# Patient Record
Sex: MALE | Race: WHITE | NOT HISPANIC OR LATINO | Employment: OTHER | URBAN - NONMETROPOLITAN AREA
[De-identification: names, ages, dates, MRNs, and addresses within clinical notes are randomized per-mention and may not be internally consistent; named-entity substitution may affect disease eponyms.]

---

## 2017-01-03 ENCOUNTER — OFFICE VISIT (OUTPATIENT)
Dept: CARDIOLOGY | Facility: CLINIC | Age: 67
End: 2017-01-03

## 2017-01-03 ENCOUNTER — OFFICE VISIT (OUTPATIENT)
Dept: FAMILY MEDICINE CLINIC | Facility: CLINIC | Age: 67
End: 2017-01-03

## 2017-01-03 VITALS
SYSTOLIC BLOOD PRESSURE: 120 MMHG | BODY MASS INDEX: 34.36 KG/M2 | WEIGHT: 232 LBS | DIASTOLIC BLOOD PRESSURE: 76 MMHG | HEIGHT: 69 IN | HEART RATE: 78 BPM

## 2017-01-03 VITALS
HEART RATE: 83 BPM | WEIGHT: 232.9 LBS | BODY MASS INDEX: 34.49 KG/M2 | SYSTOLIC BLOOD PRESSURE: 108 MMHG | OXYGEN SATURATION: 91 % | HEIGHT: 69 IN | DIASTOLIC BLOOD PRESSURE: 64 MMHG

## 2017-01-03 DIAGNOSIS — J44.9 CHRONIC OBSTRUCTIVE PULMONARY DISEASE, UNSPECIFIED COPD TYPE (HCC): Chronic | ICD-10-CM

## 2017-01-03 DIAGNOSIS — E78.5 HYPERLIPIDEMIA, UNSPECIFIED HYPERLIPIDEMIA TYPE: ICD-10-CM

## 2017-01-03 DIAGNOSIS — R07.9 CHEST PAIN, UNSPECIFIED TYPE: Primary | Chronic | ICD-10-CM

## 2017-01-03 DIAGNOSIS — R73.09 INCREASED GLUCOSE LEVEL: ICD-10-CM

## 2017-01-03 DIAGNOSIS — I10 ESSENTIAL HYPERTENSION: Primary | ICD-10-CM

## 2017-01-03 DIAGNOSIS — R53.83 FATIGUE, UNSPECIFIED TYPE: Chronic | ICD-10-CM

## 2017-01-03 DIAGNOSIS — I20.9 ISCHEMIC CHEST PAIN (HCC): ICD-10-CM

## 2017-01-03 DIAGNOSIS — I25.10 CORONARY ARTERY DISEASE INVOLVING NATIVE CORONARY ARTERY OF NATIVE HEART WITHOUT ANGINA PECTORIS: ICD-10-CM

## 2017-01-03 DIAGNOSIS — Z71.6 TOBACCO ABUSE COUNSELING: ICD-10-CM

## 2017-01-03 PROCEDURE — 99214 OFFICE O/P EST MOD 30 MIN: CPT | Performed by: INTERNAL MEDICINE

## 2017-01-03 PROCEDURE — 99203 OFFICE O/P NEW LOW 30 MIN: CPT | Performed by: INTERNAL MEDICINE

## 2017-01-03 PROCEDURE — 93010 ELECTROCARDIOGRAM REPORT: CPT | Performed by: INTERNAL MEDICINE

## 2017-01-03 RX ORDER — NITROGLYCERIN 0.4 MG/1
0.4 TABLET SUBLINGUAL
Qty: 100 TABLET | Refills: 11 | Status: SHIPPED | OUTPATIENT
Start: 2017-01-03

## 2017-01-03 RX ORDER — MIRTAZAPINE 15 MG/1
TABLET, ORALLY DISINTEGRATING ORAL
Qty: 90 TABLET | Refills: 1 | Status: SHIPPED | OUTPATIENT
Start: 2017-01-03 | End: 2017-06-19 | Stop reason: SDUPTHER

## 2017-01-03 NOTE — MR AVS SNAPSHOT
Aman Win Daniel   1/3/2017 11:15 AM   Office Visit    Dept Phone:  [phone]   Encounter #:  [id number]    Provider:  Chuy Camargo MD   Department:  [facility]                Your Full Care Plan              Today's Medication Changes          These changes are accurate as of: 1/3/17 12:12 PM.  If you have any questions, ask your nurse or doctor.               Medication(s)that have changed:     mirtazapine 15 MG disintegrating tablet   Commonly known as:  REMERON SOL-TAB   Dissolve 1 tablet on the tongue every night   What changed:  See the new instructions.   Changed by:  Chyna Kennedy MA            Where to Get Your Medications      These medications were sent to [facility] - [place], [place] - [address] [facility] - [phone]  - [phone] [address], [facility] [address]     Phone:  [phone]     mirtazapine 15 MG disintegrating tablet                  Your Updated Medication List          This list is accurate as of: 1/3/17 12:12 PM.  Always use your most recent med list.                aspirin 81 MG EC tablet       budesonide-formoterol 160-4.5 MCG/ACT inhaler   Commonly known as:  SYMBICORT       cetirizine 10 MG tablet   Commonly known as:  zyrTEC       finasteride 5 MG tablet   Commonly known as:  PROSCAR       FLUoxetine 40 MG capsule   Commonly known as:  PROzac   Take 1 CAPSULE BY MOUTH  DAILY FOR DEPRESSION       fluticasone 50 MCG/ACT nasal spray   Commonly known as:  FLONASE       lisinopril-hydrochlorothiazide 20-25 MG per tablet   Commonly known as:  PRINZIDE,ZESTORETIC       meloxicam 15 MG tablet   Commonly known as:  MOBIC       mirtazapine 15 MG disintegrating tablet   Commonly known as:  REMERON SOL-TAB   Dissolve 1 tablet on the tongue every night       naproxen sodium 220 MG tablet   Commonly known as:  ALEVE       pravastatin 80 MG tablet   Commonly known as:  PRAVACHOL        predniSONE 20 MG tablet   Commonly known as:  DELTASONE   Take 1 tablet by mouth Daily.       * PROAIR  (90 BASE) MCG/ACT inhaler   Generic drug:  albuterol       * albuterol (2.5 MG/3ML) 0.083% nebulizer solution   Commonly known as:  PROVENTIL   TAKE INSERT VIAL(2.5MG) BY NEBULIZATION EVERY 4 HOURS AS NEEDED FOR WHEEZING       tamsulosin 0.4 MG capsule 24 hr capsule   Commonly known as:  FLOMAX   TAKE 1 CAPSULE BY MOUTH EVERY NIGHT       tiotropium 18 MCG per inhalation capsule   Commonly known as:  SPIRIVA HANDIHALER   Place 2 puffs into inhaler and inhale Daily.       traMADol 50 MG tablet   Commonly known as:  ULTRAM       * Notice:  This list has 2 medication(s) that are the same as other medications prescribed for you. Read the directions carefully, and ask your doctor or other care provider to review them with you.            You Were Diagnosed With        Codes Comments    Chest pain, unspecified type    -  Primary ICD-10-CM: R07.9  ICD-9-CM: 786.50     Fatigue, unspecified type     ICD-10-CM: R53.83  ICD-9-CM: 780.79     Increased glucose level     ICD-10-CM: R73.09  ICD-9-CM: 790.29 new      Instructions     None    Patient Instructions History      Upcoming Appointments     Visit Type Date Time Department    NEW PATIENT 1/3/2017  3:00 PM [facility] CARDIOLOGY [facility]    OFFICE VISIT 1/3/2017 11:15 AM [facility] Signup     Our records indicate that you have declined [facility] sign[facility]. If you would like to sign up for [employer], please email H[employer] [email or web address] or call [phone] to obtain an activation code.             Other Info from Your Visit           Your Appointments     Jan 03, 2017  3:00 PM CST   New Patient with Mariposa Zamorano MD   [facility] MEDICAL [facility] CARDIOLOGY (--)    [address] [place] [address] Dr Deloris RAMOS [id number] [phone]           Bring all previous medical records and films, along with current medications and insurance information.        "       Allergies     Codeine      Contrast Dye        Reason for Visit     Follow-up 1 month, mirtazipine      Vital Signs     Blood Pressure Pulse Height Weight Body Mass Index Smoking Status    120/76 78 69\" (175.3 cm) 232 lb (105 kg) 34.26 kg/m2 Current Every Day Smoker      Problems and Diagnoses Noted     Chest pain    -  Primary    Tiredness        Increased glucose level            "

## 2017-01-03 NOTE — MR AVS SNAPSHOT
Aman Sanchez   1/3/2017 3:00 PM   Office Visit    Dept Phone:  [phone]   Encounter #:  [id number]    Provider:  Mariposa Zamorano MD   Department:  [facility] CARDIOLOGY                Your Full Care Plan              Today's Medication Changes          These changes are accurate as of: 1/3/17  4:31 PM.  If you have any questions, ask your nurse or doctor.               New Medication(s)Ordered:     nitroglycerin 0.4 MG SL tablet   Commonly known as:  NITROSTAT   Place 1 tablet under the tongue Every 5 (Five) Minutes As Needed for chest pain.   Started by:  Mariposa Zamorano MD         Medication(s)that have changed:     mirtazapine 15 MG disintegrating tablet   Commonly known as:  REMERON SOL-TAB   Dissolve 1 tablet on the tongue every night   What changed:  See the new instructions.   Changed by:  Chyna Kennedy MA            Where to Get Your Medications      These medications were sent to [facility] - [place], [place] - [address] [facility] [phone]  - [phone] [address], [facility] [address]     Phone:  [phone]     mirtazapine 15 MG disintegrating tablet    nitroglycerin 0.4 MG SL tablet                  Your Updated Medication List          This list is accurate as of: 1/3/17  4:31 PM.  Always use your most recent med list.                aspirin 81 MG EC tablet       budesonide-formoterol 160-4.5 MCG/ACT inhaler   Commonly known as:  SYMBICORT       cetirizine 10 MG tablet   Commonly known as:  zyrTEC       finasteride 5 MG tablet   Commonly known as:  PROSCAR       FLUoxetine 40 MG capsule   Commonly known as:  PROzac   Take 1 CAPSULE BY MOUTH  DAILY FOR DEPRESSION       fluticasone 50 MCG/ACT nasal spray   Commonly known as:  FLONASE       lisinopril-hydrochlorothiazide 20-25 MG per tablet   Commonly known as:  PRINZIDE,ZESTORETIC       meloxicam 15 MG tablet   Commonly known as:  MOBIC       mirtazapine 15 MG  disintegrating tablet   Commonly known as:  REMERON SOL-TAB   Dissolve 1 tablet on the tongue every night       naproxen sodium 220 MG tablet   Commonly known as:  ALEVE       nitroglycerin 0.4 MG SL tablet   Commonly known as:  NITROSTAT   Place 1 tablet under the tongue Every 5 (Five) Minutes As Needed for chest pain.       pravastatin 80 MG tablet   Commonly known as:  PRAVACHOL       predniSONE 20 MG tablet   Commonly known as:  DELTASONE   Take 1 tablet by mouth Daily.       * PROAIR  (90 BASE) MCG/ACT inhaler   Generic drug:  albuterol       * albuterol (2.5 MG/3ML) 0.083% nebulizer solution   Commonly known as:  PROVENTIL   TAKE INSERT VIAL(2.5MG) BY NEBULIZATION EVERY 4 HOURS AS NEEDED FOR WHEEZING       tamsulosin 0.4 MG capsule 24 hr capsule   Commonly known as:  FLOMAX   TAKE 1 CAPSULE BY MOUTH EVERY NIGHT       tiotropium 18 MCG per inhalation capsule   Commonly known as:  SPIRIVA HANDIHALER   Place 2 puffs into inhaler and inhale Daily.       traMADol 50 MG tablet   Commonly known as:  ULTRAM       * Notice:  This list has 2 medication(s) that are the same as other medications prescribed for you. Read the directions carefully, and ask your doctor or other care provider to review them with you.            You Were Diagnosed With        Codes Comments    Chest pain, unspecified type    -  Primary ICD-10-CM: R07.9  ICD-9-CM: 786.50     Essential hypertension     ICD-10-CM: I10  ICD-9-CM: 401.9       Instructions     None    Patient Instructions History      Upcoming Appointments     Visit Type Date Time Department    NEW PATIENT 1/3/2017  3:00 PM [facility] CARDIOLOGY POW    OFFICE VISIT 1/3/2017 11:15 AM BRANDAN KNUTSON      [facility] Signup     Our records indicate that you have declined [employer] signup. If you would like to sign up for [employer], please email [email or web address] or call [phone] to obtain an activation code.             Other Info from Your Visit          "  Allergies     Codeine      Contrast Dye        Reason for Visit     Chest Pain New patient for Cardiac Eval.      Vital Signs     Blood Pressure Pulse Height Weight Oxygen Saturation Body Mass Index    108/64 (BP Location: Right arm, Patient Position: Lying, Cuff Size: Large Adult) 83 69\" (175.3 cm) 232 lb 14.4 oz (106 kg) 91% 34.39 kg/m2    Smoking Status                   Current Every Day Smoker           Problems and Diagnoses Noted     Chest pain    -  Primary    High blood pressure            "

## 2017-01-03 NOTE — PROGRESS NOTES
Subjective   Aman Sanchez is a 66 y.o. male.     Chief Complaint   Patient presents with   • Follow-up     1 month, mirtazipine        History of Present Illness     Pt in f/u on chest pain and COPD.    Pt says he thinks the new medication is helping him sleep.  The Spiriva he says he doesn't think is helping any.  He says he has an appointment with the heart doctor this afternoon.  Pt says he is still hurting in his chest but just a little bit.     Pt says yesterday, he went out with his dog and says he got so dizzy he didn't know if he was going to make it back to his house.  He says it was all he could do to get in the house and get in the chair.  He says this isn't the first time this has happened.  He says he sometimes feels dizzy while he is sitting down too.       The following portions of the patient's history were reviewed and updated as appropriate: allergies, current medications, past family history, past medical history, past social history, past surgical history and problem list.    Review of Systems   Constitutional: Negative for activity change, appetite change, fatigue and unexpected weight change.   HENT: Negative for ear pain, facial swelling and hearing loss.    Respiratory: Negative for cough, chest tightness, shortness of breath and wheezing.    Cardiovascular: Positive for chest pain. Negative for palpitations and leg swelling.   Gastrointestinal: Negative for abdominal pain.   Genitourinary: Negative for difficulty urinating, dysuria, flank pain, frequency and urgency.   Musculoskeletal: Negative for arthralgias and back pain.   Skin: Negative for color change and rash.   Allergic/Immunologic: Negative for environmental allergies and food allergies.   Neurological: Positive for dizziness and light-headedness. Negative for syncope, weakness, numbness and headaches.   Hematological: Negative for adenopathy.   Psychiatric/Behavioral: Positive for sleep disturbance. Negative for confusion,  decreased concentration, dysphoric mood and suicidal ideas. The patient is not nervous/anxious.    All other systems reviewed and are negative.      Objective   Physical Exam   Constitutional: He is oriented to person, place, and time. Vital signs are normal. He appears well-developed and well-nourished.   HENT:   Head: Normocephalic and atraumatic.   Right Ear: External ear normal.   Left Ear: External ear normal.   Nose: Nose normal.   Eyes: Conjunctivae and EOM are normal. Pupils are equal, round, and reactive to light.   Neck: Normal range of motion. Neck supple.   Cardiovascular: Normal rate and regular rhythm.  Exam reveals no gallop and no friction rub.    No murmur heard.  Pulmonary/Chest: Effort normal and breath sounds normal. No respiratory distress. He has no wheezes. He has no rales.   Abdominal: Soft. He exhibits no distension. There is no tenderness. There is no rebound and no guarding.   Musculoskeletal: Normal range of motion. He exhibits no edema.   Neurological: He is alert and oriented to person, place, and time. No cranial nerve deficit.   Skin: Skin is warm and dry. No rash noted.   Psychiatric: He has a normal mood and affect. His behavior is normal. Judgment and thought content normal. His mood appears not anxious. He does not exhibit a depressed mood. He expresses no homicidal and no suicidal ideation. He expresses no suicidal plans and no homicidal plans.   Nursing note and vitals reviewed.      Assessment/Plan   Aman was seen today for follow-up.    Diagnoses and all orders for this visit:    Chest pain, unspecified type    Fatigue, unspecified type    Increased glucose level  Comments:  new    Chronic obstructive pulmonary disease, unspecified COPD type        Pt has chest pain - he is seeing cardio today.    Glucose is elevated to 120    Get BMP A1C in February    Refill Remeron with 6 refills    Scribed for Dr. Camargo by Tabatha Ackerman [facility] 01/03/17

## 2017-01-03 NOTE — PROGRESS NOTES
Chief complaint : Chest Pain    Chest Pain    This is a recurrent problem. The current episode started more than 1 year ago. The onset quality is gradual. The problem occurs daily. The problem has been gradually worsening. The pain is present in the substernal region. The pain is at a severity of 5/10. The pain is moderate. The quality of the pain is described as heavy and pressure. The pain does not radiate. Associated symptoms include dizziness, malaise/fatigue, orthopnea, shortness of breath and vomiting. Pertinent negatives include no abdominal pain, claudication, cough, diaphoresis, headaches, hemoptysis, irregular heartbeat, nausea, numbness, palpitations, PND, syncope or weakness. The pain is aggravated by exertion. Risk factors include obesity, male gender, lack of exercise and smoking/tobacco exposure.   His past medical history is significant for CAD, COPD, hyperlipidemia and hypertension.   Pertinent negatives for past medical history include no muscle weakness and no seizures. Prior diagnostic workup includes cardiac catherization.     Very pleasant 66-year-old gentleman who comes today for cardiac evaluation.  He's been having progressively worsening chest discomfort which is exacerbated with minimal exertion and relieved at rest.  The discomfort is associated with shortness of breath diaphoresis and dizziness.  He had myocardial infarction in 2007 and drug-eluting stent was placed to the mid right coronary artery.      3.5mm x 18mm Cypher mid RCA  2007 Dr [facility]       Review of Systems   Constitution: Positive for malaise/fatigue. Negative for decreased appetite, diaphoresis, weakness, night sweats, weight gain and weight loss.   HENT: Negative for headaches, hearing loss, nosebleeds and sore throat.    Eyes: Negative.  Negative for blurred vision and photophobia.   Cardiovascular: Positive for chest pain and orthopnea. Negative for claudication, dyspnea on exertion, irregular heartbeat, leg swelling,  palpitations, paroxysmal nocturnal dyspnea and syncope.   Respiratory: Positive for shortness of breath. Negative for cough, hemoptysis and wheezing.    Endocrine: Negative for cold intolerance, heat intolerance, polydipsia, polyphagia and polyuria.   Hematologic/Lymphatic: Negative.    Skin: Negative for color change, dry skin, flushing, itching and rash.   Musculoskeletal: Negative.  Negative for muscle cramps, muscle weakness and myalgias.   Gastrointestinal: Positive for vomiting. Negative for abdominal pain, change in bowel habit, diarrhea, hematemesis, melena and nausea.   Genitourinary: Negative for dysuria, frequency and hematuria.   Neurological: Positive for dizziness. Negative for focal weakness, light-headedness, loss of balance, numbness and seizures.   Psychiatric/Behavioral: Negative.  Negative for substance abuse, suicidal ideas and thoughts of violence.   Allergic/Immunologic: Negative.        Past Medical History   Diagnosis Date   • Acute bronchitis    • Anemia    • Breast lump    • Cervical radiculopathy    • Coronary atherosclerosis    • Depressive disorder    • Dysuria    • Essential hypertension    • Flank pain    • Hematochezia    • Hematuria syndrome    • Hyperlipidemia    • Hypoglycemia    • Mass of neck    • Neck pain      sprain   • Osteoarthritis    • Type 2 diabetes mellitus        Family History   Problem Relation Age of Onset   • Lung cancer Mother    • Rectal cancer Sister        Codeine and Contrast dye     reports that he has been smoking.  He has never used smokeless tobacco. He reports that he drinks alcohol.    Objective     Vital Signs  Heart Rate:  [78-83] 83  BP: (108-120)/(64-76) 108/64    Physical Exam   Constitutional: He is oriented to person, place, and time. He appears well-developed and well-nourished.   HENT:   Head: Normocephalic and atraumatic.   Eyes: Conjunctivae and EOM are normal. Pupils are equal, round, and reactive to light.   Neck: Neck supple. No JVD  present. Carotid bruit is not present. No tracheal deviation and no edema present.   Cardiovascular: Normal rate, regular rhythm, S1 normal, S2 normal, normal heart sounds and intact distal pulses.  Exam reveals no gallop, no S3, no S4 and no friction rub.    No murmur heard.  Pulmonary/Chest: Effort normal and breath sounds normal. He has no wheezes. He has no rales. He exhibits no tenderness.   Abdominal: Bowel sounds are normal. He exhibits no abdominal bruit and no pulsatile midline mass. There is no rebound and no guarding.   Musculoskeletal: Normal range of motion. He exhibits no edema.   Neurological: He is alert and oriented to person, place, and time.   Skin: Skin is warm and dry.   Psychiatric: He has a normal mood and affect.         ECG 12 Lead  Date/Time: 1/3/2017 5:47 PM  Performed by: PEDRO PABLO STEWART  Authorized by: PEDRO PABLO STEWART   Rhythm: sinus rhythm  Rate: normal  Conduction: incomplete RBBB  QRS axis: left  Q waves: II, III and aVF  Clinical impression: abnormal ECG and myocardial infarction            Assessment/Plan     Patient Active Problem List   Diagnosis   • Ischemic chest pain   • Essential hypertension   • Coronary artery disease involving native coronary artery of native heart without angina pectoris   • Hyperlipidemia   • Tobacco abuse counseling     1. Essential hypertension  Patient's blood pressure actually is well-controlled at this time.  We'll continue with current medications and risk factor modification.    2. Ischemic chest pain  The chest pain that he is describing is typical of angina pectoralis class III.  In view of history of coronary artery disease and stent placement in 2007 and multiple risk factors for coronary artery disease I have suggested that he undergoes cardiac catheterization.  I have discussed indication alternatives and all potential, occasions of cardiac catheterization and patient has given me permission to schedule the procedure for him.    3. Coronary artery  disease involving native coronary artery of native heart without angina pectoris  We will continue with guideline direct medical therapy including aspirin and statin.    4. Hyperlipidemia, unspecified hyperlipidemia type  Continue with current dose of pravastatin.  We will continue to monitor liver function test and lipid panel.    5. Tobacco abuse counseling  Patient has been counseled regarding tobacco smoking cessation.  He has tried Chantix in the past however he did not tolerate the side effects.  He is going to try and cut back as much as he can and try to quit on his own.    I discussed the patients findings and my recommendations with patient.    Mariposa Zamorano MD  01/03/17  5:55 PM     EMR Dragon/Transcription disclaimer:   Much of this encounter note is an electronic transcription/translation of spoken language to printed text. The electronic translation of spoken language may permit erroneous, or at times, nonsensical words or phrases to be inadvertently transcribed; Although I have reviewed the note for such errors, some may still exist.

## 2017-01-09 ENCOUNTER — TRANSCRIBE ORDERS (OUTPATIENT)
Dept: CARDIOLOGY | Facility: HOSPITAL | Age: 67
End: 2017-01-09

## 2017-01-09 ENCOUNTER — HOSPITAL ENCOUNTER (OUTPATIENT)
Dept: OTHER | Facility: HOSPITAL | Age: 67
Discharge: HOME | End: 2017-01-10
Attending: INTERNAL MEDICINE | Admitting: INTERNAL MEDICINE

## 2017-01-09 LAB
ANION GAP SERPL CALCULATED.3IONS-SCNC: 10 MMOL/L (ref 5–15)
APTT PPP: 27.1 SECONDS (ref 20–40.3)
BASOPHILS # BLD AUTO: 0.04 X1000/UL (ref 0–0.2)
BASOPHILS NFR BLD AUTO: 0.5 % (ref 0–2)
BUN SERPL-MCNC: 14 MG/DL (ref 7–21)
CALCIUM SERPL-MCNC: 9 MG/DL (ref 8.4–10.2)
CHLORIDE SERPL-SCNC: 104 MMOL/L (ref 95–110)
CO2 SERPL-SCNC: 27 MMOL/L (ref 22–31)
CONV AUTO IG#: 0.03 X1000/UL (ref 0.01–0.02)
CONV AUTO IG%: 0.3 % (ref 0–0.5)
CREAT SERPL-MCNC: 0.9 MG/DL (ref 0.7–1.3)
EOSINOPHIL # BLD AUTO: 0.56 X1000/UL (ref 0–0.7)
EOSINOPHIL NFR BLD AUTO: 6.4 % (ref 0–7)
ERYTHROCYTE [DISTWIDTH] IN BLOOD: 13.7 % (ref 11.5–14.5)
GLUCOSE SERPL-MCNC: 96 MG/DL (ref 60–100)
GRANULOCYTES # BLD AUTO: 3.34 X1000/UL (ref 2–8.6)
GRANULOCYTES NFR BLD AUTO: 38 % (ref 37–80)
HCT VFR BLD CALC: 45.4 % (ref 39–49)
HGB BLD-MCNC: 15.8 GM/DL (ref 13.7–17.3)
INR PPP: 1 (ref 0.8–1.2)
LYMPHOCYTES # BLD AUTO: 3.88 X1000/UL (ref 0.6–4.2)
LYMPHOCYTES NFR BLD AUTO: 44.2 % (ref 10–50)
MCH RBC QN: 31.1 PG (ref 26–34)
MCHC RBC-ENTMCNC: 34.8 GM/DL (ref 31.5–36.3)
MCV RBC: 89.4 FL (ref 80–98)
MONOCYTES # BLD AUTO: 0.93 X1000/UL (ref 0–0.9)
MONOCYTES NFR BLD AUTO: 10.6 % (ref 0–12)
PLATELET # BLD: 169 X1000/MM3 (ref 150–450)
PMV BLD: 9.6 FL (ref 8–12)
POTASSIUM SERPL-SCNC: 3.8 MMOL/L (ref 3.5–5.1)
PROTHROMBIN TIME: 13.1 SECONDS (ref 11.1–15.3)
RBC # BLD: 5.08 MEGA/MM3 (ref 4.37–5.74)
SODIUM SERPL-SCNC: 141 MMOL/L (ref 137–145)
WBC # BLD: 8.8 X1000/UL (ref 3.2–9.8)

## 2017-01-10 LAB
ANION GAP SERPL CALCULATED.3IONS-SCNC: 8 MMOL/L (ref 5–15)
BASOPHILS # BLD AUTO: 0.03 X1000/UL (ref 0–0.2)
BASOPHILS NFR BLD AUTO: 0.3 % (ref 0–2)
BUN SERPL-MCNC: 15 MG/DL (ref 7–21)
CALCIUM SERPL-MCNC: 9.1 MG/DL (ref 8.4–10.2)
CHLORIDE SERPL-SCNC: 103 MMOL/L (ref 95–110)
CO2 SERPL-SCNC: 29 MMOL/L (ref 22–31)
CONV AUTO IG#: 0.04 X1000/UL (ref 0.01–0.02)
CONV AUTO IG%: 0.3 % (ref 0–0.5)
CREAT SERPL-MCNC: 1 MG/DL (ref 0.7–1.3)
EOSINOPHIL # BLD AUTO: 0.36 X1000/UL (ref 0–0.7)
EOSINOPHIL NFR BLD AUTO: 3.1 % (ref 0–7)
ERYTHROCYTE [DISTWIDTH] IN BLOOD: 13.7 % (ref 11.5–14.5)
GLUCOSE SERPL-MCNC: 105 MG/DL (ref 60–100)
GRANULOCYTES # BLD AUTO: 5.38 X1000/UL (ref 2–8.6)
GRANULOCYTES NFR BLD AUTO: 45.7 % (ref 37–80)
HCT VFR BLD CALC: 43 % (ref 39–49)
HGB BLD-MCNC: 14.7 GM/DL (ref 13.7–17.3)
LYMPHOCYTES # BLD AUTO: 4.61 X1000/UL (ref 0.6–4.2)
LYMPHOCYTES NFR BLD AUTO: 39.2 % (ref 10–50)
MCH RBC QN: 30.5 PG (ref 26–34)
MCHC RBC-ENTMCNC: 34.2 GM/DL (ref 31.5–36.3)
MCV RBC: 89.2 FL (ref 80–98)
MONOCYTES # BLD AUTO: 1.34 X1000/UL (ref 0–0.9)
MONOCYTES NFR BLD AUTO: 11.4 % (ref 0–12)
PLATELET # BLD: 172 X1000/MM3 (ref 150–450)
PMV BLD: 9.8 FL (ref 8–12)
POTASSIUM SERPL-SCNC: 4.1 MMOL/L (ref 3.5–5.1)
RBC # BLD: 4.82 MEGA/MM3 (ref 4.37–5.74)
SODIUM SERPL-SCNC: 140 MMOL/L (ref 137–145)
WBC # BLD: 11.8 X1000/UL (ref 3.2–9.8)

## 2017-01-10 RX ORDER — METOPROLOL SUCCINATE 25 MG/1
25 TABLET, EXTENDED RELEASE ORAL DAILY
Qty: 90 TABLET | Refills: 3 | Status: SHIPPED | OUTPATIENT
Start: 2017-01-10 | End: 2017-03-06 | Stop reason: ALTCHOICE

## 2017-01-10 RX ORDER — PRASUGREL 10 MG/1
10 TABLET, FILM COATED ORAL DAILY
Qty: 30 TABLET | Refills: 11 | Status: SHIPPED | OUTPATIENT
Start: 2017-01-10 | End: 2017-01-10 | Stop reason: SDUPTHER

## 2017-01-10 RX ORDER — PRASUGREL 10 MG/1
10 TABLET, FILM COATED ORAL DAILY
Qty: 90 TABLET | Refills: 3 | Status: SHIPPED | OUTPATIENT
Start: 2017-01-10 | End: 2017-08-11 | Stop reason: ALTCHOICE

## 2017-01-10 RX ORDER — METOPROLOL SUCCINATE 25 MG/1
25 TABLET, EXTENDED RELEASE ORAL DAILY
Qty: 30 TABLET | Refills: 6 | Status: SHIPPED | OUTPATIENT
Start: 2017-01-10 | End: 2017-01-10 | Stop reason: SDUPTHER

## 2017-01-14 NOTE — OP NOTE
[facility]                             CARDIAC CATHETERIZATION     NAME:  JLUIS KILPATRICK    UNIT #:  [id number]  :  1950              PHYSICIAN:  PEDRO PABLO STEWART M.D.  ROOM:  [id number]                 [facility] #:  [id number]  DICTATED:  2017  1001   TRANSCRIBED:  2017 1035        DATE OF PROCEDURE:  2017     INDICATION:  This is a very pleasant 66-year-old gentleman who presented  to my office with a chief complaint of progressively worsening angina  pectoris, Garza Cardiovascular Class Society III. Patient did have  history of coronary artery disease and stent placement back in . At  that time, a 3.5 x 18 mm Cypher stent was deployed to the midright  coronary artery by Dr. Harvey. Given his history of coronary artery  disease and typical symptoms of angina, decision was made to proceed  with a diagnostic coronary angiogram and possible PCI. I discussed the  indication, alternatives, and all potential complications of the  procedure and the patient gave me permission to proceed by signing a  witnessed consent.     PROCEDURES PERFORMED:  1.   Selective coronary angiogram.  2.   Successful angioplasty and stent placement to the distal right       coronary artery using a 3 0 x 23 mm Xience Alpine stent which was       postdilated with a 3.5 x 20 mm noncompliant balloon inflated up to       16 romeo.     DESCRIPTION OF PROCEDURE:  The patient was brought to the cath lab in  stable condition. He was prepped and draped according to the cath lab  protocol under strict aseptic and sterile condition. Patient's right  radial artery site was prepped and draped accordingly. Using ultrasound  guidance, the right radial artery was punctured using a 21-gauge needle,  and a 6-Tamazight sheath was then introduced over a wire. Initially, we  advanced the FL3.5 diagnostic catheter over the wire. The ostium of the  LAD was selectively engaged. Selective angiogram of the LAD  was  performed in multiple projections. Then, we advanced the FR4 diagnostic  catheter over the wire. The ostium of the right coronary artery was  selectively engaged and selective right coronary angiogram of the right  coronary artery and the left circumflex that originates from the right  coronary sinus was obtained.     CORONARIES:  1.   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  2.   LAD: Left anterior descending coronary artery originates from the       left coronary sinus. It courses along the anterior interventricular       groove and it gives rise to a single major diagonal branch and       multiple septal perforators. The diagonal branch has 3 subbranches.       The LAD extends all the way down to the apex. LAD also is noted to       give rise to multiple septal perforators. There is approximately       less than 50% stenosis noted in the mid-LAD.  3.   RCA: The right coronary artery originates from the right coronary       sinus. It courses along the posterior AV groove. It is a relatively       large caliber vessel. It gives rise distally to a decent caliber       PDA and posterolateral branches. The RCA has less than 50% stenosis       at the midsegment. At the edge of the previously placed stent,       there is evidence of in-stent restenosis of approximately 80% to       90%. Shortly after that, the vessel has approximately 30% stenosis       as well.  4.   Left circumflex: The left circumflex originates from the right       coronary sinus and courses along the anterior AV groove. The left       circumflex gives rise to 2 marginal branches and is free of       obstructive disease.     FINAL IMPRESSION:  1.   Single vessel obstructive coronary artery disease noted at the       distal edge of the previously stented segment of the distal RCA.       This appears to be an in-segment and in-stent  restenosis.  2.   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     PLAN:  Plan is to proceed with PCI of the distal RCA.     PERCUTANEOUS CORONARY INTERVENTION OF THE DISTAL RIGHT CORONARY ARTERY:  We advanced the 3 DRC guiding catheter over the wire. The ostium of the  right coronary artery was selectively engaged. Run-through wire was then  advanced into the right coronary artery and crossed the lesion with no  difficulty. Therapeutic anticoagulation was achieved by administration  of unfractionated heparin. Initially, we advanced a 2.5 mm balloon and  performed angioplasty at the lesion site. Then, we advanced a 3.0 x 23  mm Xience Alpine stent. We positioned it across the lesion, overlapped  it with the previously placed stent, and deployed it at a nominal  pressure. We subsequently advanced a 3.5 x 20 mm noncompliant balloon  and performed high pressure inflation within the stented segment. The  final angiogram revealed excellent result with resolution of the  stenosis. The 80% to 90% stenosis was reduced to 0% with no residual  disease. Patient tolerated the procedure very well with no  complications. The radial sheath was removed in the cath lab. Adequate  hemostasis was achieved by placing a transit radial band. Patient was  escorted to the unit in stable condition. He was administered with 60 mg  of Effient in the cath lab.        PEDRO PABLO STEWART M.D.  Electronically Signed  01/14/2017 08:04:44  By MAUREEN BYRNES/[email or web address]  [id number]                          CARDIAC CATHETERIZATION  Page #page

## 2017-01-14 NOTE — DISCHARGE SUMMARY
[facility]                                DISCHARGE SUMMARY     NAME:  JLUIS KILPATRICK    UNIT #:  [id number]  :  1950              ACCT #:  [id number]                                PHYSICIAN:  PEDRO PABLO STEWART M.D.  ADMITTED:  2017         DISCHARGED:  01/10/2017  DICTATED:  01/10/2017  1857   TRANSCRIBED:  01/10/2017 2336        HOSPITAL COURSE:  This is a very pleasant 66-year-old gentleman who  presented with chest discomfort. Subsequent stress test was abnormal  suggesting ischemia. He underwent successful diagnostic coronary  angiogram, which revealed evidence of stenosis distal to his previously  placed stent. The patient had a stent placement back in , 3.5 x 18  mm Cypher stent. Distal to the stent, the patient had an obstructive  stenosis. After angioplasty, we advanced a 3.0 x 23 mm Xience Alpine  stent, which was subsequently post dilated with a 3.5 x 20 mm  noncompliant balloon. Patient was admitted to the hospital and monitored  overnight. He has no chest pain or chest discomfort. He is feeling much  better. His blood pressure is 118/68. Pulse is 57. Temperature is 97.1  degrees Fahrenheit.     MEDICATIONS:  1.   Aspirin 325 mg daily.  2.   Effient 10 mg daily.  3.   Lisinopril 20 mg daily.  4.   Hydrochlorothiazide 25 mg daily.  5.   Fluoxetine 40 mg daily.  6.   Mirtazapine 50 mg at bedtime.  7.   Toprol-XL 25 mg daily.  8.   Tamsulosin 0.4 mg daily.     CONDITION:  Stable.     FOLLOWUP:  He will be following up with me in the office in 4 weeks.     DISCHARGE DIAGNOSES:  1.   Coronary artery disease.  2.   Angina pectoris, Hitchcock Class 3 with abnormal stress test.  3.   Status post successful stent placement to the distal right coronary       artery.  4.   Hypertension.  5.   Hyperlipidemia.     PRIMARY CARE PHYSICIAN:  Dr. Chuy STEWART M.D.  Electronically Signed  2017 08:07:53  By PEDRO PABLO STEWART M.D.      /  [id number]  cc:   MD PEDRO PABLO PORTILLO MD                             DISCHARGE SUMMARY  Page #page

## 2017-01-23 ENCOUNTER — HOSPITAL ENCOUNTER (EMERGENCY)
Facility: HOSPITAL | Age: 67
Discharge: LEFT BEFORE DISCHARGE | End: 2017-01-23

## 2017-01-23 VITALS
BODY MASS INDEX: 34.07 KG/M2 | HEIGHT: 69 IN | RESPIRATION RATE: 20 BRPM | TEMPERATURE: 98 F | DIASTOLIC BLOOD PRESSURE: 91 MMHG | WEIGHT: 230 LBS | OXYGEN SATURATION: 95 % | HEART RATE: 68 BPM | SYSTOLIC BLOOD PRESSURE: 180 MMHG

## 2017-01-23 PROCEDURE — 99211 OFF/OP EST MAY X REQ PHY/QHP: CPT

## 2017-01-24 ENCOUNTER — APPOINTMENT (OUTPATIENT)
Dept: CT IMAGING | Facility: HOSPITAL | Age: 67
End: 2017-01-24

## 2017-01-24 ENCOUNTER — APPOINTMENT (OUTPATIENT)
Dept: GENERAL RADIOLOGY | Facility: HOSPITAL | Age: 67
End: 2017-01-24

## 2017-01-24 ENCOUNTER — HOSPITAL ENCOUNTER (OUTPATIENT)
Facility: HOSPITAL | Age: 67
Setting detail: OBSERVATION
Discharge: HOME | End: 2017-01-25
Attending: EMERGENCY MEDICINE | Admitting: INTERNAL MEDICINE

## 2017-01-24 DIAGNOSIS — R55 SYNCOPE AND COLLAPSE: ICD-10-CM

## 2017-01-24 DIAGNOSIS — R06.02 SHORTNESS OF BREATH: ICD-10-CM

## 2017-01-24 DIAGNOSIS — R07.2 PRECORDIAL CHEST PAIN: Primary | ICD-10-CM

## 2017-01-24 LAB
ALBUMIN SERPL-MCNC: 3.8 G/DL (ref 3.4–4.8)
ALBUMIN/GLOB SERPL: 1.3 G/DL (ref 1.1–1.8)
ALP SERPL-CCNC: 64 U/L (ref 38–126)
ALT SERPL W P-5'-P-CCNC: 71 U/L (ref 21–72)
ANION GAP SERPL CALCULATED.3IONS-SCNC: 12 MMOL/L (ref 5–15)
AST SERPL-CCNC: 38 U/L (ref 17–59)
BASOPHILS # BLD AUTO: 0.04 10*3/MM3 (ref 0–0.2)
BASOPHILS NFR BLD AUTO: 0.4 % (ref 0–2)
BILIRUB SERPL-MCNC: 0.5 MG/DL (ref 0.2–1.3)
BUN BLD-MCNC: 19 MG/DL (ref 7–21)
BUN/CREAT SERPL: 21.3 (ref 7–25)
CALCIUM SPEC-SCNC: 9.7 MG/DL (ref 8.4–10.2)
CHLORIDE SERPL-SCNC: 101 MMOL/L (ref 95–110)
CK MB SERPL-CCNC: 1.08 NG/ML (ref 0–5)
CK MB SERPL-CCNC: 1.11 NG/ML (ref 0–5)
CK SERPL-CCNC: 128 U/L (ref 55–170)
CK SERPL-CCNC: 129 U/L (ref 55–170)
CO2 SERPL-SCNC: 26 MMOL/L (ref 22–31)
CREAT BLD-MCNC: 0.89 MG/DL (ref 0.7–1.3)
D-DIMER, QUANTITATIVE (MAD,POW, STR): 498 NG/ML (FEU) (ref 0–470)
DEPRECATED RDW RBC AUTO: 44.9 FL (ref 35.1–43.9)
EOSINOPHIL # BLD AUTO: 0.62 10*3/MM3 (ref 0–0.7)
EOSINOPHIL NFR BLD AUTO: 6.4 % (ref 0–7)
ERYTHROCYTE [DISTWIDTH] IN BLOOD BY AUTOMATED COUNT: 13.8 % (ref 11.5–14.5)
GFR SERPL CREATININE-BSD FRML MDRD: 86 ML/MIN/1.73 (ref 49–113)
GLOBULIN UR ELPH-MCNC: 3 GM/DL (ref 2.3–3.5)
GLUCOSE BLD-MCNC: 97 MG/DL (ref 60–100)
HCT VFR BLD AUTO: 44 % (ref 39–49)
HGB BLD-MCNC: 15.3 G/DL (ref 13.7–17.3)
HOLD SPECIMEN: NORMAL
HOLD SPECIMEN: NORMAL
IMM GRANULOCYTES # BLD: 0.05 10*3/MM3 (ref 0–0.02)
IMM GRANULOCYTES NFR BLD: 0.5 % (ref 0–0.5)
LYMPHOCYTES # BLD AUTO: 3.71 10*3/MM3 (ref 0.6–4.2)
LYMPHOCYTES NFR BLD AUTO: 38.5 % (ref 10–50)
MCH RBC QN AUTO: 30.8 PG (ref 26.5–34)
MCHC RBC AUTO-ENTMCNC: 34.8 G/DL (ref 31.5–36.3)
MCV RBC AUTO: 88.5 FL (ref 80–98)
MONOCYTES # BLD AUTO: 1.07 10*3/MM3 (ref 0–0.9)
MONOCYTES NFR BLD AUTO: 11.1 % (ref 0–12)
NEUTROPHILS # BLD AUTO: 4.15 10*3/MM3 (ref 2–8.6)
NEUTROPHILS NFR BLD AUTO: 43.1 % (ref 37–80)
NT-PROBNP SERPL-MCNC: 239 PG/ML (ref 0–900)
PLATELET # BLD AUTO: 179 10*3/MM3 (ref 150–450)
PMV BLD AUTO: 10.1 FL (ref 8–12)
POTASSIUM BLD-SCNC: 4.3 MMOL/L (ref 3.5–5.1)
PROT SERPL-MCNC: 6.8 G/DL (ref 6.3–8.6)
RBC # BLD AUTO: 4.97 10*6/MM3 (ref 4.37–5.74)
SODIUM BLD-SCNC: 139 MMOL/L (ref 137–145)
TROPONIN I SERPL-MCNC: 0.01 NG/ML
TROPONIN I SERPL-MCNC: <0.012 NG/ML
WBC NRBC COR # BLD: 9.64 10*3/MM3 (ref 3.2–9.8)
WHOLE BLOOD HOLD SPECIMEN: NORMAL
WHOLE BLOOD HOLD SPECIMEN: NORMAL

## 2017-01-24 PROCEDURE — 93005 ELECTROCARDIOGRAM TRACING: CPT

## 2017-01-24 PROCEDURE — 82550 ASSAY OF CK (CPK): CPT | Performed by: EMERGENCY MEDICINE

## 2017-01-24 PROCEDURE — 85379 FIBRIN DEGRADATION QUANT: CPT | Performed by: EMERGENCY MEDICINE

## 2017-01-24 PROCEDURE — 82550 ASSAY OF CK (CPK): CPT | Performed by: PHYSICIAN ASSISTANT

## 2017-01-24 PROCEDURE — G0378 HOSPITAL OBSERVATION PER HR: HCPCS

## 2017-01-24 PROCEDURE — 99285 EMERGENCY DEPT VISIT HI MDM: CPT

## 2017-01-24 PROCEDURE — 85025 COMPLETE CBC W/AUTO DIFF WBC: CPT | Performed by: EMERGENCY MEDICINE

## 2017-01-24 PROCEDURE — 84484 ASSAY OF TROPONIN QUANT: CPT | Performed by: PHYSICIAN ASSISTANT

## 2017-01-24 PROCEDURE — 93010 ELECTROCARDIOGRAM REPORT: CPT | Performed by: INTERNAL MEDICINE

## 2017-01-24 PROCEDURE — 80053 COMPREHEN METABOLIC PANEL: CPT | Performed by: EMERGENCY MEDICINE

## 2017-01-24 PROCEDURE — 82553 CREATINE MB FRACTION: CPT | Performed by: PHYSICIAN ASSISTANT

## 2017-01-24 PROCEDURE — 84484 ASSAY OF TROPONIN QUANT: CPT | Performed by: EMERGENCY MEDICINE

## 2017-01-24 PROCEDURE — 0 IOPAMIDOL PER 1 ML: Performed by: EMERGENCY MEDICINE

## 2017-01-24 PROCEDURE — 71020 HC CHEST PA AND LATERAL: CPT

## 2017-01-24 PROCEDURE — 71275 CT ANGIOGRAPHY CHEST: CPT

## 2017-01-24 PROCEDURE — 82553 CREATINE MB FRACTION: CPT | Performed by: EMERGENCY MEDICINE

## 2017-01-24 PROCEDURE — 83880 ASSAY OF NATRIURETIC PEPTIDE: CPT | Performed by: EMERGENCY MEDICINE

## 2017-01-24 RX ORDER — PRASUGREL 10 MG/1
10 TABLET, FILM COATED ORAL DAILY
Status: DISCONTINUED | OUTPATIENT
Start: 2017-01-25 | End: 2017-01-25

## 2017-01-24 RX ORDER — BUDESONIDE AND FORMOTEROL FUMARATE DIHYDRATE 160; 4.5 UG/1; UG/1
2 AEROSOL RESPIRATORY (INHALATION)
Status: DISCONTINUED | OUTPATIENT
Start: 2017-01-24 | End: 2017-01-26 | Stop reason: HOSPADM

## 2017-01-24 RX ORDER — METOPROLOL SUCCINATE 25 MG/1
25 TABLET, EXTENDED RELEASE ORAL DAILY
Status: DISCONTINUED | OUTPATIENT
Start: 2017-01-25 | End: 2017-01-25

## 2017-01-24 RX ORDER — LISINOPRIL 5 MG/1
5 TABLET ORAL DAILY
Status: DISCONTINUED | OUTPATIENT
Start: 2017-01-25 | End: 2017-01-25

## 2017-01-24 RX ORDER — TAMSULOSIN HYDROCHLORIDE 0.4 MG/1
0.4 CAPSULE ORAL DAILY
Status: DISCONTINUED | OUTPATIENT
Start: 2017-01-25 | End: 2017-01-25

## 2017-01-24 RX ORDER — FLUOXETINE HYDROCHLORIDE 20 MG/1
40 CAPSULE ORAL DAILY
Status: DISCONTINUED | OUTPATIENT
Start: 2017-01-25 | End: 2017-01-25

## 2017-01-24 RX ORDER — SODIUM CHLORIDE 0.9 % (FLUSH) 0.9 %
10 SYRINGE (ML) INJECTION AS NEEDED
Status: DISCONTINUED | OUTPATIENT
Start: 2017-01-24 | End: 2017-01-26 | Stop reason: HOSPADM

## 2017-01-24 RX ORDER — FINASTERIDE 5 MG/1
5 TABLET, FILM COATED ORAL ONCE
Status: DISCONTINUED | OUTPATIENT
Start: 2017-01-24 | End: 2017-01-26 | Stop reason: HOSPADM

## 2017-01-24 RX ORDER — HYDRALAZINE HYDROCHLORIDE 20 MG/ML
20 INJECTION INTRAMUSCULAR; INTRAVENOUS EVERY 6 HOURS PRN
Status: DISCONTINUED | OUTPATIENT
Start: 2017-01-24 | End: 2017-01-26 | Stop reason: HOSPADM

## 2017-01-24 RX ORDER — ONDANSETRON 2 MG/ML
4 INJECTION INTRAMUSCULAR; INTRAVENOUS EVERY 6 HOURS PRN
Status: DISCONTINUED | OUTPATIENT
Start: 2017-01-24 | End: 2017-01-26 | Stop reason: HOSPADM

## 2017-01-24 RX ORDER — CETIRIZINE HYDROCHLORIDE 10 MG/1
10 TABLET ORAL DAILY
Status: DISCONTINUED | OUTPATIENT
Start: 2017-01-25 | End: 2017-01-25

## 2017-01-24 RX ORDER — SODIUM CHLORIDE 0.9 % (FLUSH) 0.9 %
1-10 SYRINGE (ML) INJECTION AS NEEDED
Status: DISCONTINUED | OUTPATIENT
Start: 2017-01-24 | End: 2017-01-26 | Stop reason: HOSPADM

## 2017-01-24 RX ORDER — PRAVASTATIN SODIUM 40 MG
80 TABLET ORAL NIGHTLY
Status: DISCONTINUED | OUTPATIENT
Start: 2017-01-24 | End: 2017-01-25

## 2017-01-24 RX ORDER — ALBUTEROL SULFATE 2.5 MG/3ML
2.5 SOLUTION RESPIRATORY (INHALATION)
Status: DISCONTINUED | OUTPATIENT
Start: 2017-01-25 | End: 2017-01-26 | Stop reason: HOSPADM

## 2017-01-24 RX ORDER — ASPIRIN 325 MG
325 TABLET ORAL DAILY
Status: DISCONTINUED | OUTPATIENT
Start: 2017-01-24 | End: 2017-01-26 | Stop reason: HOSPADM

## 2017-01-24 RX ADMIN — IOPAMIDOL 66 ML: 755 INJECTION, SOLUTION INTRAVENOUS at 17:16

## 2017-01-24 NOTE — Clinical Note
Level of Care: Telemetry [5]   Diagnosis: Precordial chest pain [551001]   Admitting Physician: KHADIJAH LEMOS [[id number]]   Attending Physician: KHADIJAH LEMOS [[id number]]

## 2017-01-24 NOTE — IP AVS SNAPSHOT
AFTER VISIT SUMMARY             Aman Sanchez           About your hospitalization     You were admitted on:  January 24, 2017 You last received care in the:  [address]       Procedures & Surgeries         Medications    If you or your caregiver advised us that you are currently taking a medication and that medication is marked below as “Resume”, this simply indicates that we have reviewed those medications to make sure our new therapy recommendations do not interfere.  If you have concerns about medications other than those new ones which we are prescribing today, please consult the physician who prescribed them (or your primary physician).  Our review of your home medications is not meant to indicate that we are directing their use.             Your Medications      START taking these medications     amLODIPine 5 MG tablet   Take 1 tablet by mouth Daily.   Last time this was given:  1/25/2017  2:16 PM   Commonly known as:  NORVASC   Next Dose Due:  1/26 9 AM   Notes to Patient:  For blood pressure             CHANGE how you take these medications     aspirin 81 MG tablet   Take 2 tablets by mouth Daily for 30 days.   According to our records, you may have been taking this medication differently.   What changed:  how much to take   Next Dose Due:  1/26 9 AM   Notes to Patient:  For heart prevention           tiotropium 18 MCG per inhalation capsule   Place 2 puffs into inhaler and inhale Daily.   According to our records, you may have been taking this medication differently.   Commonly known as:  SPIRIVA HANDIHALER   What changed:    - when to take this  - reasons to take this   Next Dose Due:  1/26 9 AM   Notes to Patient:  For breathing             CONTINUE taking these medications     budesonide-formoterol 160-4.5 MCG/ACT inhaler   Inhale 2 puffs 2 (Two) Times a Day As Needed.   Commonly known as:  SYMBICORT   Next Dose Due:  As needed   Notes to Patient:  For breathing           cetirizine 10 MG tablet   Take 10 mg by mouth Daily.   Last time this was given:  1/25/2017  8:32 AM   Commonly known as:  zyrTEC   Next Dose Due:  1/26 9 AM   Notes to Patient:  For allergies             finasteride 5 MG tablet   Take 5 mg by mouth 1 (One) Time.   Commonly known as:  PROSCAR   Next Dose Due:  1/26 9 AM   Notes to Patient:  For prostate           FLUoxetine 40 MG capsule   Take 1 CAPSULE BY MOUTH  DAILY FOR DEPRESSION   Last time this was given:  1/25/2017  8:34 AM   Commonly known as:  PROzac   Next Dose Due:  1/26 9 AM   Notes to Patient:  For depression           fluticasone 50 MCG/ACT nasal spray   2 sprays into each nostril Every Night.   Commonly known as:  FLONASE   Next Dose Due:  1/25 9 pm   Notes to Patient:  For allergies           lisinopril-hydrochlorothiazide 20-25 MG per tablet   Take 1 tablet by mouth Daily.   Commonly known as:  PRINZIDE,ZESTORETIC   Next Dose Due:  1/26 9 AM   Notes to Patient:  For blood pressure/fluid           meloxicam 15 MG tablet   Take 15 mg by mouth 1 (One) Time.   Commonly known as:  MOBIC   Next Dose Due:  1/26 9 AM   Notes to Patient:  For joint pain           metoprolol succinate XL 25 MG 24 hr tablet   Take 1 tablet by mouth Daily for 90 days.   Last time this was given:  1/25/2017  8:33 AM   Commonly known as:  TOPROL-XL   Next Dose Due:  1/26 9 AM   Notes to Patient:  For heart/blood pressure           mirtazapine 15 MG disintegrating tablet   Dissolve 1 tablet on the tongue every night   Commonly known as:  REMERON SOL-TAB   Next Dose Due:  1/25 9 pm           nitroglycerin 0.4 MG SL tablet   Place 1 tablet under the tongue Every 5 (Five) Minutes As Needed for chest pain.   Commonly known as:  NITROSTAT   Next Dose Due:  As needed           prasugrel 10 MG tablet   Take 1 tablet by mouth Daily for 90 days.   Last time this was given:  1/25/2017  8:31 AM   Commonly known as:  EFFIENT   Next Dose Due:  1/26 9 AM   Notes to Patient:  Blood thinner to  prevent clots             pravastatin 80 MG tablet   Take 80 mg by mouth Daily.   Last time this was given:  1/25/2017  9:02 PM   Commonly known as:  PRAVACHOL   Next Dose Due:  1/26 9 pm   Notes to Patient:  For cholesterol           predniSONE 20 MG tablet   Take 1 tablet by mouth Daily.   Commonly known as:  DELTASONE   Next Dose Due:  1/26 9 AM   Notes to Patient:  steroid           PROAIR  (90 BASE) MCG/ACT inhaler   Inhale 108 mcg 4 (Four) Times a Day As Needed.   Generic drug:  albuterol   Next Dose Due:  As needed   Notes to Patient:  For shortness of air/wheezing           albuterol (2.5 MG/3ML) 0.083% nebulizer solution   TAKE INSERT VIAL(2.5MG) BY NEBULIZATION EVERY 4 HOURS AS NEEDED FOR WHEEZING   Last time this was given:  1/25/2017  2:03 AM   Commonly known as:  PROVENTIL   Next Dose Due:  As needed   Notes to Patient:  For shortness of air/wheezing           tamsulosin 0.4 MG capsule 24 hr capsule   TAKE 1 CAPSULE BY MOUTH EVERY NIGHT   Last time this was given:  1/25/2017  8:32 AM   Commonly known as:  FLOMAX   Next Dose Due:  1/26 9 pm   Notes to Patient:  For prostate           traMADol 50 MG tablet   Take 50 mg by mouth Every 6 (Six) Hours As Needed for moderate pain (4-6).   Commonly known as:  ULTRAM   Next Dose Due:  As needed   Notes to Patient:  For pain             STOP taking these medications     naproxen sodium 220 MG tablet   Commonly known as:  ALEVE                Where to Get Your Medications      These medications were sent to [facility] - [place], [place] - [address] [place] [address] - [phone] [facility] [phone] [address], [facility] [address]     Phone:  [phone]     amLODIPine 5 MG tablet                  Your Medications      Your Medication List           Morning Noon Evening Bedtime As Needed    amLODIPine 5 MG tablet   Take 1 tablet by mouth Daily.   Commonly known as:  NORVASC   Notes to Patient:  For blood pressure                                    aspirin 81 MG tablet   Take 2 tablets by mouth Daily for 30 days.   Notes to Patient:  For heart prevention                                   budesonide-formoterol 160-4.5 MCG/ACT inhaler   Inhale 2 puffs 2 (Two) Times a Day As Needed.   Commonly known as:  SYMBICORT   Notes to Patient:  For breathing                                   cetirizine 10 MG tablet   Take 10 mg by mouth Daily.   Commonly known as:  zyrTEC   Notes to Patient:  For allergies                                     finasteride 5 MG tablet   Take 5 mg by mouth 1 (One) Time.   Commonly known as:  PROSCAR   Notes to Patient:  For prostate                                   FLUoxetine 40 MG capsule   Take 1 CAPSULE BY MOUTH  DAILY FOR DEPRESSION   Commonly known as:  PROzac   Notes to Patient:  For depression                                   fluticasone 50 MCG/ACT nasal spray   2 sprays into each nostril Every Night.   Commonly known as:  FLONASE   Notes to Patient:  For allergies                                   lisinopril-hydrochlorothiazide 20-25 MG per tablet   Take 1 tablet by mouth Daily.   Commonly known as:  PRINZIDE,ZESTORETIC   Notes to Patient:  For blood pressure/fluid                                   meloxicam 15 MG tablet   Take 15 mg by mouth 1 (One) Time.   Commonly known as:  MOBIC   Notes to Patient:  For joint pain                                   metoprolol succinate XL 25 MG 24 hr tablet   Take 1 tablet by mouth Daily for 90 days.   Commonly known as:  TOPROL-XL   Notes to Patient:  For heart/blood pressure                                   mirtazapine 15 MG disintegrating tablet   Dissolve 1 tablet on the tongue every night   Commonly known as:  REMERON SOL-TAB                                   nitroglycerin 0.4 MG SL tablet   Place 1 tablet under the tongue Every 5 (Five) Minutes As Needed for chest pain.   Commonly known as:  NITROSTAT                                   prasugrel 10 MG tablet   Take 1 tablet  by mouth Daily for 90 days.   Commonly known as:  EFFIENT   Notes to Patient:  Blood thinner to prevent clots                                     pravastatin 80 MG tablet   Take 80 mg by mouth Daily.   Commonly known as:  PRAVACHOL   Notes to Patient:  For cholesterol                                   predniSONE 20 MG tablet   Take 1 tablet by mouth Daily.   Commonly known as:  DELTASONE   Notes to Patient:  steroid                                   * PROAIR  (90 BASE) MCG/ACT inhaler   Inhale 108 mcg 4 (Four) Times a Day As Needed.   Generic drug:  albuterol   Notes to Patient:  For shortness of air/wheezing                                   * albuterol (2.5 MG/3ML) 0.083% nebulizer solution   TAKE INSERT VIAL(2.5MG) BY NEBULIZATION EVERY 4 HOURS AS NEEDED FOR WHEEZING   Commonly known as:  PROVENTIL   Notes to Patient:  For shortness of air/wheezing                                   tamsulosin 0.4 MG capsule 24 hr capsule   TAKE 1 CAPSULE BY MOUTH EVERY NIGHT   Commonly known as:  FLOMAX   Notes to Patient:  For prostate                                   tiotropium 18 MCG per inhalation capsule   Place 2 puffs into inhaler and inhale Daily.   Commonly known as:  SPIRIVA HANDIHALER   Notes to Patient:  For breathing                                   traMADol 50 MG tablet   Take 50 mg by mouth Every 6 (Six) Hours As Needed for moderate pain (4-6).   Commonly known as:  ULTRAM   Notes to Patient:  For pain                                   * Notice:  This list has 2 medication(s) that are the same as other medications prescribed for you. Read the directions carefully, and ask your doctor or other care provider to review them with you.             Instructions for After Discharge        Activity Instructions     Activity as tolerated             Diet Instructions     Heart healthy               Discharge Instructions       Go to the hospital or see the doctor for unrelieved chest pain, shortness of air, temp  >101.5, or any persistent worrisome symptoms.     Discharge References/Attachments     PURSED LIP BREATHING, EASY-TO-READ (ENGLISH)       Follow-ups for After Discharge        Follow-up Information     Follow up with Chuy Camargo MD. Schedule an appointment as soon as possible for a visit in 1 week(s).    Specialty:  Internal Medicine    Contact information:    [address] [place] [address] [address] [place] [place] [address] [phone]        Scheduled Appointments     Mar 06, 2017  2:15 PM CST   Follow Up with Mariposa Zamorano MD   [facility] CARDIOLOGY (--)    [address] [place] [address] [address]  [facility] [address] [place] [id number] [phone]           Arrive 15 minutes prior to appointment.              [employer] Signup     [employer] allows you to send messages to your doctor, view your test results, renew your prescriptions, schedule appointments, and more. To sign up, go to [email or web address] and click on the Sign Up Now link in the New User? box. Enter your [employer] Activation Code exactly as it appears below along with the last four digits of your Social Security Number and your Date of Birth () to complete the sign-up process. If you do not sign up before the expiration date, you must request a new code.    [employer] Activation Code: [email or web address]  Expires: 2017 10:44 PM    If you have questions, you can email [email or web address] or call [phone] to talk to our [employer] staff. Remember, [employer] is NOT to be used for urgent needs. For medical emergencies, dial 911.           Summary of Your Hospitalization        Reason for Hospitalization     Your primary diagnosis was:  Not on File    Your diagnoses also included:  Precordial Chest Pain      Care Providers     Provider Service Role Specialty    Musa Miller MD Medicine Attending Provider Hospitalist    Mariposa Zamorano MD Cardiology Consulting Physician  Cardiology      Your Allergies  Date Reviewed: 2017     Allergen Reactions    Codeine Not Noted         Contrast Dye Not Noted      Patient Belongings Returned     Document Return of Belongings Flowsheet     Were the patient bedside belongings sent home?   --   Belongings Retrieved from Security & Sent Home   --    Belongings Sent to Safe   --   Medications Retrieved from Pharmacy & Sent Home   --              More Information      Pursed Lip Breathing  Some long-term respiratory conditions like COPD and asthma can make it hard to release all the air in your lungs when you breathe out (exhale). This can result in oxygen-poor air building up in your lungs (air trapping) and make it hard to fill your lungs with fresh air during each breath. This can also cause you to feel short of breath.   Pursed lip breathing can help to relieve some of this shortness of breath. By keeping your airways open for a longer amount of time during your breath out, you can empty more air out of your lungs. This will make more space for fresh air during the next breath in (inhalation).    HOW TO PERFORM PURSED LIP BREATHING  1. Close your mouth.  2. Breathe in through your nose, taking a normal-sized breath. The rate of breathing in may be your normal rate, or you can try breathing in with a slow count to two or three if you feel you are not getting enough air.  3. Pucker your lips as if you were going to whistle.  4. Gently tighten your stomach muscles to help push the air out.  5. Breathe out slowly through your pursed lips. You should breathe out at least twice as long as you breathe in.  6.  Be sure you breathe out all of the air, but do not force the air out.  GET HELP IF:  Your shortness of breath gets worse.  GET HELP RIGHT AWAY IF:   You are very short of breath.     This information is not intended to replace advice given to you by your health care provider. Make sure you discuss any questions you have with your health care provider.     Document Released: 09/26/2009 Document Revised:  01/08/2016 Document Reviewed: 07/30/2014  [employer] Interactive Patient Education ©2016 [employer]            SYMPTOMS OF A STROKE    Call 911 or have someone take you to the Emergency Department if you have any of the following:    · Sudden numbness or weakness of your face, arm or leg especially on one side of the body  · Sudden confusion, diffiiculty speaking or trouble understanding   · Changes in your vision or loss of sight in one eye  · Sudden severe headache with no known cause  · sudden dizziness, trouble walking, loss of balance or coordination    It is important to seek emergency care right away if you have further stroke symptoms. If you get emergency help quickly, the powerful clot-dissolving medicines can reduce the disabilities caused by a stroke.     For more information:    American Stroke Association  7-694-7-STROKE  www.strokeassociation.org           IF YOU SMOKE OR USE TOBACCO PLEASE READ THE FOLLOWING:    Why is smoking bad for me?  Smoking increases the risk of heart disease, lung disease, vascular disease, stroke, and cancer.     If you smoke, STOP!    If you would like more information on quitting smoking, please visit the [employer] website: [email or web address]   This link will provide additional resources including the QUIT line and the Beat the Pack support groups.     For more information:    American Cancer Society  (830) 441-7783    American Heart Association  1-969.647.8798               YOU ARE THE MOST IMPORTANT FACTOR IN YOUR RECOVERY.     Follow all instructions carefully.     I have reviewed my discharge instructions with my nurse, including the following information, if applicable:     Information about my illness and diagnosis   Follow up appointments (including lab draws)   Wound Care   Equipment Needs   Medications (new and continuing) along with side effects   Preventative information such as vaccines and smoking  cessations   Diet   Pain   I know when to contact my Doctor's office or seek emergency care      I want my nurse to describe the side effects of my medications: YES NO   If the answer is no, I understand the side effects of my medications: YES NO   My nurse described the side effects of my medications in a way that I could understand: YES NO   I have taken my personal belongings and my own medications with me at discharge: YES NO            I have received this information and my questions have been answered. I have discussed any concerns I see with this plan with the nurse or physician. I understand these instructions.    Signature of Patient or Responsible Person: _____________________________________    Date: _________________  Time: __________________    Signature of Healthcare Provider: _______________________________________  Date: _________________  Time: __________________

## 2017-01-24 NOTE — ED PROVIDER NOTES
Subjective   HPI Comments: Patient sent from clinic with about two weeks of worsening elevated blood pressure, followed by CP today while at an outpatient urology appointment.  Patient became presyncopal, possible syncope, at the clinic radiology.  No f/c.  No n/v.  No diaphoresis.  Patient sent to the ED after phone consultation with Dr. Mario.  There was initial concern for NSTEMI.  Patient currently without symptoms.      Patient is a 66 y.o. male presenting with chest pain.   History provided by:  Patient   used: No    Chest Pain   Pain location:  Substernal area and epigastric  Pain quality: stabbing and throbbing    Pain radiates to:  Does not radiate  Pain severity:  Severe (Now without pain)  Onset quality:  Sudden  Duration:  2 hours  Timing:  Intermittent  Progression:  Resolved  Chronicity:  New  Context: breathing (could not catch his breath during the episode)    Relieved by:  Nothing  Worsened by:  Certain positions and deep breathing  Ineffective treatments:  None tried  Associated symptoms: dizziness, fatigue, near-syncope, shortness of breath and weakness    Associated symptoms: no abdominal pain, no altered mental status, no back pain, no cough, no diaphoresis, no fever, no heartburn, no nausea, no numbness, no orthopnea, no palpitations and no vomiting    Risk factors: coronary artery disease (recent stenting on January 9th 2017, prior stenting in 2007), hypertension, male sex and obesity        Review of Systems   Constitutional: Positive for fatigue. Negative for diaphoresis and fever.   HENT: Negative.    Eyes: Negative.    Respiratory: Positive for chest tightness and shortness of breath. Negative for cough.    Cardiovascular: Positive for chest pain and near-syncope. Negative for palpitations and orthopnea.   Gastrointestinal: Negative.  Negative for abdominal pain, heartburn, nausea and vomiting.   Genitourinary: Positive for dysuria (recent ureterolithiasis).    Musculoskeletal: Negative.  Negative for back pain.   Skin: Negative.    Neurological: Positive for dizziness and weakness. Negative for numbness.   Psychiatric/Behavioral: Negative.        Past Medical History   Diagnosis Date   • Acute bronchitis    • Anemia    • Breast lump    • Cervical radiculopathy    • COPD (chronic obstructive pulmonary disease)    • Coronary atherosclerosis    • Depressive disorder    • Dysuria    • Essential hypertension    • Flank pain    • Hematochezia    • Hematuria syndrome    • Hyperlipidemia    • Hypoglycemia    • Mass of neck    • Neck pain      sprain   • Osteoarthritis    • Type 2 diabetes mellitus        Allergies   Allergen Reactions   • Codeine    • Contrast Dye        Past Surgical History   Procedure Laterality Date   • Cystoscopy  08/29/2014     Left ESWL, cysto and stent removal   • Transesophageal echocardiogram (mary lou)  07/21/2014     with color flow-Normal LV systolic function with Ef of 60-65%/ Grad1 diastolic dysfunction of the left ventricular myocardium. No evidence of pericardial effusion   • Injection of medication  11/11/2013     Kenalog   • Cystoscopy w/ litholapaxy / ehl  08/15/2014     Left ESWL   • Injection of medication  01/16/2014     Toradol   • Back surgery     • Hip surgery     • Tonsillectomy     • Cholecystectomy         Family History   Problem Relation Age of Onset   • Lung cancer Mother    • Rectal cancer Sister        Social History     Social History   • Marital status:      Spouse name: N/A   • Number of children: N/A   • Years of education: N/A     Social History Main Topics   • Smoking status: Current Every Day Smoker     Packs/day: 0.50     Types: Cigarettes   • Smokeless tobacco: Never Used   • Alcohol use Yes      Comment: Rare   • Drug use: No   • Sexual activity: Not Asked     Other Topics Concern   • None     Social History Narrative   • None           Objective   Physical Exam   Constitutional: He is oriented to person, place, and  time. He appears well-developed and well-nourished.   HENT:   Head: Normocephalic and atraumatic.   Mouth/Throat: Oropharynx is clear and moist.   Eyes: Conjunctivae and EOM are normal.   Neck: Normal range of motion. Neck supple.   Cardiovascular: Normal rate, regular rhythm, normal heart sounds and normal pulses.  Exam reveals no gallop and no friction rub.    No murmur heard.  No current pain, though some pain on palpation to the left costosternal region   Pulmonary/Chest: Effort normal and breath sounds normal. He has no wheezes. He has no rales.   Abdominal: Soft. Bowel sounds are normal. He exhibits no mass. There is no tenderness. There is no rebound and no guarding.   Musculoskeletal: Normal range of motion. He exhibits no tenderness.   Neurological: He is alert and oriented to person, place, and time. No cranial nerve deficit.   Skin: Skin is warm and dry.   Psychiatric: He has a normal mood and affect. His behavior is normal. Judgment and thought content normal.   Nursing note and vitals reviewed.      ECG 12 Lead    Date/Time: 1/24/2017 1:23 PM  Performed by: BIENVENIDO CARRASCO  Authorized by: BIENVENIDO CARRASCO   Interpreted by physician  Rhythm: sinus bradycardia  Rate: normal  QRS axis: left  Conduction: incomplete RBBB  Clinical impression: non-specific ECG               ED Course  ED Course      Labs Reviewed   CBC WITH AUTO DIFFERENTIAL - Abnormal; Notable for the following:        Result Value    RDW-SD 44.9 (*)     Monocytes, Absolute 1.07 (*)     Immature Grans, Absolute 0.05 (*)     All other components within normal limits   D-DIMER, QUANTITATIVE - Abnormal; Notable for the following:     D-Dimer, Quantitative 498 (*)     All other components within normal limits    Narrative:     Dimer values <500 ng/ml FEU are FDA approved as aid in diagnosis of deep venous thrombosis and pulmonary embolism.  This test should not be used in an exclusion strategy with pretest probability alone.    A recent  guideline regarding diagnosis for pulmonary thomboembolism recommends an adjusted exclusion criterion of age x 10 ng/ml FEU for patients >50 years of age ([employer] Intern Med 2015; 163: 701-711).   TROPONIN (IN-HOUSE) - Normal   COMPREHENSIVE METABOLIC PANEL - Normal   BNP (IN-HOUSE) - Normal   CK - Normal   CK MB - Normal   TROPONIN (IN-HOUSE) - Normal   RAINBOW DRAW    Narrative:     The following orders were created for panel order [place] Draw.  Procedure                               Abnormality         Status                     ---------                               -----------         ------                     Light Blue Top[[id number]]                                    In process                 Green Top (Gel)[[id number]]                                   In process                 Lavender Top[[id number]]                                      In process                 Gold Top - SST[[id number]]                                    In process                   Please view results for these tests on the individual orders.   CBC AND DIFFERENTIAL    Narrative:     The following orders were created for panel order CBC & Differential.  Procedure                               Abnormality         Status                     ---------                               -----------         ------                     CBC Auto Differential[[id number]]         Abnormal            Final result                 Please view results for these tests on the individual orders.   LIGHT BLUE TOP   GREEN TOP   LAVENDER TOP   GOLD TOP - SST       CT Angiogram Chest With Contrast   Final Result   CONCLUSION:   1.  No evidence of pulmonary embolism.   2.  Scattered areas of glandular less attenuation and prominent interstitial markings could be due to CHF.   3.  Two tiny nodules in the right upper lobe, the largest measuring 5 mm. Recommend follow-up chest CT in 6 months to ensure stability.   4.  Enlarged left hilar and mediastinal lymph nodes, most  likely reactive.   5.  Fatty liver.      Electronically signed by:  Tyler Dewitt MD  1/24/2017 5:44 PM CST         XR Chest 2 View   Final Result   CONCLUSION:   Chronic obstructive pulmonary disease.   No acute infiltrate.   Old granulomatous disease is present.   Minimal cardiomegaly.      [id number]               Electronically signed by:  Ermias Paz MD  1/24/2017 2:31 PM CST                       MDM  Number of Diagnoses or Management Options  Precordial chest pain: established and worsening  Shortness of breath: new and requires workup  Syncope and collapse: new and requires workup  Diagnosis management comments: Patient with chest pain, syncope, shortness of breath, recent stenting, last stent in 2007 with known ASCAD.  Negative markers and negative EKG changes for AMI.  Will obs for repeat cardiac markers.  Also awaiting CTA with minimally elevated d-dimer, current picture does not suggest PE though cannot be ruled out as cause of patients syncope with cp, dyspnea.        Final diagnoses:   Precordial chest pain   Syncope and collapse   Shortness of breath            Keo George MD  01/24/17 6318       Keo George MD  01/24/17 2833

## 2017-01-25 VITALS
BODY MASS INDEX: 35.01 KG/M2 | RESPIRATION RATE: 20 BRPM | OXYGEN SATURATION: 93 % | TEMPERATURE: 96.2 F | HEART RATE: 60 BPM | WEIGHT: 236.4 LBS | SYSTOLIC BLOOD PRESSURE: 152 MMHG | DIASTOLIC BLOOD PRESSURE: 84 MMHG | HEIGHT: 69 IN

## 2017-01-25 PROBLEM — I20.9 ISCHEMIC CHEST PAIN (HCC): Status: RESOLVED | Noted: 2017-01-03 | Resolved: 2017-01-25

## 2017-01-25 PROBLEM — R07.2 PRECORDIAL CHEST PAIN: Status: RESOLVED | Noted: 2017-01-24 | Resolved: 2017-01-25

## 2017-01-25 LAB
TROPONIN I SERPL-MCNC: <0.012 NG/ML

## 2017-01-25 PROCEDURE — 84484 ASSAY OF TROPONIN QUANT: CPT | Performed by: PHYSICIAN ASSISTANT

## 2017-01-25 PROCEDURE — 99218 PR INITIAL OBSERVATION CARE/DAY 30 MINUTES: CPT | Performed by: INTERNAL MEDICINE

## 2017-01-25 PROCEDURE — 94640 AIRWAY INHALATION TREATMENT: CPT

## 2017-01-25 PROCEDURE — G0378 HOSPITAL OBSERVATION PER HR: HCPCS

## 2017-01-25 RX ORDER — TAMSULOSIN HYDROCHLORIDE 0.4 MG/1
0.4 CAPSULE ORAL DAILY
Status: DISCONTINUED | OUTPATIENT
Start: 2017-01-25 | End: 2017-01-26 | Stop reason: HOSPADM

## 2017-01-25 RX ORDER — PRAVASTATIN SODIUM 80 MG/1
80 TABLET ORAL NIGHTLY
Status: DISCONTINUED | OUTPATIENT
Start: 2017-01-25 | End: 2017-01-26 | Stop reason: HOSPADM

## 2017-01-25 RX ORDER — PRAVASTATIN SODIUM 40 MG
80 TABLET ORAL NIGHTLY
Status: DISCONTINUED | OUTPATIENT
Start: 2017-01-25 | End: 2017-01-25

## 2017-01-25 RX ORDER — CETIRIZINE HYDROCHLORIDE 10 MG/1
10 TABLET ORAL DAILY
Status: DISCONTINUED | OUTPATIENT
Start: 2017-01-25 | End: 2017-01-26 | Stop reason: HOSPADM

## 2017-01-25 RX ORDER — FLUOXETINE HYDROCHLORIDE 20 MG/1
40 CAPSULE ORAL DAILY
Status: DISCONTINUED | OUTPATIENT
Start: 2017-01-25 | End: 2017-01-25

## 2017-01-25 RX ORDER — AMLODIPINE BESYLATE 5 MG/1
5 TABLET ORAL
Qty: 30 TABLET | Refills: 1 | Status: SHIPPED | OUTPATIENT
Start: 2017-01-25 | End: 2017-02-02 | Stop reason: SDUPTHER

## 2017-01-25 RX ORDER — FLUOXETINE HYDROCHLORIDE 40 MG/1
40 CAPSULE ORAL DAILY
Status: DISCONTINUED | OUTPATIENT
Start: 2017-01-25 | End: 2017-01-26 | Stop reason: HOSPADM

## 2017-01-25 RX ORDER — AMLODIPINE BESYLATE 5 MG/1
5 TABLET ORAL
Status: DISCONTINUED | OUTPATIENT
Start: 2017-01-25 | End: 2017-01-26 | Stop reason: HOSPADM

## 2017-01-25 RX ORDER — LISINOPRIL 20 MG/1
20 TABLET ORAL
Status: DISCONTINUED | OUTPATIENT
Start: 2017-01-25 | End: 2017-01-26 | Stop reason: HOSPADM

## 2017-01-25 RX ORDER — LISINOPRIL 10 MG/1
10 TABLET ORAL
Status: DISCONTINUED | OUTPATIENT
Start: 2017-01-25 | End: 2017-01-25

## 2017-01-25 RX ORDER — METOPROLOL SUCCINATE 25 MG/1
25 TABLET, EXTENDED RELEASE ORAL DAILY
Status: DISCONTINUED | OUTPATIENT
Start: 2017-01-25 | End: 2017-01-26 | Stop reason: HOSPADM

## 2017-01-25 RX ORDER — AMLODIPINE BESYLATE 5 MG/1
5 TABLET ORAL
Qty: 30 TABLET | Refills: 1 | Status: SHIPPED | OUTPATIENT
Start: 2017-01-25 | End: 2017-01-25

## 2017-01-25 RX ORDER — HYDROCHLOROTHIAZIDE 25 MG/1
25 TABLET ORAL DAILY
Status: DISCONTINUED | OUTPATIENT
Start: 2017-01-25 | End: 2017-01-26 | Stop reason: HOSPADM

## 2017-01-25 RX ORDER — PRASUGREL 10 MG/1
10 TABLET, FILM COATED ORAL DAILY
Status: DISCONTINUED | OUTPATIENT
Start: 2017-01-25 | End: 2017-01-26 | Stop reason: HOSPADM

## 2017-01-25 RX ADMIN — LISINOPRIL 5 MG: 5 TABLET ORAL at 08:36

## 2017-01-25 RX ADMIN — HYDROCHLOROTHIAZIDE 25 MG: 25 TABLET ORAL at 14:16

## 2017-01-25 RX ADMIN — METOPROLOL SUCCINATE 25 MG: 25 TABLET, EXTENDED RELEASE ORAL at 08:33

## 2017-01-25 RX ADMIN — CETIRIZINE HYDROCHLORIDE 10 MG: 10 TABLET ORAL at 08:32

## 2017-01-25 RX ADMIN — PRAVASTATIN SODIUM 80 MG: 80 TABLET ORAL at 21:02

## 2017-01-25 RX ADMIN — LISINOPRIL 20 MG: 20 TABLET ORAL at 14:16

## 2017-01-25 RX ADMIN — ALBUTEROL SULFATE 2.5 MG: 2.5 SOLUTION RESPIRATORY (INHALATION) at 02:03

## 2017-01-25 RX ADMIN — AMLODIPINE BESYLATE 5 MG: 5 TABLET ORAL at 14:16

## 2017-01-25 RX ADMIN — FLUOXETINE HYDROCHLORIDE 40 MG: 40 CAPSULE ORAL at 08:34

## 2017-01-25 RX ADMIN — PRASUGREL 10 MG: 10 TABLET, FILM COATED ORAL at 08:31

## 2017-01-25 RX ADMIN — TAMSULOSIN HYDROCHLORIDE 0.4 MG: 0.4 CAPSULE ORAL at 08:32

## 2017-01-25 NOTE — H&P
"      [facility] Medicine Admission      Date of Admission: 1/24/2017      Primary Care Physician: Chuy Camargo MD      Chief Complaint/HPI:  Patient was at Dr. Horne' office for check up today when he had extremely elevated blood pressure.  Systolic was over 200, diastolic over 100.  Patient was directed to ED at that time.  Patient reports \"I run out of air.\"  Reports he has had increasing dyspnea and fatigue over the last few days.  Reports recent history of stent placement with his cardiologist, Dr. Zamorano.  Reports left sided chest pain that does not radiate.  Denies diaphoresis, reports this is different than when he had his MI.  Reports his MI was more like \"burning in my throat.\"  Patient states he has general headache, but no vision changes or worst headache of life.  No focal weakness.    Concurrent Medical History:  has a past medical history of Acute bronchitis; Anemia; Breast lump; Cervical radiculopathy; COPD (chronic obstructive pulmonary disease); Coronary atherosclerosis; Depressive disorder; Dysuria; Essential hypertension; Flank pain; Hematochezia; Hematuria syndrome; Hyperlipidemia; Hypoglycemia; Mass of neck; Neck pain; Osteoarthritis; and Type 2 diabetes mellitus.  CAD with recent PTCA, chronic pain, history of myocardial infarction    Past Surgical History:  has a past surgical history that includes Cystoscopy (08/29/2014); transesophageal echocardiogram (mary lou) (07/21/2014); Injection of Medication (11/11/2013); Cystoscopy w/ litholapaxy / EHL (08/15/2014); Injection of Medication (01/16/2014); Back surgery; Hip surgery; Tonsillectomy; and Cholecystectomy.    Family History: family history includes Lung cancer in his mother; Rectal cancer in his sister.    Social History:  reports that he has been smoking Cigarettes.  He has been smoking about 0.50 packs per day. He has never used smokeless tobacco. He reports that he drinks alcohol. He reports that " he does not use illicit drugs.    Allergies:   Allergies   Allergen Reactions   • Codeine    • Contrast Dye        Medications: Scheduled Meds:   Continuous Infusions:   PRN Meds:.•  hydrALAZINE  •  ondansetron  •  sodium chloride    Review of Systems:  Review of Systems   Constitutional: Positive for fatigue. Negative for appetite change, chills, diaphoresis and fever.   Respiratory: Positive for chest tightness and shortness of breath. Negative for cough, choking, wheezing and stridor.    Cardiovascular: Positive for chest pain. Negative for palpitations and leg swelling.   Gastrointestinal: Negative for abdominal distention, abdominal pain, constipation, diarrhea, nausea and vomiting.   Genitourinary: Negative for decreased urine volume, difficulty urinating, dysuria and flank pain.   Musculoskeletal: Negative for arthralgias and myalgias.   Skin: Negative for pallor and rash.   Neurological: Positive for headaches. Negative for dizziness, syncope, facial asymmetry, speech difficulty, weakness, light-headedness and numbness.   Psychiatric/Behavioral: Negative for agitation.      Otherwise complete ROS is negative except as mentioned above.    Physical Exam:   Temp:  [98.7 °F (37.1 °C)] 98.7 °F (37.1 °C)  Heart Rate:  [55-71] 70  Resp:  [18-20] 18  BP: (141-177)/(71-86) 169/82  Physical Exam   Constitutional: He is oriented to person, place, and time. He appears well-developed and well-nourished.  Non-toxic appearance. He does not have a sickly appearance. He does not appear ill. No distress.   HENT:   Head: Normocephalic and atraumatic.   Eyes: Conjunctivae and EOM are normal. Pupils are equal, round, and reactive to light. Right eye exhibits no discharge and no exudate. Left eye exhibits no discharge and no exudate. No scleral icterus.   Neck: Normal range of motion. Neck supple. No tracheal deviation present. No thyromegaly present.   Cardiovascular: Normal rate, regular rhythm, normal heart sounds and intact  distal pulses.  Exam reveals no gallop and no friction rub.    No murmur heard.  Pulmonary/Chest: Effort normal and breath sounds normal. No accessory muscle usage. No apnea, no tachypnea and no bradypnea. No respiratory distress. He has no wheezes. He has no rales. He exhibits no tenderness.   Abdominal: Soft. Bowel sounds are normal. He exhibits no distension and no mass. There is no tenderness. There is no rebound and no guarding. No hernia.   Musculoskeletal: He exhibits no edema, tenderness or deformity.   Neurological: He is alert and oriented to person, place, and time. No cranial nerve deficit. Coordination normal.   Skin: Skin is warm and dry. No abrasion, no bruising, no ecchymosis, no laceration, no lesion, no petechiae and no rash noted. He is not diaphoretic. No erythema. No pallor.   Psychiatric: He has a normal mood and affect. His behavior is normal. Judgment and thought content normal. His mood appears not anxious. His affect is not inappropriate.           Results Reviewed:  I have personally reviewed current lab, radiology, and data and agree with results.  Lab Results (last 24 hours)     Procedure Component Value Units Date/Time    CBC & Differential [[id number]] Collected:  01/24/17 1328    Specimen:  Blood Updated:  01/24/17 1342    Narrative:       The following orders were created for panel order CBC & Differential.  Procedure                               Abnormality         Status                     ---------                               -----------         ------                     CBC Auto Differential[[id number]]         Abnormal            Final result                 Please view results for these tests on the individual orders.    CBC Auto Differential [[id number]]  (Abnormal) Collected:  01/24/17 1328    Specimen:  Blood Updated:  01/24/17 1342     WBC 9.64 10*3/mm3      RBC 4.97 10*6/mm3      Hemoglobin 15.3 g/dL      Hematocrit 44.0 %      MCV 88.5 fL      MCH 30.8 pg      MCHC 34.8  g/dL      RDW 13.8 %      RDW-SD 44.9 (H) fl      MPV 10.1 fL      Platelets 179 10*3/mm3      Neutrophil % 43.1 %      Lymphocyte % 38.5 %      Monocyte % 11.1 %      Eosinophil % 6.4 %      Basophil % 0.4 %      Immature Grans % 0.5 %      Neutrophils, Absolute 4.15 10*3/mm3      Lymphocytes, Absolute 3.71 10*3/mm3      Monocytes, Absolute 1.07 (H) 10*3/mm3      Eosinophils, Absolute 0.62 10*3/mm3      Basophils, Absolute 0.04 10*3/mm3      Immature Grans, Absolute 0.05 (H) 10*3/mm3     Comprehensive Metabolic Panel [[id number]]  (Normal) Collected:  01/24/17 1328    Specimen:  Blood Updated:  01/24/17 1413     Glucose 97 mg/dL      BUN 19 mg/dL      Creatinine 0.89 mg/dL      Sodium 139 mmol/L      Potassium 4.3 mmol/L      Chloride 101 mmol/L      CO2 26.0 mmol/L      Calcium 9.7 mg/dL      Total Protein 6.8 g/dL      Albumin 3.80 g/dL      ALT (SGPT) 71 U/L      AST (SGOT) 38 U/L      Alkaline Phosphatase 64 U/L      Total Bilirubin 0.5 mg/dL      eGFR Non African Amer 86 mL/min/1.73      Globulin 3.0 gm/dL      A/G Ratio 1.3 g/dL      BUN/Creatinine Ratio 21.3      Anion Gap 12.0 mmol/L     BNP [[id number]]  (Normal) Collected:  01/24/17 1328    Specimen:  Blood Updated:  01/24/17 1421     proBNP 239.0 pg/mL     Troponin [[id number]]  (Normal) Collected:  01/24/17 1328    Specimen:  Blood Updated:  01/24/17 1426     Troponin I <0.012 ng/mL     CK [[id number]]  (Normal) Collected:  01/24/17 1328    Specimen:  Blood Updated:  01/24/17 1506     Creatine Kinase 129 U/L     D-dimer, Quantitative [[id number]]  (Abnormal) Collected:  01/24/17 1328    Specimen:  Blood Updated:  01/24/17 1508     D-Dimer, Quantitative 498 (H) ng/mL (FEU)     Narrative:       Dimer values <500 ng/ml FEU are FDA approved as aid in diagnosis of deep venous thrombosis and pulmonary embolism.  This test should not be used in an exclusion strategy with pretest probability alone.    A recent guideline regarding diagnosis for pulmonary thomboembolism  recommends an adjusted exclusion criterion of age x 10 ng/ml FEU for patients >50 years of age ([employer] Intern Med 2015; 163: 701-711).    CK-MB [[id number]]  (Normal) Collected:  01/24/17 1328    Specimen:  Blood Updated:  01/24/17 1551     CKMB 1.08 ng/mL     Troponin [[id number]]  (Normal) Collected:  01/24/17 1630    Specimen:  Blood Updated:  01/24/17 1727     Troponin I <0.012 ng/mL     [place] Draw [[id number]] Collected:  01/24/17 1328    Specimen:  Blood Updated:  01/24/17 1801    Narrative:       The following orders were created for panel order [place] Draw.  Procedure                               Abnormality         Status                     ---------                               -----------         ------                     Light Blue Top[[id number]]                                    Final result               Green Top (Gel)[[id number]]                                   Final result               Lavender Top[[id number]]                                      Final result               Gold Top - SST[[id number]]                                    Final result                 Please view results for these tests on the individual orders.    Light Blue Top [[id number]] Collected:  01/24/17 1328    Specimen:  Blood Updated:  01/24/17 1801     Extra Tube hold for add-on       Auto resulted       Green Top (Gel) [[id number]] Collected:  01/24/17 1328    Specimen:  Blood Updated:  01/24/17 1801     Extra Tube Hold for add-ons.       Auto resulted.       Lavender Top [[id number]] Collected:  01/24/17 1328    Specimen:  Blood Updated:  01/24/17 1801     Extra Tube hold for add-on       Auto resulted       Gold Top - SST [[id number]] Collected:  01/24/17 1328    Specimen:  Blood Updated:  01/24/17 1801     Extra Tube Hold for add-ons.       Auto resulted.           Imaging Results (last 24 hours)     Procedure Component Value Units Date/Time    XR Chest 2 View [[id number]] Collected:  01/24/17 1429     Updated:  01/24/17 1432     Narrative:         [facility] [place]    Patient Name: JLUIS KILPATRICK    ORDERING: BIENVENIDO CARRASCO    ATTENDING: BIENVENIDO CARRASCO     REFERRING: BIENVENIDO CARRASCO      TWO VIEW CHEST    HISTORY: Chest pain    Frontal and lateral films of the chest were obtained.  COMPARISON: 6/19/2016    EKG leads in place.  Chronic obstructive pulmonary disease.  No acute infiltrate.  Old granulomatous disease is present.  Minimal cardiomegaly.  The pulmonary vasculature is not increased.  No pleural effusion.  No pneumothorax.  No acute osseous abnormality.      Impression:       CONCLUSION:  Chronic obstructive pulmonary disease.  No acute infiltrate.  Old granulomatous disease is present.  Minimal cardiomegaly.    [id number]          Electronically signed by:  Ermias Paz MD  1/24/2017 2:31 PM CST      CT Angiogram Chest With Contrast [[id number]] Collected:  01/24/17 1731     Updated:  01/24/17 1745    Narrative:       [facility] [place]    Patient Name: JLUIS KILPATRICK    ORDERING: BIENVENIDO CARRASCO    ATTENDING: BIENVENIDO CARRASCO     REFERRING: BIENVENIDO CARRASCO    -----------------------    PROCEDURE: CT/CTA THORAX/PULMONARY WITH CONTRAST    CLINICAL INFORMATION:  PE, R07.2 Precordial pain R55 Syncope and collapse R06.02 Shortness of breath       TECHNIQUE: Axial images were obtained and multiplanar reconstructions were made.    Dose length product 535.3  This exam was performed using radiation doses that are as low as reasonably achievable (ALARA).  CONTRAST:   66 cc intravenous Isovue 370  COMPARISON: CT abdomen dated 7/22/2014    Note: Reconstructed MIP images were obtained in multiple obliquities. No 3-D surface rendered images were obtained for this exam.    FINDINGS:  PULMONARY CTA: The main pulmonary arteries and their major branches are opacified with contrast without evidence of abnormal filling defects.  LUNGS/PLEURA: There are emphysematous changes bilaterally. Scattered areas of groundglass attenuation and  prominent interstitial markings are nonspecific but could be due to CHF. There are two tiny nodules in the right upper lobe best seen on axial image   46, the largest measuring 5 mm.  TRACHEA AND BRONCHI:  The trachea and bronchi are patent.  MEDIASTINUM, RICKIE AND LYMPH NODES: Enlarged, calcified, right paratracheal lymph node likely due to old granulomatous disease. Enlarged left hilar lymph nodes measuring up to 1.3 cm in short axis. Slightly enlarged aorticopulmonary window lymph node   measuring 1.2 cm short axis.  HEART AND PERICARDIUM: The heart and pericardium are normal.  VASCULAR: Unremarkable  UPPER ABDOMEN: Fatty liver.  OSSEOUS STRUCTURES: Unremarkable.      Impression:       CONCLUSION:  1.  No evidence of pulmonary embolism.  2.  Scattered areas of glandular less attenuation and prominent interstitial markings could be due to CHF.  3.  Two tiny nodules in the right upper lobe, the largest measuring 5 mm. Recommend follow-up chest CT in 6 months to ensure stability.  4.  Enlarged left hilar and mediastinal lymph nodes, most likely reactive.  5.  Fatty liver.    Electronically signed by:  Tyler Dewitt MD  1/24/2017 5:44 PM CST              Assessment:    Hospital Problem List     Precordial chest pain                Plan:  Have informed patient cardiologist, Dr. Zamorano, of patient's admission.  Will continue to trend cardiac enzymes.  Echocardiogram.  May also be suffering COPD exacerbation.  Will treat with breathing treatments and steroids if necessary.      DANIELLE Pino  01/24/17  6:41 PM

## 2017-01-25 NOTE — ED NOTES
Attempted to call report; nurse unavailable for report at this time and will call back     Tete Jones RN  01/24/17 1575

## 2017-01-25 NOTE — PROGRESS NOTES
"    [facility] Medicine Services  INPATIENT PROGRESS NOTE    Length of Stay: 0  Date of Admission: 1/24/2017  Primary Care Physician: Chuy Camargo MD    Subjective   Chief Complaint:   Chief Complaint   Patient presents with   • Chest Pain       HPI  Patient was at Dr. Horne' office for check up today when he had extremely elevated blood pressure. Systolic was over 200, diastolic over 100. Patient was directed to ED at that time. Patient reports \"I run out of air.\" Reports he has had increasing dyspnea and fatigue over the last few days. Reports recent history of stent placement with his cardiologist, Dr. Zamorano. Reports left sided chest pain that does not radiate. Denies diaphoresis, reports this is different than when he had his MI. Reports his MI was more like \"burning in my throat.\" Patient states he has general headache, but no vision changes or worst headache of life. No focal weakness    Review of Systems     All pertinent negatives and positives are as above. All other systems have been reviewed and are negative unless otherwise stated.     Objective    Temp:  [97 °F (36.1 °C)-98.7 °F (37.1 °C)] 97 °F (36.1 °C)  Heart Rate:  [50-71] 55  Resp:  [18-20] 20  BP: (139-177)/(71-93) 167/93  Physical Exam        Results Review:  I have reviewed the labs, radiology results, and diagnostic studies.    Laboratory Data:   Lab Results (last 24 hours)     Procedure Component Value Units Date/Time    CBC & Differential [[id number]] Collected:  01/24/17 1328    Specimen:  Blood Updated:  01/24/17 1342    Narrative:       The following orders were created for panel order CBC & Differential.  Procedure                               Abnormality         Status                     ---------                               -----------         ------                     CBC Auto Differential[[id number]]         Abnormal            Final result                 Please view results for these " tests on the individual orders.    CBC Auto Differential [[id number]]  (Abnormal) Collected:  01/24/17 1328    Specimen:  Blood Updated:  01/24/17 1342     WBC 9.64 10*3/mm3      RBC 4.97 10*6/mm3      Hemoglobin 15.3 g/dL      Hematocrit 44.0 %      MCV 88.5 fL      MCH 30.8 pg      MCHC 34.8 g/dL      RDW 13.8 %      RDW-SD 44.9 (H) fl      MPV 10.1 fL      Platelets 179 10*3/mm3      Neutrophil % 43.1 %      Lymphocyte % 38.5 %      Monocyte % 11.1 %      Eosinophil % 6.4 %      Basophil % 0.4 %      Immature Grans % 0.5 %      Neutrophils, Absolute 4.15 10*3/mm3      Lymphocytes, Absolute 3.71 10*3/mm3      Monocytes, Absolute 1.07 (H) 10*3/mm3      Eosinophils, Absolute 0.62 10*3/mm3      Basophils, Absolute 0.04 10*3/mm3      Immature Grans, Absolute 0.05 (H) 10*3/mm3     Comprehensive Metabolic Panel [[id number]]  (Normal) Collected:  01/24/17 1328    Specimen:  Blood Updated:  01/24/17 1413     Glucose 97 mg/dL      BUN 19 mg/dL      Creatinine 0.89 mg/dL      Sodium 139 mmol/L      Potassium 4.3 mmol/L      Chloride 101 mmol/L      CO2 26.0 mmol/L      Calcium 9.7 mg/dL      Total Protein 6.8 g/dL      Albumin 3.80 g/dL      ALT (SGPT) 71 U/L      AST (SGOT) 38 U/L      Alkaline Phosphatase 64 U/L      Total Bilirubin 0.5 mg/dL      eGFR Non African Amer 86 mL/min/1.73      Globulin 3.0 gm/dL      A/G Ratio 1.3 g/dL      BUN/Creatinine Ratio 21.3      Anion Gap 12.0 mmol/L     BNP [[id number]]  (Normal) Collected:  01/24/17 1328    Specimen:  Blood Updated:  01/24/17 1421     proBNP 239.0 pg/mL     Troponin [[id number]]  (Normal) Collected:  01/24/17 1328    Specimen:  Blood Updated:  01/24/17 1426     Troponin I <0.012 ng/mL     CK [[id number]]  (Normal) Collected:  01/24/17 1328    Specimen:  Blood Updated:  01/24/17 1506     Creatine Kinase 129 U/L     D-dimer, Quantitative [[id number]]  (Abnormal) Collected:  01/24/17 1328    Specimen:  Blood Updated:  01/24/17 1508     D-Dimer, Quantitative 498 (H) ng/mL (FEU)      Narrative:       Dimer values <500 ng/ml FEU are FDA approved as aid in diagnosis of deep venous thrombosis and pulmonary embolism.  This test should not be used in an exclusion strategy with pretest probability alone.    A recent guideline regarding diagnosis for pulmonary thomboembolism recommends an adjusted exclusion criterion of age x 10 ng/ml FEU for patients >50 years of age ([employer] Intern Med 2015; 163: 701-711).    CK-MB [[id number]]  (Normal) Collected:  01/24/17 1328    Specimen:  Blood Updated:  01/24/17 1551     CKMB 1.08 ng/mL     Troponin [[id number]]  (Normal) Collected:  01/24/17 1630    Specimen:  Blood Updated:  01/24/17 1727     Troponin I <0.012 ng/mL     [place] [facility] [[id number]] Collected:  01/24/17 1328    Specimen:  Blood Updated:  01/24/17 1801    Narrative:       The following orders were created for panel order [place] [facility].  Procedure                               Abnormality         Status                     ---------                               -----------         ------                     Light Blue Top[[id number]]                                    Final result               Green Top (Gel)[[id number]]                                   Final result               Lavender Top[[id number]]                                      Final result               Gold Top - SST[[id number]]                                    Final result                 Please view results for these tests on the individual orders.    Light Blue Top [[id number]] Collected:  01/24/17 1328    Specimen:  Blood Updated:  01/24/17 1801     Extra Tube hold for add-on       Auto resulted       Green Top (Gel) [[id number]] Collected:  01/24/17 1328    Specimen:  Blood Updated:  01/24/17 1801     Extra Tube Hold for add-ons.       Auto resulted.       Lavender Top [[id number]] Collected:  01/24/17 1328    Specimen:  Blood Updated:  01/24/17 1801     Extra Tube hold for add-on       Auto resulted       Gold Top - SST [[id number]] Collected:   01/24/17 1328    Specimen:  Blood Updated:  01/24/17 1801     Extra Tube Hold for add-ons.       Auto resulted.       CK [[id number]]  (Normal) Collected:  01/24/17 1630    Specimen:  Blood Updated:  01/24/17 1915     Creatine Kinase 128 U/L     CK-MB [[id number]]  (Normal) Collected:  01/24/17 1630    Specimen:  Blood Updated:  01/24/17 1925     CKMB 1.11 ng/mL     Troponin [[id number]]  (Normal) Collected:  01/24/17 1926    Specimen:  Blood Updated:  01/24/17 2017     Troponin I 0.014 ng/mL     Troponin [[id number]]  (Normal) Collected:  01/24/17 2205    Specimen:  Blood Updated:  01/24/17 2306     Troponin I <0.012 ng/mL     Troponin [[id number]]  (Normal) Collected:  01/25/17 0021    Specimen:  Blood Updated:  01/25/17 0109     Troponin I <0.012 ng/mL     Troponin [[id number]]  (Normal) Collected:  01/25/17 0338    Specimen:  Blood Updated:  01/25/17 0442     Troponin I <0.012 ng/mL     Troponin [[id number]]  (Normal) Collected:  01/25/17 0719    Specimen:  Blood Updated:  01/25/17 0748     Troponin I <0.012 ng/mL           Culture Data:   No results found for: BLOODCX  No results found for: URINECX  No results found for: RESPCX  No results found for: WOUNDCX  No results found for: STOOLCX  No components found for: BODYFLD    Radiology Data:   Imaging Results (last 24 hours)     Procedure Component Value Units Date/Time    XR Chest 2 View [[id number]] Collected:  01/24/17 1429     Updated:  01/24/17 1432    Narrative:         [facility] [place]    Patient Name: JLUIS KILPATRICK    ORDERING: BIENVENIDO CARRASCO    ATTENDING: BIENVENIDO CARRASCO     REFERRING: BIENVENIDO CARRASCO      TWO VIEW CHEST    HISTORY: Chest pain    Frontal and lateral films of the chest were obtained.  COMPARISON: 6/19/2016    EKG leads in place.  Chronic obstructive pulmonary disease.  No acute infiltrate.  Old granulomatous disease is present.  Minimal cardiomegaly.  The pulmonary vasculature is not increased.  No pleural effusion.  No pneumothorax.  No  acute osseous abnormality.      Impression:       CONCLUSION:  Chronic obstructive pulmonary disease.  No acute infiltrate.  Old granulomatous disease is present.  Minimal cardiomegaly.    [id number]          Electronically signed by:  Ermias Paz MD  1/24/2017 2:31 PM CST      CT Angiogram Chest With Contrast [[id number]] Collected:  01/24/17 1731     Updated:  01/24/17 1745    Narrative:       [facility] [place]    Patient Name: JLUIS KILPATRICK    ORDERING: BIENVENIDO CARRASCO    ATTENDING: BIENVENIDO CARRASCO     REFERRING: BIENVENIDO CARRASCO    -----------------------    PROCEDURE: CT/CTA THORAX/PULMONARY WITH CONTRAST    CLINICAL INFORMATION:  PE, R07.2 Precordial pain R55 Syncope and collapse R06.02 Shortness of breath       TECHNIQUE: Axial images were obtained and multiplanar reconstructions were made.    Dose length product 535.3  This exam was performed using radiation doses that are as low as reasonably achievable (ALARA).  CONTRAST:   66 cc intravenous Isovue 370  COMPARISON: CT abdomen dated 7/22/2014    Note: Reconstructed MIP images were obtained in multiple obliquities. No 3-D surface rendered images were obtained for this exam.    FINDINGS:  PULMONARY CTA: The main pulmonary arteries and their major branches are opacified with contrast without evidence of abnormal filling defects.  LUNGS/PLEURA: There are emphysematous changes bilaterally. Scattered areas of groundglass attenuation and prominent interstitial markings are nonspecific but could be due to CHF. There are two tiny nodules in the right upper lobe best seen on axial image   46, the largest measuring 5 mm.  TRACHEA AND BRONCHI:  The trachea and bronchi are patent.  MEDIASTINUM, RICKIE AND LYMPH NODES: Enlarged, calcified, right paratracheal lymph node likely due to old granulomatous disease. Enlarged left hilar lymph nodes measuring up to 1.3 cm in short axis. Slightly enlarged aorticopulmonary window lymph node   measuring 1.2 cm short axis.  HEART  AND PERICARDIUM: The heart and pericardium are normal.  VASCULAR: Unremarkable  UPPER ABDOMEN: Fatty liver.  OSSEOUS STRUCTURES: Unremarkable.      Impression:       CONCLUSION:  1.  No evidence of pulmonary embolism.  2.  Scattered areas of glandular less attenuation and prominent interstitial markings could be due to CHF.  3.  Two tiny nodules in the right upper lobe, the largest measuring 5 mm. Recommend follow-up chest CT in 6 months to ensure stability.  4.  Enlarged left hilar and mediastinal lymph nodes, most likely reactive.  5.  Fatty liver.    Electronically signed by:  Tyler Dewitt MD  1/24/2017 5:44 PM CST            I have reviewed the patient current medications.     Assessment/Plan     Hospital Problem List     Precordial chest pain        A/P  1/Chest pain  Rule out ACS  Cardiology consult pending   2/shortness of breath  Multifactorial  :COPD ,WEight  ,ALLEN   Will order an echo to evaluate the EF   Treat  COPD   Patient not in any exacerbation   3/Uncontrolled BP  :increased dose of lisinopril           Discharge Planning: I expect patient to be discharged to home  in 1 days.    Musa Miller MD   01/25/17   12:53 PM

## 2017-01-25 NOTE — PLAN OF CARE
Problem: Respiratory Insufficiency (Adult)  Goal: Identify Related Risk Factors and Signs and Symptoms  Outcome: Outcome(s) achieved Date Met:  01/24/17

## 2017-01-25 NOTE — PLAN OF CARE
Problem: Patient Care Overview (Adult)  Goal: Plan of Care Review  Outcome: Ongoing (interventions implemented as appropriate)    01/25/17 1611   Coping/Psychosocial Response Interventions   Plan Of Care Reviewed With patient;spouse   Patient Care Overview   Progress no change   Outcome Evaluation   Outcome Summary/Follow up Plan continuing to monitor blood pressure       Goal: Adult Individualization and Mutuality  Outcome: Ongoing (interventions implemented as appropriate)    Problem: Acute Coronary Syndrome (ACS) (Adult)  Goal: Signs and Symptoms of Listed Potential Problems Will be Absent or Manageable (Acute Coronary Syndrome)  Outcome: Ongoing (interventions implemented as appropriate)

## 2017-01-25 NOTE — PLAN OF CARE
Problem: Patient Care Overview (Adult)  Goal: Plan of Care Review  Outcome: Ongoing (interventions implemented as appropriate)  Goal: Adult Individualization and Mutuality  Outcome: Ongoing (interventions implemented as appropriate)  Goal: Discharge Needs Assessment  Outcome: Ongoing (interventions implemented as appropriate)    Problem: Respiratory Insufficiency (Adult)  Goal: Identify Related Risk Factors and Signs and Symptoms  Outcome: Ongoing (interventions implemented as appropriate)  Goal: Acid/Base Balance  Outcome: Ongoing (interventions implemented as appropriate)  Goal: Effective Ventilation  Outcome: Ongoing (interventions implemented as appropriate)

## 2017-01-26 NOTE — DISCHARGE INSTRUCTIONS
Go to the hospital or see the doctor for unrelieved chest pain, shortness of air, temp >101.5, or any persistent worrisome symptoms.

## 2017-01-26 NOTE — CONSULTS
Patient Name: Aman Sanchez  Age/Sex: 66 y.o. male  : 1950  MRN: [id number]    Date of Hospital Visit: 2017  Encounter Provider: Mariposa Zamorano MD  Referring Provider: Keo George MD         Subjective:       Chief Complaint: Chest Pain, dyspnea, accelerated hypertension    History of Present Illness:  Aman Sanchez is a 66 y.o. male presented to the emergency room with complaint of progressively worsening shortness of breath.  Patient apparently was at the [facility] and on his way to see Dr. Horne he became extremely short of breath and gave out.  He did not have syncope and he did not lose consciousness.  However his blood pressure was markedly elevated.  At this point decision was made to send her to the emergency room for further evaluation.  I saw him this morning in his room he has no chest pain or chest discomfort but he continues to have dyspnea which has been going on for at least a year.  His blood pressure is markedly elevated.    Last Echo: 2014 with left ventricular ejection fraction of 60-65% and grade 1 diastolic dysfunction of the left ventricular myocardium.    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  3.5 x 23 mm xience alpine stent was then deployed to the distal RCA.  Excellent result was achieved with no residual disease.      Past Medical History:  Past Medical History   Diagnosis Date   • Acute bronchitis    • Anemia    • Breast lump    • Cervical radiculopathy    • COPD (chronic obstructive pulmonary disease)    • Coronary atherosclerosis    • Depressive disorder    • Dysuria    • Essential hypertension    • Flank pain    • Hematochezia    • Hematuria syndrome    • Hyperlipidemia    • Hypoglycemia    • Kidney stone    • Mass of neck    • MI (myocardial infarction)    • Neck pain      sprain   • Osteoarthritis    • Type 2 diabetes mellitus        Past Surgical History   Procedure Laterality Date   •  Cystoscopy  08/29/2014     Left ESWL, cysto and stent removal   • Transesophageal echocardiogram (mary lou)  07/21/2014     with color flow-Normal LV systolic function with Ef of 60-65%/ Grad1 diastolic dysfunction of the left ventricular myocardium. No evidence of pericardial effusion   • Injection of medication  11/11/2013     Kenalog   • Cystoscopy w/ litholapaxy / ehl  08/15/2014     Left ESWL   • Injection of medication  01/16/2014     Toradol   • Back surgery     • Hip surgery     • Tonsillectomy     • Cholecystectomy     • Skin graft       on his foot; removed skin from hip       Home Medications:    Prescriptions Prior to Admission   Medication Sig Dispense Refill Last Dose   • albuterol (PROVENTIL) (2.5 MG/3ML) 0.083% nebulizer solution TAKE INSERT VIAL(2.5MG) BY NEBULIZATION EVERY 4 HOURS AS NEEDED FOR WHEEZING 900 mL 12 1/23/2017 at Unknown time   • aspirin 81 MG tablet Take 2 tablets by mouth Daily for 30 days. (Patient taking differently: Take 81 mg by mouth Daily.) 60 tablet 6 1/24/2017 at 1500   • budesonide-formoterol (SYMBICORT) 160-4.5 MCG/ACT inhaler Inhale 2 puffs 2 (Two) Times a Day As Needed.   1/23/2017   • cetirizine (zyrTEC) 10 MG tablet Take 10 mg by mouth Daily.   1/24/2017 at 0900   • finasteride (PROSCAR) 5 MG tablet Take 5 mg by mouth 1 (One) Time.   1/24/2017 at 0900   • FLUoxetine (PROzac) 40 MG capsule Take 1 CAPSULE BY MOUTH  DAILY FOR DEPRESSION 90 capsule 3 1/24/2017 at 0900   • fluticasone (FLONASE) 50 MCG/ACT nasal spray 2 sprays into each nostril Every Night.   1/23/2017 at 2100   • lisinopril-hydrochlorothiazide (PRINZIDE,ZESTORETIC) 20-25 MG per tablet Take 1 tablet by mouth Daily.   1/24/2017 at 0900   • meloxicam (MOBIC) 15 MG tablet Take 15 mg by mouth 1 (One) Time.   1/24/2017 at 0900   • metoprolol succinate XL (TOPROL-XL) 25 MG 24 hr tablet Take 1 tablet by mouth Daily for 90 days. 90 tablet 3 1/24/2017 at 0900   • mirtazapine (REMERON SOL-TAB) 15 MG disintegrating tablet  Dissolve 1 tablet on the tongue every night 90 tablet 1 1/23/2017 at 2100   • naproxen sodium (ALEVE) 220 MG tablet Take 220 mg by mouth As Needed for mild pain (1-3).   Past Week at Unknown time   • nitroglycerin (NITROSTAT) 0.4 MG SL tablet Place 1 tablet under the tongue Every 5 (Five) Minutes As Needed for chest pain. 100 tablet 11    • prasugrel (EFFIENT) 10 MG tablet Take 1 tablet by mouth Daily for 90 days. 90 tablet 3 1/24/2017 at 0900   • pravastatin (PRAVACHOL) 80 MG tablet Take 80 mg by mouth Daily.   1/24/2017 at 0900   • predniSONE (DELTASONE) 20 MG tablet Take 1 tablet by mouth Daily. 14 tablet 0 1/24/2017 at 0900   • PROAIR  (90 BASE) MCG/ACT inhaler Inhale 108 mcg 4 (Four) Times a Day As Needed.  2 1/23/2017 at Unknown time   • tamsulosin (FLOMAX) 0.4 MG capsule 24 hr capsule TAKE 1 CAPSULE BY MOUTH EVERY NIGHT 90 capsule 5 1/24/2017 at 0900   • tiotropium (SPIRIVA HANDIHALER) 18 MCG per inhalation capsule Place 2 puffs into inhaler and inhale Daily. (Patient taking differently: Place 1 capsule into inhaler and inhale Daily As Needed.) 30 each 5 Past Week at Unknown time   • traMADol (ULTRAM) 50 MG tablet Take 50 mg by mouth Every 6 (Six) Hours As Needed for moderate pain (4-6).   Past Month at Unknown time       Allergies:  Allergies   Allergen Reactions   • Codeine    • Contrast Dye        Past Social History:  Social History     Social History   • Marital status:      Spouse name: N/A   • Number of children: N/A   • Years of education: N/A     Social History Main Topics   • Smoking status: Current Every Day Smoker     Packs/day: 0.25     Types: Cigarettes   • Smokeless tobacco: Never Used      Comment: 2 cigarettes per day.  Declines cessation    • Alcohol use Yes      Comment: Rare   • Drug use: No   • Sexual activity: Defer     Other Topics Concern   • None     Social History Narrative       Past Family History:      Review of Systems:   Constitution: No chills, no rigors, no  unexplained weight loss or weight gain  Eyes:  No diplopia, no blurred vision, no loss of vision, conjunctiva is pink and sclera is anicteric  ENT:  No tinnitus, no otorrhea, no epistaxis, no sore throat   Respiratory: No cough, no hemoptysis  Cardiovascular: Dyspnea on exertion  Gastrointestinal: No nausea, no vomiting, no hematemesis, no diarrhea or constipation, no melena  Genitourinary: No frequency of dysuria no hematuria  Integument: positive for  No pruritis and  no skin rash  Hematologic / Lymphatic: No excessive bleeding, easy bruising, fatigue, lymphadenopathy and petechiae  Musculoskeletal: No joint pain, joint stiffness, joint swelling, muscle pain, muscle weakness and neck pain  Neurological: No dizziness, headaches, light headedness, seizures and vertigo  Behavioral/Psych: No depression, homicidal ideations and suicidal ideations  Endocrine: No frequent urination and nocturia, temperature intolerance, weight gain, unintended and weight loss, unintended      Objective:     Objective:  Vital Signs (last 24 hours)       01/24 0700  -  01/25 0659 01/25 0700  -  01/25 1919   Most Recent    Temp (°F)   98.7    97 -  97.2     97.2 (36.2)    Heart Rate 50 -  71    55 -  65     65    Resp 18 -  20    16 -  20     16    /75 -  177/81    148/82 -  174/84     174/84    SpO2 (%) 92 -  98    93 -  96     93          Body mass index is 34.91 kg/(m^2).  Weight change:           Physical Exam:   General Appearance:    Alert, oriented, cooperative, in no acute distress   Head:    Normocephalic, atraumatic, without obvious abnormality   Eyes:            Lids and lashes normal, conjunctivae and sclerae normal, no   icterus, no pallor   Ears:    Ears appear intact with no abnormalities noted   Throat:   Mucous membranes pink and moist   Neck:   Supple, trachea midline, no carotid bruit, no JVD   Lungs:     Clear to auscultation, respirations regular, even and                   Unlabored. No wheezes, rales, rhonchi     Heart:    Regular rhythm and normal rate, normal S1 and S2, no            murmur, no gallop, no rub, no click   Abdomen:     Normal bowel sounds, no masses, liver and spleen nonpalpable, soft, non-tender, non-distended, no guarding, no rebound tenderness   Genitalia:    Deferred   Extremities:   Moves all extremities well, no edema, no cyanosis, no              Redness, no clubbing   Pulses:   Pulses palpable and equal bilaterally   Skin:   No bleeding, bruising or rash   Neurologic:   Alert and oriented to person, place, and time. No focal neurological deficits       Lab Review:       Results from last 7 days  Lab Units 01/24/17  1328   SODIUM mmol/L 139   POTASSIUM mmol/L 4.3   CHLORIDE mmol/L 101   TOTAL CO2 mmol/L 26.0   BUN mg/dL 19   CREATININE mg/dL 0.89   CALCIUM mg/dL 9.7   BILIRUBIN mg/dL 0.5   ALK PHOS U/L 64   ALT (SGPT) U/L 71   AST (SGOT) U/L 38   GLUCOSE mg/dL 97       Results from last 7 days  Lab Units 01/25/17  0719 01/25/17  0338 01/25/17  0021  01/24/17  1630 01/24/17  1328   CK TOTAL U/L  --   --   --   --  128 129   TROPONIN I ng/mL <0.012 <0.012 <0.012  < > <0.012 <0.012   < > = values in this interval not displayed.        Results from last 7 days  Lab Units 01/24/17  1328   WBC 10*3/mm3 9.64   HEMOGLOBIN g/dL 15.3   HEMATOCRIT % 44.0   PLATELETS 10*3/mm3 179                       Invalid input(s):  T4,  FREET4    EKG:   ECG/EMG Results (last 24 hours)     Procedure Component Value Units Date/Time    ECG 12 Lead [[id number]] Collected:  01/24/17 1323     Updated:  01/25/17 0636    Narrative:       Test Reason : Chest painBlood Pressure : **/** mmHGVent. Rate : 055 BPM     Atrial Rate : 055 BPM   P-R Int : 156 ms          QRS Dur : 104 ms    QT Int : 446 ms       P-R-T Axes : 062 -24 094 degrees   QTc Int : 426 msSinus bradycardiaIncomplete right   bundle branch blockInferior infarct (cited on or before 11-JUN-2014)Abnormal ECGWhen compared with ECG of 18-JUN-2016 23:50,Vent. rate has decreased  BY  38 BPMNonspecific T wave abnormality now evident in Lateral leadsQT has shortenedConfirmed by   WENDY CHRISTENSEN, B. N. ([id number]),  FABIANO TOLBERT ([id number])on 1/25/2017 6:35:52 AMReferred By:             Confirmed By:YAZ NGUYEN MD          Imaging:  Imaging Results (last 24 hours)     ** No results found for the last 24 hours. **          I personally viewed and interpreted the patient's EKG/Telemetry data.    Assessment:     Active Problems:   1.  Precordial chest pain   2.  Accelerated hypertension   3.  Coronary artery disease status post recent stent placement   4.  Tobacco dependence   5.  Obstructive sleep apnea   6 .  Right bundle branch block          Plan:     Myocardial infarction was ruled out with serial cardiac enzymes.  I have added amlodipine and increased his lisinopril.  Patient can be discharged to home from cardiology standpoint.  Continue with dual antiplatelet treatment including aspirin and Effient, patient has been counseled regarding tobacco smoking cessation.  Recommend consultation with the pulmonary specialist as an outpatient.  And I'll see him back in my office in about 4 weeks.      Mariposa Zamorano MD  01/25/17  7:19 PM    EMR Dragon/Transcription disclaimer:   Much of this encounter note is an electronic transcription/translation of spoken language to printed text. The electronic translation of spoken language may permit erroneous, or at times, nonsensical words or phrases to be inadvertently transcribed; Although I have reviewed the note for such errors, some may still exist.

## 2017-01-27 NOTE — PROGRESS NOTES
"    [facility]  DISCHARGE SUMMARY       Date of Admission: 1/24/2017  Date of Discharge:  1/27/2017  Primary Care Physician: Chuy Camarog MD    Presenting Problem/History of Present Illness:  Syncope and collapse [R55]  Shortness of breath [R06.02]  Precordial chest pain [R07.2]       Final Discharge Diagnoses:  chest pain rule out ACS  shortness of breath due to copd   Obesity   Fatty liver   small lung nodule   ALLEN        Consults:   Dr Zamorano     Procedures Performed: none                 Pertinent Test Results:     CTA lungs  1. No evidence of pulmonary embolism.  2. Scattered areas of glandular less attenuation and prominent interstitial markings could be due to CHF.  3. Two tiny nodules in the right upper lobe, the largest measuring 5 mm. Recommend follow-up chest CT in 6 months to ensure stability.  4. Enlarged left hilar and mediastinal lymph nodes, most likely reactive.  5. Fatty liver    Chief Complaint on Day of Discharge: None     Hospital Course:  The patient is a 66 y.o. male who presented to [facility] with chest pain and shortness of breath he rule out for acute coronary syndrome . He had a CTA of the lungs that did not show  Any pulmonary embolism .   Dr Zamorano his cardiologist saw him he adjusted his medications . The shortness of breath reported by the patient  Was  thought to be from pulmonary origin as he is a smoker .    He needs to followup with an lung specialist . He does have a small nodule that warrants a followup by a CT  In 6 months .  Condition on Discharge:  Stable     Physical Exam on Discharge:  Visit Vitals   • /84 (BP Location: Right arm, Patient Position: Sitting)   • Pulse 60   • Temp 96.2 °F (35.7 °C) (Oral)   • Resp 20   • Ht 69\" (175.3 cm)   • Wt 236 lb 6.4 oz (107 kg)   • SpO2 93%   • BMI 34.91 kg/m2     Physical Exam   Constitutional: He is oriented to person, place, and time. He appears " well-developed and well-nourished.  Non-toxic appearance. He does not have a sickly appearance. He does not appear ill. No distress. He is not intubated.   HENT:   Head: Normocephalic and atraumatic.   Right Ear: External ear normal.   Left Ear: External ear normal.   Nose: Nose normal.   Mouth/Throat: Oropharynx is clear and moist. No oropharyngeal exudate.   Eyes: Conjunctivae and EOM are normal. Pupils are equal, round, and reactive to light. Right eye exhibits no discharge and no exudate. Left eye exhibits no discharge and no exudate. Right conjunctiva is not injected. Right conjunctiva has no hemorrhage. Left conjunctiva is not injected. Left conjunctiva has no hemorrhage. No scleral icterus.   Neck: Normal range of motion. Neck supple. No hepatojugular reflux and no JVD present. No tracheal tenderness, no spinous process tenderness and no muscular tenderness present. Carotid bruit is not present. No rigidity. No tracheal deviation, no edema, no erythema and normal range of motion present. No thyroid mass and no thyromegaly present.   Cardiovascular: Normal rate, regular rhythm, normal heart sounds and intact distal pulses.   No extrasystoles are present. Exam reveals no gallop and no friction rub.    No murmur heard.  Pulmonary/Chest: Effort normal and breath sounds normal. No stridor. He is not intubated. No respiratory distress. He has no decreased breath sounds. He has no wheezes. He has no rhonchi. He has no rales. He exhibits no tenderness.   Abdominal: Soft. Normal appearance and bowel sounds are normal. He exhibits no shifting dullness, no distension, no pulsatile liver, no fluid wave, no abdominal bruit, no ascites, no pulsatile midline mass and no mass. There is no hepatosplenomegaly, splenomegaly or hepatomegaly. There is no tenderness. There is no rigidity, no rebound, no guarding, no CVA tenderness, no tenderness at McBurney's point and negative Brower's sign. No hernia.   Genitourinary: Rectal  exam shows guaiac negative stool.   Musculoskeletal: Normal range of motion. He exhibits no edema, tenderness or deformity.        Right shoulder: He exhibits no swelling.      Skin Integrity  -   He hasno right foot ulcer, non-callous right foot, no right foot warmth and no right foot blister. He has no left foot ulcer, non-callous left foot, no left foot warmth and no left foot blister..  Lymphadenopathy:     He has no cervical adenopathy.     He has no axillary adenopathy.   Neurological: He is alert and oriented to person, place, and time. He has normal strength and normal reflexes. He is not disoriented. He displays no atrophy, no tremor and normal reflexes. No cranial nerve deficit or sensory deficit. He exhibits normal muscle tone. He displays no seizure activity. Coordination and gait normal. He displays no Babinski's sign on the right side. He displays no Babinski's sign on the left side.   Skin: Skin is warm and dry. No abrasion, no bruising, no burn, no ecchymosis, no laceration, no lesion, no purpura and no rash noted. Rash is not macular, not papular, not maculopapular, not nodular, not pustular, not vesicular and not urticarial. He is not diaphoretic. No cyanosis or erythema. No pallor. Nails show no clubbing.   Psychiatric: He has a normal mood and affect. His behavior is normal. Judgment and thought content normal. His mood appears not anxious. His affect is not angry, not blunt, not labile and not inappropriate. His speech is not slurred. He is not agitated, not aggressive, not hyperactive, not slowed, not withdrawn and not combative. Thought content is not paranoid and not delusional. Cognition and memory are not impaired. He does not express impulsivity or inappropriate judgment. He does not exhibit a depressed mood. He expresses no homicidal and no suicidal ideation. He expresses no suicidal plans and no homicidal plans. He is communicative. He exhibits normal recent memory and normal remote  memory.       Discharge Disposition:  Home or Self Care    Discharge Medications:   Aman Sanchez   Home Medication Instructions KELLI:[id number]    Printed on:01/27/17 3656   Medication Information                      albuterol (PROVENTIL) (2.5 MG/3ML) 0.083% nebulizer solution  TAKE INSERT VIAL(2.5MG) BY NEBULIZATION EVERY 4 HOURS AS NEEDED FOR WHEEZING             amLODIPine (NORVASC) 5 MG tablet  Take 1 tablet by mouth Daily.             aspirin 81 MG tablet  Take 2 tablets by mouth Daily for 30 days.             budesonide-formoterol (SYMBICORT) 160-4.5 MCG/ACT inhaler  Inhale 2 puffs 2 (Two) Times a Day As Needed.             cetirizine (zyrTEC) 10 MG tablet  Take 10 mg by mouth Daily.             finasteride (PROSCAR) 5 MG tablet  Take 5 mg by mouth 1 (One) Time.             FLUoxetine (PROzac) 40 MG capsule  Take 1 CAPSULE BY MOUTH  DAILY FOR DEPRESSION             fluticasone (FLONASE) 50 MCG/ACT nasal spray  2 sprays into each nostril Every Night.             lisinopril-hydrochlorothiazide (PRINZIDE,ZESTORETIC) 20-25 MG per tablet  Take 1 tablet by mouth Daily.             meloxicam (MOBIC) 15 MG tablet  Take 15 mg by mouth 1 (One) Time.             metoprolol succinate XL (TOPROL-XL) 25 MG 24 hr tablet  Take 1 tablet by mouth Daily for 90 days.             mirtazapine (REMERON SOL-TAB) 15 MG disintegrating tablet  Dissolve 1 tablet on the tongue every night             nitroglycerin (NITROSTAT) 0.4 MG SL tablet  Place 1 tablet under the tongue Every 5 (Five) Minutes As Needed for chest pain.             prasugrel (EFFIENT) 10 MG tablet  Take 1 tablet by mouth Daily for 90 days.             pravastatin (PRAVACHOL) 80 MG tablet  Take 80 mg by mouth Daily.             predniSONE (DELTASONE) 20 MG tablet  Take 1 tablet by mouth Daily.             PROAIR  (90 BASE) MCG/ACT inhaler  Inhale 108 mcg 4 (Four) Times a Day As Needed.             tamsulosin (FLOMAX) 0.4 MG capsule 24 hr capsule  TAKE 1  CAPSULE BY MOUTH EVERY NIGHT             tiotropium (SPIRIVA HANDIHALER) 18 MCG per inhalation capsule  Place 2 puffs into inhaler and inhale Daily.             traMADol (ULTRAM) 50 MG tablet  Take 50 mg by mouth Every 6 (Six) Hours As Needed for moderate pain (4-6).                 Discharge Diet:   Diet Instructions     Heart healthy                 Activity at Discharge:   Activity Instructions     Activity as tolerated                 Discharge Care Plan/Instructions:  Smoking cessation   fup on lung nodule in 6 months with a CT of chest     Follow-up Appointments:   Dr Zamorano in 4 weeks   pulmonogist as scheduled   pcp in 1week     Future Appointments  Date Time Provider Department Center   3/6/2017 2:15 PM Mariposa Zamorano MD [facility] None       Test Results Pending at Discharge: [unfilled]    Musa Miller MD  01/27/17  3:06 PM    Time: 35 min

## 2017-01-27 NOTE — DISCHARGE SUMMARY
"-     [facility]  DISCHARGE SUMMARY         Date of Admission: 1/24/2017  Date of Discharge: 1/27/2017  Primary Care Physician: Chuy Camargo MD     Presenting Problem/History of Present Illness:  Syncope and collapse [R55]  Shortness of breath [R06.02]  Precordial chest pain [R07.2]         Final Discharge Diagnoses:  chest pain rule out ACS  shortness of breath due to copd   Obesity   Fatty liver   small lung nodule   ALLEN           Consults:   Dr Zamorano      Procedures Performed: none         Pertinent Test Results:      CTA lungs  1. No evidence of pulmonary embolism.  2. Scattered areas of glandular less attenuation and prominent interstitial markings could be due to CHF.  3. Two tiny nodules in the right upper lobe, the largest measuring 5 mm. Recommend follow-up chest CT in 6 months to ensure stability.  4. Enlarged left hilar and mediastinal lymph nodes, most likely reactive.  5. Fatty liver     Chief Complaint on Day of Discharge: None      Hospital Course:  The patient is a 66 y.o. male who presented to [facility] with chest pain and shortness of breath he rule out for acute coronary syndrome . He had a CTA of the lungs that did not show Any pulmonary embolism .   Dr Zamorano his cardiologist saw him he adjusted his medications . The shortness of breath reported by the patient Was thought to be from pulmonary origin as he is a smoker .   He needs to followup with an lung specialist . He does have a small nodule that warrants a followup by a CT In 6 months .  Condition on Discharge: Stable      Physical Exam on Discharge:       Visit Vitals   • /84 (BP Location: Right arm, Patient Position: Sitting)   • Pulse 60   • Temp 96.2 °F (35.7 °C) (Oral)   • Resp 20   • Ht 69\" (175.3 cm)   • Wt 236 lb 6.4 oz (107 kg)   • SpO2 93%   • BMI 34.91 kg/m2      Physical Exam   Constitutional: He is oriented to person, place, and time. He " appears well-developed and well-nourished. Non-toxic appearance. He does not have a sickly appearance. He does not appear ill. No distress. He is not intubated.   HENT:   Head: Normocephalic and atraumatic.   Right Ear: External ear normal.   Left Ear: External ear normal.   Nose: Nose normal.   Mouth/Throat: Oropharynx is clear and moist. No oropharyngeal exudate.   Eyes: Conjunctivae and EOM are normal. Pupils are equal, round, and reactive to light. Right eye exhibits no discharge and no exudate. Left eye exhibits no discharge and no exudate. Right conjunctiva is not injected. Right conjunctiva has no hemorrhage. Left conjunctiva is not injected. Left conjunctiva has no hemorrhage. No scleral icterus.   Neck: Normal range of motion. Neck supple. No hepatojugular reflux and no JVD present. No tracheal tenderness, no spinous process tenderness and no muscular tenderness present. Carotid bruit is not present. No rigidity. No tracheal deviation, no edema, no erythema and normal range of motion present. No thyroid mass and no thyromegaly present.   Cardiovascular: Normal rate, regular rhythm, normal heart sounds and intact distal pulses. No extrasystoles are present. Exam reveals no gallop and no friction rub.   No murmur heard.  Pulmonary/Chest: Effort normal and breath sounds normal. No stridor. He is not intubated. No respiratory distress. He has no decreased breath sounds. He has no wheezes. He has no rhonchi. He has no rales. He exhibits no tenderness.   Abdominal: Soft. Normal appearance and bowel sounds are normal. He exhibits no shifting dullness, no distension, no pulsatile liver, no fluid wave, no abdominal bruit, no ascites, no pulsatile midline mass and no mass. There is no hepatosplenomegaly, splenomegaly or hepatomegaly. There is no tenderness. There is no rigidity, no rebound, no guarding, no CVA tenderness, no tenderness at McBurney's point and negative Brower's sign. No hernia.   Genitourinary: Rectal  exam shows guaiac negative stool.   Musculoskeletal: Normal range of motion. He exhibits no edema, tenderness or deformity.   Right shoulder: He exhibits no swelling.     Skin Integrity - He hasno right foot ulcer, non-callous right foot, no right foot warmth and no right foot blister. He has no left foot ulcer, non-callous left foot, no left foot warmth and no left foot blister..  Lymphadenopathy:   He has no cervical adenopathy.   He has no axillary adenopathy.   Neurological: He is alert and oriented to person, place, and time. He has normal strength and normal reflexes. He is not disoriented. He displays no atrophy, no tremor and normal reflexes. No cranial nerve deficit or sensory deficit. He exhibits normal muscle tone. He displays no seizure activity. Coordination and gait normal. He displays no Babinski's sign on the right side. He displays no Babinski's sign on the left side.   Skin: Skin is warm and dry. No abrasion, no bruising, no burn, no ecchymosis, no laceration, no lesion, no purpura and no rash noted. Rash is not macular, not papular, not maculopapular, not nodular, not pustular, not vesicular and not urticarial. He is not diaphoretic. No cyanosis or erythema. No pallor. Nails show no clubbing.   Psychiatric: He has a normal mood and affect. His behavior is normal. Judgment and thought content normal. His mood appears not anxious. His affect is not angry, not blunt, not labile and not inappropriate. His speech is not slurred. He is not agitated, not aggressive, not hyperactive, not slowed, not withdrawn and not combative. Thought content is not paranoid and not delusional. Cognition and memory are not impaired. He does not express impulsivity or inappropriate judgment. He does not exhibit a depressed mood. He expresses no homicidal and no suicidal ideation. He expresses no suicidal plans and no homicidal plans. He is communicative. He exhibits normal recent memory and normal remote memory.          Discharge Disposition:  Home or Self Care     Discharge Medications:                Aman Sanchez   Home Medication Instructions KELLI:[id number]     Printed on:01/27/17 [id number]   Medication Information                                       albuterol (PROVENTIL) (2.5 MG/3ML) 0.083% nebulizer solution  TAKE INSERT VIAL(2.5MG) BY NEBULIZATION EVERY 4 HOURS AS NEEDED FOR WHEEZING                   amLODIPine (NORVASC) 5 MG tablet  Take 1 tablet by mouth Daily.                   aspirin 81 MG tablet  Take 2 tablets by mouth Daily for 30 days.                   budesonide-formoterol (SYMBICORT) 160-4.5 MCG/ACT inhaler  Inhale 2 puffs 2 (Two) Times a Day As Needed.                   cetirizine (zyrTEC) 10 MG tablet  Take 10 mg by mouth Daily.                   finasteride (PROSCAR) 5 MG tablet  Take 5 mg by mouth 1 (One) Time.                   FLUoxetine (PROzac) 40 MG capsule  Take 1 CAPSULE BY MOUTH DAILY FOR DEPRESSION                   fluticasone (FLONASE) 50 MCG/ACT nasal spray  2 sprays into each nostril Every Night.                   lisinopril-hydrochlorothiazide (PRINZIDE,ZESTORETIC) 20-25 MG per tablet  Take 1 tablet by mouth Daily.                   meloxicam (MOBIC) 15 MG tablet  Take 15 mg by mouth 1 (One) Time.                   metoprolol succinate XL (TOPROL-XL) 25 MG 24 hr tablet  Take 1 tablet by mouth Daily for 90 days.                   mirtazapine (REMERON SOL-TAB) 15 MG disintegrating tablet  Dissolve 1 tablet on the tongue every night                   nitroglycerin (NITROSTAT) 0.4 MG SL tablet  Place 1 tablet under the tongue Every 5 (Five) Minutes As Needed for chest pain.                   prasugrel (EFFIENT) 10 MG tablet  Take 1 tablet by mouth Daily for 90 days.                   pravastatin (PRAVACHOL) 80 MG tablet  Take 80 mg by mouth Daily.                   predniSONE (DELTASONE) 20 MG tablet  Take 1 tablet by mouth Daily.                   PROAIR  (90 BASE) MCG/ACT  inhaler  Inhale 108 mcg 4 (Four) Times a Day As Needed.                   tamsulosin (FLOMAX) 0.4 MG capsule 24 hr capsule  TAKE 1 CAPSULE BY MOUTH EVERY NIGHT                   tiotropium (SPIRIVA HANDIHALER) 18 MCG per inhalation capsule  Place 2 puffs into inhaler and inhale Daily.                   traMADol (ULTRAM) 50 MG tablet  Take 50 mg by mouth Every 6 (Six) Hours As Needed for moderate pain (4-6).                         Discharge Diet:       Diet Instructions      Heart healthy                    Activity at Discharge:       Activity Instructions      Activity as tolerated                    Discharge Care Plan/Instructions:  Smoking cessation   fup on lung nodule in 6 months with a CT of chest      Follow-up Appointments:   Dr Zamorano in 4 weeks   pulmonogist as scheduled   pcp in 1week      Future Appointments  Date Time Provider Department Center   3/6/2017 2:15 PM Maripsoa Zamorano MD [facility] None         Test Results Pending at Discharge:   none     Musa Miller MD  01/27/17  3:06 PM     Time: 35 min  -

## 2017-02-02 ENCOUNTER — OFFICE VISIT (OUTPATIENT)
Dept: FAMILY MEDICINE CLINIC | Facility: CLINIC | Age: 67
End: 2017-02-02

## 2017-02-02 VITALS
BODY MASS INDEX: 35.43 KG/M2 | DIASTOLIC BLOOD PRESSURE: 74 MMHG | HEART RATE: 55 BPM | HEIGHT: 69 IN | SYSTOLIC BLOOD PRESSURE: 122 MMHG | OXYGEN SATURATION: 93 % | WEIGHT: 239.2 LBS

## 2017-02-02 DIAGNOSIS — F32.A DEPRESSION, UNSPECIFIED DEPRESSION TYPE: Chronic | ICD-10-CM

## 2017-02-02 DIAGNOSIS — F41.9 ANXIETY: Chronic | ICD-10-CM

## 2017-02-02 DIAGNOSIS — I10 ESSENTIAL HYPERTENSION: Primary | ICD-10-CM

## 2017-02-02 PROCEDURE — 99214 OFFICE O/P EST MOD 30 MIN: CPT | Performed by: INTERNAL MEDICINE

## 2017-02-02 RX ORDER — AMLODIPINE BESYLATE 5 MG/1
5 TABLET ORAL 2 TIMES DAILY
Qty: 180 TABLET | Refills: 1 | Status: SHIPPED | OUTPATIENT
Start: 2017-02-02 | End: 2017-03-06 | Stop reason: SDUPTHER

## 2017-02-02 RX ORDER — AMLODIPINE BESYLATE 5 MG/1
5 TABLET ORAL
Qty: 180 TABLET | Refills: 1 | Status: SHIPPED | OUTPATIENT
Start: 2017-02-02 | End: 2017-02-02

## 2017-02-02 NOTE — PROGRESS NOTES
Subjective   Aman Sanchez is a 66 y.o. male.     Chief Complaint   Patient presents with   • Follow-up     [facility] bp elevated        History of Present Illness     Pt is in f/u for HTN.    Pt says he has recently been evaluated for high BP.  He admits he is trying to stop smoking and has been having some withdrawal symptoms with some anxiety and he thinks this is what is causing his BP to go up.  He says otherwise, he is doing okay.     The following portions of the patient's history were reviewed and updated as appropriate: allergies, current medications, past family history, past medical history, past social history, past surgical history and problem list.    Review of Systems   Constitutional: Negative for activity change, appetite change, fatigue and unexpected weight change.   HENT: Negative for ear pain, facial swelling and hearing loss.    Respiratory: Negative for cough, chest tightness, shortness of breath and wheezing.    Cardiovascular: Negative for chest pain, palpitations and leg swelling.   Gastrointestinal: Negative for abdominal pain.   Genitourinary: Negative for difficulty urinating, dysuria, flank pain, frequency and urgency.   Musculoskeletal: Negative for arthralgias and back pain.   Skin: Negative for color change and rash.   Allergic/Immunologic: Negative for environmental allergies and food allergies.   Neurological: Negative for dizziness, syncope, weakness, numbness and headaches.   Hematological: Negative for adenopathy.   Psychiatric/Behavioral: Negative for confusion, decreased concentration, dysphoric mood, sleep disturbance and suicidal ideas. The patient is nervous/anxious.    All other systems reviewed and are negative.      Objective   Physical Exam   Constitutional: He is oriented to person, place, and time. Vital signs are normal. He appears well-developed and well-nourished.   HENT:   Head: Normocephalic and atraumatic.   Right Ear: External ear normal.   Left Ear:  External ear normal.   Nose: Nose normal.   Eyes: Conjunctivae and EOM are normal. Pupils are equal, round, and reactive to light.   Neck: Normal range of motion. Neck supple.   Cardiovascular: Normal rate and regular rhythm.  Exam reveals no gallop and no friction rub.    No murmur heard.  Pulmonary/Chest: Effort normal and breath sounds normal. No respiratory distress. He has no wheezes. He has no rales.   Abdominal: Soft. He exhibits no distension. There is no tenderness. There is no rebound and no guarding.   Musculoskeletal: Normal range of motion. He exhibits no edema.   Neurological: He is alert and oriented to person, place, and time. No cranial nerve deficit.   Skin: Skin is warm and dry. No rash noted.   Psychiatric: His behavior is normal. Judgment and thought content normal. His mood appears anxious. He does not exhibit a depressed mood. He expresses no homicidal and no suicidal ideation. He expresses no suicidal plans and no homicidal plans.   Nursing note and vitals reviewed.      Assessment/Plan   Aman was seen today for follow-up.    Diagnoses and all orders for this visit:    Essential hypertension  -     CBC & Differential  -     TSH; Future  -     Comprehensive Metabolic Panel; Future  -     Lipid Panel; Future  -     T4; Future    Anxiety    Depression, unspecified depression type    Other orders  -     Discontinue: amLODIPine (NORVASC) 5 MG tablet; Take 1 tablet by mouth Daily.  -     amLODIPine (NORVASC) 5 MG tablet; Take 1 tablet by mouth 2 (Two) Times a Day.        Labs due: CBC CMP Lipids Thyroids in a couple weeks    Pt was started on Amlodipine 5mg a week ago, this isn't enough.    Double the Amlodipine to BID now.  Log BP    Scribed for Dr. Camarog by Madiha Rubi 02/03/17

## 2017-02-03 ENCOUNTER — TRANSCRIBE ORDERS (OUTPATIENT)
Dept: NUCLEAR MEDICINE | Facility: CLINIC | Age: 67
End: 2017-02-03

## 2017-02-03 DIAGNOSIS — J44.9 CHRONIC AIRWAY OBSTRUCTION, NOT ELSEWHERE CLASSIFIED: Primary | ICD-10-CM

## 2017-02-14 RX ORDER — TAMSULOSIN HYDROCHLORIDE 0.4 MG/1
CAPSULE ORAL
Qty: 90 CAPSULE | Refills: 1 | Status: SHIPPED | OUTPATIENT
Start: 2017-02-14 | End: 2017-06-27 | Stop reason: DRUGHIGH

## 2017-02-24 ENCOUNTER — LAB (OUTPATIENT)
Dept: LAB | Facility: HOSPITAL | Age: 67
End: 2017-02-24

## 2017-02-24 DIAGNOSIS — R07.9 CHEST PAIN, UNSPECIFIED TYPE: ICD-10-CM

## 2017-02-24 DIAGNOSIS — I10 ESSENTIAL HYPERTENSION: Chronic | ICD-10-CM

## 2017-02-24 DIAGNOSIS — I10 HYPERTENSION, ESSENTIAL: ICD-10-CM

## 2017-02-24 DIAGNOSIS — Z12.5 SPECIAL SCREENING EXAMINATION FOR NEOPLASM OF PROSTATE: ICD-10-CM

## 2017-02-24 DIAGNOSIS — Z00.00 ROUTINE MEDICAL EXAM: Primary | ICD-10-CM

## 2017-02-24 LAB
ALBUMIN SERPL-MCNC: 4.5 G/DL (ref 3.4–4.8)
ALBUMIN/GLOB SERPL: 1.5 G/DL (ref 1.1–1.8)
ALP SERPL-CCNC: 61 U/L (ref 38–126)
ALT SERPL W P-5'-P-CCNC: 96 U/L (ref 21–72)
ANION GAP SERPL CALCULATED.3IONS-SCNC: 12 MMOL/L (ref 5–15)
ARTICHOKE IGE QN: 72 MG/DL (ref 1–129)
AST SERPL-CCNC: 99 U/L (ref 17–59)
BASOPHILS # BLD AUTO: 0.04 10*3/MM3 (ref 0–0.2)
BASOPHILS NFR BLD AUTO: 0.5 % (ref 0–2)
BILIRUB SERPL-MCNC: 0.7 MG/DL (ref 0.2–1.3)
BUN BLD-MCNC: 20 MG/DL (ref 7–21)
BUN/CREAT SERPL: 20.2 (ref 7–25)
CALCIUM SPEC-SCNC: 9.6 MG/DL (ref 8.4–10.2)
CHLORIDE SERPL-SCNC: 101 MMOL/L (ref 95–110)
CHOLEST SERPL-MCNC: 146 MG/DL (ref 0–199)
CO2 SERPL-SCNC: 29 MMOL/L (ref 22–31)
CREAT BLD-MCNC: 0.99 MG/DL (ref 0.7–1.3)
DEPRECATED RDW RBC AUTO: 44.4 FL (ref 35.1–43.9)
EOSINOPHIL # BLD AUTO: 0.46 10*3/MM3 (ref 0–0.7)
EOSINOPHIL NFR BLD AUTO: 5.6 % (ref 0–7)
ERYTHROCYTE [DISTWIDTH] IN BLOOD BY AUTOMATED COUNT: 13.7 % (ref 11.5–14.5)
GFR SERPL CREATININE-BSD FRML MDRD: 76 ML/MIN/1.73 (ref 49–113)
GLOBULIN UR ELPH-MCNC: 3 GM/DL (ref 2.3–3.5)
GLUCOSE BLD-MCNC: 135 MG/DL (ref 60–100)
HBA1C MFR BLD: 8.13 % (ref 4–5.6)
HCT VFR BLD AUTO: 46.6 % (ref 39–49)
HDLC SERPL-MCNC: 41 MG/DL (ref 60–200)
HGB BLD-MCNC: 15.9 G/DL (ref 13.7–17.3)
IMM GRANULOCYTES # BLD: 0.03 10*3/MM3 (ref 0–0.02)
IMM GRANULOCYTES NFR BLD: 0.4 % (ref 0–0.5)
LDLC/HDLC SERPL: 1.77 {RATIO} (ref 0–3.55)
LYMPHOCYTES # BLD AUTO: 3.6 10*3/MM3 (ref 0.6–4.2)
LYMPHOCYTES NFR BLD AUTO: 43.9 % (ref 10–50)
MCH RBC QN AUTO: 30.2 PG (ref 26.5–34)
MCHC RBC AUTO-ENTMCNC: 34.1 G/DL (ref 31.5–36.3)
MCV RBC AUTO: 88.6 FL (ref 80–98)
MONOCYTES # BLD AUTO: 0.93 10*3/MM3 (ref 0–0.9)
MONOCYTES NFR BLD AUTO: 11.3 % (ref 0–12)
NEUTROPHILS # BLD AUTO: 3.14 10*3/MM3 (ref 2–8.6)
NEUTROPHILS NFR BLD AUTO: 38.3 % (ref 37–80)
PLATELET # BLD AUTO: 200 10*3/MM3 (ref 150–450)
PMV BLD AUTO: 10.1 FL (ref 8–12)
POTASSIUM BLD-SCNC: 4.2 MMOL/L (ref 3.5–5.1)
PROT SERPL-MCNC: 7.5 G/DL (ref 6.3–8.6)
PSA SERPL-MCNC: 1.01 NG/ML (ref 0–4)
RBC # BLD AUTO: 5.26 10*6/MM3 (ref 4.37–5.74)
SODIUM BLD-SCNC: 142 MMOL/L (ref 137–145)
T4 FREE SERPL-MCNC: 1.06 NG/DL (ref 0.78–2.19)
T4 SERPL-MCNC: 8.85 MCG/DL (ref 5.5–11)
TRIGL SERPL-MCNC: 163 MG/DL (ref 20–199)
TSH SERPL DL<=0.05 MIU/L-ACNC: 4.76 MIU/ML (ref 0.46–4.68)
WBC NRBC COR # BLD: 8.2 10*3/MM3 (ref 3.2–9.8)

## 2017-02-24 PROCEDURE — 84443 ASSAY THYROID STIM HORMONE: CPT | Performed by: INTERNAL MEDICINE

## 2017-02-24 PROCEDURE — 85025 COMPLETE CBC W/AUTO DIFF WBC: CPT | Performed by: INTERNAL MEDICINE

## 2017-02-24 PROCEDURE — 84439 ASSAY OF FREE THYROXINE: CPT | Performed by: INTERNAL MEDICINE

## 2017-02-24 PROCEDURE — 83036 HEMOGLOBIN GLYCOSYLATED A1C: CPT | Performed by: INTERNAL MEDICINE

## 2017-02-24 PROCEDURE — 36415 COLL VENOUS BLD VENIPUNCTURE: CPT

## 2017-02-24 PROCEDURE — 84153 ASSAY OF PSA TOTAL: CPT | Performed by: INTERNAL MEDICINE

## 2017-02-24 PROCEDURE — 80061 LIPID PANEL: CPT | Performed by: INTERNAL MEDICINE

## 2017-02-24 PROCEDURE — 80053 COMPREHEN METABOLIC PANEL: CPT | Performed by: INTERNAL MEDICINE

## 2017-02-28 ENCOUNTER — TRANSCRIBE ORDERS (OUTPATIENT)
Dept: LAB | Facility: OTHER | Age: 67
End: 2017-02-28

## 2017-02-28 ENCOUNTER — LAB (OUTPATIENT)
Dept: LAB | Facility: OTHER | Age: 67
End: 2017-02-28

## 2017-02-28 DIAGNOSIS — J84.10 POSTINFLAMMATORY PULMONARY FIBROSIS (HCC): Primary | ICD-10-CM

## 2017-02-28 DIAGNOSIS — J84.10 POSTINFLAMMATORY PULMONARY FIBROSIS (HCC): ICD-10-CM

## 2017-02-28 LAB — ERYTHROCYTE [SEDIMENTATION RATE] IN BLOOD: 1 MM/HR (ref 0–15)

## 2017-02-28 PROCEDURE — 85651 RBC SED RATE NONAUTOMATED: CPT | Performed by: INTERNAL MEDICINE

## 2017-02-28 PROCEDURE — 36415 COLL VENOUS BLD VENIPUNCTURE: CPT | Performed by: INTERNAL MEDICINE

## 2017-03-06 ENCOUNTER — OFFICE VISIT (OUTPATIENT)
Dept: CARDIOLOGY | Facility: CLINIC | Age: 67
End: 2017-03-06

## 2017-03-06 VITALS
SYSTOLIC BLOOD PRESSURE: 136 MMHG | DIASTOLIC BLOOD PRESSURE: 70 MMHG | WEIGHT: 243 LBS | OXYGEN SATURATION: 95 % | HEIGHT: 69 IN | BODY MASS INDEX: 35.99 KG/M2 | HEART RATE: 60 BPM

## 2017-03-06 DIAGNOSIS — R06.09 DYSPNEA ON EXERTION: ICD-10-CM

## 2017-03-06 DIAGNOSIS — I10 ESSENTIAL HYPERTENSION: Primary | ICD-10-CM

## 2017-03-06 DIAGNOSIS — I25.10 CORONARY ARTERY DISEASE INVOLVING NATIVE CORONARY ARTERY OF NATIVE HEART WITHOUT ANGINA PECTORIS: ICD-10-CM

## 2017-03-06 DIAGNOSIS — E78.5 HYPERLIPIDEMIA, UNSPECIFIED HYPERLIPIDEMIA TYPE: ICD-10-CM

## 2017-03-06 PROCEDURE — 99213 OFFICE O/P EST LOW 20 MIN: CPT | Performed by: INTERNAL MEDICINE

## 2017-03-06 RX ORDER — AMLODIPINE BESYLATE 10 MG/1
10 TABLET ORAL DAILY
Qty: 90 TABLET | Refills: 3 | Status: SHIPPED | OUTPATIENT
Start: 2017-03-06 | End: 2017-03-10

## 2017-03-07 ENCOUNTER — DOCUMENTATION (OUTPATIENT)
Dept: CARDIOLOGY | Facility: CLINIC | Age: 67
End: 2017-03-07

## 2017-03-10 ENCOUNTER — TRANSCRIBE ORDERS (OUTPATIENT)
Dept: LAB | Facility: OTHER | Age: 67
End: 2017-03-10

## 2017-03-10 ENCOUNTER — OFFICE VISIT (OUTPATIENT)
Dept: FAMILY MEDICINE CLINIC | Facility: CLINIC | Age: 67
End: 2017-03-10

## 2017-03-10 ENCOUNTER — LAB (OUTPATIENT)
Dept: LAB | Facility: OTHER | Age: 67
End: 2017-03-10

## 2017-03-10 VITALS
HEIGHT: 69 IN | OXYGEN SATURATION: 96 % | BODY MASS INDEX: 36.08 KG/M2 | WEIGHT: 243.6 LBS | DIASTOLIC BLOOD PRESSURE: 74 MMHG | HEART RATE: 112 BPM | SYSTOLIC BLOOD PRESSURE: 118 MMHG

## 2017-03-10 DIAGNOSIS — I25.10 CAD IN NATIVE ARTERY: Chronic | ICD-10-CM

## 2017-03-10 DIAGNOSIS — J84.10 POSTINFLAMMATORY PULMONARY FIBROSIS (HCC): ICD-10-CM

## 2017-03-10 DIAGNOSIS — M15.9 PRIMARY OSTEOARTHRITIS INVOLVING MULTIPLE JOINTS: Chronic | ICD-10-CM

## 2017-03-10 DIAGNOSIS — E03.9 HYPOTHYROIDISM, UNSPECIFIED TYPE: Chronic | ICD-10-CM

## 2017-03-10 DIAGNOSIS — R60.9 EDEMA, UNSPECIFIED TYPE: ICD-10-CM

## 2017-03-10 DIAGNOSIS — R06.02 SOB (SHORTNESS OF BREATH): Primary | ICD-10-CM

## 2017-03-10 DIAGNOSIS — J84.10 POSTINFLAMMATORY PULMONARY FIBROSIS (HCC): Primary | ICD-10-CM

## 2017-03-10 DIAGNOSIS — J44.9 CHRONIC OBSTRUCTIVE PULMONARY DISEASE, UNSPECIFIED COPD TYPE (HCC): Chronic | ICD-10-CM

## 2017-03-10 LAB
BASOPHILS # BLD AUTO: 0.05 10*3/MM3 (ref 0–0.2)
BASOPHILS NFR BLD AUTO: 0.5 % (ref 0–2)
DEPRECATED RDW RBC AUTO: 44 FL (ref 35.1–43.9)
EOSINOPHIL # BLD AUTO: 0.43 10*3/MM3 (ref 0–0.7)
EOSINOPHIL NFR BLD AUTO: 4.7 % (ref 0–7)
ERYTHROCYTE [DISTWIDTH] IN BLOOD BY AUTOMATED COUNT: 13.6 % (ref 11.5–14.5)
HCT VFR BLD AUTO: 47 % (ref 39–49)
HGB BLD-MCNC: 16 G/DL (ref 13.7–17.3)
LYMPHOCYTES # BLD AUTO: 3.4 10*3/MM3 (ref 0.6–4.2)
LYMPHOCYTES NFR BLD AUTO: 36.9 % (ref 10–50)
MCH RBC QN AUTO: 30.5 PG (ref 26.5–34)
MCHC RBC AUTO-ENTMCNC: 34 G/DL (ref 31.5–36.3)
MCV RBC AUTO: 89.5 FL (ref 80–98)
MONOCYTES # BLD AUTO: 0.86 10*3/MM3 (ref 0–0.9)
MONOCYTES NFR BLD AUTO: 9.3 % (ref 0–12)
NEUTROPHILS # BLD AUTO: 4.47 10*3/MM3 (ref 2–8.6)
NEUTROPHILS NFR BLD AUTO: 48.6 % (ref 37–80)
PLATELET # BLD AUTO: 195 10*3/MM3 (ref 150–450)
PMV BLD AUTO: 9.9 FL (ref 8–12)
RBC # BLD AUTO: 5.25 10*6/MM3 (ref 4.37–5.74)
RHEUMATOID FACT SERPL-ACNC: NEGATIVE [IU]/ML
WBC NRBC COR # BLD: 9.21 10*3/MM3 (ref 3.2–9.8)

## 2017-03-10 PROCEDURE — 86256 FLUORESCENT ANTIBODY TITER: CPT | Performed by: INTERNAL MEDICINE

## 2017-03-10 PROCEDURE — 86235 NUCLEAR ANTIGEN ANTIBODY: CPT | Performed by: INTERNAL MEDICINE

## 2017-03-10 PROCEDURE — 86431 RHEUMATOID FACTOR QUANT: CPT | Performed by: INTERNAL MEDICINE

## 2017-03-10 PROCEDURE — 85025 COMPLETE CBC W/AUTO DIFF WBC: CPT | Performed by: INTERNAL MEDICINE

## 2017-03-10 PROCEDURE — 83520 IMMUNOASSAY QUANT NOS NONAB: CPT | Performed by: INTERNAL MEDICINE

## 2017-03-10 PROCEDURE — 36415 COLL VENOUS BLD VENIPUNCTURE: CPT | Performed by: INTERNAL MEDICINE

## 2017-03-10 PROCEDURE — 86225 DNA ANTIBODY NATIVE: CPT | Performed by: INTERNAL MEDICINE

## 2017-03-10 PROCEDURE — 99214 OFFICE O/P EST MOD 30 MIN: CPT | Performed by: INTERNAL MEDICINE

## 2017-03-10 RX ORDER — POTASSIUM CHLORIDE 20 MEQ/1
20 TABLET, EXTENDED RELEASE ORAL DAILY
Qty: 30 TABLET | Refills: 5 | Status: SHIPPED | OUTPATIENT
Start: 2017-03-10 | End: 2017-06-27

## 2017-03-10 RX ORDER — FUROSEMIDE 20 MG/1
20 TABLET ORAL DAILY
Qty: 30 TABLET | Refills: 5 | Status: SHIPPED | OUTPATIENT
Start: 2017-03-10 | End: 2017-03-20

## 2017-03-10 RX ORDER — AMLODIPINE BESYLATE 5 MG/1
5 TABLET ORAL DAILY
Qty: 30 TABLET | Refills: 5 | Status: SHIPPED | OUTPATIENT
Start: 2017-03-10 | End: 2017-03-20 | Stop reason: DRUGHIGH

## 2017-03-10 NOTE — PROGRESS NOTES
Subjective   Aman Sanchez is a 66 y.o. male.   Chief Complaint   Patient presents with   • Follow-up     labs       History of Present Illness pt co severe sob the past year. Progressive. He sees pulmonary and cardiology. He has severe beach and orthopnea.     The following portions of the patient's history were reviewed and updated as appropriate: allergies, current medications, past family history, past medical history, past social history, past surgical history and problem list.    Review of Systems   Constitutional: Negative for activity change, appetite change, fatigue and unexpected weight change.   HENT: Negative for ear pain, facial swelling and hearing loss.    Respiratory: Positive for shortness of breath. Negative for cough, chest tightness and wheezing.    Cardiovascular: Positive for leg swelling. Negative for chest pain and palpitations.   Gastrointestinal: Negative for abdominal pain.   Genitourinary: Negative for difficulty urinating, dysuria, flank pain, frequency and urgency.   Musculoskeletal: Negative for arthralgias and back pain.   Skin: Negative for color change and rash.   Allergic/Immunologic: Negative for environmental allergies and food allergies.   Neurological: Negative for dizziness, syncope, weakness, numbness and headaches.   Hematological: Negative for adenopathy.   Psychiatric/Behavioral: Negative for confusion, decreased concentration, dysphoric mood, sleep disturbance and suicidal ideas. The patient is not nervous/anxious.    All other systems reviewed and are negative.      Objective   Physical Exam   Constitutional: He is oriented to person, place, and time. Vital signs are normal. He appears well-developed and well-nourished.   HENT:   Head: Normocephalic and atraumatic.   Right Ear: External ear normal.   Left Ear: External ear normal.   Nose: Nose normal.   Eyes: Conjunctivae and EOM are normal. Pupils are equal, round, and reactive to light.   Neck: Normal range of  motion. Neck supple.   Cardiovascular: Normal rate and regular rhythm.  Exam reveals no gallop and no friction rub.    No murmur heard.  Pulmonary/Chest: Effort normal. No respiratory distress. He has decreased breath sounds. He has no wheezes. He has no rales.   Abdominal: Soft. He exhibits no distension. There is no tenderness. There is no rebound and no guarding.   Musculoskeletal: Normal range of motion. He exhibits no edema.   Neurological: He is alert and oriented to person, place, and time. No cranial nerve deficit.   Skin: Skin is warm and dry. No rash noted.   Psychiatric: He has a normal mood and affect. His behavior is normal. His mood appears not anxious. He does not exhibit a depressed mood. He expresses no homicidal and no suicidal ideation.   Nursing note and vitals reviewed.      Assessment/Plan   Aman was seen today for follow-up.    Diagnoses and all orders for this visit:    Chronic obstructive pulmonary disease, unspecified COPD type    CAD in native artery    Primary osteoarthritis involving multiple joints    Edema, unspecified type      Decrease amolodipine to 5mg q day. Dc mobic. Overnight oximetry. Ambulatory oximetry again.     Stat chest xray.     Labs; glucose 135. a1c 8.1. Sgot 96, sgpt 99. tsh 4.7.     Metformin 500mg bid. Protoocol.

## 2017-03-13 ENCOUNTER — LAB (OUTPATIENT)
Dept: LAB | Facility: HOSPITAL | Age: 67
End: 2017-03-13

## 2017-03-13 DIAGNOSIS — Z00.00 ROUTINE MEDICAL EXAM: ICD-10-CM

## 2017-03-13 DIAGNOSIS — R60.9 EDEMA, UNSPECIFIED TYPE: ICD-10-CM

## 2017-03-13 LAB
ALBUMIN SERPL-MCNC: 4.4 G/DL (ref 3.4–4.8)
ALBUMIN/GLOB SERPL: 1.6 G/DL (ref 1.1–1.8)
ALP SERPL-CCNC: 110 U/L (ref 38–126)
ALT SERPL W P-5'-P-CCNC: 89 U/L (ref 21–72)
ANION GAP SERPL CALCULATED.3IONS-SCNC: 16 MMOL/L (ref 5–15)
ARTICHOKE IGE QN: 67 MG/DL (ref 1–129)
AST SERPL-CCNC: 47 U/L (ref 17–59)
BILIRUB SERPL-MCNC: 0.5 MG/DL (ref 0.2–1.3)
BUN BLD-MCNC: 26 MG/DL (ref 7–21)
BUN/CREAT SERPL: 24.8 (ref 7–25)
C-ANCA TITR SER IF: NORMAL TITER
CALCIUM SPEC-SCNC: 10.3 MG/DL (ref 8.4–10.2)
CENTROMERE B AB SER-ACNC: <0.2 AI (ref 0–0.9)
CHLORIDE SERPL-SCNC: 97 MMOL/L (ref 95–110)
CHOLEST SERPL-MCNC: 130 MG/DL (ref 0–199)
CHROMATIN AB SERPL-ACNC: <0.2 AI (ref 0–0.9)
CO2 SERPL-SCNC: 26 MMOL/L (ref 22–31)
CREAT BLD-MCNC: 1.05 MG/DL (ref 0.7–1.3)
DSDNA AB SER-ACNC: 1 IU/ML (ref 0–9)
ENA JO1 AB SER-ACNC: <0.2 AI (ref 0–0.9)
ENA RNP AB SER-ACNC: <0.2 AI (ref 0–0.9)
ENA SCL70 AB SER-ACNC: <0.2 AI (ref 0–0.9)
ENA SM AB SER-ACNC: <0.2 AI (ref 0–0.9)
ENA SS-A AB SER-ACNC: <0.2 AI (ref 0–0.9)
ENA SS-B AB SER-ACNC: <0.2 AI (ref 0–0.9)
GFR SERPL CREATININE-BSD FRML MDRD: 71 ML/MIN/1.73 (ref 60–113)
GLOBULIN UR ELPH-MCNC: 2.8 GM/DL (ref 2.3–3.5)
GLUCOSE BLD-MCNC: 289 MG/DL (ref 60–100)
HDLC SERPL-MCNC: 39 MG/DL (ref 60–200)
LDLC/HDLC SERPL: 0.9 {RATIO} (ref 0–3.55)
Lab: NORMAL
MAGNESIUM SERPL-MCNC: 1.6 MG/DL (ref 1.6–2.3)
MYELOPEROXIDASE AB SER-ACNC: <9 U/ML (ref 0–9)
P-ANCA ATYPICAL TITR SER IF: NORMAL TITER
P-ANCA TITR SER IF: NORMAL TITER
POTASSIUM BLD-SCNC: 4.5 MMOL/L (ref 3.5–5.1)
PROT SERPL-MCNC: 7.2 G/DL (ref 6.3–8.6)
PROTEINASE3 AB SER IA-ACNC: <3.5 U/ML (ref 0–3.5)
PSA SERPL-MCNC: 0.77 NG/ML (ref 0–4)
SODIUM BLD-SCNC: 139 MMOL/L (ref 137–145)
T4 SERPL-MCNC: 7.6 MCG/DL (ref 5.5–11)
TRIGL SERPL-MCNC: 280 MG/DL (ref 20–199)
TSH SERPL DL<=0.05 MIU/L-ACNC: 5.26 MIU/ML (ref 0.46–4.68)

## 2017-03-13 PROCEDURE — 80053 COMPREHEN METABOLIC PANEL: CPT | Performed by: INTERNAL MEDICINE

## 2017-03-13 PROCEDURE — 84436 ASSAY OF TOTAL THYROXINE: CPT | Performed by: INTERNAL MEDICINE

## 2017-03-13 PROCEDURE — 80061 LIPID PANEL: CPT | Performed by: INTERNAL MEDICINE

## 2017-03-13 PROCEDURE — 84153 ASSAY OF PSA TOTAL: CPT | Performed by: INTERNAL MEDICINE

## 2017-03-13 PROCEDURE — 83735 ASSAY OF MAGNESIUM: CPT | Performed by: INTERNAL MEDICINE

## 2017-03-13 PROCEDURE — 84443 ASSAY THYROID STIM HORMONE: CPT | Performed by: INTERNAL MEDICINE

## 2017-03-13 PROCEDURE — 36415 COLL VENOUS BLD VENIPUNCTURE: CPT

## 2017-03-14 ENCOUNTER — OFFICE VISIT (OUTPATIENT)
Dept: FAMILY MEDICINE CLINIC | Facility: CLINIC | Age: 67
End: 2017-03-14

## 2017-03-14 VITALS
HEART RATE: 100 BPM | OXYGEN SATURATION: 94 % | WEIGHT: 245 LBS | SYSTOLIC BLOOD PRESSURE: 134 MMHG | BODY MASS INDEX: 36.29 KG/M2 | HEIGHT: 69 IN | DIASTOLIC BLOOD PRESSURE: 68 MMHG

## 2017-03-14 DIAGNOSIS — Z13.9 SCREENING: ICD-10-CM

## 2017-03-14 DIAGNOSIS — E11.9 TYPE 2 DIABETES MELLITUS WITHOUT COMPLICATION, WITHOUT LONG-TERM CURRENT USE OF INSULIN (HCC): Chronic | ICD-10-CM

## 2017-03-14 DIAGNOSIS — I10 ESSENTIAL HYPERTENSION: Chronic | ICD-10-CM

## 2017-03-14 DIAGNOSIS — R06.02 SHORTNESS OF BREATH: Primary | ICD-10-CM

## 2017-03-14 PROCEDURE — 99214 OFFICE O/P EST MOD 30 MIN: CPT | Performed by: INTERNAL MEDICINE

## 2017-03-14 RX ORDER — BUDESONIDE AND FORMOTEROL FUMARATE DIHYDRATE 160; 4.5 UG/1; UG/1
2 AEROSOL RESPIRATORY (INHALATION) 2 TIMES DAILY PRN
Qty: 3 INHALER | Refills: 1 | Status: SHIPPED | OUTPATIENT
Start: 2017-03-14 | End: 2017-04-11

## 2017-03-14 RX ORDER — PRAVASTATIN SODIUM 80 MG/1
80 TABLET ORAL DAILY
Qty: 90 TABLET | Refills: 1 | Status: SHIPPED | OUTPATIENT
Start: 2017-03-14 | End: 2017-08-31 | Stop reason: SDUPTHER

## 2017-03-14 RX ORDER — LISINOPRIL AND HYDROCHLOROTHIAZIDE 25; 20 MG/1; MG/1
1 TABLET ORAL DAILY
Qty: 90 TABLET | Refills: 1 | Status: SHIPPED | OUTPATIENT
Start: 2017-03-14 | End: 2017-08-31 | Stop reason: SDUPTHER

## 2017-03-14 RX ORDER — ALBUTEROL SULFATE 2.5 MG/3ML
2.5 SOLUTION RESPIRATORY (INHALATION) EVERY 4 HOURS PRN
Qty: 75 VIAL | Refills: 1 | Status: SHIPPED | OUTPATIENT
Start: 2017-03-14 | End: 2017-05-05

## 2017-03-14 NOTE — PROGRESS NOTES
Subjective   Aman Sanchez is a 66 y.o. male.   Chief Complaint   Patient presents with   • Follow-up     MRI lab follow up       History of Present Illness pt fu dyspnea. He has been evaluated by cardiology, pulmonary, and cta chest neg for pe. Cant walk 100 feet without severe sob. Overnight oxygen pending. In for chest xray and labs.     The following portions of the patient's history were reviewed and updated as appropriate: allergies, current medications, past family history, past medical history, past social history, past surgical history and problem list.    Review of Systems   Constitutional: Negative for activity change, appetite change, fatigue and unexpected weight change.   HENT: Negative for ear pain, facial swelling and hearing loss.    Respiratory: Positive for shortness of breath. Negative for cough, chest tightness and wheezing.    Cardiovascular: Negative for chest pain, palpitations and leg swelling.   Gastrointestinal: Negative for abdominal pain.   Genitourinary: Negative for difficulty urinating, dysuria, flank pain, frequency and urgency.   Musculoskeletal: Negative for arthralgias and back pain.   Skin: Negative for color change and rash.   Allergic/Immunologic: Negative for environmental allergies and food allergies.   Neurological: Negative for dizziness, syncope, weakness, numbness and headaches.   Hematological: Negative for adenopathy.   Psychiatric/Behavioral: Negative for confusion, decreased concentration, dysphoric mood, sleep disturbance and suicidal ideas. The patient is not nervous/anxious.    All other systems reviewed and are negative.      Objective   Physical Exam   Constitutional: He is oriented to person, place, and time. Vital signs are normal. He appears well-developed and well-nourished.   HENT:   Head: Normocephalic and atraumatic.   Right Ear: External ear normal.   Left Ear: External ear normal.   Nose: Nose normal.   Eyes: Conjunctivae and EOM are normal. Pupils  are equal, round, and reactive to light.   Neck: Normal range of motion. Neck supple.   Cardiovascular: Normal rate and regular rhythm.  Exam reveals no gallop and no friction rub.    No murmur heard.  Pulmonary/Chest: No respiratory distress. He has decreased breath sounds. He has no wheezes. He has no rales.   Abdominal: Soft. He exhibits no distension. There is no tenderness. There is no rebound and no guarding.   Musculoskeletal: Normal range of motion. He exhibits no edema.   Neurological: He is alert and oriented to person, place, and time. No cranial nerve deficit.   Skin: Skin is warm and dry. No rash noted.   Psychiatric: He has a normal mood and affect. His behavior is normal. His mood appears not anxious. He does not exhibit a depressed mood. He expresses no homicidal and no suicidal ideation.   Nursing note and vitals reviewed.      Assessment/Plan   Aman was seen today for follow-up.    Diagnoses and all orders for this visit:    Shortness of breath    Essential hypertension    Type 2 diabetes mellitus without complication, without long-term current use of insulin    Other orders  -     PROAIR  (90 BASE) MCG/ACT inhaler; Inhale 2 puffs 4 (Four) Times a Day As Needed for shortness of air.  -     tiotropium (SPIRIVA HANDIHALER) 18 MCG per inhalation capsule; Place 1 capsule into inhaler and inhale Daily.  -     lisinopril-hydrochlorothiazide (PRINZIDE,ZESTORETIC) 20-25 MG per tablet; Take 1 tablet by mouth Daily.  -     pravastatin (PRAVACHOL) 80 MG tablet; Take 1 tablet by mouth Daily.  -     budesonide-formoterol (SYMBICORT) 160-4.5 MCG/ACT inhaler; Inhale 2 puffs 2 (Two) Times a Day As Needed (COPD).  -     albuterol (PROVENTIL) (2.5 MG/3ML) 0.083% nebulizer solution; Take 2.5 mg by nebulization Every 4 (Four) Hours As Needed for Wheezing.    dc lasix and k. It did not help.     overnite o2 pending.     Chest xray neg.     Glucose 228 and metformin increased. tfts slightly up. Lipids ok.      Bmp and fu with me 2 weeks from Friday. Bmp, a1c 3 mths. tfts 3 mths.

## 2017-03-16 LAB
BH CV ECHO MEAS - AO ISTHMUS: 2.3 CM
BH CV ECHO MEAS - AO MAX PG (FULL): 1 MMHG
BH CV ECHO MEAS - AO MAX PG: 4.2 MMHG
BH CV ECHO MEAS - AO MEAN PG (FULL): 0 MMHG
BH CV ECHO MEAS - AO MEAN PG: 2 MMHG
BH CV ECHO MEAS - AO ROOT AREA (BSA CORRECTED): 1.5
BH CV ECHO MEAS - AO ROOT AREA: 9.1 CM^2
BH CV ECHO MEAS - AO ROOT DIAM: 3.4 CM
BH CV ECHO MEAS - AO V2 MAX: 103 CM/SEC
BH CV ECHO MEAS - AO V2 MEAN: 68 CM/SEC
BH CV ECHO MEAS - AO V2 VTI: 10.7 CM
BH CV ECHO MEAS - ASC AORTA: 2.8 CM
BH CV ECHO MEAS - AVA(I,A): 3.7 CM^2
BH CV ECHO MEAS - AVA(I,D): 3.7 CM^2
BH CV ECHO MEAS - AVA(V,A): 2.7 CM^2
BH CV ECHO MEAS - AVA(V,D): 2.7 CM^2
BH CV ECHO MEAS - BSA(HAYCOCK): 2.4 M^2
BH CV ECHO MEAS - BSA: 2.3 M^2
BH CV ECHO MEAS - BZI_BMI: 36.2 KILOGRAMS/M^2
BH CV ECHO MEAS - BZI_METRIC_HEIGHT: 175.3 CM
BH CV ECHO MEAS - BZI_METRIC_WEIGHT: 111.1 KG
BH CV ECHO MEAS - EDV(CUBED): 97.3 ML
BH CV ECHO MEAS - EDV(TEICH): 97.3 ML
BH CV ECHO MEAS - EF(CUBED): 74.9 %
BH CV ECHO MEAS - EF(TEICH): 66.9 %
BH CV ECHO MEAS - ESV(CUBED): 24.4 ML
BH CV ECHO MEAS - ESV(TEICH): 32.2 ML
BH CV ECHO MEAS - FS: 37 %
BH CV ECHO MEAS - IVS/LVPW: 1.3
BH CV ECHO MEAS - IVSD: 1.4 CM
BH CV ECHO MEAS - LA DIMENSION: 3.6 CM
BH CV ECHO MEAS - LA/AO: 1.1
BH CV ECHO MEAS - LV MASS(C)D: 217.4 GRAMS
BH CV ECHO MEAS - LV MASS(C)DI: 96.5 GRAMS/M^2
BH CV ECHO MEAS - LV MAX PG: 3.2 MMHG
BH CV ECHO MEAS - LV MEAN PG: 2 MMHG
BH CV ECHO MEAS - LV V1 MAX: 89.4 CM/SEC
BH CV ECHO MEAS - LV V1 MEAN: 69.2 CM/SEC
BH CV ECHO MEAS - LV V1 VTI: 12.6 CM
BH CV ECHO MEAS - LVIDD: 4.6 CM
BH CV ECHO MEAS - LVIDS: 2.9 CM
BH CV ECHO MEAS - LVOT AREA (M): 3.1 CM^2
BH CV ECHO MEAS - LVOT AREA: 3.1 CM^2
BH CV ECHO MEAS - LVOT DIAM: 2 CM
BH CV ECHO MEAS - LVPWD: 1.1 CM
BH CV ECHO MEAS - MV A MAX VEL: 91.3 CM/SEC
BH CV ECHO MEAS - MV DEC SLOPE: 632 CM/SEC^2
BH CV ECHO MEAS - MV E MAX VEL: 60.7 CM/SEC
BH CV ECHO MEAS - MV E/A: 0.66
BH CV ECHO MEAS - MV MAX PG: 6.7 MMHG
BH CV ECHO MEAS - MV MEAN PG: 2 MMHG
BH CV ECHO MEAS - MV P1/2T MAX VEL: 70.6 CM/SEC
BH CV ECHO MEAS - MV P1/2T: 32.7 MSEC
BH CV ECHO MEAS - MV V2 MAX: 129 CM/SEC
BH CV ECHO MEAS - MV V2 MEAN: 66.5 CM/SEC
BH CV ECHO MEAS - MV V2 VTI: 20.1 CM
BH CV ECHO MEAS - MVA P1/2T LCG: 3.1 CM^2
BH CV ECHO MEAS - MVA(P1/2T): 6.7 CM^2
BH CV ECHO MEAS - MVA(VTI): 2 CM^2
BH CV ECHO MEAS - PA MAX PG: 6.1 MMHG
BH CV ECHO MEAS - PA V2 MAX: 123 CM/SEC
BH CV ECHO MEAS - PI END-D VEL: 68.8 CM/SEC
BH CV ECHO MEAS - RVDD: 2.2 CM
BH CV ECHO MEAS - SI(AO): 43.1 ML/M^2
BH CV ECHO MEAS - SI(CUBED): 32.4 ML/M^2
BH CV ECHO MEAS - SI(LVOT): 17.6 ML/M^2
BH CV ECHO MEAS - SI(TEICH): 28.9 ML/M^2
BH CV ECHO MEAS - SV(AO): 97.1 ML
BH CV ECHO MEAS - SV(CUBED): 72.9 ML
BH CV ECHO MEAS - SV(LVOT): 39.6 ML
BH CV ECHO MEAS - SV(TEICH): 65.1 ML
BH CV ECHO MEAS - TR MAX VEL: 142 CM/SEC

## 2017-03-20 ENCOUNTER — OFFICE VISIT (OUTPATIENT)
Dept: CARDIOLOGY | Facility: CLINIC | Age: 67
End: 2017-03-20

## 2017-03-20 VITALS
HEART RATE: 84 BPM | BODY MASS INDEX: 35.84 KG/M2 | HEIGHT: 69 IN | SYSTOLIC BLOOD PRESSURE: 130 MMHG | DIASTOLIC BLOOD PRESSURE: 77 MMHG | WEIGHT: 242 LBS | OXYGEN SATURATION: 95 %

## 2017-03-20 DIAGNOSIS — E78.5 HYPERLIPIDEMIA, UNSPECIFIED HYPERLIPIDEMIA TYPE: ICD-10-CM

## 2017-03-20 DIAGNOSIS — I25.10 CORONARY ARTERY DISEASE INVOLVING NATIVE CORONARY ARTERY OF NATIVE HEART WITHOUT ANGINA PECTORIS: Primary | ICD-10-CM

## 2017-03-20 DIAGNOSIS — R06.09 DYSPNEA ON EXERTION: ICD-10-CM

## 2017-03-20 DIAGNOSIS — I10 ESSENTIAL HYPERTENSION: ICD-10-CM

## 2017-03-20 PROCEDURE — 99214 OFFICE O/P EST MOD 30 MIN: CPT | Performed by: INTERNAL MEDICINE

## 2017-03-20 RX ORDER — AMLODIPINE BESYLATE 10 MG/1
5 TABLET ORAL 2 TIMES DAILY WITH MEALS
COMMUNITY
End: 2017-06-27 | Stop reason: ALTCHOICE

## 2017-03-20 NOTE — PROGRESS NOTES
Chief complaint : Shortness of breath, coronary artery disease    History of Present Illness this is a very pleasant 66-year-old gentleman who comes today for a follow-up visit.  He has no chest pain or chest discomfort.  However he continues to have dyspnea on minimal exertion NYHA functional class 3-4.  He has no orthopnea.    Subjective      Review of Systems   Cardiovascular: Positive for dyspnea on exertion. Negative for chest pain.   Respiratory: Positive for shortness of breath.        Past Medical History   Diagnosis Date   • Acute bronchitis    • Anemia    • Breast lump    • Cervical radiculopathy    • COPD (chronic obstructive pulmonary disease)    • Coronary atherosclerosis    • Depressive disorder    • Dysuria    • Essential hypertension    • Flank pain    • Hematochezia    • Hematuria syndrome    • Hyperlipidemia    • Hypoglycemia    • Kidney stone    • Mass of neck    • MI (myocardial infarction)    • Neck pain      sprain   • Osteoarthritis    • Type 2 diabetes mellitus        Family History   Problem Relation Age of Onset   • Lung cancer Mother    • Rectal cancer Sister        Codeine and Contrast dye     reports that he has quit smoking. His smoking use included Cigarettes. He smoked 0.25 packs per day. He has never used smokeless tobacco. He reports that he drinks alcohol. He reports that he does not use illicit drugs.    Objective     Vital Signs  Heart Rate:  [84] 84  BP: (130)/(77) 130/77    Physical Exam   Constitutional: He is oriented to person, place, and time. He appears well-developed and well-nourished.   HENT:   Head: Normocephalic and atraumatic.   Eyes: Conjunctivae and EOM are normal. Pupils are equal, round, and reactive to light.   Neck: Neck supple. No JVD present. Carotid bruit is not present. No tracheal deviation and no edema present.   Cardiovascular: Normal rate, regular rhythm, S1 normal, S2 normal, normal heart sounds and intact distal pulses.  Exam reveals no gallop, no S3,  no S4 and no friction rub.    No murmur heard.  Pulmonary/Chest: Effort normal and breath sounds normal. He has no wheezes. He has no rales. He exhibits no tenderness.   Abdominal: Bowel sounds are normal. He exhibits no abdominal bruit and no pulsatile midline mass. There is no rebound and no guarding.   Musculoskeletal: Normal range of motion. He exhibits no edema.   Neurological: He is alert and oriented to person, place, and time.   Skin: Skin is warm and dry.   Psychiatric: He has a normal mood and affect.       Procedures    Assessment/Plan     Patient Active Problem List   Diagnosis   • Essential hypertension   • Coronary artery disease involving native coronary artery of native heart without angina pectoris   • Hyperlipidemia   • Tobacco abuse counseling   • Dyspnea on exertion     1. 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  Obstructive disease disease in the RCA was noted which was successfully treated.  2007 3.5 x 18 mm Cypher stent was placed to the mid RCA.  January 2017 3.5 x 23 mm xience alpine stent was deployed to the distal RCA.  Plan:  Continue with dual antiplatelet treatment.  Continue with risk factor modification.  Patient has quit smoking cigarettes.    2. Essential hypertension  Patient's blood pressure readings from his home appears to be within optimal range I have advised him to continue with current medications.    His current echocardiogram reveals normal LV systolic function.      3. Hyperlipidemia, unspecified hyperlipidemia type  Component      Latest Ref Rng 11/14/2016 2/24/2017 3/13/2017   Total Cholesterol      0 - 199 mg/dL 124 (L) 146 130   Triglycerides      20 - 199 mg/dL 129 163 280 (H)   HDL Cholesterol      60 - 200 mg/dL 29.8 (L) 41 (L) 39 (L)   LDL Cholesterol      1  - 129 mg/dL 68 72 67   LDL/HDL Ratio      0.00 - 3.55  1.77 0.90     Continue with current dosage of statin.  We will monitor liver function and fasting lipid panel in 6 months    4. Dyspnea on exertion  Patient's current dyspnea is most likely secondary to his advanced stage of chronic obstructive lung disease.  He will continue to follow-up with his pulmonologist.  His CPAP need some adjustment.    I discussed the patients findings and my recommendations with patient.    Mariposa Zamorano MD  03/20/17  5:26 PM    EMR Dragon/Transcription disclaimer:   Much of this encounter note is an electronic transcription/translation of spoken language to printed text. The electronic translation of spoken language may permit erroneous, or at times, nonsensical words or phrases to be inadvertently transcribed; Although I have reviewed the note for such errors, some may still exist.

## 2017-03-28 ENCOUNTER — LAB (OUTPATIENT)
Dept: LAB | Facility: HOSPITAL | Age: 67
End: 2017-03-28

## 2017-03-28 DIAGNOSIS — Z13.9 SCREENING: ICD-10-CM

## 2017-03-28 LAB
ANION GAP SERPL CALCULATED.3IONS-SCNC: 13 MMOL/L (ref 5–15)
BUN BLD-MCNC: 25 MG/DL (ref 7–21)
BUN/CREAT SERPL: 22.7 (ref 7–25)
CALCIUM SPEC-SCNC: 9.8 MG/DL (ref 8.4–10.2)
CHLORIDE SERPL-SCNC: 100 MMOL/L (ref 95–110)
CO2 SERPL-SCNC: 24 MMOL/L (ref 22–31)
CREAT BLD-MCNC: 1.1 MG/DL (ref 0.7–1.3)
GFR SERPL CREATININE-BSD FRML MDRD: 67 ML/MIN/1.73 (ref 49–113)
GLUCOSE BLD-MCNC: 124 MG/DL (ref 60–100)
HBA1C MFR BLD: 8.05 % (ref 4–5.6)
POTASSIUM BLD-SCNC: 4.6 MMOL/L (ref 3.5–5.1)
SODIUM BLD-SCNC: 137 MMOL/L (ref 137–145)
T4 SERPL-MCNC: 7.76 MCG/DL (ref 5.5–11)
TSH SERPL DL<=0.05 MIU/L-ACNC: 3.48 MIU/ML (ref 0.46–4.68)

## 2017-03-28 PROCEDURE — 84436 ASSAY OF TOTAL THYROXINE: CPT | Performed by: INTERNAL MEDICINE

## 2017-03-28 PROCEDURE — 83036 HEMOGLOBIN GLYCOSYLATED A1C: CPT | Performed by: INTERNAL MEDICINE

## 2017-03-28 PROCEDURE — 36415 COLL VENOUS BLD VENIPUNCTURE: CPT

## 2017-03-28 PROCEDURE — 84443 ASSAY THYROID STIM HORMONE: CPT | Performed by: INTERNAL MEDICINE

## 2017-03-28 PROCEDURE — 80048 BASIC METABOLIC PNL TOTAL CA: CPT | Performed by: INTERNAL MEDICINE

## 2017-03-30 ENCOUNTER — OFFICE VISIT (OUTPATIENT)
Dept: FAMILY MEDICINE CLINIC | Facility: CLINIC | Age: 67
End: 2017-03-30

## 2017-03-30 VITALS
HEART RATE: 70 BPM | BODY MASS INDEX: 35.84 KG/M2 | SYSTOLIC BLOOD PRESSURE: 112 MMHG | WEIGHT: 242 LBS | OXYGEN SATURATION: 99 % | DIASTOLIC BLOOD PRESSURE: 64 MMHG | HEIGHT: 69 IN

## 2017-03-30 DIAGNOSIS — J44.9 CHRONIC OBSTRUCTIVE PULMONARY DISEASE, UNSPECIFIED COPD TYPE (HCC): Primary | Chronic | ICD-10-CM

## 2017-03-30 DIAGNOSIS — R09.02 HYPOXIA: Chronic | ICD-10-CM

## 2017-03-30 PROCEDURE — 99213 OFFICE O/P EST LOW 20 MIN: CPT | Performed by: INTERNAL MEDICINE

## 2017-03-31 ENCOUNTER — TELEPHONE (OUTPATIENT)
Dept: FAMILY MEDICINE CLINIC | Facility: CLINIC | Age: 67
End: 2017-03-31

## 2017-03-31 NOTE — TELEPHONE ENCOUNTER
----- Message from Chuy Camargo MD sent at 3/27/2017 10:06 AM CDT -----  Synthroid .05mg. Protocool.

## 2017-03-31 NOTE — TELEPHONE ENCOUNTER
Call placed to patient concerning recent MD new orders and unable to reach patient at this time, left VM

## 2017-04-03 ENCOUNTER — TELEPHONE (OUTPATIENT)
Dept: FAMILY MEDICINE CLINIC | Facility: CLINIC | Age: 67
End: 2017-04-03

## 2017-04-04 ENCOUNTER — TELEPHONE (OUTPATIENT)
Dept: FAMILY MEDICINE CLINIC | Facility: CLINIC | Age: 67
End: 2017-04-04

## 2017-04-04 DIAGNOSIS — Z13.9 SCREENING: Primary | ICD-10-CM

## 2017-04-04 RX ORDER — LEVOTHYROXINE SODIUM 0.05 MG/1
50 TABLET ORAL DAILY
Qty: 30 TABLET | Refills: 5 | Status: SHIPPED | OUTPATIENT
Start: 2017-04-04 | End: 2017-04-11 | Stop reason: SDUPTHER

## 2017-04-04 NOTE — TELEPHONE ENCOUNTER
Call placed to pt and spoke with wife Jesusita, explained the increase of metformin from 500 to 1000 in the am and cont. Same dose in PM of 500 and blood work in 2 weeks, 6 weeks and 3 months.  She voiced back understanding at this time and orders placed

## 2017-04-04 NOTE — TELEPHONE ENCOUNTER
----- Message from Chuy Camargo MD sent at 4/3/2017 12:02 PM CDT -----  Increase metformin to 1 gm am, cont 500mg pm. protoocol.

## 2017-04-04 NOTE — TELEPHONE ENCOUNTER
Call placed to pt and wife answered, spoke with her, her name is niall and explained synthroid new med and the lab work protocol for being put on synthroid, she voiced back understanding and stated they are in [place] at the moment and just to call in to mail in order so they will have it when they get home next week.

## 2017-04-11 ENCOUNTER — OFFICE VISIT (OUTPATIENT)
Dept: PULMONOLOGY | Facility: CLINIC | Age: 67
End: 2017-04-11

## 2017-04-11 VITALS
WEIGHT: 249.9 LBS | DIASTOLIC BLOOD PRESSURE: 82 MMHG | OXYGEN SATURATION: 97 % | HEART RATE: 55 BPM | HEIGHT: 69 IN | SYSTOLIC BLOOD PRESSURE: 129 MMHG | BODY MASS INDEX: 37.01 KG/M2

## 2017-04-11 DIAGNOSIS — R91.8 MULTIPLE LUNG NODULES: ICD-10-CM

## 2017-04-11 DIAGNOSIS — R06.09 DYSPNEA ON EXERTION: Primary | ICD-10-CM

## 2017-04-11 DIAGNOSIS — G47.33 OBSTRUCTIVE SLEEP APNEA SYNDROME: ICD-10-CM

## 2017-04-11 DIAGNOSIS — E66.01 MORBID OBESITY DUE TO EXCESS CALORIES (HCC): ICD-10-CM

## 2017-04-11 DIAGNOSIS — I25.10 CHRONIC CORONARY ARTERY DISEASE: ICD-10-CM

## 2017-04-11 DIAGNOSIS — F17.200 TOBACCO USE DISORDER: ICD-10-CM

## 2017-04-11 DIAGNOSIS — J44.9 MIXED TYPE COPD (CHRONIC OBSTRUCTIVE PULMONARY DISEASE) (HCC): ICD-10-CM

## 2017-04-11 DIAGNOSIS — I51.89 DIASTOLIC DYSFUNCTION: ICD-10-CM

## 2017-04-11 PROCEDURE — 99406 BEHAV CHNG SMOKING 3-10 MIN: CPT | Performed by: INTERNAL MEDICINE

## 2017-04-11 PROCEDURE — 99204 OFFICE O/P NEW MOD 45 MIN: CPT | Performed by: INTERNAL MEDICINE

## 2017-04-11 RX ORDER — HYDROCODONE BITARTRATE AND ACETAMINOPHEN 5; 325 MG/1; MG/1
1 TABLET ORAL EVERY 6 HOURS PRN
COMMUNITY
Start: 2017-04-05 | End: 2017-09-01 | Stop reason: ALTCHOICE

## 2017-04-11 RX ORDER — LEVOTHYROXINE SODIUM 0.05 MG/1
50 TABLET ORAL DAILY
Qty: 30 TABLET | Refills: 0 | Status: SHIPPED | OUTPATIENT
Start: 2017-04-11 | End: 2017-08-31 | Stop reason: SDUPTHER

## 2017-04-11 NOTE — PROGRESS NOTES
"Pulmonary Consultation    Subjective     Chief Complaint   Patient presents with   • COPD     REF from Mary Jo        History of Present Illness  Aman Sanchez is a 66 y.o. male with a PMH significant for COPD, tobacco use, CAD s/p PCI, ALLEN on CPAP, and DM who presents for evaluation of dyspnea. Pt states \"I don't have any air\". He reports his sats drop into the low 90's and have been in the 80's at night. He is disabled from back problems. Pt states he has had progressive SCHWAB for years and was diagnosed with COPD 3-4yrs ago. He has been tried on several different bronchodilators without improvement. Pt is currently on Symbicort BID, Spiriva BID, and albuterol MDI/ nebs prn. He does not feel the albuterol does not help so he does not use it much. He has smoked up to 1ppd, starting at age 8. He has cut down but recently quit, only to restart smoking 0.25ppd. Pt does have occasional nonproductive cough and 20lbs over the past 2-3months, but he denies wheeze, chest pain, or edema. He does wear a CPAP nightly and feels it helps him sleep at night.  His wife states that he was prescribed supplemental oxygen as he had an overnight pulse oximetry while off his CPAP and noted desaturation to 84% on room air.  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 His mother had lung cancer and COPD.     Review of Systems: History obtained from chart review and the patient.  Review of Systems   Constitutional: Positive for unexpected weight change.        Snoring, stops breathing at night, falls asleep early during the day   Respiratory: Positive for cough and shortness of breath. Negative for wheezing.    Cardiovascular: Negative for chest pain and leg swelling.   Musculoskeletal: Positive for arthralgias and back pain.   Psychiatric/Behavioral: Positive for dysphoric mood.     As described in the HPI. Otherwise, remainder of ROS (14 systems) were negative.    Patient Active Problem List   Diagnosis "   • Essential hypertension   • Chronic coronary artery disease   • Hyperlipidemia   • Tobacco abuse counseling   • Dyspnea on exertion   • Acute retention of urine   • Anemia   • Mixed type COPD (chronic obstructive pulmonary disease)   • Osteoarthritis of hip   • Hypersomnia   • Hypotension   • Hypoxia   • Obstructive sleep apnea syndrome   • Diastolic dysfunction   • Multiple lung nodules   • Morbid obesity due to excess calories   • Tobacco use disorder         Current Outpatient Prescriptions:   •  albuterol (PROVENTIL) (2.5 MG/3ML) 0.083% nebulizer solution, Take 2.5 mg by nebulization Every 4 (Four) Hours As Needed for Wheezing., Disp: 75 vial, Rfl: 1  •  amLODIPine (NORVASC) 10 MG tablet, Take 5 mg by mouth 2 (Two) Times a Day With Meals., Disp: , Rfl:   •  cetirizine (zyrTEC) 10 MG tablet, Take 10 mg by mouth Daily., Disp: , Rfl:   •  finasteride (PROSCAR) 5 MG tablet, Take 5 mg by mouth 1 (One) Time., Disp: , Rfl:   •  FLUoxetine (PROzac) 40 MG capsule, Take 1 CAPSULE BY MOUTH  DAILY FOR DEPRESSION, Disp: 90 capsule, Rfl: 3  •  fluticasone (FLONASE) 50 MCG/ACT nasal spray, 2 sprays into each nostril Every Night., Disp: , Rfl:   •  glucose blood test strip, Use as instructed, Disp: 100 each, Rfl: 0  •  HYDROcodone-acetaminophen (NORCO) 5-325 MG per tablet, , Disp: , Rfl:   •  levothyroxine (SYNTHROID) 50 MCG tablet, Take 1 tablet by mouth Daily., Disp: 30 tablet, Rfl: 0  •  lisinopril-hydrochlorothiazide (PRINZIDE,ZESTORETIC) 20-25 MG per tablet, Take 1 tablet by mouth Daily., Disp: 90 tablet, Rfl: 1  •  Magnesium 400 MG capsule, Take 400 mg by mouth Daily., Disp: 30 capsule, Rfl: 5  •  meloxicam (MOBIC) 15 MG tablet, Take 15 mg by mouth 1 (One) Time., Disp: , Rfl:   •  metFORMIN (GLUCOPHAGE) 1000 MG tablet, Take 1 tablet by mouth Daily Before Supper., Disp: 30 tablet, Rfl: 5  •  metFORMIN (GLUCOPHAGE) 500 MG tablet, Take 1 tablet by mouth Daily With Breakfast., Disp: 30 tablet, Rfl: 5  •  mirtazapine  (REMERON SOL-TAB) 15 MG disintegrating tablet, Dissolve 1 tablet on the tongue every night, Disp: 90 tablet, Rfl: 1  •  nitroglycerin (NITROSTAT) 0.4 MG SL tablet, Place 1 tablet under the tongue Every 5 (Five) Minutes As Needed for chest pain., Disp: 100 tablet, Rfl: 11  •  ONE TOUCH LANCETS misc, Use as directed test 6 times daily, Disp: 200 each, Rfl: 0  •  potassium chloride (K-DUR,KLOR-CON) 20 MEQ CR tablet, Take 1 tablet by mouth Daily., Disp: 30 tablet, Rfl: 5  •  pravastatin (PRAVACHOL) 80 MG tablet, Take 1 tablet by mouth Daily., Disp: 90 tablet, Rfl: 1  •  PROAIR  (90 BASE) MCG/ACT inhaler, Inhale 2 puffs 4 (Four) Times a Day As Needed for shortness of air., Disp: 3 inhaler, Rfl: 1  •  tamsulosin (FLOMAX) 0.4 MG capsule 24 hr capsule, Take 1 capsule by mouth  every night at bedtime for  BPH, Disp: 90 capsule, Rfl: 1  •  tiotropium (SPIRIVA HANDIHALER) 18 MCG per inhalation capsule, Place 1 capsule into inhaler and inhale Daily., Disp: 90 capsule, Rfl: 1  •  traMADol (ULTRAM) 50 MG tablet, Take 50 mg by mouth Every 6 (Six) Hours As Needed for moderate pain (4-6)., Disp: , Rfl:   •  prasugrel (EFFIENT) 10 MG tablet, Take 1 tablet by mouth Daily for 90 days., Disp: 90 tablet, Rfl: 3    Past Medical History:   Diagnosis Date   • Acute bronchitis    • Anemia    • Breast lump    • Cervical radiculopathy    • COPD (chronic obstructive pulmonary disease)    • Coronary atherosclerosis    • Depressive disorder    • Dysuria    • Essential hypertension    • Flank pain    • Hematochezia    • Hematuria syndrome    • Hyperlipidemia    • Hypoglycemia    • Kidney stone    • Mass of neck    • MI (myocardial infarction)    • Neck pain     sprain   • Osteoarthritis    • Type 2 diabetes mellitus      Past Surgical History:   Procedure Laterality Date   • BACK SURGERY     • CHOLECYSTECTOMY     • CYSTOSCOPY  08/29/2014    Left ESWL, cysto and stent removal   • CYSTOSCOPY W/ LITHOLAPAXY / EHL  08/15/2014    Left ESWL   •  "HIP SURGERY     • INJECTION OF MEDICATION  11/11/2013    Kenalog   • INJECTION OF MEDICATION  01/16/2014    Toradol   • SKIN GRAFT      on his foot; removed skin from hip   • TONSILLECTOMY     • TRANSESOPHAGEAL ECHOCARDIOGRAM (ОЛЬГА)  07/21/2014    with color flow-Normal LV systolic function with Ef of 60-65%/ Grad1 diastolic dysfunction of the left ventricular myocardium. No evidence of pericardial effusion     Social History     Social History   • Marital status:      Spouse name: N/A   • Number of children: N/A   • Years of education: N/A     Social History Main Topics   • Smoking status: Former Smoker     Packs/day: 0.25     Types: Cigarettes   • Smokeless tobacco: Never Used      Comment: 2 cigarettes per day.  Declines cessation    • Alcohol use Yes      Comment: Rare   • Drug use: No   • Sexual activity: Defer     Other Topics Concern   • None     Social History Narrative     Family History   Problem Relation Age of Onset   • Lung cancer Mother    • Rectal cancer Sister           Objective     Blood pressure 129/82, pulse 55, height 69\" (175.3 cm), weight 249 lb 14.4 oz (113 kg), SpO2 97 %.  Physical Exam   Constitutional: He is oriented to person, place, and time. Vital signs are normal. He appears well-developed and well-nourished.   Obese   HENT:   Head: Normocephalic and atraumatic.   Nose: Nose normal.   Mouth/Throat: Uvula is midline, oropharynx is clear and moist and mucous membranes are normal.   Mallampati 3   Eyes: Conjunctivae, EOM and lids are normal. Pupils are equal, round, and reactive to light.   Neck: Trachea normal and normal range of motion. No tracheal tenderness present. No thyroid mass present.   Cardiovascular: Normal rate, regular rhythm and normal heart sounds.  Exam reveals no gallop.    No murmur heard.  Pulmonary/Chest: Effort normal and breath sounds normal. No respiratory distress. He has no wheezes. He has no rhonchi. Chest wall is not dull to percussion. He exhibits no " tenderness.   Abdominal: Soft. Normal appearance and bowel sounds are normal. There is no tenderness.   Obese       Vascular Status -  His exam exhibits no right foot edema. His exam exhibits no left foot edema.  Lymphadenopathy:        Head (right side): No submandibular adenopathy present.        Head (left side): No submandibular adenopathy present.     He has no cervical adenopathy.        Right: No supraclavicular adenopathy present.        Left: No supraclavicular adenopathy present.   Neurological: He is alert and oriented to person, place, and time.   Skin: Skin is warm and dry. No cyanosis. Nails show no clubbing.   Psychiatric: He has a normal mood and affect. His speech is normal and behavior is normal. Judgment normal.   Nursing note and vitals reviewed.      PFTs: 2/6/17  Ratio 77  FVC 3.37/ 75%  FEV1 2.6/ 78%  Probable mixed obstruction and restriction.  Significant bronchial dilator response for midexpiratory flows.  No comparative data available.     Radiology (independently reviewed and interpreted by me): CXR 3/13/17 showed prior granulomatous disease, mild hyperinflation.    CTPA 1/24/17: No PE, emphysematous changes bilaterally, prominent interstitial markings and scattered GG oh likely representative of vascular congestion and early pulmonary edema, 25 mm nodules in the right upper lobe, calcified right paratracheal node, enlarged left hilar node and AP window node       Assessment/Plan     Aman was seen today for copd.    Diagnoses and all orders for this visit:    Dyspnea on exertion  -     Ambulatory Referral to Cardiology    Mixed type COPD (chronic obstructive pulmonary disease)    Morbid obesity due to excess calories    Tobacco use disorder    Obstructive sleep apnea syndrome    Chronic coronary artery disease  -     Ambulatory Referral to Cardiology    Diastolic dysfunction    Multiple lung nodules         Discussion/ Recommendations:   Spirometry consistent with mixed obstruction and  restriction, but primarily restriction likely due to obesity.  I think his dyspnea is multifactorial from mild COPD, morbid obesity, and deconditioning.  His CT from January is more indicative of vascular congestion and mild pulmonary edema.  He does have risk factors for heart failure and diastolic dysfunction was noted on his last echocardiogram.  I think he is currently on more bronchodilators that are necessary, especially in light as no perceived benefit.  Unfortunate, he continues to smoke intermittently, but is actively trying to quit.    -Stop Symbicort and use Spiriva once daily only.  If he notices worsening of his breathing after stopping Symbicort, recommend he restart it.  -Use albuterol nebs or MDI as needed only.  -Counseled the patient on the importance of continued complete smoking cessation.  As he is only smoking a few cigarettes a day, is likely the habit more so than anything.  Offered support.  Spent 3 minutes.  -Referral to Dr. Suggs for second opinion regarding diastolic dysfunction and dyspnea.  I anticipate he'll need another echocardiogram.  -Continue with CPAP at current pressure given good compliance and effectiveness.  Will refer to the new sleep physician when they arrive later this year for annual follow-up.  -No indication for supplemental oxygen therapy.  To assess nocturnal need, and overnight pulse oximetry would need to be performed while the patient is wearing his CPAP.  -Recommend entrance and pulmonary rehabilitation as I feel his deconditioning is a major issue.  Patient wishes to consider this and will let me know if he requests referral.  I did stress that physical activity is necessary for him to regain quality of life.  -Plan on repeat CT chest without contrast in January 2018.         Return in about 6 weeks (around 5/23/2017).      Thank you for allowing me to participate in the care of Aman Sanchez. Please do not hesitate to contact me with any questions.          This document has been electronically signed by Dorota Paz MD on April 11, 2017 2:22 PM      EMR Dragon/Transcription disclaimer:     Much of this encounter note is an electronic transcription/translation of spoken language to printed text. The electronic translation of spoken language may permit erroneous, or at times, nonsensical words or phrases to be inadvertently transcribed; Although I have reviewed the note for such errors, some may still exist.

## 2017-05-03 ENCOUNTER — LAB (OUTPATIENT)
Dept: LAB | Facility: HOSPITAL | Age: 67
End: 2017-05-03

## 2017-05-03 DIAGNOSIS — Z13.9 SCREENING: ICD-10-CM

## 2017-05-03 LAB
ANION GAP SERPL CALCULATED.3IONS-SCNC: 14 MMOL/L (ref 5–15)
ARTICHOKE IGE QN: 64 MG/DL (ref 1–129)
BUN BLD-MCNC: 15 MG/DL (ref 7–21)
BUN/CREAT SERPL: 16.7 (ref 7–25)
CALCIUM SPEC-SCNC: 9.7 MG/DL (ref 8.4–10.2)
CHLORIDE SERPL-SCNC: 99 MMOL/L (ref 95–110)
CHOLEST SERPL-MCNC: 125 MG/DL (ref 0–199)
CO2 SERPL-SCNC: 27 MMOL/L (ref 22–31)
CREAT BLD-MCNC: 0.9 MG/DL (ref 0.7–1.3)
GFR SERPL CREATININE-BSD FRML MDRD: 85 ML/MIN/1.73 (ref 49–113)
GLUCOSE BLD-MCNC: 138 MG/DL (ref 60–100)
HBA1C MFR BLD: 6.97 % (ref 4–5.6)
HDLC SERPL-MCNC: 34 MG/DL (ref 60–200)
LDLC/HDLC SERPL: 1.72 {RATIO} (ref 0–3.55)
POTASSIUM BLD-SCNC: 4.3 MMOL/L (ref 3.5–5.1)
SODIUM BLD-SCNC: 140 MMOL/L (ref 137–145)
TRIGL SERPL-MCNC: 162 MG/DL (ref 20–199)

## 2017-05-03 PROCEDURE — 80061 LIPID PANEL: CPT | Performed by: INTERNAL MEDICINE

## 2017-05-03 PROCEDURE — 80048 BASIC METABOLIC PNL TOTAL CA: CPT | Performed by: INTERNAL MEDICINE

## 2017-05-03 PROCEDURE — 83036 HEMOGLOBIN GLYCOSYLATED A1C: CPT | Performed by: INTERNAL MEDICINE

## 2017-05-05 ENCOUNTER — OFFICE VISIT (OUTPATIENT)
Dept: FAMILY MEDICINE CLINIC | Facility: CLINIC | Age: 67
End: 2017-05-05

## 2017-05-05 VITALS
HEART RATE: 102 BPM | SYSTOLIC BLOOD PRESSURE: 132 MMHG | BODY MASS INDEX: 35.78 KG/M2 | DIASTOLIC BLOOD PRESSURE: 68 MMHG | WEIGHT: 241.6 LBS | HEIGHT: 69 IN | OXYGEN SATURATION: 94 %

## 2017-05-05 DIAGNOSIS — J44.9 CHRONIC OBSTRUCTIVE PULMONARY DISEASE, UNSPECIFIED COPD TYPE (HCC): Chronic | ICD-10-CM

## 2017-05-05 DIAGNOSIS — I25.10 CAD IN NATIVE ARTERY: Chronic | ICD-10-CM

## 2017-05-05 DIAGNOSIS — E11.8 TYPE 2 DIABETES MELLITUS WITH COMPLICATION, WITHOUT LONG-TERM CURRENT USE OF INSULIN (HCC): Primary | Chronic | ICD-10-CM

## 2017-05-05 DIAGNOSIS — R06.09 DOE (DYSPNEA ON EXERTION): ICD-10-CM

## 2017-05-05 PROBLEM — E78.5 HYPERLIPIDEMIA: Status: RESOLVED | Noted: 2017-01-03 | Resolved: 2017-05-05

## 2017-05-05 PROCEDURE — 99214 OFFICE O/P EST MOD 30 MIN: CPT | Performed by: INTERNAL MEDICINE

## 2017-05-18 ENCOUNTER — OFFICE VISIT (OUTPATIENT)
Dept: RETAIL CLINIC | Facility: CLINIC | Age: 67
End: 2017-05-18

## 2017-05-18 VITALS
RESPIRATION RATE: 22 BRPM | OXYGEN SATURATION: 94 % | HEART RATE: 85 BPM | TEMPERATURE: 102.1 F | SYSTOLIC BLOOD PRESSURE: 142 MMHG | BODY MASS INDEX: 36.14 KG/M2 | HEIGHT: 69 IN | DIASTOLIC BLOOD PRESSURE: 74 MMHG | WEIGHT: 244 LBS

## 2017-05-18 DIAGNOSIS — R50.9 FEVER, UNSPECIFIED FEVER CAUSE: Primary | ICD-10-CM

## 2017-05-18 LAB
EXPIRATION DATE: NORMAL
EXPIRATION DATE: NORMAL
FLUAV AG NPH QL: NORMAL
FLUBV AG NPH QL: NORMAL
INTERNAL CONTROL: NORMAL
INTERNAL CONTROL: NORMAL
Lab: NORMAL
Lab: NORMAL
S PYO AG THROAT QL: NEGATIVE

## 2017-05-18 PROCEDURE — 87804 INFLUENZA ASSAY W/OPTIC: CPT | Performed by: NURSE PRACTITIONER

## 2017-05-18 PROCEDURE — 87880 STREP A ASSAY W/OPTIC: CPT | Performed by: NURSE PRACTITIONER

## 2017-05-18 PROCEDURE — 87086 URINE CULTURE/COLONY COUNT: CPT | Performed by: FAMILY MEDICINE

## 2017-05-18 PROCEDURE — 99213 OFFICE O/P EST LOW 20 MIN: CPT | Performed by: NURSE PRACTITIONER

## 2017-05-25 ENCOUNTER — OFFICE VISIT (OUTPATIENT)
Dept: FAMILY MEDICINE CLINIC | Facility: CLINIC | Age: 67
End: 2017-05-25

## 2017-05-25 VITALS
DIASTOLIC BLOOD PRESSURE: 70 MMHG | HEART RATE: 71 BPM | TEMPERATURE: 97 F | BODY MASS INDEX: 33.49 KG/M2 | WEIGHT: 239.2 LBS | HEIGHT: 71 IN | SYSTOLIC BLOOD PRESSURE: 142 MMHG | OXYGEN SATURATION: 97 %

## 2017-05-25 DIAGNOSIS — E11.9 TYPE 2 DIABETES, HBA1C GOAL < 7% (HCC): Primary | ICD-10-CM

## 2017-05-25 DIAGNOSIS — N12 PYELONEPHRITIS: ICD-10-CM

## 2017-05-25 PROCEDURE — 99213 OFFICE O/P EST LOW 20 MIN: CPT | Performed by: INTERNAL MEDICINE

## 2017-05-25 RX ORDER — CIPROFLOXACIN 500 MG/1
500 TABLET, FILM COATED ORAL 2 TIMES DAILY
Qty: 10 TABLET | Refills: 0 | Status: SHIPPED | OUTPATIENT
Start: 2017-05-25 | End: 2017-05-26 | Stop reason: SDUPTHER

## 2017-05-26 RX ORDER — CIPROFLOXACIN 500 MG/1
500 TABLET, FILM COATED ORAL 2 TIMES DAILY
Qty: 10 TABLET | Refills: 0 | Status: SHIPPED | OUTPATIENT
Start: 2017-05-26 | End: 2017-05-30

## 2017-05-30 RX ORDER — SULFAMETHOXAZOLE AND TRIMETHOPRIM 800; 160 MG/1; MG/1
1 TABLET ORAL 2 TIMES DAILY
Qty: 14 TABLET | Refills: 0 | Status: SHIPPED | OUTPATIENT
Start: 2017-05-30 | End: 2017-06-27

## 2017-06-02 NOTE — PROGRESS NOTES
Subjective   Aman Sanchez is a 66 y.o. male.     Chief Complaint   Patient presents with   • Follow-up     talk to MD        History of Present Illness     Pt in f/u on SOB.  F/U on COPD, CAD.    Pt is doing well now.  Pt is tolerating their conditions and medications well.       The following portions of the patient's history were reviewed and updated as appropriate: allergies, current medications, past family history, past medical history, past social history, past surgical history and problem list.    Review of Systems   Constitutional: Positive for fatigue. Negative for activity change, appetite change and unexpected weight change.   HENT: Negative for ear pain, facial swelling and hearing loss.    Respiratory: Positive for shortness of breath. Negative for cough, chest tightness and wheezing.    Cardiovascular: Negative for chest pain, palpitations and leg swelling.   Gastrointestinal: Negative for abdominal pain.   Genitourinary: Negative for difficulty urinating, dysuria, flank pain, frequency and urgency.   Musculoskeletal: Negative for arthralgias and back pain.   Skin: Negative for color change and rash.   Allergic/Immunologic: Negative for environmental allergies and food allergies.   Neurological: Negative for dizziness, syncope, weakness, numbness and headaches.   Hematological: Negative for adenopathy.   Psychiatric/Behavioral: Negative for confusion, decreased concentration, dysphoric mood, sleep disturbance and suicidal ideas. The patient is not nervous/anxious.    All other systems reviewed and are negative.  In past two weeks the patient has not felt down, depressed, hopeless, or lost interest in doing things.      Objective   Physical Exam   Constitutional: He is oriented to person, place, and time. Vital signs are normal. He appears well-developed and well-nourished.   HENT:   Head: Normocephalic and atraumatic.   Right Ear: External ear normal.   Left Ear: External ear normal.   Nose: Nose  normal.   Eyes: Conjunctivae and EOM are normal. Pupils are equal, round, and reactive to light.   Neck: Normal range of motion. Neck supple.   Cardiovascular: Normal rate and regular rhythm.  Exam reveals no gallop and no friction rub.    No murmur heard.  Pulmonary/Chest: Effort normal. No respiratory distress. He has decreased breath sounds. He has no wheezes. He has no rales.   Abdominal: Soft. He exhibits no distension. There is no tenderness. There is no rebound and no guarding.   Musculoskeletal: Normal range of motion. He exhibits tenderness. He exhibits no edema.   Neurological: He is alert and oriented to person, place, and time. No cranial nerve deficit.   Skin: Skin is warm and dry. No rash noted.   Psychiatric: He has a normal mood and affect. His behavior is normal. Judgment and thought content normal. His mood appears not anxious. He does not exhibit a depressed mood. He expresses no homicidal and no suicidal ideation. He expresses no suicidal plans and no homicidal plans.   Nursing note and vitals reviewed.      Assessment/Plan   Aman was seen today for follow-up.    Diagnoses and all orders for this visit:    Type 2 diabetes mellitus with complication, without long-term current use of insulin  -     CBC w AUTO Differential; Future  -     Basic metabolic panel; Future  -     Hemoglobin A1c; Future    CAD in native artery    Chronic obstructive pulmonary disease, unspecified COPD type    SCHWAB (dyspnea on exertion)  Comments:  Resolving    Other orders  -     Discontinue: glucose blood test strip; Use as instructed        F/U in 3 months RTC    CBC BMP A1C when returned    Scribed for Dr. Camargo by [facility] 06[facility]02/17      Dr. Camargo voiced to scribe what he wanted to be typed in this note.

## 2017-06-23 ENCOUNTER — PREP FOR SURGERY (OUTPATIENT)
Dept: OTHER | Facility: HOSPITAL | Age: 67
End: 2017-06-23

## 2017-06-23 DIAGNOSIS — N20.2 CALCULUS OF KIDNEY AND URETER: Primary | ICD-10-CM

## 2017-06-23 RX ORDER — LEVOFLOXACIN 5 MG/ML
500 INJECTION, SOLUTION INTRAVENOUS ONCE
Status: CANCELLED | OUTPATIENT
Start: 2017-06-30

## 2017-06-27 ENCOUNTER — APPOINTMENT (OUTPATIENT)
Dept: PREADMISSION TESTING | Facility: HOSPITAL | Age: 67
End: 2017-06-27

## 2017-06-27 VITALS
HEIGHT: 69 IN | SYSTOLIC BLOOD PRESSURE: 130 MMHG | RESPIRATION RATE: 20 BRPM | OXYGEN SATURATION: 94 % | BODY MASS INDEX: 34.51 KG/M2 | DIASTOLIC BLOOD PRESSURE: 72 MMHG | HEART RATE: 65 BPM | WEIGHT: 233 LBS

## 2017-06-27 LAB
ANION GAP SERPL CALCULATED.3IONS-SCNC: 13 MMOL/L (ref 5–15)
BUN BLD-MCNC: 14 MG/DL (ref 7–21)
BUN/CREAT SERPL: 15.9 (ref 7–25)
CALCIUM SPEC-SCNC: 10 MG/DL (ref 8.4–10.2)
CHLORIDE SERPL-SCNC: 99 MMOL/L (ref 95–110)
CO2 SERPL-SCNC: 25 MMOL/L (ref 22–31)
CREAT BLD-MCNC: 0.88 MG/DL (ref 0.7–1.3)
GFR SERPL CREATININE-BSD FRML MDRD: 87 ML/MIN/1.73 (ref 60–113)
GLUCOSE BLD-MCNC: 119 MG/DL (ref 60–100)
POTASSIUM BLD-SCNC: 4.2 MMOL/L (ref 3.5–5.1)
SODIUM BLD-SCNC: 137 MMOL/L (ref 137–145)

## 2017-06-27 PROCEDURE — 93010 ELECTROCARDIOGRAM REPORT: CPT | Performed by: INTERNAL MEDICINE

## 2017-06-27 PROCEDURE — 36415 COLL VENOUS BLD VENIPUNCTURE: CPT

## 2017-06-27 PROCEDURE — 93005 ELECTROCARDIOGRAM TRACING: CPT

## 2017-06-27 PROCEDURE — 80048 BASIC METABOLIC PNL TOTAL CA: CPT | Performed by: ANESTHESIOLOGY

## 2017-06-27 RX ORDER — TAMSULOSIN HYDROCHLORIDE 0.4 MG/1
2 CAPSULE ORAL NIGHTLY
COMMUNITY
End: 2017-12-07 | Stop reason: SDUPTHER

## 2017-06-27 RX ORDER — ASPIRIN 81 MG/1
81 TABLET ORAL DAILY
COMMUNITY

## 2017-06-27 RX ORDER — SODIUM CHLORIDE 9 MG/ML
1000 INJECTION, SOLUTION INTRAVENOUS CONTINUOUS PRN
Status: CANCELLED | OUTPATIENT
Start: 2017-06-30

## 2017-06-27 RX ORDER — MIRTAZAPINE 15 MG/1
TABLET, ORALLY DISINTEGRATING ORAL
Qty: 90 TABLET | Refills: 1 | Status: ON HOLD | OUTPATIENT
Start: 2017-06-27 | End: 2017-08-23

## 2017-06-27 NOTE — PAT
Unable to get Dr. Zamorano at office, left message on telephone r/t patient on [employer] and if OK for patient to hold prior to surgery. Patient voiced understanding that if he doesn't hear back from me today r/t [employer], he is to call Dr. Zamorano's office in am to find out if and when he can hold [employer].

## 2017-06-27 NOTE — DISCHARGE INSTRUCTIONS
[facility]  Pre-op Information and Guidelines    You will be called after 2 p.m. the day before your surgery (Friday for Monday surgery) and notified of your time for arrival and approximate surgery time.  If you have not received a call by 4P.M., please contact Same Day Surgery at [phone] of if outside [facility] call [phone].    Please Follow these Important Safety Guidelines:    • The morning of your procedure, take only the medications listed below with   A sip of water:_____________________________________________       ______________________________________________    • DO NOT eat or drink anything after 12:00 midnight the night before surgery  Specific instructions concerning drinking clear liquids will be discussed during  the pre-surgery instruction call the day before your surgery.    • If you take a blood thinner (ex. Plavix, Coumadin, aspirin), ask your doctor when to stop it before surgery  STOP DATE: _________________    • Only 2 visitors are allowed in patient rooms at a time  Your visitors will be asked to wait in the lobby until the admission process is complete with the exception of a parent with a child and patients in need of special assistance.    • YOU CANNOT DRIVE YOURSELF HOME  You must be accompanied by someone who will be responsible for driving you home after surgery and for your care at home.    • DO NOT chew gum, use breath mints, hard candy, or smoke the day of surgery  • DO NOT drink alcohol for at least 24 hours before your surgery  • DO NOT wear any jewelry and remove all body piercing before coming to the hospital  • DO NOT wear make-up to the hospital  • If you are having surgery on an extremity (arm/leg/foot) remove nail polish/artificial nails on the surgical side  • Clothing, glasses, contacts, dentures, and hairpieces must be removed before surgery  • Bathe the night before or the morning of your surgery and do not use powders/lotions on  skin.

## 2017-06-29 ENCOUNTER — ANESTHESIA EVENT (OUTPATIENT)
Dept: PERIOP | Facility: HOSPITAL | Age: 67
End: 2017-06-29

## 2017-06-30 ENCOUNTER — HOSPITAL ENCOUNTER (OUTPATIENT)
Dept: GENERAL RADIOLOGY | Facility: HOSPITAL | Age: 67
Discharge: HOME | End: 2017-06-30

## 2017-06-30 ENCOUNTER — ANESTHESIA (OUTPATIENT)
Dept: PERIOP | Facility: HOSPITAL | Age: 67
End: 2017-06-30

## 2017-06-30 ENCOUNTER — HOSPITAL ENCOUNTER (OUTPATIENT)
Facility: HOSPITAL | Age: 67
Setting detail: HOSPITAL OUTPATIENT SURGERY
Discharge: HOME | End: 2017-06-30
Attending: UROLOGY | Admitting: UROLOGY

## 2017-06-30 VITALS
HEART RATE: 70 BPM | TEMPERATURE: 97.2 F | SYSTOLIC BLOOD PRESSURE: 148 MMHG | WEIGHT: 226.85 LBS | OXYGEN SATURATION: 94 % | RESPIRATION RATE: 18 BRPM | HEIGHT: 69 IN | DIASTOLIC BLOOD PRESSURE: 75 MMHG | BODY MASS INDEX: 33.6 KG/M2

## 2017-06-30 LAB
BACTERIA UR QL AUTO: ABNORMAL /HPF
BILIRUB UR QL STRIP: ABNORMAL
CLARITY UR: ABNORMAL
COLOR UR: ABNORMAL
GLUCOSE BLDC GLUCOMTR-MCNC: 103 MG/DL (ref 70–130)
GLUCOSE BLDC GLUCOMTR-MCNC: 97 MG/DL (ref 70–130)
GLUCOSE UR STRIP-MCNC: NEGATIVE MG/DL
HGB UR QL STRIP.AUTO: ABNORMAL
HYALINE CASTS UR QL AUTO: ABNORMAL /LPF
KETONES UR QL STRIP: NEGATIVE
LEUKOCYTE ESTERASE UR QL STRIP.AUTO: NEGATIVE
NITRITE UR QL STRIP: NEGATIVE
PH UR STRIP.AUTO: 6 [PH] (ref 5–9)
PROT UR QL STRIP: NEGATIVE
RBC # UR: ABNORMAL /HPF
REF LAB TEST METHOD: ABNORMAL
SP GR UR STRIP: 1.02 (ref 1–1.03)
SQUAMOUS #/AREA URNS HPF: ABNORMAL /HPF
UROBILINOGEN UR QL STRIP: ABNORMAL
WBC UR QL AUTO: ABNORMAL /HPF

## 2017-06-30 PROCEDURE — 82962 GLUCOSE BLOOD TEST: CPT

## 2017-06-30 PROCEDURE — 25010000002 FENTANYL CITRATE (PF) 100 MCG/2ML SOLUTION: Performed by: NURSE ANESTHETIST, CERTIFIED REGISTERED

## 2017-06-30 PROCEDURE — 25010000002 MIDAZOLAM PER 1 MG: Performed by: NURSE ANESTHETIST, CERTIFIED REGISTERED

## 2017-06-30 PROCEDURE — 0 IOPAMIDOL 61 % SOLUTION: Performed by: UROLOGY

## 2017-06-30 PROCEDURE — 25010000002 LEVOFLOXACIN PER 250 MG: Performed by: UROLOGY

## 2017-06-30 PROCEDURE — C1758 CATHETER, URETERAL: HCPCS | Performed by: UROLOGY

## 2017-06-30 PROCEDURE — C2617 STENT, NON-COR, TEM W/O DEL: HCPCS | Performed by: UROLOGY

## 2017-06-30 PROCEDURE — C1769 GUIDE WIRE: HCPCS | Performed by: UROLOGY

## 2017-06-30 PROCEDURE — 74420 UROGRAPHY RTRGR +-KUB: CPT

## 2017-06-30 PROCEDURE — 25010000002 PROPOFOL 10 MG/ML EMULSION: Performed by: NURSE ANESTHETIST, CERTIFIED REGISTERED

## 2017-06-30 PROCEDURE — 25010000002 ONDANSETRON PER 1 MG: Performed by: NURSE ANESTHETIST, CERTIFIED REGISTERED

## 2017-06-30 PROCEDURE — 81001 URINALYSIS AUTO W/SCOPE: CPT | Performed by: UROLOGY

## 2017-06-30 DEVICE — URETERAL STENT
Type: IMPLANTABLE DEVICE | Status: FUNCTIONAL
Brand: POLARIS™ ULTRA

## 2017-06-30 RX ORDER — SCOLOPAMINE TRANSDERMAL SYSTEM 1 MG/1
1 PATCH, EXTENDED RELEASE TRANSDERMAL
Status: DISCONTINUED | OUTPATIENT
Start: 2017-06-30 | End: 2017-06-30 | Stop reason: HOSPADM

## 2017-06-30 RX ORDER — PROPOFOL 10 MG/ML
VIAL (ML) INTRAVENOUS AS NEEDED
Status: DISCONTINUED | OUTPATIENT
Start: 2017-06-30 | End: 2017-06-30 | Stop reason: SURG

## 2017-06-30 RX ORDER — FLUMAZENIL 0.1 MG/ML
0.2 INJECTION INTRAVENOUS AS NEEDED
Status: DISCONTINUED | OUTPATIENT
Start: 2017-06-30 | End: 2017-06-30 | Stop reason: HOSPADM

## 2017-06-30 RX ORDER — NALOXONE HCL 0.4 MG/ML
0.2 VIAL (ML) INJECTION AS NEEDED
Status: DISCONTINUED | OUTPATIENT
Start: 2017-06-30 | End: 2017-06-30 | Stop reason: HOSPADM

## 2017-06-30 RX ORDER — SODIUM CHLORIDE 9 MG/ML
1000 INJECTION, SOLUTION INTRAVENOUS CONTINUOUS PRN
Status: DISCONTINUED | OUTPATIENT
Start: 2017-06-30 | End: 2017-06-30 | Stop reason: HOSPADM

## 2017-06-30 RX ORDER — LABETALOL HYDROCHLORIDE 5 MG/ML
5 INJECTION, SOLUTION INTRAVENOUS
Status: DISCONTINUED | OUTPATIENT
Start: 2017-06-30 | End: 2017-06-30 | Stop reason: HOSPADM

## 2017-06-30 RX ORDER — TAMSULOSIN HYDROCHLORIDE 0.4 MG/1
0.4 CAPSULE ORAL DAILY
Status: DISCONTINUED | OUTPATIENT
Start: 2017-06-30 | End: 2017-06-30 | Stop reason: HOSPADM

## 2017-06-30 RX ORDER — ONDANSETRON 2 MG/ML
4 INJECTION INTRAMUSCULAR; INTRAVENOUS ONCE AS NEEDED
Status: DISCONTINUED | OUTPATIENT
Start: 2017-06-30 | End: 2017-06-30 | Stop reason: HOSPADM

## 2017-06-30 RX ORDER — DIPHENHYDRAMINE HYDROCHLORIDE 50 MG/ML
12.5 INJECTION INTRAMUSCULAR; INTRAVENOUS
Status: DISCONTINUED | OUTPATIENT
Start: 2017-06-30 | End: 2017-06-30 | Stop reason: HOSPADM

## 2017-06-30 RX ORDER — ONDANSETRON 2 MG/ML
INJECTION INTRAMUSCULAR; INTRAVENOUS AS NEEDED
Status: DISCONTINUED | OUTPATIENT
Start: 2017-06-30 | End: 2017-06-30 | Stop reason: SURG

## 2017-06-30 RX ORDER — ACETAMINOPHEN 325 MG/1
650 TABLET ORAL ONCE AS NEEDED
Status: DISCONTINUED | OUTPATIENT
Start: 2017-06-30 | End: 2017-06-30 | Stop reason: HOSPADM

## 2017-06-30 RX ORDER — ACETAMINOPHEN 650 MG/1
650 SUPPOSITORY RECTAL ONCE AS NEEDED
Status: DISCONTINUED | OUTPATIENT
Start: 2017-06-30 | End: 2017-06-30 | Stop reason: HOSPADM

## 2017-06-30 RX ORDER — LIDOCAINE HYDROCHLORIDE 20 MG/ML
INJECTION, SOLUTION INFILTRATION; PERINEURAL AS NEEDED
Status: DISCONTINUED | OUTPATIENT
Start: 2017-06-30 | End: 2017-06-30 | Stop reason: SURG

## 2017-06-30 RX ORDER — LEVOFLOXACIN 5 MG/ML
500 INJECTION, SOLUTION INTRAVENOUS ONCE
Status: COMPLETED | OUTPATIENT
Start: 2017-06-30 | End: 2017-06-30

## 2017-06-30 RX ORDER — EPHEDRINE SULFATE 50 MG/ML
5 INJECTION, SOLUTION INTRAVENOUS ONCE AS NEEDED
Status: DISCONTINUED | OUTPATIENT
Start: 2017-06-30 | End: 2017-06-30 | Stop reason: HOSPADM

## 2017-06-30 RX ORDER — MIDAZOLAM HYDROCHLORIDE 1 MG/ML
INJECTION INTRAMUSCULAR; INTRAVENOUS AS NEEDED
Status: DISCONTINUED | OUTPATIENT
Start: 2017-06-30 | End: 2017-06-30 | Stop reason: SURG

## 2017-06-30 RX ORDER — FENTANYL CITRATE 50 UG/ML
INJECTION, SOLUTION INTRAMUSCULAR; INTRAVENOUS AS NEEDED
Status: DISCONTINUED | OUTPATIENT
Start: 2017-06-30 | End: 2017-06-30 | Stop reason: SURG

## 2017-06-30 RX ADMIN — ONDANSETRON 4 MG: 2 INJECTION INTRAMUSCULAR; INTRAVENOUS at 18:35

## 2017-06-30 RX ADMIN — SCOPALAMINE 1 PATCH: 1 PATCH, EXTENDED RELEASE TRANSDERMAL at 13:33

## 2017-06-30 RX ADMIN — LEVOFLOXACIN 500 MG: 5 INJECTION, SOLUTION INTRAVENOUS at 18:24

## 2017-06-30 RX ADMIN — SODIUM CHLORIDE 1000 ML: 9 INJECTION, SOLUTION INTRAVENOUS at 14:06

## 2017-06-30 RX ADMIN — TAMSULOSIN HYDROCHLORIDE 0.4 MG: 0.4 CAPSULE ORAL at 21:11

## 2017-06-30 RX ADMIN — MIDAZOLAM 2 MG: 1 INJECTION INTRAMUSCULAR; INTRAVENOUS at 18:16

## 2017-06-30 RX ADMIN — FENTANYL CITRATE 50 MCG: 50 INJECTION, SOLUTION INTRAMUSCULAR; INTRAVENOUS at 18:28

## 2017-06-30 RX ADMIN — FENTANYL CITRATE 50 MCG: 50 INJECTION, SOLUTION INTRAMUSCULAR; INTRAVENOUS at 18:23

## 2017-06-30 RX ADMIN — LIDOCAINE HYDROCHLORIDE 80 MG: 20 INJECTION, SOLUTION INFILTRATION; PERINEURAL at 18:23

## 2017-06-30 RX ADMIN — PROPOFOL 160 MG: 10 INJECTION, EMULSION INTRAVENOUS at 18:23

## 2017-06-30 NOTE — ANESTHESIA PREPROCEDURE EVALUATION
" Anesthesia Evaluation     Patient summary reviewed and Nursing notes reviewed   history of anesthetic complications (Scopalmine patch applied pre-op.): PONV  NPO Solid Status: > 8 hours  NPO Liquid Status: > 4 hours     Airway   Mallampati: II  TM distance: >3 FB  Neck ROM: full  possible difficult intubation  Dental    (+) poor dentation    Pulmonary - normal exam    breath sounds clear to auscultation  (+) a smoker Former, COPD moderate, shortness of breath, sleep apnea,   Cardiovascular - normal exam    ECG reviewed  PT is on anticoagulation therapy  Rhythm: regular  Rate: normal    (+) hypertension, past MI  >12 months, CAD, cardiac stents (First stent place January 2007;Second stent for \"complete blockage\" January 2017.) more than 12 months ago hyperlipidemia    ROS comment: EKG:Sinus bradycardia with old IMI.    Neuro/Psych  (+) numbness (Cervical radiculopathy.), psychiatric history Depression,    GI/Hepatic/Renal/Endo    (+) morbid obesity, diabetes mellitus type 2 poorly controlled, hypothyroidism,     Musculoskeletal     (+) back pain, neck pain,       ROS comment: Lumbar surgery 2015.  Abdominal   (+) obese,    Substance History - negative use     OB/GYN negative ob/gyn ROS         Other   (+) arthritis                                     Anesthesia Plan    ASA 3     general     intravenous induction   Anesthetic plan and risks discussed with patient and spouse/significant other.      "

## 2017-06-30 NOTE — ANESTHESIA POSTPROCEDURE EVALUATION
Patient: Aman Sanchez    Procedure Summary     Date Anesthesia Start Anesthesia Stop Room / Location    06/30/17 [id number] [id number]  [facility] OR 02 / [facility]       Procedure Diagnosis Surgeon Provider    CYSTOSCOPY, LEFT URETEROSCOPY, RETROGRADE PYELOGRAM, HOLMIUM LASER, STENT INSERTION (Left ) Calculus of kidney and ureter  (Calculus of kidney and ureter [N20.2]) MD Zia Doll MD          Anesthesia Type: general  Last vitals  BP      Temp      Pulse     Resp      SpO2        Post Anesthesia Care and Evaluation    Patient location during evaluation: PACU  Patient participation: complete - patient participated  Level of consciousness: awake and awake and alert  Pain score: 3  Pain management: satisfactory to patient  Airway patency: patent  Anesthetic complications: No anesthetic complications  PONV Status: none  Cardiovascular status: acceptable and stable  Respiratory status: acceptable, room air, unassisted and spontaneous ventilation  Hydration status: acceptable

## 2017-06-30 NOTE — ANESTHESIA PROCEDURE NOTES
Airway  Urgency: elective    Airway not difficult    General Information and Staff    Patient location during procedure: OR  CRNA: FILI [id number]    Indications and Patient Condition  Indications for airway management: airway protection    Preoxygenated: yes  MILS not maintained throughout  Mask difficulty assessment: 1 - vent by mask    Final Airway Details  Final airway type: supraglottic airway      Successful airway: classic  Size 4    Number of attempts at approach: 1

## 2017-07-01 NOTE — PLAN OF CARE
Problem: Patient Care Overview (Adult)  Goal: Plan of Care Review  Outcome: Outcome(s) achieved Date Met:  06/30/17  Goal: Adult Individualization and Mutuality  Outcome: Outcome(s) achieved Date Met:  06/30/17  Goal: Discharge Needs Assessment  Outcome: Outcome(s) achieved Date Met:  06/30/17    Problem: Perioperative Period (Adult)  Goal: Signs and Symptoms of Listed Potential Problems Will be Absent or Manageable (Perioperative Period)  Outcome: Outcome(s) achieved Date Met:  06/30/17

## 2017-07-01 NOTE — PLAN OF CARE
Problem: Patient Care Overview (Adult)  Goal: Plan of Care Review  Outcome: Ongoing (interventions implemented as appropriate)    06/30/17 3946   Coping/Psychosocial Response Interventions   Plan Of Care Reviewed With patient   Patient Care Overview   Progress improving   Outcome Evaluation   Outcome Summary/Follow up Plan VSS on room air oxygen. Denies having nausea and tolerating ice chips well. Denies pain but complaining of need to void, unsuccessful at this time. Ready for phase II care.         Problem: Perioperative Period (Adult)  Goal: Signs and Symptoms of Listed Potential Problems Will be Absent or Manageable (Perioperative Period)  Outcome: Ongoing (interventions implemented as appropriate)

## 2017-08-11 ENCOUNTER — OFFICE VISIT (OUTPATIENT)
Dept: CARDIOLOGY | Facility: CLINIC | Age: 67
End: 2017-08-11

## 2017-08-11 ENCOUNTER — PREP FOR SURGERY (OUTPATIENT)
Dept: OTHER | Facility: HOSPITAL | Age: 67
End: 2017-08-11

## 2017-08-11 VITALS
HEIGHT: 69 IN | SYSTOLIC BLOOD PRESSURE: 140 MMHG | OXYGEN SATURATION: 97 % | HEART RATE: 68 BPM | WEIGHT: 231 LBS | BODY MASS INDEX: 34.21 KG/M2 | DIASTOLIC BLOOD PRESSURE: 82 MMHG

## 2017-08-11 DIAGNOSIS — F17.200 TOBACCO USE DISORDER: ICD-10-CM

## 2017-08-11 DIAGNOSIS — R06.09 DYSPNEA ON EXERTION: Primary | ICD-10-CM

## 2017-08-11 DIAGNOSIS — I27.20 PULMONARY HYPERTENSION (HCC): Primary | ICD-10-CM

## 2017-08-11 DIAGNOSIS — I25.10 CORONARY ARTERY DISEASE INVOLVING NATIVE CORONARY ARTERY OF NATIVE HEART WITHOUT ANGINA PECTORIS: ICD-10-CM

## 2017-08-11 PROCEDURE — 99214 OFFICE O/P EST MOD 30 MIN: CPT | Performed by: INTERNAL MEDICINE

## 2017-08-11 RX ORDER — ISOSORBIDE MONONITRATE 30 MG/1
30 TABLET, EXTENDED RELEASE ORAL DAILY
Qty: 90 TABLET | Refills: 3 | Status: SHIPPED | OUTPATIENT
Start: 2017-08-11 | End: 2017-12-07 | Stop reason: SDUPTHER

## 2017-08-11 RX ORDER — ISOSORBIDE MONONITRATE 30 MG/1
30 TABLET, EXTENDED RELEASE ORAL DAILY
Qty: 30 TABLET | Refills: 11 | Status: SHIPPED | OUTPATIENT
Start: 2017-08-11 | End: 2017-08-11 | Stop reason: SDUPTHER

## 2017-08-11 RX ORDER — SODIUM CHLORIDE 0.9 % (FLUSH) 0.9 %
1-10 SYRINGE (ML) INJECTION AS NEEDED
Status: CANCELLED | OUTPATIENT
Start: 2017-08-23

## 2017-08-11 RX ORDER — SODIUM CHLORIDE 9 MG/ML
50 INJECTION, SOLUTION INTRAVENOUS CONTINUOUS
Status: CANCELLED | OUTPATIENT
Start: 2017-08-23

## 2017-08-11 RX ORDER — CLOPIDOGREL BISULFATE 75 MG/1
75 TABLET ORAL DAILY
Qty: 90 TABLET | Refills: 6 | Status: SHIPPED | OUTPATIENT
Start: 2017-08-11 | End: 2017-12-07 | Stop reason: SDUPTHER

## 2017-08-11 NOTE — PROGRESS NOTES
Chief complaint : Coronary artery disease    History of Present Illness very pleasant 67-year-old gentleman who comes today for a follow-up visit.  Patient continues to experience shortness of breath.  He has seen a pulmonologist for further evaluation.  The recommendations were noted.  We will repeat his 2-D echocardiogram suggested by his pulmonologist and I will review the echocardiogram with Dr. Suggs for second opinion.        · Patient's last 2-D echocardiogram is from March 6, 2017 which revealed normal LV systolic function with estimated ejection fraction of 56-60%.  There was evidence of mild concentric left ventricular hypertrophy.  And grade 1 diastolic dysfunction of the left ventricular myocardium with normal left atrial dimensions.    MV E/A 0.7  Lateral e' 6 , Septal e' 11    Review of Systems   Constitution: Negative for fever.   HENT: Positive for headaches. Negative for ear pain and sore throat.    Cardiovascular: Positive for chest pain, claudication and orthopnea. Negative for leg swelling, paroxysmal nocturnal dyspnea and syncope.   Respiratory: Positive for shortness of breath and sputum production. Negative for hemoptysis and wheezing.    Skin: Negative for rash.   Musculoskeletal: Positive for neck pain.   Gastrointestinal: Negative for abdominal pain and vomiting.       Past Medical History:   Diagnosis Date   • Acute bronchitis    • Anemia    • Breast lump    • Cervical radiculopathy    • COPD (chronic obstructive pulmonary disease)    • Coronary atherosclerosis    • Depressive disorder    • Disease of thyroid gland    • Dysuria    • Essential hypertension    • Flank pain    • Hematochezia    • Hematuria syndrome    • Hyperlipidemia    • Hypoglycemia    • Kidney stone    • Mass of neck    • MI (myocardial infarction)    • Neck pain     sprain   • Osteoarthritis    • Sleep apnea     using c-pap   • Type 2 diabetes mellitus        Family History   Problem Relation Age of Onset   • Lung  cancer Mother    • Rectal cancer Sister         reports that he has quit smoking. His smoking use included Cigarettes. He has a 12.50 pack-year smoking history. He has never used smokeless tobacco. He reports that he drinks alcohol. He reports that he does not use illicit drugs.    Past Surgical History:   Procedure Laterality Date   • BACK SURGERY     • CHOLECYSTECTOMY     • CYSTOSCOPY  08/29/2014    Left ESWL, cysto and stent removal   • CYSTOSCOPY W/ LITHOLAPAXY / EHL  08/15/2014    Left ESWL   • CYSTOSCOPY, URETEROSCOPY, RETROGRADE PYELOGRAM, STENT INSERTION Left 6/30/2017    Procedure: CYSTOSCOPY, LEFT URETEROSCOPY, RETROGRADE PYELOGRAM, HOLMIUM LASER, STENT INSERTION;  Surgeon: Uday Horne MD;  Location: Health system;  Service:    • HIP SURGERY     • INJECTION OF MEDICATION  11/11/2013    Kenalog   • INJECTION OF MEDICATION  01/16/2014    Toradol   • JOINT REPLACEMENT Right 2012    total hip replacement   • SKIN GRAFT      on his foot; removed skin from hip   • TONSILLECTOMY     • TRANSESOPHAGEAL ECHOCARDIOGRAM (ОЛЬГА)  07/21/2014    with color flow-Normal LV systolic function with Ef of 60-65%/ Grad1 diastolic dysfunction of the left ventricular myocardium. No evidence of pericardial effusion       Allergies   Allergen Reactions   • Codeine Other (See Comments)     unknown       Current Outpatient Prescriptions   Medication Sig Dispense Refill   • aspirin 81 MG EC tablet Take 81 mg by mouth Daily.     • cetirizine (zyrTEC) 10 MG tablet Take 10 mg by mouth Daily.     • finasteride (PROSCAR) 5 MG tablet Take 5 mg by mouth 1 (One) Time.     • FLUoxetine (PROzac) 40 MG capsule Take 1 CAPSULE BY MOUTH  DAILY FOR DEPRESSION 90 capsule 3   • fluticasone (FLONASE) 50 MCG/ACT nasal spray 2 sprays into each nostril At Night As Needed.     • glucose blood test strip One touch reli-on glucometer 100 each 6   • HYDROcodone-acetaminophen (NORCO) 5-325 MG per tablet Take 1 tablet by mouth Every 6 (Six) Hours As Needed.      • levothyroxine (SYNTHROID) 50 MCG tablet Take 1 tablet by mouth Daily. 30 tablet 0   • lisinopril-hydrochlorothiazide (PRINZIDE,ZESTORETIC) 20-25 MG per tablet Take 1 tablet by mouth Daily. 90 tablet 1   • meloxicam (MOBIC) 15 MG tablet Take 15 mg by mouth 1 (One) Time.     • metFORMIN (GLUCOPHAGE) 1000 MG tablet Take 1 tablet by mouth Daily Before Supper. (Patient taking differently: Take 1,000 mg by mouth Daily With Breakfast.) 30 tablet 5   • metFORMIN (GLUCOPHAGE) 500 MG tablet Take 1 tablet by mouth Daily With Breakfast. (Patient taking differently: Take 500 mg by mouth Every Night.) 30 tablet 5   • mirtazapine (REMERON SOL-TAB) 15 MG disintegrating tablet Dissolve 1 tablet on the  tongue every night 90 tablet 1   • nitroglycerin (NITROSTAT) 0.4 MG SL tablet Place 1 tablet under the tongue Every 5 (Five) Minutes As Needed for chest pain. 100 tablet 11   • ONE TOUCH LANCETS misc Use as directed test 6 times daily 200 each 6   • prasugrel (EFFIENT) 10 MG tablet Take 1 tablet by mouth Daily for 90 days. 90 tablet 3   • pravastatin (PRAVACHOL) 80 MG tablet Take 1 tablet by mouth Daily. 90 tablet 1   • tamsulosin (FLOMAX) 0.4 MG capsule 24 hr capsule Take 2 capsules by mouth Every Night.     • traMADol (ULTRAM) 50 MG tablet Take 50 mg by mouth Every 6 (Six) Hours As Needed for moderate pain (4-6).       No current facility-administered medications for this visit.        Objective     Vital Signs  Heart Rate:  [68] 68  BP: (140)/(82) 140/82    Physical Exam   Constitutional: He is oriented to person, place, and time. He appears well-developed and well-nourished.   HENT:   Head: Normocephalic and atraumatic.   Eyes: Conjunctivae and EOM are normal. Pupils are equal, round, and reactive to light.   Neck: Neck supple. No JVD present. Carotid bruit is not present. No tracheal deviation and no edema present.   Cardiovascular: Normal rate, regular rhythm, S1 normal, S2 normal, normal heart sounds and intact distal  pulses.  Exam reveals no gallop, no S3, no S4 and no friction rub.    No murmur heard.  Pulmonary/Chest: Effort normal and breath sounds normal. He has no wheezes. He has no rales. He exhibits no tenderness.   Abdominal: Bowel sounds are normal. He exhibits no abdominal bruit and no pulsatile midline mass. There is no rebound and no guarding.   Musculoskeletal: Normal range of motion. He exhibits no edema.   Neurological: He is alert and oriented to person, place, and time.   Skin: Skin is warm and dry.   Psychiatric: He has a normal mood and affect.       Procedures    Assessment/Plan     Patient Active Problem List   Diagnosis   • Essential hypertension   • Chronic coronary artery disease   • Tobacco abuse counseling   • Dyspnea on exertion   • Acute retention of urine   • Anemia   • Mixed type COPD (chronic obstructive pulmonary disease)   • Osteoarthritis of hip   • Hypersomnia   • Hypotension   • Hypoxia   • Obstructive sleep apnea syndrome   • Diastolic dysfunction   • Multiple lung nodules   • Morbid obesity due to excess calories   • Tobacco use disorder     1.  Coronary artery disease    January 9, 2017 patient had cardiac catheterization and stent placement to the right coronary artery.  3.5 x 23 mm Xience Alpine stent was placed to the distal RCA.  2007 patient had a 3.5 x 18 mm Cypher stent to the mid RCA by Dr. Mart.    Plan:  · Patient is unable to afford his Effient due to cost reasons.  We will switch his Effient to Plavix 75 mg by mouth daily until the end of the year.  · I have counseled patient regarding tobacco smoking cessation.  I have offered the patient Chantix and Wellbutrin.  · Continue with risk factor modification and guideline direct medical therapy.    2.  Hyperlipidemia    Component      Latest Ref Rng & Units 11/14/2016 2/24/2017 3/13/2017 5/3/2017   Total Cholesterol      0 - 199 mg/dL 124 (L) 146 130 125   Triglycerides      20 - 199 mg/dL 129 163 280 (H) 162   HDL Cholesterol       60 - 200 mg/dL 29.8 (L) 41 (L) 39 (L) 34 (L)   LDL Cholesterol      1 - 129 mg/dL 68 72 67 64   LDL/HDL Ratio      0.00 - 3.55  1.77 0.90 1.72     We will continue with current dose of pravastatin.  Continue with risk factor modification.    3.  Hypertension  Patient's blood pressure is fairly controlled at this time.  We'll continue with current medication  Continue with low sodium diet    4.  Tobacco abuse  I have counseled patient regarding tobacco smoking cessation.  I have offered him Chantix.  I have also offered infection.      In view of his unexplained dyspnea I have suggested that we proceed with right side heart catheterization to evaluate his right-sided pressures and evaluate for pulmonary hypertension.  I suggested by his pulmonologist I have also ordered CT scan of the chest without contrast.  According to his previous CAT scan report repeat CAT scan was requested 6 months after the one that was done in January.    I have discussed indication alternatives and all potential complications of right heart catheterization.  Patient has verbalized understanding.  All her questions were answered to his satisfaction.    I discussed the patients findings and my recommendations with patient.          This document has been electronically signed by Mariposa Zamorano MD on August 11, 2017 9:31 AM      Mariposa Zamorano MD  08/11/17  8:45 AM

## 2017-08-11 NOTE — PROGRESS NOTES
Answers for HPI/ROS submitted by the patient on 8/8/2017   Shortness of breath  Chronicity: chronic  Onset: more than 1 year ago  Frequency: constantly  Progression since onset: rapidly worsening  Episode duration: 5 minutes  abdominal pain: No  chest pain: Yes  claudication: Yes  coryza: No  ear pain: No  fever: No  headaches: Yes  hemoptysis: No  leg pain: No  leg swelling: No  neck pain: Yes  orthopnea: Yes  PND: No  rash: No  rhinorrhea: No  sore throat: No  sputum production: Yes  swollen glands: No  syncope: No  vomiting: No  wheezing: No  Aggravating factors: any activity

## 2017-08-14 PROBLEM — I27.20 PULMONARY HYPERTENSION: Status: ACTIVE | Noted: 2017-08-14

## 2017-08-17 LAB
BH CV ECHO MEAS - ACS: 1.9 CM
BH CV ECHO MEAS - AO MAX PG (FULL): 0.63 MMHG
BH CV ECHO MEAS - AO MAX PG: 10.1 MMHG
BH CV ECHO MEAS - AO MEAN PG (FULL): 0.94 MMHG
BH CV ECHO MEAS - AO MEAN PG: 4.9 MMHG
BH CV ECHO MEAS - AO ROOT AREA: 6.4 CM^2
BH CV ECHO MEAS - AO ROOT DIAM: 2.9 CM
BH CV ECHO MEAS - AO V2 MAX: 159 CM/SEC
BH CV ECHO MEAS - AO V2 MEAN: 105 CM/SEC
BH CV ECHO MEAS - AO V2 VTI: 29.3 CM
BH CV ECHO MEAS - AVA(I,A): 4.4 CM^2
BH CV ECHO MEAS - AVA(I,D): 4.4 CM^2
BH CV ECHO MEAS - AVA(V,A): 4.2 CM^2
BH CV ECHO MEAS - AVA(V,D): 4.2 CM^2
BH CV ECHO MEAS - EDV(CUBED): 34.8 ML
BH CV ECHO MEAS - EDV(TEICH): 43 ML
BH CV ECHO MEAS - EF(CUBED): 62.9 %
BH CV ECHO MEAS - EF(TEICH): 55.7 %
BH CV ECHO MEAS - ESV(CUBED): 12.9 ML
BH CV ECHO MEAS - ESV(TEICH): 19.1 ML
BH CV ECHO MEAS - FS: 28.1 %
BH CV ECHO MEAS - IVS/LVPW: 1
BH CV ECHO MEAS - IVSD: 1.2 CM
BH CV ECHO MEAS - LV MASS(C)D: 123.2 GRAMS
BH CV ECHO MEAS - LV MAX PG: 9.5 MMHG
BH CV ECHO MEAS - LV MEAN PG: 4 MMHG
BH CV ECHO MEAS - LV V1 MAX: 154 CM/SEC
BH CV ECHO MEAS - LV V1 MEAN: 89.3 CM/SEC
BH CV ECHO MEAS - LV V1 VTI: 29.7 CM
BH CV ECHO MEAS - LVIDD: 3.3 CM
BH CV ECHO MEAS - LVIDS: 2.3 CM
BH CV ECHO MEAS - LVOT AREA: 4.3 CM^2
BH CV ECHO MEAS - LVOT DIAM: 2.3 CM
BH CV ECHO MEAS - LVPWD: 1.2 CM
BH CV ECHO MEAS - MR MAX PG: 57.4 MMHG
BH CV ECHO MEAS - MR MAX VEL: 378.9 CM/SEC
BH CV ECHO MEAS - MV A MAX VEL: 93.7 CM/SEC
BH CV ECHO MEAS - MV E MAX VEL: 78.3 CM/SEC
BH CV ECHO MEAS - MV E/A: 0.84
BH CV ECHO MEAS - MV P1/2T MAX VEL: 74.4 CM/SEC
BH CV ECHO MEAS - MVA P1/2T LCG: 3 CM^2
BH CV ECHO MEAS - PA MAX PG: 1.2 MMHG
BH CV ECHO MEAS - PA V2 MAX: 54 CM/SEC
BH CV ECHO MEAS - RAP SYSTOLE: 5 MMHG
BH CV ECHO MEAS - SV(AO): 187.2 ML
BH CV ECHO MEAS - SV(CUBED): 21.9 ML
BH CV ECHO MEAS - SV(LVOT): 129 ML
BH CV ECHO MEAS - SV(TEICH): 24 ML

## 2017-08-22 ENCOUNTER — PREP FOR SURGERY (OUTPATIENT)
Dept: OTHER | Facility: HOSPITAL | Age: 67
End: 2017-08-22

## 2017-08-22 DIAGNOSIS — N20.1 CALCULUS OF URETER: Primary | ICD-10-CM

## 2017-08-22 RX ORDER — LEVOFLOXACIN 5 MG/ML
500 INJECTION, SOLUTION INTRAVENOUS ONCE
Status: CANCELLED | OUTPATIENT
Start: 2017-09-06

## 2017-08-23 ENCOUNTER — HOSPITAL ENCOUNTER (OUTPATIENT)
Facility: HOSPITAL | Age: 67
Setting detail: HOSPITAL OUTPATIENT SURGERY
Discharge: HOME | End: 2017-08-23
Attending: INTERNAL MEDICINE | Admitting: INTERNAL MEDICINE

## 2017-08-23 VITALS
HEART RATE: 57 BPM | RESPIRATION RATE: 22 BRPM | WEIGHT: 215.17 LBS | DIASTOLIC BLOOD PRESSURE: 75 MMHG | TEMPERATURE: 97 F | BODY MASS INDEX: 31.87 KG/M2 | OXYGEN SATURATION: 95 % | SYSTOLIC BLOOD PRESSURE: 135 MMHG | HEIGHT: 69 IN

## 2017-08-23 DIAGNOSIS — I27.20 PULMONARY HYPERTENSION (HCC): ICD-10-CM

## 2017-08-23 LAB
ALBUMIN SERPL-MCNC: 3.8 G/DL (ref 3.4–4.8)
ALBUMIN/GLOB SERPL: 1.4 G/DL (ref 1.1–1.8)
ALP SERPL-CCNC: 70 U/L (ref 38–126)
ALT SERPL W P-5'-P-CCNC: 48 U/L (ref 21–72)
ANION GAP SERPL CALCULATED.3IONS-SCNC: 9 MMOL/L (ref 5–15)
AST SERPL-CCNC: 23 U/L (ref 17–59)
BILIRUB SERPL-MCNC: 0.4 MG/DL (ref 0.2–1.3)
BUN BLD-MCNC: 22 MG/DL (ref 7–21)
BUN/CREAT SERPL: 24.4 (ref 7–25)
CALCIUM SPEC-SCNC: 9.4 MG/DL (ref 8.4–10.2)
CHLORIDE SERPL-SCNC: 104 MMOL/L (ref 95–110)
CO2 SERPL-SCNC: 25 MMOL/L (ref 22–31)
CREAT BLD-MCNC: 0.9 MG/DL (ref 0.7–1.3)
DEPRECATED RDW RBC AUTO: 45.6 FL (ref 35.1–43.9)
ERYTHROCYTE [DISTWIDTH] IN BLOOD BY AUTOMATED COUNT: 14.2 % (ref 11.5–14.5)
GFR SERPL CREATININE-BSD FRML MDRD: 85 ML/MIN/1.73 (ref 49–113)
GLOBULIN UR ELPH-MCNC: 2.7 GM/DL (ref 2.3–3.5)
GLUCOSE BLD-MCNC: 134 MG/DL (ref 60–100)
HCT VFR BLD AUTO: 41.1 % (ref 39–49)
HGB BLD-MCNC: 13.9 G/DL (ref 13.7–17.3)
INR PPP: 0.97 (ref 0.8–1.2)
MCH RBC QN AUTO: 29.8 PG (ref 26.5–34)
MCHC RBC AUTO-ENTMCNC: 33.8 G/DL (ref 31.5–36.3)
MCV RBC AUTO: 88 FL (ref 80–98)
OXYGEN SATURATION, PULMONARY ARTERY: 59.4 % (ref 94–100)
OXYGEN SATURATION, RIGHT ATRIUM: 59.6 % (ref 94–100)
OXYGEN SATURATION, RIGHT VENTRICLE: 59 % (ref 94–100)
OXYGEN SATURATION, WEDGE: 95.2 % (ref 94–100)
PLATELET # BLD AUTO: 200 10*3/MM3 (ref 150–450)
PMV BLD AUTO: 9.6 FL (ref 8–12)
POTASSIUM BLD-SCNC: 3.9 MMOL/L (ref 3.5–5.1)
PROT SERPL-MCNC: 6.5 G/DL (ref 6.3–8.6)
PROTHROMBIN TIME: 12.8 SECONDS (ref 11.1–15.3)
RBC # BLD AUTO: 4.67 10*6/MM3 (ref 4.37–5.74)
SODIUM BLD-SCNC: 138 MMOL/L (ref 137–145)
WBC NRBC COR # BLD: 8.17 10*3/MM3 (ref 3.2–9.8)

## 2017-08-23 PROCEDURE — 85027 COMPLETE CBC AUTOMATED: CPT | Performed by: INTERNAL MEDICINE

## 2017-08-23 PROCEDURE — 82810 BLOOD GASES O2 SAT ONLY: CPT | Performed by: INTERNAL MEDICINE

## 2017-08-23 PROCEDURE — 85610 PROTHROMBIN TIME: CPT | Performed by: INTERNAL MEDICINE

## 2017-08-23 PROCEDURE — 93451 RIGHT HEART CATH: CPT | Performed by: INTERNAL MEDICINE

## 2017-08-23 PROCEDURE — 80053 COMPREHEN METABOLIC PANEL: CPT | Performed by: INTERNAL MEDICINE

## 2017-08-23 PROCEDURE — C1894 INTRO/SHEATH, NON-LASER: HCPCS | Performed by: INTERNAL MEDICINE

## 2017-08-23 RX ORDER — SODIUM CHLORIDE 9 MG/ML
50 INJECTION, SOLUTION INTRAVENOUS CONTINUOUS
Status: DISCONTINUED | OUTPATIENT
Start: 2017-08-23 | End: 2017-08-23 | Stop reason: HOSPADM

## 2017-08-23 RX ORDER — SODIUM CHLORIDE 0.9 % (FLUSH) 0.9 %
1-10 SYRINGE (ML) INJECTION AS NEEDED
Status: DISCONTINUED | OUTPATIENT
Start: 2017-08-23 | End: 2017-08-23 | Stop reason: HOSPADM

## 2017-08-23 RX ORDER — PRASUGREL 5 MG/1
5 TABLET, FILM COATED ORAL DAILY
COMMUNITY
End: 2018-01-11 | Stop reason: ALTCHOICE

## 2017-08-23 RX ORDER — LIDOCAINE HYDROCHLORIDE 20 MG/ML
INJECTION, SOLUTION INFILTRATION; PERINEURAL AS NEEDED
Status: DISCONTINUED | OUTPATIENT
Start: 2017-08-23 | End: 2017-08-23 | Stop reason: HOSPADM

## 2017-08-23 RX ADMIN — SODIUM CHLORIDE 50 ML/HR: 9 INJECTION, SOLUTION INTRAVENOUS at 08:46

## 2017-08-25 ENCOUNTER — HOSPITAL ENCOUNTER (OUTPATIENT)
Dept: CT IMAGING | Facility: HOSPITAL | Age: 67
Discharge: HOME | End: 2017-08-25
Attending: INTERNAL MEDICINE | Admitting: INTERNAL MEDICINE

## 2017-08-25 DIAGNOSIS — R06.09 DYSPNEA ON EXERTION: ICD-10-CM

## 2017-08-25 PROCEDURE — 71250 CT THORAX DX C-: CPT

## 2017-08-27 RX ORDER — LISINOPRIL AND HYDROCHLOROTHIAZIDE 25; 20 MG/1; MG/1
TABLET ORAL
Qty: 90 TABLET | Status: CANCELLED | OUTPATIENT
Start: 2017-08-27

## 2017-08-27 RX ORDER — PRAVASTATIN SODIUM 80 MG/1
TABLET ORAL
Qty: 90 TABLET | Status: CANCELLED | OUTPATIENT
Start: 2017-08-27

## 2017-08-31 RX ORDER — LEVOTHYROXINE SODIUM 0.05 MG/1
50 TABLET ORAL DAILY
Qty: 90 TABLET | Refills: 1 | Status: SHIPPED | OUTPATIENT
Start: 2017-08-31 | End: 2017-12-07 | Stop reason: SDUPTHER

## 2017-08-31 RX ORDER — PRAVASTATIN SODIUM 80 MG/1
80 TABLET ORAL DAILY
Qty: 90 TABLET | Refills: 1 | Status: SHIPPED | OUTPATIENT
Start: 2017-08-31 | End: 2017-12-07 | Stop reason: SDUPTHER

## 2017-08-31 RX ORDER — LISINOPRIL AND HYDROCHLOROTHIAZIDE 25; 20 MG/1; MG/1
1 TABLET ORAL DAILY
Qty: 90 TABLET | Refills: 1 | Status: SHIPPED | OUTPATIENT
Start: 2017-08-31 | End: 2017-12-07 | Stop reason: SDUPTHER

## 2017-09-01 ENCOUNTER — APPOINTMENT (OUTPATIENT)
Dept: PREADMISSION TESTING | Facility: HOSPITAL | Age: 67
End: 2017-09-01

## 2017-09-01 ENCOUNTER — OFFICE VISIT (OUTPATIENT)
Dept: FAMILY MEDICINE CLINIC | Facility: CLINIC | Age: 67
End: 2017-09-01

## 2017-09-01 VITALS
BODY MASS INDEX: 33.21 KG/M2 | WEIGHT: 232 LBS | DIASTOLIC BLOOD PRESSURE: 88 MMHG | HEART RATE: 66 BPM | OXYGEN SATURATION: 97 % | HEIGHT: 70 IN | RESPIRATION RATE: 24 BRPM | SYSTOLIC BLOOD PRESSURE: 130 MMHG

## 2017-09-01 VITALS
SYSTOLIC BLOOD PRESSURE: 132 MMHG | OXYGEN SATURATION: 94 % | DIASTOLIC BLOOD PRESSURE: 84 MMHG | WEIGHT: 233.8 LBS | HEART RATE: 68 BPM | HEIGHT: 70 IN | BODY MASS INDEX: 33.47 KG/M2

## 2017-09-01 DIAGNOSIS — R06.09 DYSPNEA ON EXERTION: ICD-10-CM

## 2017-09-01 DIAGNOSIS — N20.1 CALCULUS OF URETER: ICD-10-CM

## 2017-09-01 DIAGNOSIS — M19.90 OSTEOARTHRITIS, UNSPECIFIED OSTEOARTHRITIS TYPE, UNSPECIFIED SITE: Chronic | ICD-10-CM

## 2017-09-01 DIAGNOSIS — I25.10 CAD IN NATIVE ARTERY: Chronic | ICD-10-CM

## 2017-09-01 DIAGNOSIS — N20.0 KIDNEY STONES: Primary | ICD-10-CM

## 2017-09-01 DIAGNOSIS — E11.8 TYPE 2 DIABETES MELLITUS WITH COMPLICATION, WITHOUT LONG-TERM CURRENT USE OF INSULIN (HCC): Chronic | ICD-10-CM

## 2017-09-01 DIAGNOSIS — J44.9 CHRONIC OBSTRUCTIVE PULMONARY DISEASE, UNSPECIFIED COPD TYPE (HCC): Chronic | ICD-10-CM

## 2017-09-01 DIAGNOSIS — I10 ESSENTIAL HYPERTENSION: Chronic | ICD-10-CM

## 2017-09-01 LAB
BILIRUB UR QL STRIP: NEGATIVE
CLARITY UR: CLEAR
COLOR UR: YELLOW
GLUCOSE UR STRIP-MCNC: NEGATIVE MG/DL
HGB UR QL STRIP.AUTO: ABNORMAL
KETONES UR QL STRIP: NEGATIVE
LEUKOCYTE ESTERASE UR QL STRIP.AUTO: ABNORMAL
NITRITE UR QL STRIP: NEGATIVE
PH UR STRIP.AUTO: 7 [PH] (ref 5–9)
PROT UR QL STRIP: ABNORMAL
SP GR UR STRIP: 1.01 (ref 1–1.03)
UROBILINOGEN UR QL STRIP: ABNORMAL

## 2017-09-01 PROCEDURE — 81003 URINALYSIS AUTO W/O SCOPE: CPT | Performed by: UROLOGY

## 2017-09-01 PROCEDURE — 99214 OFFICE O/P EST MOD 30 MIN: CPT | Performed by: INTERNAL MEDICINE

## 2017-09-01 RX ORDER — ALBUTEROL SULFATE 90 UG/1
2 AEROSOL, METERED RESPIRATORY (INHALATION) EVERY 4 HOURS PRN
COMMUNITY
End: 2017-12-07 | Stop reason: SDUPTHER

## 2017-09-01 RX ORDER — OXYCODONE AND ACETAMINOPHEN 7.5; 325 MG/1; MG/1
1 TABLET ORAL EVERY 4 HOURS PRN
COMMUNITY
End: 2021-04-15

## 2017-09-01 RX ORDER — FLUOXETINE HYDROCHLORIDE 40 MG/1
40 CAPSULE ORAL DAILY
COMMUNITY
End: 2017-11-29 | Stop reason: SDUPTHER

## 2017-09-01 RX ORDER — BUDESONIDE AND FORMOTEROL FUMARATE DIHYDRATE 160; 4.5 UG/1; UG/1
2 AEROSOL RESPIRATORY (INHALATION) 2 TIMES DAILY PRN
COMMUNITY
End: 2017-12-04

## 2017-09-01 RX ORDER — SODIUM CHLORIDE 9 MG/ML
1000 INJECTION, SOLUTION INTRAVENOUS CONTINUOUS PRN
Status: CANCELLED | OUTPATIENT
Start: 2017-09-06

## 2017-09-01 RX ORDER — ALBUTEROL SULFATE 2.5 MG/3ML
2.5 SOLUTION RESPIRATORY (INHALATION) EVERY 4 HOURS PRN
COMMUNITY
End: 2017-12-04 | Stop reason: SDUPTHER

## 2017-09-01 NOTE — DISCHARGE INSTRUCTIONS
[facility]  Pre-op Information and Guidelines    You will be called after 2 p.m. the day before your surgery (Friday for Monday surgery) and notified of your time for arrival and approximate surgery time.  If you have not received a call by 4P.M., please contact Same Day Surgery at [phone] of if outside [facility] call [phone].    Please Follow these Important Safety Guidelines:    • The morning of your procedure, take only the medications listed below with   A sip of water:_PERCOCET, ULTRAM, LEVOTHYROXINE, INHALERS______       ISOSORBIDE, FLONASE, ZYRTEC______________________    • DO NOT eat or drink anything after 12:00 midnight the night before surgery  Specific instructions concerning drinking clear liquids will be discussed during  the pre-surgery instruction call the day before your surgery.    • If you take a blood thinner (ex. Plavix, Coumadin, aspirin), ask your doctor when to stop it before surgery  STOP DATE: _________________    • Only 2 visitors are allowed in patient rooms at a time  Your visitors will be asked to wait in the lobby until the admission process is complete with the exception of a parent with a child and patients in need of special assistance.    • YOU CANNOT DRIVE YOURSELF HOME  You must be accompanied by someone who will be responsible for driving you home after surgery and for your care at home.    • DO NOT chew gum, use breath mints, hard candy, or smoke the day of surgery  • DO NOT drink alcohol for at least 24 hours before your surgery  • DO NOT wear any jewelry and remove all body piercing before coming to the hospital  • DO NOT wear make-up to the hospital  • If you are having surgery on an extremity (arm/leg/foot) remove nail polish/artificial nails on the surgical side  • Clothing, glasses, contacts, dentures, and hairpieces must be removed before surgery  • Bathe the night before or the morning of your surgery and do not use powders/lotions  on skin.

## 2017-09-01 NOTE — PROGRESS NOTES
Subjective   Aman Sanchez is a 67 y.o. male.   Chief Complaint   Patient presents with   • surgery clearance     having surgery to break up kidney stones, stent placement,       History of Present Illness pt has multiple kidney stones. He is scheduled for lithotripsy and stint. He needs medical clearance. He has dm, htn, hyperlipidemia, cad, copd. He is stable with these. He does still have a lot of beach but not on oxygen.     The following portions of the patient's history were reviewed and updated as appropriate: allergies, current medications, past family history, past medical history, past social history, past surgical history and problem list.    Review of Systems   Constitutional: Negative for activity change, appetite change, fatigue and unexpected weight change.   HENT: Negative for ear pain, facial swelling and hearing loss.    Respiratory: Positive for shortness of breath. Negative for cough, chest tightness and wheezing.    Cardiovascular: Negative for chest pain, palpitations and leg swelling.   Gastrointestinal: Negative for abdominal pain.   Genitourinary: Negative for difficulty urinating, dysuria, flank pain, frequency and urgency.   Musculoskeletal: Negative for arthralgias and back pain.   Skin: Negative for color change and rash.   Allergic/Immunologic: Negative for environmental allergies and food allergies.   Neurological: Negative for dizziness, syncope, weakness, numbness and headaches.   Hematological: Negative for adenopathy.   Psychiatric/Behavioral: Negative for confusion, decreased concentration, dysphoric mood, sleep disturbance and suicidal ideas. The patient is not nervous/anxious.    All other systems reviewed and are negative.      Objective   Physical Exam   Constitutional: He is oriented to person, place, and time. Vital signs are normal. He appears well-developed and well-nourished.   HENT:   Head: Normocephalic and atraumatic.   Right Ear: External ear normal.   Left Ear:  External ear normal.   Nose: Nose normal.   Eyes: Conjunctivae and EOM are normal. Pupils are equal, round, and reactive to light.   Neck: Normal range of motion. Neck supple.   Cardiovascular: Normal rate and regular rhythm.  Exam reveals no gallop and no friction rub.    No murmur heard.  Pulmonary/Chest: Effort normal. No respiratory distress. He has decreased breath sounds. He has no wheezes. He has no rales.   Abdominal: Soft. He exhibits no distension. There is no tenderness. There is no rebound and no guarding.   Musculoskeletal: Normal range of motion. He exhibits no edema.   Neurological: He is alert and oriented to person, place, and time. No cranial nerve deficit.   Skin: Skin is warm and dry. No rash noted.   Psychiatric: He has a normal mood and affect. His behavior is normal. His mood appears not anxious. He does not exhibit a depressed mood. He expresses no homicidal and no suicidal ideation.   Nursing note and vitals reviewed.      Assessment/Plan   Aman was seen today for surgery clearance.    Diagnoses and all orders for this visit:    Kidney stones    CAD in native artery    Chronic obstructive pulmonary disease, unspecified COPD type    Type 2 diabetes mellitus with complication, without long-term current use of insulin    Osteoarthritis, unspecified osteoarthritis type, unspecified site    Dyspnea on exertion    Essential hypertension      Ambulatory oximetry. Overnight oxiimetry. Labs ok. Medically stable for surgery. Pt unable to sit for jury duty due to all these medical problems.

## 2017-09-01 NOTE — PAT
Dr. Potter here to examine patient r/t SOA. Cardiac hx reviewed.  Requested patient be seen by PCP prior to surgery. Appointment made with Dr. Camargo for today at 2:00 pm. Patient and spouse voiced understanding.

## 2017-09-06 ENCOUNTER — ANESTHESIA EVENT (OUTPATIENT)
Dept: PERIOP | Facility: HOSPITAL | Age: 67
End: 2017-09-06

## 2017-09-06 ENCOUNTER — ANESTHESIA (OUTPATIENT)
Dept: PERIOP | Facility: HOSPITAL | Age: 67
End: 2017-09-06

## 2017-09-06 ENCOUNTER — HOSPITAL ENCOUNTER (OUTPATIENT)
Facility: HOSPITAL | Age: 67
Setting detail: HOSPITAL OUTPATIENT SURGERY
Discharge: HOME | End: 2017-09-06
Attending: UROLOGY | Admitting: UROLOGY

## 2017-09-06 ENCOUNTER — APPOINTMENT (OUTPATIENT)
Dept: GENERAL RADIOLOGY | Facility: HOSPITAL | Age: 67
End: 2017-09-06

## 2017-09-06 VITALS
HEIGHT: 69 IN | RESPIRATION RATE: 18 BRPM | BODY MASS INDEX: 33.73 KG/M2 | WEIGHT: 227.74 LBS | DIASTOLIC BLOOD PRESSURE: 72 MMHG | OXYGEN SATURATION: 96 % | HEART RATE: 69 BPM | SYSTOLIC BLOOD PRESSURE: 148 MMHG | TEMPERATURE: 96.8 F

## 2017-09-06 DIAGNOSIS — N20.1 CALCULUS OF URETER: ICD-10-CM

## 2017-09-06 LAB
GLUCOSE BLDC GLUCOMTR-MCNC: 101 MG/DL (ref 70–130)
GLUCOSE BLDC GLUCOMTR-MCNC: 96 MG/DL (ref 70–130)

## 2017-09-06 PROCEDURE — 74420 UROGRAPHY RTRGR +-KUB: CPT

## 2017-09-06 PROCEDURE — 25010000002 ONDANSETRON PER 1 MG: Performed by: NURSE ANESTHETIST, CERTIFIED REGISTERED

## 2017-09-06 PROCEDURE — 25010000002 MIDAZOLAM PER 1 MG: Performed by: NURSE ANESTHETIST, CERTIFIED REGISTERED

## 2017-09-06 PROCEDURE — 25010000002 HYDROMORPHONE PER 4 MG: Performed by: ANESTHESIOLOGY

## 2017-09-06 PROCEDURE — 25010000002 DEXAMETHASONE PER 1 MG: Performed by: NURSE ANESTHETIST, CERTIFIED REGISTERED

## 2017-09-06 PROCEDURE — C1894 INTRO/SHEATH, NON-LASER: HCPCS | Performed by: UROLOGY

## 2017-09-06 PROCEDURE — 82962 GLUCOSE BLOOD TEST: CPT

## 2017-09-06 PROCEDURE — C1758 CATHETER, URETERAL: HCPCS | Performed by: UROLOGY

## 2017-09-06 PROCEDURE — C1769 GUIDE WIRE: HCPCS | Performed by: UROLOGY

## 2017-09-06 PROCEDURE — 0 IOPAMIDOL 61 % SOLUTION: Performed by: UROLOGY

## 2017-09-06 PROCEDURE — 25010000002 PROPOFOL 10 MG/ML EMULSION: Performed by: NURSE ANESTHETIST, CERTIFIED REGISTERED

## 2017-09-06 PROCEDURE — 25010000002 FENTANYL CITRATE (PF) 100 MCG/2ML SOLUTION: Performed by: NURSE ANESTHETIST, CERTIFIED REGISTERED

## 2017-09-06 PROCEDURE — C2617 STENT, NON-COR, TEM W/O DEL: HCPCS | Performed by: UROLOGY

## 2017-09-06 PROCEDURE — 25010000002 LEVOFLOXACIN PER 250 MG: Performed by: UROLOGY

## 2017-09-06 DEVICE — URETERAL STENT
Type: IMPLANTABLE DEVICE | Status: FUNCTIONAL
Brand: PERCUFLEX™ PLUS

## 2017-09-06 RX ORDER — DIPHENHYDRAMINE HYDROCHLORIDE 50 MG/ML
12.5 INJECTION INTRAMUSCULAR; INTRAVENOUS
Status: DISCONTINUED | OUTPATIENT
Start: 2017-09-06 | End: 2017-09-06 | Stop reason: HOSPADM

## 2017-09-06 RX ORDER — FENTANYL CITRATE 50 UG/ML
INJECTION, SOLUTION INTRAMUSCULAR; INTRAVENOUS AS NEEDED
Status: DISCONTINUED | OUTPATIENT
Start: 2017-09-06 | End: 2017-09-06 | Stop reason: SURG

## 2017-09-06 RX ORDER — SODIUM CHLORIDE 9 MG/ML
1000 INJECTION, SOLUTION INTRAVENOUS CONTINUOUS PRN
Status: DISCONTINUED | OUTPATIENT
Start: 2017-09-06 | End: 2017-09-06 | Stop reason: HOSPADM

## 2017-09-06 RX ORDER — MIDAZOLAM HYDROCHLORIDE 1 MG/ML
INJECTION INTRAMUSCULAR; INTRAVENOUS AS NEEDED
Status: DISCONTINUED | OUTPATIENT
Start: 2017-09-06 | End: 2017-09-06 | Stop reason: SURG

## 2017-09-06 RX ORDER — EPHEDRINE SULFATE 50 MG/ML
5 INJECTION, SOLUTION INTRAVENOUS ONCE AS NEEDED
Status: DISCONTINUED | OUTPATIENT
Start: 2017-09-06 | End: 2017-09-06 | Stop reason: HOSPADM

## 2017-09-06 RX ORDER — CEPHALEXIN 250 MG/1
250 CAPSULE ORAL 4 TIMES DAILY
Qty: 10 CAPSULE | Refills: 0 | Status: SHIPPED | OUTPATIENT
Start: 2017-09-06 | End: 2017-09-16

## 2017-09-06 RX ORDER — LABETALOL HYDROCHLORIDE 5 MG/ML
5 INJECTION, SOLUTION INTRAVENOUS
Status: DISCONTINUED | OUTPATIENT
Start: 2017-09-06 | End: 2017-09-06 | Stop reason: HOSPADM

## 2017-09-06 RX ORDER — ONDANSETRON 2 MG/ML
4 INJECTION INTRAMUSCULAR; INTRAVENOUS ONCE AS NEEDED
Status: DISCONTINUED | OUTPATIENT
Start: 2017-09-06 | End: 2017-09-06 | Stop reason: HOSPADM

## 2017-09-06 RX ORDER — ONDANSETRON 2 MG/ML
INJECTION INTRAMUSCULAR; INTRAVENOUS AS NEEDED
Status: DISCONTINUED | OUTPATIENT
Start: 2017-09-06 | End: 2017-09-06 | Stop reason: SURG

## 2017-09-06 RX ORDER — PROPOFOL 10 MG/ML
VIAL (ML) INTRAVENOUS AS NEEDED
Status: DISCONTINUED | OUTPATIENT
Start: 2017-09-06 | End: 2017-09-06 | Stop reason: SURG

## 2017-09-06 RX ORDER — ACETAMINOPHEN 650 MG/1
650 SUPPOSITORY RECTAL ONCE AS NEEDED
Status: DISCONTINUED | OUTPATIENT
Start: 2017-09-06 | End: 2017-09-06 | Stop reason: HOSPADM

## 2017-09-06 RX ORDER — NALOXONE HCL 0.4 MG/ML
0.2 VIAL (ML) INJECTION AS NEEDED
Status: DISCONTINUED | OUTPATIENT
Start: 2017-09-06 | End: 2017-09-06 | Stop reason: HOSPADM

## 2017-09-06 RX ORDER — SCOLOPAMINE TRANSDERMAL SYSTEM 1 MG/1
1 PATCH, EXTENDED RELEASE TRANSDERMAL ONCE
Status: DISCONTINUED | OUTPATIENT
Start: 2017-09-06 | End: 2017-09-06 | Stop reason: HOSPADM

## 2017-09-06 RX ORDER — DEXAMETHASONE SODIUM PHOSPHATE 4 MG/ML
INJECTION, SOLUTION INTRA-ARTICULAR; INTRALESIONAL; INTRAMUSCULAR; INTRAVENOUS; SOFT TISSUE AS NEEDED
Status: DISCONTINUED | OUTPATIENT
Start: 2017-09-06 | End: 2017-09-06 | Stop reason: SURG

## 2017-09-06 RX ORDER — ACETAMINOPHEN 325 MG/1
650 TABLET ORAL ONCE AS NEEDED
Status: DISCONTINUED | OUTPATIENT
Start: 2017-09-06 | End: 2017-09-06 | Stop reason: HOSPADM

## 2017-09-06 RX ORDER — FLUMAZENIL 0.1 MG/ML
0.2 INJECTION INTRAVENOUS AS NEEDED
Status: DISCONTINUED | OUTPATIENT
Start: 2017-09-06 | End: 2017-09-06 | Stop reason: HOSPADM

## 2017-09-06 RX ORDER — LEVOFLOXACIN 5 MG/ML
500 INJECTION, SOLUTION INTRAVENOUS ONCE
Status: COMPLETED | OUTPATIENT
Start: 2017-09-06 | End: 2017-09-06

## 2017-09-06 RX ADMIN — FENTANYL CITRATE 25 MCG: 50 INJECTION, SOLUTION INTRAMUSCULAR; INTRAVENOUS at 18:23

## 2017-09-06 RX ADMIN — LEVOFLOXACIN 500 MG: 5 INJECTION, SOLUTION INTRAVENOUS at 18:25

## 2017-09-06 RX ADMIN — FENTANYL CITRATE 25 MCG: 50 INJECTION, SOLUTION INTRAMUSCULAR; INTRAVENOUS at 18:39

## 2017-09-06 RX ADMIN — DEXAMETHASONE SODIUM PHOSPHATE 4 MG: 4 INJECTION, SOLUTION INTRAMUSCULAR; INTRAVENOUS at 18:22

## 2017-09-06 RX ADMIN — HYDROMORPHONE HYDROCHLORIDE 0.5 MG: 1 INJECTION, SOLUTION INTRAMUSCULAR; INTRAVENOUS; SUBCUTANEOUS at 16:21

## 2017-09-06 RX ADMIN — PROPOFOL 150 MG: 10 INJECTION, EMULSION INTRAVENOUS at 18:17

## 2017-09-06 RX ADMIN — SODIUM CHLORIDE 1000 ML: 9 INJECTION, SOLUTION INTRAVENOUS at 14:37

## 2017-09-06 RX ADMIN — ONDANSETRON 4 MG: 2 INJECTION INTRAMUSCULAR; INTRAVENOUS at 18:23

## 2017-09-06 RX ADMIN — FENTANYL CITRATE 25 MCG: 50 INJECTION, SOLUTION INTRAMUSCULAR; INTRAVENOUS at 18:17

## 2017-09-06 RX ADMIN — MIDAZOLAM 2 MG: 1 INJECTION INTRAMUSCULAR; INTRAVENOUS at 18:06

## 2017-09-06 NOTE — ANESTHESIA PREPROCEDURE EVALUATION
" Anesthesia Evaluation     Patient summary reviewed and Nursing notes reviewed   history of anesthetic complications (Scopalmine patch applied pre-op.): PONV  NPO Solid Status: > 8 hours  NPO Liquid Status: > 8 hours     Airway   Mallampati: II  TM distance: >3 FB  Neck ROM: full  possible difficult intubation  Dental    (+) poor dentation        Pulmonary - normal exam    breath sounds clear to auscultation  (+) a smoker Former, COPD moderate, shortness of breath, sleep apnea on CPAP,   Cardiovascular - normal exam  Exercise tolerance: poor (<4 METS)    ECG reviewed  PT is on anticoagulation therapy  Rhythm: regular  Rate: normal    (+) hypertension, past MI  >12 months, CAD, cardiac stents (First stent place January 2007;Second stent for \"complete blockage\" January 2017.) more than 12 months ago hyperlipidemia  (-) angina, murmur    ROS comment: EKG:Sinus bradycardia with old IMI.    Neuro/Psych  (+) numbness (Cervical radiculopathy.), psychiatric history Depression,    GI/Hepatic/Renal/Endo    (+) obesity,  diabetes mellitus (glu 101) type 2 well controlled, hypothyroidism,     Musculoskeletal     (+) back pain, neck pain,       ROS comment: Lumbar surgery 2015.  Abdominal   (+) obese,    Substance History - negative use     OB/GYN negative ob/gyn ROS         Other   (+) arthritis                                       Anesthesia Plan    ASA 3     general     intravenous induction   Anesthetic plan and risks discussed with patient and spouse/significant other.      "

## 2017-09-06 NOTE — ANESTHESIA PROCEDURE NOTES
Airway    General Information and Staff    Patient location during procedure: OR  CRNA: JOSE OBRIEN    Indications and Patient Condition  Indications for airway management: airway protection    Preoxygenated: yes  Mask difficulty assessment: 0 - not attempted    Final Airway Details  Final airway type: supraglottic airway      Successful airway: ProSeal  Size 4    Number of attempts at approach: 1

## 2017-09-06 NOTE — OP NOTE
CYSTOSCOPY, URETEROSCOPY, RETROGRADE PYELOGRAM, HOLMIUM LASER, STENT INSERTION  Procedure Note    Aman Sanchez  9/6/2017    Pre-op Diagnosis:   Calculus of ureter [N20.1]    Post-op Diagnosis:     Post-Op Diagnosis Codes:     * Calculus of ureter [N20.1]      Procedure(s):  CYSTOSCOPY, LEFT URETEROSCOPY, RETROGRADE PYELOGRAM, HOLMIUM LASER, STENT INSERTION    Surgeon(s):  Uday Horne MD    Anesthesia: General    Staff:   Circulator: Wendi Cast RN  Scrub Person: Jer Drummond RN  Assistant: Deanna Duval CSA    Estimated Blood Loss: *No blood loss documented*    Specimens:                * No specimens in log *      Drains:           Findings: Left proximal ureteral stones with 2 proximal and distal ureteral strictures    Complications: None    Indications: Flank pain hematuria hydronephrosis    Description of Procedure: Patient brought to cystoscopy place in the dorsolithotomy position.  I placed us 22 cystoscope open into the bladder the left ureter was catheterized with a 6 left 6 Tristanian retrograde catheter we put 6 cc contrast in the ureter showing a mid to distal left ureteral stone as well as a proximal ureteral stone we dilated this with the navigator system all the way to the UPJ and then pushing the stones in the proximal ureter back in the renal pelvis we went up through the working sheath of the navigator dilator into the renal pelvis of the flexible scope after we were unable to get up the ureter to the proximal stone with the rigid scope.  We placed the flexor scope into the renal pelvis and with a prior setting range between 3.5 continuous pulses we fragmented the stone in pieces then we put the 038 guidewire back in the renal pelvis removed the working sheath and over the top guidewire through the cystoscope placed 8 x 26 double-J stent push assistant pusher bladder strain scope was removed stent string was brought to the tip of the penis and shortened in routine fashion and  the patient was taken recovery and tall procedure well    Uday Horne MD     Date: 9/6/2017  Time: 6:55 PM

## 2017-09-07 NOTE — PLAN OF CARE
Problem: Patient Care Overview (Adult)  Goal: Plan of Care Review  Patient meets discharge criteria

## 2017-09-07 NOTE — PLAN OF CARE
Problem: Patient Care Overview (Adult)  Goal: Plan of Care Review  Outcome: Ongoing (interventions implemented as appropriate)    09/06/17 1926   Coping/Psychosocial Response Interventions   Plan Of Care Reviewed With patient   Patient Care Overview   Progress improving   Outcome Evaluation   Outcome Summary/Follow up Plan VSS on room air. Denies having nausea or pain at this time. Ready for transfer to [facility].         Problem: Perioperative Period (Adult)  Goal: Signs and Symptoms of Listed Potential Problems Will be Absent or Manageable (Perioperative Period)  Outcome: Ongoing (interventions implemented as appropriate)

## 2017-09-07 NOTE — ANESTHESIA POSTPROCEDURE EVALUATION
Patient: Aman Sanchez    Procedure Summary     Date Anesthesia Start Anesthesia Stop Room / Location    09/06/17 1811 1900  [facility] 02 / [facility]       Procedure Diagnosis Surgeon Provider    CYSTOSCOPY, LEFT URETEROSCOPY, RETROGRADE PYELOGRAM, HOLMIUM LASER, STENT INSERTION (Left ) Calculus of ureter  (Calculus of ureter [N20.1]) MD Darren Doll MD          Anesthesia Type: general  Last vitals  BP        Temp        Pulse       Resp        SpO2          Post Anesthesia Care and Evaluation    Patient location during evaluation: PACU  Patient participation: complete - patient participated  Level of consciousness: awake and alert  Pain management: adequate  Airway patency: patent  Anesthetic complications: No anesthetic complications    Cardiovascular status: acceptable  Respiratory status: acceptable  Hydration status: acceptable

## 2017-09-08 RX ORDER — LEVOTHYROXINE SODIUM 0.05 MG/1
TABLET ORAL
Qty: 90 TABLET | Refills: 0 | Status: SHIPPED | OUTPATIENT
Start: 2017-09-08 | End: 2017-09-29 | Stop reason: SDUPTHER

## 2017-09-11 ENCOUNTER — TELEPHONE (OUTPATIENT)
Dept: CARDIOLOGY | Facility: CLINIC | Age: 67
End: 2017-09-11

## 2017-09-29 ENCOUNTER — OFFICE VISIT (OUTPATIENT)
Dept: CARDIOLOGY | Facility: CLINIC | Age: 67
End: 2017-09-29

## 2017-09-29 VITALS
BODY MASS INDEX: 34.51 KG/M2 | WEIGHT: 233 LBS | SYSTOLIC BLOOD PRESSURE: 140 MMHG | OXYGEN SATURATION: 98 % | HEIGHT: 69 IN | HEART RATE: 65 BPM | DIASTOLIC BLOOD PRESSURE: 74 MMHG

## 2017-09-29 DIAGNOSIS — I25.10 CORONARY ARTERY DISEASE INVOLVING NATIVE CORONARY ARTERY OF NATIVE HEART WITHOUT ANGINA PECTORIS: ICD-10-CM

## 2017-09-29 DIAGNOSIS — G47.33 OBSTRUCTIVE SLEEP APNEA SYNDROME: ICD-10-CM

## 2017-09-29 DIAGNOSIS — R06.09 DYSPNEA ON EXERTION: ICD-10-CM

## 2017-09-29 DIAGNOSIS — I10 ESSENTIAL HYPERTENSION: Primary | ICD-10-CM

## 2017-09-29 PROCEDURE — 99213 OFFICE O/P EST LOW 20 MIN: CPT | Performed by: INTERNAL MEDICINE

## 2017-09-29 RX ORDER — HYDROMORPHONE HYDROCHLORIDE 2 MG/1
TABLET ORAL
COMMUNITY
Start: 2017-09-07 | End: 2018-01-11

## 2017-11-21 DIAGNOSIS — E11.9 DIABETES MELLITUS TYPE 2, NONINSULIN DEPENDENT (HCC): Primary | ICD-10-CM

## 2017-11-21 DIAGNOSIS — I10 ESSENTIAL HYPERTENSION: ICD-10-CM

## 2017-11-30 ENCOUNTER — LAB (OUTPATIENT)
Dept: LAB | Facility: HOSPITAL | Age: 67
End: 2017-11-30

## 2017-11-30 DIAGNOSIS — E11.9 TYPE 2 DIABETES, HBA1C GOAL < 7% (HCC): ICD-10-CM

## 2017-11-30 DIAGNOSIS — N12 PYELONEPHRITIS: ICD-10-CM

## 2017-11-30 DIAGNOSIS — E11.8 TYPE 2 DIABETES MELLITUS WITH COMPLICATION, WITHOUT LONG-TERM CURRENT USE OF INSULIN (HCC): Chronic | ICD-10-CM

## 2017-11-30 DIAGNOSIS — E11.9 DIABETES MELLITUS TYPE 2, NONINSULIN DEPENDENT (HCC): ICD-10-CM

## 2017-11-30 DIAGNOSIS — I10 ESSENTIAL HYPERTENSION: ICD-10-CM

## 2017-11-30 LAB
ALBUMIN SERPL-MCNC: 4.6 G/DL (ref 3.4–4.8)
ALBUMIN UR-MCNC: 2.8 MG/L
ALBUMIN/GLOB SERPL: 1.4 G/DL (ref 1.1–1.8)
ALP SERPL-CCNC: 64 U/L (ref 38–126)
ALT SERPL W P-5'-P-CCNC: 55 U/L (ref 21–72)
ANION GAP SERPL CALCULATED.3IONS-SCNC: 14 MMOL/L (ref 5–15)
ARTICHOKE IGE QN: 83 MG/DL (ref 1–129)
AST SERPL-CCNC: 99 U/L (ref 17–59)
BACTERIA UR QL AUTO: ABNORMAL /HPF
BASOPHILS # BLD AUTO: 0.04 10*3/MM3 (ref 0–0.2)
BASOPHILS NFR BLD AUTO: 0.5 % (ref 0–2)
BILIRUB SERPL-MCNC: 0.4 MG/DL (ref 0.2–1.3)
BILIRUB UR QL STRIP: ABNORMAL
BUN BLD-MCNC: 14 MG/DL (ref 7–21)
BUN/CREAT SERPL: 14.7 (ref 7–25)
CALCIUM SPEC-SCNC: 9.8 MG/DL (ref 8.4–10.2)
CHLORIDE SERPL-SCNC: 101 MMOL/L (ref 95–110)
CHOLEST SERPL-MCNC: 137 MG/DL (ref 0–199)
CLARITY UR: CLEAR
CO2 SERPL-SCNC: 26 MMOL/L (ref 22–31)
COD CRY URNS QL: ABNORMAL /HPF
COLOR UR: ABNORMAL
CREAT BLD-MCNC: 0.95 MG/DL (ref 0.7–1.3)
DEPRECATED RDW RBC AUTO: 46.8 FL (ref 35.1–43.9)
EOSINOPHIL # BLD AUTO: 0.39 10*3/MM3 (ref 0–0.7)
EOSINOPHIL NFR BLD AUTO: 4.4 % (ref 0–7)
ERYTHROCYTE [DISTWIDTH] IN BLOOD BY AUTOMATED COUNT: 14.4 % (ref 11.5–14.5)
GFR SERPL CREATININE-BSD FRML MDRD: 80 ML/MIN/1.73 (ref 49–113)
GLOBULIN UR ELPH-MCNC: 3.4 GM/DL (ref 2.3–3.5)
GLUCOSE BLD-MCNC: 128 MG/DL (ref 60–100)
GLUCOSE UR STRIP-MCNC: NEGATIVE MG/DL
HBA1C MFR BLD: 6.8 % (ref 4–5.6)
HCT VFR BLD AUTO: 49.1 % (ref 39–49)
HDLC SERPL-MCNC: 32 MG/DL (ref 60–200)
HGB BLD-MCNC: 16.6 G/DL (ref 13.7–17.3)
HGB UR QL STRIP.AUTO: NEGATIVE
HYALINE CASTS UR QL AUTO: ABNORMAL /LPF
IMM GRANULOCYTES # BLD: 0.03 10*3/MM3 (ref 0–0.02)
IMM GRANULOCYTES NFR BLD: 0.3 % (ref 0–0.5)
KETONES UR QL STRIP: NEGATIVE
LDLC/HDLC SERPL: 2.28 {RATIO} (ref 0–3.55)
LEUKOCYTE ESTERASE UR QL STRIP.AUTO: ABNORMAL
LYMPHOCYTES # BLD AUTO: 3.63 10*3/MM3 (ref 0.6–4.2)
LYMPHOCYTES NFR BLD AUTO: 41.2 % (ref 10–50)
MCH RBC QN AUTO: 29.7 PG (ref 26.5–34)
MCHC RBC AUTO-ENTMCNC: 33.8 G/DL (ref 31.5–36.3)
MCV RBC AUTO: 88 FL (ref 80–98)
MONOCYTES # BLD AUTO: 0.74 10*3/MM3 (ref 0–0.9)
MONOCYTES NFR BLD AUTO: 8.4 % (ref 0–12)
MUCOUS THREADS URNS QL MICRO: ABNORMAL /HPF
NEUTROPHILS # BLD AUTO: 3.99 10*3/MM3 (ref 2–8.6)
NEUTROPHILS NFR BLD AUTO: 45.2 % (ref 37–80)
NITRITE UR QL STRIP: NEGATIVE
PH UR STRIP.AUTO: 5.5 [PH] (ref 5–9)
PLATELET # BLD AUTO: 234 10*3/MM3 (ref 150–450)
PMV BLD AUTO: 10.2 FL (ref 8–12)
POTASSIUM BLD-SCNC: 4.1 MMOL/L (ref 3.5–5.1)
PROT SERPL-MCNC: 8 G/DL (ref 6.3–8.6)
PROT UR QL STRIP: NEGATIVE
PROT UR-MCNC: 12.1 MG/DL
RBC # BLD AUTO: 5.58 10*6/MM3 (ref 4.37–5.74)
RBC # UR: ABNORMAL /HPF
REF LAB TEST METHOD: ABNORMAL
SODIUM BLD-SCNC: 141 MMOL/L (ref 137–145)
SP GR UR STRIP: 1.02 (ref 1–1.03)
SQUAMOUS #/AREA URNS HPF: ABNORMAL /HPF
T4 SERPL-MCNC: 9.88 MCG/DL (ref 5.5–11)
TRIGL SERPL-MCNC: 161 MG/DL (ref 20–199)
TSH SERPL DL<=0.05 MIU/L-ACNC: 1.64 MIU/ML (ref 0.46–4.68)
UROBILINOGEN UR QL STRIP: ABNORMAL
WBC NRBC COR # BLD: 8.82 10*3/MM3 (ref 3.2–9.8)
WBC UR QL AUTO: ABNORMAL /HPF

## 2017-11-30 PROCEDURE — 81001 URINALYSIS AUTO W/SCOPE: CPT

## 2017-11-30 PROCEDURE — 84443 ASSAY THYROID STIM HORMONE: CPT

## 2017-11-30 PROCEDURE — 80061 LIPID PANEL: CPT

## 2017-11-30 PROCEDURE — 82043 UR ALBUMIN QUANTITATIVE: CPT

## 2017-11-30 PROCEDURE — 80053 COMPREHEN METABOLIC PANEL: CPT

## 2017-11-30 PROCEDURE — 84156 ASSAY OF PROTEIN URINE: CPT

## 2017-11-30 PROCEDURE — 87086 URINE CULTURE/COLONY COUNT: CPT

## 2017-11-30 PROCEDURE — 84436 ASSAY OF TOTAL THYROXINE: CPT

## 2017-11-30 PROCEDURE — 85025 COMPLETE CBC W/AUTO DIFF WBC: CPT

## 2017-11-30 PROCEDURE — 36415 COLL VENOUS BLD VENIPUNCTURE: CPT

## 2017-11-30 PROCEDURE — 83036 HEMOGLOBIN GLYCOSYLATED A1C: CPT

## 2017-11-30 RX ORDER — FLUOXETINE HYDROCHLORIDE 40 MG/1
CAPSULE ORAL
Qty: 90 CAPSULE | Refills: 0 | Status: SHIPPED | OUTPATIENT
Start: 2017-11-30 | End: 2017-12-07 | Stop reason: SDUPTHER

## 2017-12-02 LAB — BACTERIA SPEC AEROBE CULT: NORMAL

## 2017-12-04 ENCOUNTER — OFFICE VISIT (OUTPATIENT)
Dept: FAMILY MEDICINE CLINIC | Facility: CLINIC | Age: 67
End: 2017-12-04

## 2017-12-04 VITALS
OXYGEN SATURATION: 97 % | HEIGHT: 69 IN | WEIGHT: 230 LBS | TEMPERATURE: 97 F | BODY MASS INDEX: 34.07 KG/M2 | HEART RATE: 78 BPM | SYSTOLIC BLOOD PRESSURE: 142 MMHG | DIASTOLIC BLOOD PRESSURE: 78 MMHG

## 2017-12-04 DIAGNOSIS — R35.1 BPH ASSOCIATED WITH NOCTURIA: Chronic | ICD-10-CM

## 2017-12-04 DIAGNOSIS — J44.9 CHRONIC OBSTRUCTIVE PULMONARY DISEASE, UNSPECIFIED COPD TYPE (HCC): Chronic | ICD-10-CM

## 2017-12-04 DIAGNOSIS — M15.9 PRIMARY OSTEOARTHRITIS INVOLVING MULTIPLE JOINTS: Chronic | ICD-10-CM

## 2017-12-04 DIAGNOSIS — E11.8 TYPE 2 DIABETES MELLITUS WITH COMPLICATION, WITHOUT LONG-TERM CURRENT USE OF INSULIN (HCC): Chronic | ICD-10-CM

## 2017-12-04 DIAGNOSIS — F33.1 DEPRESSION, MAJOR, RECURRENT, MODERATE (HCC): Chronic | ICD-10-CM

## 2017-12-04 DIAGNOSIS — N40.1 BPH ASSOCIATED WITH NOCTURIA: Chronic | ICD-10-CM

## 2017-12-04 DIAGNOSIS — E78.01 FAMILIAL HYPERCHOLESTEROLEMIA: Chronic | ICD-10-CM

## 2017-12-04 DIAGNOSIS — R06.02 SOB (SHORTNESS OF BREATH): ICD-10-CM

## 2017-12-04 DIAGNOSIS — I10 ESSENTIAL HYPERTENSION: Primary | Chronic | ICD-10-CM

## 2017-12-04 PROCEDURE — 99214 OFFICE O/P EST MOD 30 MIN: CPT | Performed by: INTERNAL MEDICINE

## 2017-12-04 RX ORDER — ALBUTEROL SULFATE 2.5 MG/3ML
2.5 SOLUTION RESPIRATORY (INHALATION) EVERY 4 HOURS PRN
Qty: 180 VIAL | Refills: 1 | Status: SHIPPED | OUTPATIENT
Start: 2017-12-04 | End: 2017-12-07 | Stop reason: SDUPTHER

## 2017-12-07 RX ORDER — PRAVASTATIN SODIUM 80 MG/1
80 TABLET ORAL DAILY
Qty: 90 TABLET | Refills: 1 | Status: SHIPPED | OUTPATIENT
Start: 2017-12-07 | End: 2018-01-29 | Stop reason: SDUPTHER

## 2017-12-07 RX ORDER — CETIRIZINE HYDROCHLORIDE 10 MG/1
10 TABLET ORAL DAILY
Qty: 30 TABLET | Refills: 5 | Status: SHIPPED | OUTPATIENT
Start: 2017-12-07 | End: 2018-01-11

## 2017-12-07 RX ORDER — ALBUTEROL SULFATE 2.5 MG/3ML
2.5 SOLUTION RESPIRATORY (INHALATION) EVERY 4 HOURS PRN
Qty: 180 VIAL | Refills: 1 | Status: SHIPPED | OUTPATIENT
Start: 2017-12-07 | End: 2018-02-26 | Stop reason: SDUPTHER

## 2017-12-07 RX ORDER — LEVOTHYROXINE SODIUM 0.05 MG/1
50 TABLET ORAL DAILY
Qty: 90 TABLET | Refills: 1 | Status: SHIPPED | OUTPATIENT
Start: 2017-12-07 | End: 2018-01-29 | Stop reason: SDUPTHER

## 2017-12-07 RX ORDER — FLUOXETINE HYDROCHLORIDE 40 MG/1
40 CAPSULE ORAL DAILY
Qty: 90 CAPSULE | Refills: 1 | Status: SHIPPED | OUTPATIENT
Start: 2017-12-07 | End: 2018-01-29 | Stop reason: SDUPTHER

## 2017-12-07 RX ORDER — TAMSULOSIN HYDROCHLORIDE 0.4 MG/1
2 CAPSULE ORAL NIGHTLY
Qty: 90 CAPSULE | Refills: 1 | Status: SHIPPED | OUTPATIENT
Start: 2017-12-07 | End: 2018-01-25 | Stop reason: SDUPTHER

## 2017-12-07 RX ORDER — ALBUTEROL SULFATE 90 UG/1
2 AEROSOL, METERED RESPIRATORY (INHALATION) EVERY 4 HOURS PRN
Qty: 8 G | Refills: 5 | Status: SHIPPED | OUTPATIENT
Start: 2017-12-07

## 2017-12-07 RX ORDER — FLUTICASONE PROPIONATE 50 MCG
2 SPRAY, SUSPENSION (ML) NASAL NIGHTLY PRN
Qty: 15.8 ML | Refills: 5 | Status: SHIPPED | OUTPATIENT
Start: 2017-12-07 | End: 2018-02-26 | Stop reason: SDUPTHER

## 2017-12-07 RX ORDER — ISOSORBIDE MONONITRATE 30 MG/1
30 TABLET, EXTENDED RELEASE ORAL DAILY
Qty: 90 TABLET | Refills: 3 | Status: SHIPPED | OUTPATIENT
Start: 2017-12-07 | End: 2018-01-29 | Stop reason: SDUPTHER

## 2017-12-07 RX ORDER — LISINOPRIL AND HYDROCHLOROTHIAZIDE 25; 20 MG/1; MG/1
1 TABLET ORAL DAILY
Qty: 90 TABLET | Refills: 1 | Status: SHIPPED | OUTPATIENT
Start: 2017-12-07 | End: 2018-01-29 | Stop reason: SDUPTHER

## 2017-12-07 RX ORDER — CLOPIDOGREL BISULFATE 75 MG/1
75 TABLET ORAL DAILY
Qty: 90 TABLET | Refills: 6 | Status: SHIPPED | OUTPATIENT
Start: 2017-12-07 | End: 2018-01-29 | Stop reason: SDUPTHER

## 2018-01-02 RX ORDER — MIRTAZAPINE 15 MG/1
TABLET, ORALLY DISINTEGRATING ORAL
Qty: 90 TABLET | OUTPATIENT
Start: 2018-01-02

## 2018-01-08 ENCOUNTER — HOSPITAL ENCOUNTER (EMERGENCY)
Facility: HOSPITAL | Age: 68
Discharge: HOME | End: 2018-01-08
Attending: FAMILY MEDICINE | Admitting: FAMILY MEDICINE

## 2018-01-08 ENCOUNTER — APPOINTMENT (OUTPATIENT)
Dept: CT IMAGING | Facility: HOSPITAL | Age: 68
End: 2018-01-08

## 2018-01-08 VITALS
SYSTOLIC BLOOD PRESSURE: 151 MMHG | OXYGEN SATURATION: 93 % | HEIGHT: 69 IN | TEMPERATURE: 97.6 F | HEART RATE: 74 BPM | DIASTOLIC BLOOD PRESSURE: 82 MMHG | BODY MASS INDEX: 34.51 KG/M2 | RESPIRATION RATE: 18 BRPM | WEIGHT: 233 LBS

## 2018-01-08 DIAGNOSIS — R11.2 NAUSEA AND VOMITING, INTRACTABILITY OF VOMITING NOT SPECIFIED, UNSPECIFIED VOMITING TYPE: ICD-10-CM

## 2018-01-08 DIAGNOSIS — N20.1 URETERAL STONE: Primary | ICD-10-CM

## 2018-01-08 LAB
ALBUMIN SERPL-MCNC: 4.8 G/DL (ref 3.4–4.8)
ALBUMIN/GLOB SERPL: 1.4 G/DL (ref 1.1–1.8)
ALP SERPL-CCNC: 64 U/L (ref 38–126)
ALT SERPL W P-5'-P-CCNC: 49 U/L (ref 21–72)
ANION GAP SERPL CALCULATED.3IONS-SCNC: 13 MMOL/L (ref 5–15)
AST SERPL-CCNC: 28 U/L (ref 17–59)
BACTERIA UR QL AUTO: ABNORMAL /HPF
BASOPHILS # BLD AUTO: 0.01 10*3/MM3 (ref 0–0.2)
BASOPHILS NFR BLD AUTO: 0.1 % (ref 0–2)
BILIRUB SERPL-MCNC: 0.3 MG/DL (ref 0.2–1.3)
BILIRUB UR QL STRIP: NEGATIVE
BUN BLD-MCNC: 20 MG/DL (ref 7–21)
BUN/CREAT SERPL: 18.5 (ref 7–25)
CALCIUM SPEC-SCNC: 9.7 MG/DL (ref 8.4–10.2)
CHLORIDE SERPL-SCNC: 99 MMOL/L (ref 95–110)
CLARITY UR: CLEAR
CO2 SERPL-SCNC: 25 MMOL/L (ref 22–31)
COLOR UR: YELLOW
CREAT BLD-MCNC: 1.08 MG/DL (ref 0.7–1.3)
DEPRECATED RDW RBC AUTO: 43.7 FL (ref 35.1–43.9)
EOSINOPHIL # BLD AUTO: 0 10*3/MM3 (ref 0–0.7)
EOSINOPHIL NFR BLD AUTO: 0 % (ref 0–7)
ERYTHROCYTE [DISTWIDTH] IN BLOOD BY AUTOMATED COUNT: 13.8 % (ref 11.5–14.5)
FLUAV AG NPH QL: NEGATIVE
FLUBV AG NPH QL IA: NEGATIVE
GFR SERPL CREATININE-BSD FRML MDRD: 69 ML/MIN/1.73 (ref 49–113)
GLOBULIN UR ELPH-MCNC: 3.5 GM/DL (ref 2.3–3.5)
GLUCOSE BLD-MCNC: 191 MG/DL (ref 60–100)
GLUCOSE UR STRIP-MCNC: ABNORMAL MG/DL
HCT VFR BLD AUTO: 47.4 % (ref 39–49)
HGB BLD-MCNC: 16 G/DL (ref 13.7–17.3)
HGB UR QL STRIP.AUTO: ABNORMAL
HOLD SPECIMEN: NORMAL
HOLD SPECIMEN: NORMAL
HYALINE CASTS UR QL AUTO: ABNORMAL /LPF
IMM GRANULOCYTES # BLD: 0.04 10*3/MM3 (ref 0–0.02)
IMM GRANULOCYTES NFR BLD: 0.3 % (ref 0–0.5)
KETONES UR QL STRIP: ABNORMAL
LEUKOCYTE ESTERASE UR QL STRIP.AUTO: NEGATIVE
LYMPHOCYTES # BLD AUTO: 2.43 10*3/MM3 (ref 0.6–4.2)
LYMPHOCYTES NFR BLD AUTO: 20.3 % (ref 10–50)
MCH RBC QN AUTO: 29.5 PG (ref 26.5–34)
MCHC RBC AUTO-ENTMCNC: 33.8 G/DL (ref 31.5–36.3)
MCV RBC AUTO: 87.3 FL (ref 80–98)
MONOCYTES # BLD AUTO: 0.74 10*3/MM3 (ref 0–0.9)
MONOCYTES NFR BLD AUTO: 6.2 % (ref 0–12)
NEUTROPHILS # BLD AUTO: 8.75 10*3/MM3 (ref 2–8.6)
NEUTROPHILS NFR BLD AUTO: 73.1 % (ref 37–80)
NITRITE UR QL STRIP: NEGATIVE
PH UR STRIP.AUTO: 6.5 [PH] (ref 5–9)
PLATELET # BLD AUTO: 248 10*3/MM3 (ref 150–450)
PMV BLD AUTO: 9.7 FL (ref 8–12)
POTASSIUM BLD-SCNC: 4.1 MMOL/L (ref 3.5–5.1)
PROT SERPL-MCNC: 8.3 G/DL (ref 6.3–8.6)
PROT UR QL STRIP: ABNORMAL
RBC # BLD AUTO: 5.43 10*6/MM3 (ref 4.37–5.74)
RBC # UR: ABNORMAL /HPF
REF LAB TEST METHOD: ABNORMAL
SODIUM BLD-SCNC: 137 MMOL/L (ref 137–145)
SP GR UR STRIP: 1.02 (ref 1–1.03)
SQUAMOUS #/AREA URNS HPF: ABNORMAL /HPF
UROBILINOGEN UR QL STRIP: ABNORMAL
WBC NRBC COR # BLD: 11.97 10*3/MM3 (ref 3.2–9.8)
WBC UR QL AUTO: ABNORMAL /HPF
WHOLE BLOOD HOLD SPECIMEN: NORMAL
WHOLE BLOOD HOLD SPECIMEN: NORMAL

## 2018-01-08 PROCEDURE — 99284 EMERGENCY DEPT VISIT MOD MDM: CPT

## 2018-01-08 PROCEDURE — 99157 MOD SED OTHER PHYS/QHP EA: CPT

## 2018-01-08 PROCEDURE — 96374 THER/PROPH/DIAG INJ IV PUSH: CPT

## 2018-01-08 PROCEDURE — 25010000002 KETOROLAC TROMETHAMINE PER 15 MG: Performed by: PHYSICIAN ASSISTANT

## 2018-01-08 PROCEDURE — 96361 HYDRATE IV INFUSION ADD-ON: CPT

## 2018-01-08 PROCEDURE — 87804 INFLUENZA ASSAY W/OPTIC: CPT | Performed by: FAMILY MEDICINE

## 2018-01-08 PROCEDURE — 85025 COMPLETE CBC W/AUTO DIFF WBC: CPT | Performed by: FAMILY MEDICINE

## 2018-01-08 PROCEDURE — 81001 URINALYSIS AUTO W/SCOPE: CPT | Performed by: FAMILY MEDICINE

## 2018-01-08 PROCEDURE — 74176 CT ABD & PELVIS W/O CONTRAST: CPT

## 2018-01-08 PROCEDURE — 87086 URINE CULTURE/COLONY COUNT: CPT | Performed by: FAMILY MEDICINE

## 2018-01-08 PROCEDURE — 80053 COMPREHEN METABOLIC PANEL: CPT | Performed by: FAMILY MEDICINE

## 2018-01-08 RX ORDER — CIPROFLOXACIN 500 MG/1
500 TABLET, FILM COATED ORAL EVERY 12 HOURS SCHEDULED
Qty: 10 TABLET | Refills: 0 | Status: SHIPPED | OUTPATIENT
Start: 2018-01-08 | End: 2018-01-13

## 2018-01-08 RX ORDER — KETOROLAC TROMETHAMINE 30 MG/ML
30 INJECTION, SOLUTION INTRAMUSCULAR; INTRAVENOUS ONCE
Status: COMPLETED | OUTPATIENT
Start: 2018-01-08 | End: 2018-01-08

## 2018-01-08 RX ORDER — SODIUM CHLORIDE 9 MG/ML
125 INJECTION, SOLUTION INTRAVENOUS CONTINUOUS
Status: DISCONTINUED | OUTPATIENT
Start: 2018-01-08 | End: 2018-01-08 | Stop reason: HOSPADM

## 2018-01-08 RX ORDER — SODIUM CHLORIDE 0.9 % (FLUSH) 0.9 %
10 SYRINGE (ML) INJECTION AS NEEDED
Status: DISCONTINUED | OUTPATIENT
Start: 2018-01-08 | End: 2018-01-08 | Stop reason: HOSPADM

## 2018-01-08 RX ORDER — ONDANSETRON 4 MG/1
4 TABLET, ORALLY DISINTEGRATING ORAL 4 TIMES DAILY PRN
Qty: 12 TABLET | Refills: 0 | Status: SHIPPED | OUTPATIENT
Start: 2018-01-08 | End: 2018-01-11

## 2018-01-08 RX ADMIN — Medication 10 ML: at 14:46

## 2018-01-08 RX ADMIN — SODIUM CHLORIDE 125 ML/HR: 900 INJECTION, SOLUTION INTRAVENOUS at 14:46

## 2018-01-08 RX ADMIN — KETOROLAC TROMETHAMINE 30 MG: 30 INJECTION, SOLUTION INTRAMUSCULAR; INTRAVENOUS at 14:46

## 2018-01-08 NOTE — ED PROVIDER NOTES
Subjective   HPI Comments: Patient presents to emergency department for left flank pain starting last night with associated nausea and vomiting.  He has a history of ureteral stones and has seen Dr Horne in the past.  He has pain medications at home.      Patient is a 67 y.o. male presenting with flank pain.   History provided by:  Patient   used: No    Flank Pain   Pain location:  L flank  Pain quality: aching, sharp and stabbing    Pain radiates to:  Does not radiate  Pain severity:  Severe  Onset quality:  Sudden  Duration:  24 hours  Timing:  Constant  Progression:  Worsening  Chronicity:  New  Associated symptoms: nausea and vomiting    Associated symptoms: no chest pain, no chills, no constipation, no diarrhea, no dysuria, no fever, no hematuria and no shortness of breath        Review of Systems   Constitutional: Negative for chills and fever.   Respiratory: Negative for chest tightness and shortness of breath.    Cardiovascular: Negative for chest pain.   Gastrointestinal: Positive for nausea and vomiting. Negative for abdominal pain, constipation and diarrhea.   Endocrine: Negative for polyuria.   Genitourinary: Positive for flank pain (left). Negative for decreased urine volume, difficulty urinating, dysuria, frequency and hematuria.   Musculoskeletal: Negative for arthralgias, back pain and neck pain.   Skin: Negative for color change, pallor, rash and wound.   Allergic/Immunologic: Negative for immunocompromised state.   Neurological: Negative for dizziness, syncope, weakness and headaches.   Hematological: Does not bruise/bleed easily.   Psychiatric/Behavioral: Negative for confusion, self-injury and suicidal ideas. The patient is not nervous/anxious.        Past Medical History:   Diagnosis Date   • Acute bronchitis    • Anemia    • Breast lump    • Cervical radiculopathy    • COPD (chronic obstructive pulmonary disease)    • Coronary atherosclerosis    • Depressive disorder    •  Disease of thyroid gland    • Dysuria    • Essential hypertension    • Flank pain    • Hematochezia    • Hematuria syndrome    • Hyperlipidemia    • Hypoglycemia    • Kidney stone    • Mass of neck    • MI (myocardial infarction)    • Neck pain     sprain   • Osteoarthritis    • PONV (postoperative nausea and vomiting)    • Sleep apnea     using c-pap   • Sleep apnea    • Type 2 diabetes mellitus        Allergies   Allergen Reactions   • Codeine Other (See Comments)     unknown       Past Surgical History:   Procedure Laterality Date   • BACK SURGERY     • CARDIAC CATHETERIZATION N/A 8/23/2017    Procedure: Right Heart Cath;  Surgeon: Mariposa Zamorano MD;  Location: [facility] CATH INVASIVE LOCATION;  Service:    • CHOLECYSTECTOMY     • CYSTOSCOPY  08/29/2014    Left ESWL, cysto and stent removal   • CYSTOSCOPY W/ LITHOLAPAXY / EHL  08/15/2014    Left ESWL   • CYSTOSCOPY, URETEROSCOPY, RETROGRADE PYELOGRAM, STENT INSERTION Left 6/30/2017    Procedure: CYSTOSCOPY, LEFT URETEROSCOPY, RETROGRADE PYELOGRAM, HOLMIUM LASER, STENT INSERTION;  Surgeon: Uday Horne MD;  Location: [facility] OR;  Service:    • CYSTOSCOPY, URETEROSCOPY, RETROGRADE PYELOGRAM, STENT INSERTION Left 9/6/2017    Procedure: CYSTOSCOPY, LEFT URETEROSCOPY, RETROGRADE PYELOGRAM, HOLMIUM LASER, STENT INSERTION;  Surgeon: Uday Horne MD;  Location: [facility] OR;  Service:    • HIP SURGERY     • INJECTION OF MEDICATION  11/11/2013    Kenalog   • INJECTION OF MEDICATION  01/16/2014    Toradol   • JOINT REPLACEMENT Right 2012    total hip replacement   • SKIN GRAFT      on his foot; removed skin from hip   • TONSILLECTOMY     • TRANSESOPHAGEAL ECHOCARDIOGRAM (ОЛЬГА)  07/21/2014    with color flow-Normal LV systolic function with Ef of 60-65%/ Grad1 diastolic dysfunction of the left ventricular myocardium. No evidence of pericardial effusion       Family History   Problem Relation Age of Onset   • Lung cancer Mother    • Rectal cancer Sister        Social History  "    Social History   • Marital status:      Spouse name: N/A   • Number of children: N/A   • Years of education: N/A     Social History Main Topics   • Smoking status: Current Every Day Smoker     Packs/day: 1.00     Years: 50.00     Types: Cigarettes   • Smokeless tobacco: Never Used      Comment: 1/2 PPD   • Alcohol use Yes      Comment: Rare   • Drug use: No   • Sexual activity: Defer     Other Topics Concern   • Not on file     Social History Narrative           Objective      /82 (BP Location: Left arm, Patient Position: Lying)  Pulse 74  Temp 97.6 °F (36.4 °C) (Oral)   Resp 18  Ht 175.3 cm (69\")  Wt 106 kg (233 lb)  SpO2 93%  BMI 34.41 kg/m2    Physical Exam   Constitutional: He is oriented to person, place, and time. He appears well-developed and well-nourished. No distress.   HENT:   Head: Normocephalic and atraumatic.   Eyes: Conjunctivae are normal.   Cardiovascular: Normal rate, regular rhythm and normal heart sounds.    Pulmonary/Chest: Effort normal and breath sounds normal.   Abdominal: Soft. Bowel sounds are normal. He exhibits no distension. There is tenderness (left CVA).   Neurological: He is alert and oriented to person, place, and time.   Skin: Skin is warm and dry.   Psychiatric: He has a normal mood and affect. His behavior is normal. Thought content normal.   Nursing note and vitals reviewed.      Procedures         ED Course  ED Course      Results for orders placed or performed during the hospital encounter of 01/08/18   Influenza Antigen, Rapid - Swab, Nasopharynx   Result Value Ref Range    Influenza A Ag, EIA Negative Negative    Influenza B Ag, EIA Negative Negative   Urinalysis With / Culture If Indicated - Urine, Clean Catch   Result Value Ref Range    Color, UA Yellow Yellow, Straw, Dark Yellow, Nuvia    Appearance, UA Clear Clear    pH, UA 6.5 5.0 - 9.0    Specific Gravity, UA 1.024 1.003 - 1.030    Glucose,  mg/dL (2+) (A) Negative    Ketones, UA Trace (A) " Negative    Bilirubin, UA Negative Negative    Blood, UA Small (1+) (A) Negative    Protein, UA 30 mg/dL (1+) (A) Negative    Leuk Esterase, UA Negative Negative    Nitrite, UA Negative Negative    Urobilinogen, UA 0.2 E.U./dL 0.2 - 1.0 E.U./dL   Urinalysis, Microscopic Only - Urine, Clean Catch   Result Value Ref Range    RBC, UA 21-30 (A) None Seen /HPF    WBC, UA 6-12 (A) None Seen, 0-2, 3-5 /HPF    Bacteria, UA None Seen None Seen /HPF    Squamous Epithelial Cells, UA 3-5 (A) None Seen, 0-2 /HPF    Hyaline Casts, UA 3-6 None Seen /LPF    Methodology Automated Microscopy    Comprehensive Metabolic Panel   Result Value Ref Range    Glucose 191 (H) 60 - 100 mg/dL    BUN 20 7 - 21 mg/dL    Creatinine 1.08 0.70 - 1.30 mg/dL    Sodium 137 137 - 145 mmol/L    Potassium 4.1 3.5 - 5.1 mmol/L    Chloride 99 95 - 110 mmol/L    CO2 25.0 22.0 - 31.0 mmol/L    Calcium 9.7 8.4 - 10.2 mg/dL    Total Protein 8.3 6.3 - 8.6 g/dL    Albumin 4.80 3.40 - 4.80 g/dL    ALT (SGPT) 49 21 - 72 U/L    AST (SGOT) 28 17 - 59 U/L    Alkaline Phosphatase 64 38 - 126 U/L    Total Bilirubin 0.3 0.2 - 1.3 mg/dL    eGFR Non  Amer 69 49 - 113 mL/min/1.73    Globulin 3.5 2.3 - 3.5 gm/dL    A/G Ratio 1.4 1.1 - 1.8 g/dL    BUN/Creatinine Ratio 18.5 7.0 - 25.0    Anion Gap 13.0 5.0 - 15.0 mmol/L   CBC Auto Differential   Result Value Ref Range    WBC 11.97 (H) 3.20 - 9.80 10*3/mm3    RBC 5.43 4.37 - 5.74 10*6/mm3    Hemoglobin 16.0 13.7 - 17.3 g/dL    Hematocrit 47.4 39.0 - 49.0 %    MCV 87.3 80.0 - 98.0 fL    MCH 29.5 26.5 - 34.0 pg    MCHC 33.8 31.5 - 36.3 g/dL    RDW 13.8 11.5 - 14.5 %    RDW-SD 43.7 35.1 - 43.9 fl    MPV 9.7 8.0 - 12.0 fL    Platelets 248 150 - 450 10*3/mm3    Neutrophil % 73.1 37.0 - 80.0 %    Lymphocyte % 20.3 10.0 - 50.0 %    Monocyte % 6.2 0.0 - 12.0 %    Eosinophil % 0.0 0.0 - 7.0 %    Basophil % 0.1 0.0 - 2.0 %    Immature Grans % 0.3 0.0 - 0.5 %    Neutrophils, Absolute 8.75 (H) 2.00 - 8.60 10*3/mm3    Lymphocytes,  Absolute 2.43 0.60 - 4.20 10*3/mm3    Monocytes, Absolute 0.74 0.00 - 0.90 10*3/mm3    Eosinophils, Absolute 0.00 0.00 - 0.70 10*3/mm3    Basophils, Absolute 0.01 0.00 - 0.20 10*3/mm3    Immature Grans, Absolute 0.04 (H) 0.00 - 0.02 10*3/mm3   Light Blue Top   Result Value Ref Range    Extra Tube hold for add-on    Green Top (Gel)   Result Value Ref Range    Extra Tube Hold for add-ons.    Lavender Top   Result Value Ref Range    Extra Tube hold for add-on    Gold Top - SST   Result Value Ref Range    Extra Tube Hold for add-ons.      Ct Abdomen Pelvis Stone Protocol    Result Date: 1/8/2018  Narrative: PROCEDURE: Ct abdomen and pelvis without contrast REASON FOR EXAM: left flank pain, hematuria FINDINGS: Comparison study dated  July 22, 2014. Axial computer tomography sequential imaging was performed from the diaphragms through the symphysis pubis without IV contrast administration. Sagittal and coronal reformates was performed. This exam was performed according to our departmental dose optimization program, which includes automated exposure control, adjustment of the mA and/or KV according to patient size and/or use of iterative reconstruction technique.  Imaging through lung bases reveals multiple small calcified lung parenchymal granulomas consistent with old granulomatous disease. Right middle lobe stable small bleb. Otherwise lung bases are unremarkable.. The liver is normal. The gallbladder surgically absent. The biliary system appears within normal limits status post cholecystectomy. The pancreas is normal. The spleen is normal. Bilateral adrenal glands are normal. Right kidney lower pole 6.7 cm simple renal cortical cyst. Otherwise right kidney and ureter are normal. Left kidney lower pole multiple nonobstructive renal calculi with the largest one measuring 8 mm in greatest diameter. Left kidney moderate to severe hydronephrosis as well as hydroureter secondary to a mid to lower ureter calculi which is at  the level of the superior sacrum. This calculi measures 4.7 mm in greatest diameter. Small vascular calcifications seen adjacent to the proximal left ureter. Left perirenal fatty stranding and small amount of fluid is seen. The bladder and prostate are normal. A few descending colon diverticula. Otherwise hollow viscera is unremarkable. The appendix is identified appears normal. No lymphadenopathy in the abdomen or the pelvis. Left L4 spondylolysis. Otherwise no acute osseous abnormality. Right total hip arthroplasty in place.     Impression: 1. Left mid to distal ureter 4.7 mm calculi at the level of the superior sacrum which is causing moderate to severe obstruction at this level. Associated mild left perirenal fatty stranding and small amount of fluid may represent sequela from infundibular rupture from obstruction and/or associated inflammatory changes.. 2. Left kidney multiple additional nonobstructive renal calculi.. 3. Right kidney lower pole 6.7 cm simple renal cortical cyst.. 4. Colonic diverticulosis. 5. Left L4 spondylolysis.. Electronically signed by:  Brant Bacon MD  1/8/2018 3:59 PM [facility] Workstation: [id number]                [facility]    Final diagnoses:   Ureteral stone   Nausea and vomiting, intractability of vomiting not specified, unspecified vomiting type            Aj Byrnes PA-C  01/08/18 [id number]

## 2018-01-08 NOTE — ED TRIAGE NOTES
Patient reports fever, chills, nausea, vomiting since last night as well. He states he feels like he may have the Flu.

## 2018-01-09 ENCOUNTER — PREP FOR SURGERY (OUTPATIENT)
Dept: OTHER | Facility: HOSPITAL | Age: 68
End: 2018-01-09

## 2018-01-09 ENCOUNTER — EPISODE CHANGES (OUTPATIENT)
Dept: CASE MANAGEMENT | Facility: OTHER | Age: 68
End: 2018-01-09

## 2018-01-09 DIAGNOSIS — N20.1 CALCULUS OF URETER: Primary | ICD-10-CM

## 2018-01-09 LAB — BACTERIA SPEC AEROBE CULT: NORMAL

## 2018-01-09 RX ORDER — LEVOFLOXACIN 5 MG/ML
500 INJECTION, SOLUTION INTRAVENOUS ONCE
Status: CANCELLED | OUTPATIENT
Start: 2018-01-17

## 2018-01-09 NOTE — H&P
UROLOGY [facility] History and Physical  JLUIS KILPATRICK  67-year-old; :1950;;male  [address] [place] [address]  Ins: 1) MEDICARE 2) [employer]  Employer: UNEMPLOYED  MRN:[id number]  JV SLADANA MD  UROLOGY [facility]  2018 01:01 PM  Allergies  Codeine Unknown  ivp dye (Not Checked) Unknown  Current Medications  aspirin 81 mg tablet 1 oral  FLUoxetine 40 mg oral capsule 1 oral  hydrochlorothiazide-lisinopril 25 mg-20 mg oral  tablet 1 oral  pravastatin 80 mg tablet 1 oral  tamsulosin 0.4 mg oral capsule 2 capsule oral  at bedtime  * Medications Reconciled  Problem List  Calculus of kidney and ureter  Medical History  HIGH BLOOD PRESSURE  HEART STENTS  TONSILECTOMY  HEART DISEASE  OTH/UNS SLEEP APNEA  Patient reports having had a colonoscopy within  the past 9 years.  Surgical History  HEART STENTS  TONSIL REMOVED  [facility] 08/15/2014  Left  CYSTOSCOPY AND TREATMENT 2017  CYSTOSCOPY AND TREATMENT 2017  stent removal  Psychiatric History  Patient is generally satisfied with life and has no  thoughts of suicide. Has depression and anxiety.  Family History  Sister Lung Cancer  Sister HIGH BLOOD PRESSURE  Mother Lung Cancer  Mother HIGH BLOOD PRESSURE  Mother  Father  Brother   Sister  Sister Alive  Social History  SMOKES ONE PACK OF CIGARETTES. DOES  NOT DRINK. DISABLED. . LIVES  WITH WIFE.  Imm/Inj/Office Meds History Comments  Patient has had influenza vaccination for this  season.  Patient has had pneumococcal vaccination.  Chief Complaint  Kidney Stone  History of Present Illness  JLUIS KILPATRICK is a 67-year-old male here for evaluation. He has a history of left  kidney and ureteric stones. He has been followed for the left ureteric stone in the office  closely but on last visit stated he was going to [place] and did not want intervention for  the stone. He was told to go immediately to the ED for symptoms which  he did  yesterday after 2 days of nausea and vomiting and left flank pain. His wife is present  and states he intermittently will see hematuria. No dysuria. He is taking Zofran PRN  now and cipro 500 mg PO BID. CT abdomen/pelvis 1/8/18 showed a left mid to distal  ureter 4.7 mm calculi at the level of the superior sacrum which is causing moderate to  severe obstruction at this level with associated mild left perirenal fatty stranding and  small amount of fluid may represent sequela from infundibular  rupture from obstruction and/or associated inflammatory changes; Left kidney multiple  additional nonobstructive renal calculi; Right kidney lower pole 6.7 cm simple renal  cortical cyst. He states he wants intervention now for the stone but does not want  J-stent placement to relieve the stone, he wants lithotripsy when possible.  Location: Kidney.  Severity: mild/moderate  Onset / Duration: acute symptoms 2 days, ureteric stone since Oct 2017  Timing: intermittent  Context: TAKES ASPIRIN  Modifying factors: Cipro, Zofran, Flomax, fluids  Associated signs and symptoms: intermittent flank pain, nausea/vomiting, hematuria  Review of Systems  Eyes: ; Denies: blurry vision, pain in the eyes and double vision  ENMT: ; Denies: ear pain, sore throat and sinus problems  Cardiovascular: ; Denies: chest pain, varicose veins, angina and syncope  Respiratory: ; Denies: shortness of breath, wheezing and frequent cough  Gastrointestinal: Reports: heartburn; Denies: abdominal pain, nausea, vomiting and  indigestion  Genitourinary: Reports: other symptoms (see HPI)  Musculoskeletal: Reports: neck pain; Denies: joint pain and back pain  Integumentary: ; Denies: skin rash, boils and persistent itch  Neurological: Reports: dizzy spells; Denies: tremors and numbness / tingling  Psychiatric: Reports: generally satisfied with life; Denies: depression, suicidal  ideation and anxiety  Endocrine: Reports: tired/sluggish; Denies: Excessive  thirst, cold intolerance and  heat intolerance  Hematologic/Lymphatic: ; Denies: swollen glands and blood clotting problem  Allergic/Immunologic: ; Denies: hayfever  Constitutional: ; Denies: fever, chills and weight loss  Vital Signs  1/9/2018 1:01:00 PM  Heart Rate: 76 (/min) Weight: 228 (lb)  Resp Rate: 16 (/min) Height: 69 (in)  BMI: 33.67 Current every day smoker  BP: 159/94 (mmHg)  Exam  General appearance: The patient appears well developed and well nourished, in no  apparent acute distress.  Examination of pupils and irises: PERRL  Assessment of hearing: Hearing appears normal.  Examination of neck: Neck appears supple.  Assessment of respiratory effort: Respiratory effort appears normal. No apparent  distress.  Auscultation of lungs: Chest exam is clear to auscultation.  Auscultation of heart: Normal rate and rhythm noted.  Examination of Extremities for edema and/or varicosities: None  Inspection of breasts: Deferred.  Examination of abdomen: No masses are palpated. There is no tenderness. Soft  benign abdomen on exam.  Obtain stool sample: Stool specimen for occult blood is not indicated.  Examination of gait and station: Normal gait and stature.  Head and neck (Assessment of range of motion): Adequate ROM noted in all  extremities.  Inspection of skin and subcutaneous tissue: Exam of the skin is within normal limits.  The color and skin turgor are normal.  No lesions, bruises, rashes, or jaundice.  Description of patients judgment and insight: Judgement and insight appear normal.  Orientation to time, place and person: Oriented to person, place, and time.  Mood and affect: Mood and affect normal.  Data Review  Medications and chart reviewed. History and physical form/ROS reviewed and no  changes noted. Previous encounter reviewed. Last form done 08/08/17.  Lab Results  01/09/2018  GLUCOSE NEGATIVE, KETONE NEGATIVE, SPECIFIC GRAVITY 1.025, PH 6.0,  BLOOD SMALL, NITRITE NEGATIVE, LEUKOCYTES TRACE,  PROTEIN  NEGATIVE, CREATININE 100 MG/DL, P:C NORMAL  Assessment - Ureteric stone  DX:  Ureteric stone  SNO: [id number], ICD-9: 592.1, ICD-10: N20.1  Hydronephrosis  SNO: [id number], ICD-9: 591, ICD-10: N13.30  Kidney stone  SNO: [id number], ICD-9: 592.0, ICD-10: N20.0  Assessment Notes:  ureteric stone causing hydronephrosis  Plan  Plan Notes:  Cystoscopy, left retrograde, ureteroscopy, laser lithotripsy, stent placement.  Stop aspirin, continue cipro and Zofran.  Procedures:  95515.QW U/A AUTOMATED W CREATININE  Labs:  URINE; (Routine); [facility]  Education:  Kidney Stones - English  Discussion:  Discussed my findings and plan of action and reasoning behind decision making. All  questions were answered. FINDINGS WERE DISCUSSED WITH PATIENT. RISKS  AND BENEFITS EXPLAINED. PATIENT WISHES TO PROCEED.  Instructions:  Patient is instructed to call with any problems. Patient is instructed to call if the  condition worsens. Patient is instructed to call with any changes in condition. Patient  is instructed to call if he has any intractable pain, fever, chills, nausea, or vomiting.  INCREASE FLUIDS. IF PAIN WORSENS/ UNCONTROLLED OR TEMPERATURE  >101.0 GO IMMEDIATELY TO ER.

## 2018-01-11 ENCOUNTER — APPOINTMENT (OUTPATIENT)
Dept: PREADMISSION TESTING | Facility: HOSPITAL | Age: 68
End: 2018-01-11

## 2018-01-11 VITALS
HEIGHT: 69 IN | OXYGEN SATURATION: 95 % | BODY MASS INDEX: 34.51 KG/M2 | HEART RATE: 60 BPM | SYSTOLIC BLOOD PRESSURE: 140 MMHG | DIASTOLIC BLOOD PRESSURE: 78 MMHG | RESPIRATION RATE: 16 BRPM | WEIGHT: 233 LBS

## 2018-01-11 PROCEDURE — 93005 ELECTROCARDIOGRAM TRACING: CPT

## 2018-01-11 PROCEDURE — 93010 ELECTROCARDIOGRAM REPORT: CPT | Performed by: INTERNAL MEDICINE

## 2018-01-11 RX ORDER — SODIUM CHLORIDE 9 MG/ML
1000 INJECTION, SOLUTION INTRAVENOUS CONTINUOUS PRN
Status: CANCELLED | OUTPATIENT
Start: 2018-01-17

## 2018-01-11 NOTE — DISCHARGE INSTRUCTIONS
[facility]  Pre-op Information and Guidelines    You will be called after 2 p.m. the day before your surgery (Friday for Monday surgery) and notified of your time for arrival and approximate surgery time.  If you have not received a call by 4P.M., please contact Same Day Surgery at [phone] of if outside [facility] call [phone].    Please Follow these Important Safety Guidelines:    • The morning of your procedure, take only the medications listed below with   A sip of water:_____________________________________________       _PROZAC, LEVOTHYROXINE, FLOMAX___________      • DO NOT eat or drink anything after 12:00 midnight the night before surgery  Specific instructions concerning drinking clear liquids will be discussed during  the pre-surgery instruction call the day before your surgery.    • If you take a blood thinner (ex. Plavix, Coumadin, aspirin), ask your doctor when to stop it before surgery  STOP DATE: _________________    • Only 2 visitors are allowed in patient rooms at a time  Your visitors will be asked to wait in the lobby until the admission process is complete with the exception of a parent with a child and patients in need of special assistance.    • YOU CANNOT DRIVE YOURSELF HOME  You must be accompanied by someone who will be responsible for driving you home after surgery and for your care at home.    • DO NOT chew gum, use breath mints, hard candy, or smoke the day of surgery  • DO NOT drink alcohol for at least 24 hours before your surgery  • DO NOT wear any jewelry and remove all body piercing before coming to the hospital  • DO NOT wear make-up to the hospital  • If you are having surgery on an extremity (arm/leg/foot) remove nail polish/artificial nails on the surgical side  • Clothing, glasses, contacts, dentures, and hairpieces must be removed before surgery  • Bathe the night before or the morning of your surgery and do not use powders/lotions on  skin.

## 2018-01-17 ENCOUNTER — ANESTHESIA (OUTPATIENT)
Dept: PERIOP | Facility: HOSPITAL | Age: 68
End: 2018-01-17

## 2018-01-17 ENCOUNTER — ANESTHESIA EVENT (OUTPATIENT)
Dept: PERIOP | Facility: HOSPITAL | Age: 68
End: 2018-01-17

## 2018-01-17 ENCOUNTER — APPOINTMENT (OUTPATIENT)
Dept: GENERAL RADIOLOGY | Facility: HOSPITAL | Age: 68
End: 2018-01-17

## 2018-01-17 ENCOUNTER — HOSPITAL ENCOUNTER (OUTPATIENT)
Facility: HOSPITAL | Age: 68
Setting detail: HOSPITAL OUTPATIENT SURGERY
Discharge: HOME | End: 2018-01-17
Attending: UROLOGY | Admitting: UROLOGY

## 2018-01-17 VITALS
HEIGHT: 69 IN | TEMPERATURE: 97.5 F | DIASTOLIC BLOOD PRESSURE: 98 MMHG | RESPIRATION RATE: 20 BRPM | WEIGHT: 228.84 LBS | SYSTOLIC BLOOD PRESSURE: 158 MMHG | OXYGEN SATURATION: 94 % | BODY MASS INDEX: 33.89 KG/M2 | HEART RATE: 80 BPM

## 2018-01-17 DIAGNOSIS — N20.1 CALCULUS OF URETER: ICD-10-CM

## 2018-01-17 LAB
GLUCOSE BLDC GLUCOMTR-MCNC: 92 MG/DL (ref 70–130)
GLUCOSE BLDC GLUCOMTR-MCNC: 93 MG/DL (ref 70–130)
GLUCOSE BLDC GLUCOMTR-MCNC: 98 MG/DL (ref 70–130)

## 2018-01-17 PROCEDURE — 25010000002 MIDAZOLAM PER 1 MG: Performed by: NURSE ANESTHETIST, CERTIFIED REGISTERED

## 2018-01-17 PROCEDURE — C1769 GUIDE WIRE: HCPCS | Performed by: UROLOGY

## 2018-01-17 PROCEDURE — 25010000002 LEVOFLOXACIN PER 250 MG: Performed by: UROLOGY

## 2018-01-17 PROCEDURE — 25010000002 ONDANSETRON PER 1 MG: Performed by: ANESTHESIOLOGY

## 2018-01-17 PROCEDURE — 82962 GLUCOSE BLOOD TEST: CPT

## 2018-01-17 PROCEDURE — 25010000002 FENTANYL CITRATE (PF) 100 MCG/2ML SOLUTION: Performed by: NURSE ANESTHETIST, CERTIFIED REGISTERED

## 2018-01-17 PROCEDURE — C1894 INTRO/SHEATH, NON-LASER: HCPCS | Performed by: UROLOGY

## 2018-01-17 PROCEDURE — C1758 CATHETER, URETERAL: HCPCS | Performed by: UROLOGY

## 2018-01-17 PROCEDURE — C2617 STENT, NON-COR, TEM W/O DEL: HCPCS | Performed by: UROLOGY

## 2018-01-17 PROCEDURE — 74420 UROGRAPHY RTRGR +-KUB: CPT

## 2018-01-17 PROCEDURE — 0 IOPAMIDOL 61 % SOLUTION: Performed by: UROLOGY

## 2018-01-17 PROCEDURE — 25010000002 ONDANSETRON PER 1 MG: Performed by: NURSE ANESTHETIST, CERTIFIED REGISTERED

## 2018-01-17 PROCEDURE — 25010000002 PROPOFOL 10 MG/ML EMULSION: Performed by: ANESTHESIOLOGY

## 2018-01-17 DEVICE — URETERAL STENT
Type: IMPLANTABLE DEVICE | Site: URETER | Status: FUNCTIONAL
Brand: PERCUFLEX™ PLUS

## 2018-01-17 RX ORDER — LABETALOL HYDROCHLORIDE 5 MG/ML
5 INJECTION, SOLUTION INTRAVENOUS
Status: DISCONTINUED | OUTPATIENT
Start: 2018-01-17 | End: 2018-01-17

## 2018-01-17 RX ORDER — LEVOFLOXACIN 5 MG/ML
500 INJECTION, SOLUTION INTRAVENOUS ONCE
Status: DISCONTINUED | OUTPATIENT
Start: 2018-01-17 | End: 2018-01-17 | Stop reason: SDUPTHER

## 2018-01-17 RX ORDER — ONDANSETRON 2 MG/ML
INJECTION INTRAMUSCULAR; INTRAVENOUS AS NEEDED
Status: DISCONTINUED | OUTPATIENT
Start: 2018-01-17 | End: 2018-01-17 | Stop reason: SURG

## 2018-01-17 RX ORDER — ACETAMINOPHEN 650 MG/1
650 SUPPOSITORY RECTAL ONCE AS NEEDED
Status: DISCONTINUED | OUTPATIENT
Start: 2018-01-17 | End: 2018-01-17 | Stop reason: HOSPADM

## 2018-01-17 RX ORDER — LEVOFLOXACIN 5 MG/ML
500 INJECTION, SOLUTION INTRAVENOUS ONCE
Status: COMPLETED | OUTPATIENT
Start: 2018-01-17 | End: 2018-01-17

## 2018-01-17 RX ORDER — ONDANSETRON 2 MG/ML
4 INJECTION INTRAMUSCULAR; INTRAVENOUS ONCE AS NEEDED
Status: DISCONTINUED | OUTPATIENT
Start: 2018-01-17 | End: 2018-01-17

## 2018-01-17 RX ORDER — DIPHENHYDRAMINE HYDROCHLORIDE 50 MG/ML
12.5 INJECTION INTRAMUSCULAR; INTRAVENOUS
Status: DISCONTINUED | OUTPATIENT
Start: 2018-01-17 | End: 2018-01-17

## 2018-01-17 RX ORDER — NALOXONE HCL 0.4 MG/ML
0.2 VIAL (ML) INJECTION AS NEEDED
Status: DISCONTINUED | OUTPATIENT
Start: 2018-01-17 | End: 2018-01-17 | Stop reason: HOSPADM

## 2018-01-17 RX ORDER — MIDAZOLAM HYDROCHLORIDE 1 MG/ML
INJECTION INTRAMUSCULAR; INTRAVENOUS AS NEEDED
Status: DISCONTINUED | OUTPATIENT
Start: 2018-01-17 | End: 2018-01-17 | Stop reason: SURG

## 2018-01-17 RX ORDER — ACETAMINOPHEN 325 MG/1
650 TABLET ORAL ONCE AS NEEDED
Status: DISCONTINUED | OUTPATIENT
Start: 2018-01-17 | End: 2018-01-17 | Stop reason: HOSPADM

## 2018-01-17 RX ORDER — EPHEDRINE SULFATE 50 MG/ML
5 INJECTION, SOLUTION INTRAVENOUS ONCE AS NEEDED
Status: DISCONTINUED | OUTPATIENT
Start: 2018-01-17 | End: 2018-01-17

## 2018-01-17 RX ORDER — PROPOFOL 10 MG/ML
VIAL (ML) INTRAVENOUS AS NEEDED
Status: DISCONTINUED | OUTPATIENT
Start: 2018-01-17 | End: 2018-01-17 | Stop reason: SURG

## 2018-01-17 RX ORDER — EPHEDRINE SULFATE 50 MG/ML
5 INJECTION, SOLUTION INTRAVENOUS ONCE AS NEEDED
Status: DISCONTINUED | OUTPATIENT
Start: 2018-01-17 | End: 2018-01-17 | Stop reason: HOSPADM

## 2018-01-17 RX ORDER — NALOXONE HCL 0.4 MG/ML
0.2 VIAL (ML) INJECTION AS NEEDED
Status: DISCONTINUED | OUTPATIENT
Start: 2018-01-17 | End: 2018-01-17

## 2018-01-17 RX ORDER — DIPHENHYDRAMINE HYDROCHLORIDE 50 MG/ML
12.5 INJECTION INTRAMUSCULAR; INTRAVENOUS
Status: DISCONTINUED | OUTPATIENT
Start: 2018-01-17 | End: 2018-01-17 | Stop reason: HOSPADM

## 2018-01-17 RX ORDER — ONDANSETRON 2 MG/ML
4 INJECTION INTRAMUSCULAR; INTRAVENOUS ONCE AS NEEDED
Status: COMPLETED | OUTPATIENT
Start: 2018-01-17 | End: 2018-01-17

## 2018-01-17 RX ORDER — SODIUM CHLORIDE 9 MG/ML
1000 INJECTION, SOLUTION INTRAVENOUS CONTINUOUS PRN
Status: DISCONTINUED | OUTPATIENT
Start: 2018-01-17 | End: 2018-01-17 | Stop reason: HOSPADM

## 2018-01-17 RX ORDER — LABETALOL HYDROCHLORIDE 5 MG/ML
5 INJECTION, SOLUTION INTRAVENOUS
Status: DISCONTINUED | OUTPATIENT
Start: 2018-01-17 | End: 2018-01-17 | Stop reason: HOSPADM

## 2018-01-17 RX ORDER — SCOLOPAMINE TRANSDERMAL SYSTEM 1 MG/1
1 PATCH, EXTENDED RELEASE TRANSDERMAL
Status: DISCONTINUED | OUTPATIENT
Start: 2018-01-17 | End: 2018-01-17 | Stop reason: HOSPADM

## 2018-01-17 RX ORDER — FLUMAZENIL 0.1 MG/ML
0.2 INJECTION INTRAVENOUS AS NEEDED
Status: DISCONTINUED | OUTPATIENT
Start: 2018-01-17 | End: 2018-01-17 | Stop reason: HOSPADM

## 2018-01-17 RX ORDER — ACETAMINOPHEN 325 MG/1
650 TABLET ORAL ONCE AS NEEDED
Status: DISCONTINUED | OUTPATIENT
Start: 2018-01-17 | End: 2018-01-17

## 2018-01-17 RX ORDER — DOXYCYCLINE HYCLATE 100 MG/1
100 CAPSULE ORAL DAILY
Qty: 7 CAPSULE | Refills: 0 | Status: SHIPPED | OUTPATIENT
Start: 2018-01-17 | End: 2018-01-24

## 2018-01-17 RX ORDER — FENTANYL CITRATE 50 UG/ML
INJECTION, SOLUTION INTRAMUSCULAR; INTRAVENOUS AS NEEDED
Status: DISCONTINUED | OUTPATIENT
Start: 2018-01-17 | End: 2018-01-17 | Stop reason: SURG

## 2018-01-17 RX ORDER — FLUMAZENIL 0.1 MG/ML
0.2 INJECTION INTRAVENOUS AS NEEDED
Status: DISCONTINUED | OUTPATIENT
Start: 2018-01-17 | End: 2018-01-17

## 2018-01-17 RX ORDER — ACETAMINOPHEN 650 MG/1
650 SUPPOSITORY RECTAL ONCE AS NEEDED
Status: DISCONTINUED | OUTPATIENT
Start: 2018-01-17 | End: 2018-01-17

## 2018-01-17 RX ADMIN — PROPOFOL 130 MG: 10 INJECTION, EMULSION INTRAVENOUS at 18:15

## 2018-01-17 RX ADMIN — FENTANYL CITRATE 25 MCG: 50 INJECTION, SOLUTION INTRAMUSCULAR; INTRAVENOUS at 18:35

## 2018-01-17 RX ADMIN — MIDAZOLAM 1 MG: 1 INJECTION INTRAMUSCULAR; INTRAVENOUS at 18:12

## 2018-01-17 RX ADMIN — ONDANSETRON 4 MG: 2 INJECTION INTRAMUSCULAR; INTRAVENOUS at 18:48

## 2018-01-17 RX ADMIN — ONDANSETRON 4 MG: 2 INJECTION INTRAMUSCULAR; INTRAVENOUS at 19:31

## 2018-01-17 RX ADMIN — LEVOFLOXACIN 500 MG: 5 INJECTION, SOLUTION INTRAVENOUS at 18:26

## 2018-01-17 RX ADMIN — PROPOFOL 50 MG: 10 INJECTION, EMULSION INTRAVENOUS at 18:22

## 2018-01-17 RX ADMIN — FENTANYL CITRATE 25 MCG: 50 INJECTION, SOLUTION INTRAMUSCULAR; INTRAVENOUS at 18:40

## 2018-01-17 RX ADMIN — FENTANYL CITRATE 25 MCG: 50 INJECTION, SOLUTION INTRAMUSCULAR; INTRAVENOUS at 18:22

## 2018-01-17 RX ADMIN — FENTANYL CITRATE 25 MCG: 50 INJECTION, SOLUTION INTRAMUSCULAR; INTRAVENOUS at 18:48

## 2018-01-17 RX ADMIN — PROPOFOL 20 MG: 10 INJECTION, EMULSION INTRAVENOUS at 18:30

## 2018-01-17 RX ADMIN — SODIUM CHLORIDE 1000 ML: 9 INJECTION, SOLUTION INTRAVENOUS at 13:59

## 2018-01-18 NOTE — PLAN OF CARE
Problem: Patient Care Overview (Adult)  Goal: Plan of Care Review  Outcome: Ongoing (interventions implemented as appropriate)   01/17/18 1943   Coping/Psychosocial Response Interventions   Plan Of Care Reviewed With patient   Patient Care Overview   Progress improving   Outcome Evaluation   Outcome Summary/Follow up Plan VSS, pt meets pacu d/c criteria     Goal: Adult Individualization and Mutuality  Outcome: Ongoing (interventions implemented as appropriate)      Problem: Perioperative Period (Adult)  Goal: Signs and Symptoms of Listed Potential Problems Will be Absent or Manageable (Perioperative Period)  Outcome: Ongoing (interventions implemented as appropriate)

## 2018-01-18 NOTE — PLAN OF CARE
Problem: Patient Care Overview (Adult)  Goal: Plan of Care Review  Outcome: Outcome(s) achieved Date Met: 01/17/18  Discharge criteria met

## 2018-01-18 NOTE — ANESTHESIA PROCEDURE NOTES
Airway    General Information and Staff    Patient location during procedure: PACU    Indications and Patient Condition  Indications for airway management: airway protection    Preoxygenated: yes  MILS maintained throughout  Mask difficulty assessment: 0 - not attempted    Final Airway Details  Final airway type: supraglottic airway      Successful airway: Size 4    Number of attempts at approach: 1

## 2018-01-18 NOTE — ANESTHESIA POSTPROCEDURE EVALUATION
Patient: Aman Sanchez    Procedure Summary     Date Anesthesia Start Anesthesia Stop Room / Location    01/17/18 1815   [facility] OR 08 / [facility]       Procedure Diagnosis Surgeon Provider    CYSTOSCOPY LEFT URETEROSCOPY RETROGRADE PYELOGRAM HOLMIUM LASER STENT INSERTION (Left ) Calculus of ureter  (Calculus of ureter [N20.1]) MD Tad Doll MD          Anesthesia Type: general  Last vitals  BP   127/67 (01/17/18 1904)   Temp   97.3 °F (36.3 °C) (01/17/18 1904)   Pulse   89 (01/17/18 1904)   Resp   16 (01/17/18 1904)     SpO2   94 % (01/17/18 1904)     Post Anesthesia Care and Evaluation    Patient location during evaluation: PACU  Patient participation: complete - patient participated  Level of consciousness: awake  Pain score: 1  Pain management: adequate  Airway patency: patent  Anesthetic complications: No anesthetic complications  PONV Status: none  Cardiovascular status: acceptable  Respiratory status: acceptable  Hydration status: acceptable

## 2018-01-18 NOTE — OP NOTE
CYSTOSCOPY URETEROSCOPY RETROGRADE PYELOGRAM HOLMIUM LASER STENT INSERTION  Procedure Note    Aman Sanchez  1/17/2018    Pre-op Diagnosis:   Calculus of ureter [N20.1]    Post-op Diagnosis:     Post-Op Diagnosis Codes:     * Calculus of ureter [N20.1]      Procedure(s):  CYSTOSCOPY LEFT URETEROSCOPY RETROGRADE PYELOGRAM HOLMIUM LASER STENT INSERTION    Surgeon(s):  Uday Horne MD    Anesthesia: General    Staff:   Circulator: Cary Son RN  Scrub Person: Malathi Thomas V  Assistant: Deanna Duval CSA    Estimated Blood Loss: none    Specimens:                None      Drains:    8x26 Ureteral stent       Findings: Left ureteral calculi      Complications: None    Indications: Left flank pain and nausea    Description of Procedure: Patient was brought to operating suite and placed in dorsa lithotomy position with genital sterile prepped in routine fashion.  I placed a 22 cystoscope into the bladder.  I identified the left ureteral orifice.  I placed an 0.38 Glidewire up the ureter to the kidney. I then put a retrograde catheter and over the top of the Glidewire and placed 6 cc of contrast up the left ureter into the kidney.  A filling defect was noted proximal to a left ureteral stricture.  I removed the retrograde catheter and replaced the 0.38 Glidewire back into the kidney. I put the Navigator system over the top of the Glidewire and dilated the ureteral stricture.  Then I place the flexible ureteroscope over the top the the guidewire into the renal pelvis which pushed the proximal ureteral stones x5 back into the renal pelvis.  A 400 micron fiber and laser was used to fragment five calculi with a 3.5 power setting.  Stonesshowed fair fragmentation.  Laser was removed and guidewire was replaced.  The ureteroscope was removed.  Over the top of the guidewire and through the cystoscope and 6x28 Double J stent was placed.  Fluoroscopy showed stent in good position.  Guidewire and  cystoscope was removed.  Bladder was drained.  String on stent was shortened in the urethra. Patient was transported to recovery having tolerated the procedure well.         Uday Horne MD     Date: 1/17/2018  Time: 10:46 PM

## 2018-01-25 RX ORDER — TAMSULOSIN HYDROCHLORIDE 0.4 MG/1
CAPSULE ORAL
Qty: 180 CAPSULE | Refills: 0 | Status: SHIPPED | OUTPATIENT
Start: 2018-01-25 | End: 2018-01-29 | Stop reason: SDUPTHER

## 2018-01-29 ENCOUNTER — OFFICE VISIT (OUTPATIENT)
Dept: FAMILY MEDICINE CLINIC | Facility: CLINIC | Age: 68
End: 2018-01-29

## 2018-01-29 VITALS
WEIGHT: 230 LBS | SYSTOLIC BLOOD PRESSURE: 136 MMHG | BODY MASS INDEX: 34.07 KG/M2 | HEIGHT: 69 IN | OXYGEN SATURATION: 96 % | HEART RATE: 59 BPM | DIASTOLIC BLOOD PRESSURE: 78 MMHG

## 2018-01-29 DIAGNOSIS — R09.02 HYPOXIA: Primary | Chronic | ICD-10-CM

## 2018-01-29 DIAGNOSIS — E11.51 TYPE 2 DIABETES MELLITUS WITH DIABETIC PERIPHERAL ANGIOPATHY WITHOUT GANGRENE, WITHOUT LONG-TERM CURRENT USE OF INSULIN (HCC): Chronic | ICD-10-CM

## 2018-01-29 DIAGNOSIS — I25.10 CAD IN NATIVE ARTERY: Chronic | ICD-10-CM

## 2018-01-29 DIAGNOSIS — E11.8 TYPE 2 DIABETES MELLITUS WITH COMPLICATION, WITHOUT LONG-TERM CURRENT USE OF INSULIN (HCC): Primary | ICD-10-CM

## 2018-01-29 DIAGNOSIS — J43.2 CENTRILOBULAR EMPHYSEMA (HCC): Chronic | ICD-10-CM

## 2018-01-29 DIAGNOSIS — I10 ESSENTIAL HYPERTENSION: Chronic | ICD-10-CM

## 2018-01-29 DIAGNOSIS — H60.391 OTHER INFECTIVE ACUTE OTITIS EXTERNA OF RIGHT EAR: ICD-10-CM

## 2018-01-29 PROCEDURE — 99214 OFFICE O/P EST MOD 30 MIN: CPT | Performed by: INTERNAL MEDICINE

## 2018-01-29 RX ORDER — MIRTAZAPINE 15 MG/1
15 TABLET, ORALLY DISINTEGRATING ORAL NIGHTLY
Qty: 90 TABLET | Refills: 1 | Status: SHIPPED | OUTPATIENT
Start: 2018-01-29 | End: 2021-04-15

## 2018-01-29 RX ORDER — LISINOPRIL AND HYDROCHLOROTHIAZIDE 25; 20 MG/1; MG/1
1 TABLET ORAL DAILY
Qty: 90 TABLET | Refills: 1 | Status: SHIPPED | OUTPATIENT
Start: 2018-01-29 | End: 2023-01-01

## 2018-01-29 RX ORDER — CLOPIDOGREL BISULFATE 75 MG/1
75 TABLET ORAL DAILY
Qty: 90 TABLET | Refills: 1 | Status: SHIPPED | OUTPATIENT
Start: 2018-01-29

## 2018-01-29 RX ORDER — ISOSORBIDE MONONITRATE 30 MG/1
30 TABLET, EXTENDED RELEASE ORAL DAILY
Qty: 90 TABLET | Refills: 1 | Status: SHIPPED | OUTPATIENT
Start: 2018-01-29

## 2018-01-29 RX ORDER — PRAVASTATIN SODIUM 80 MG/1
80 TABLET ORAL NIGHTLY
Qty: 90 TABLET | Refills: 1 | Status: SHIPPED | OUTPATIENT
Start: 2018-01-29 | End: 2023-01-01

## 2018-01-29 RX ORDER — TAMSULOSIN HYDROCHLORIDE 0.4 MG/1
2 CAPSULE ORAL DAILY
Qty: 180 CAPSULE | Refills: 1 | Status: SHIPPED | OUTPATIENT
Start: 2018-01-29

## 2018-01-29 RX ORDER — FINASTERIDE 5 MG/1
5 TABLET, FILM COATED ORAL NIGHTLY
Qty: 90 TABLET | Refills: 1 | Status: SHIPPED | OUTPATIENT
Start: 2018-01-29

## 2018-01-29 RX ORDER — FLUOXETINE HYDROCHLORIDE 40 MG/1
40 CAPSULE ORAL DAILY
Qty: 90 CAPSULE | Refills: 1 | Status: SHIPPED | OUTPATIENT
Start: 2018-01-29 | End: 2021-04-15

## 2018-01-29 RX ORDER — LEVOTHYROXINE SODIUM 0.05 MG/1
50 TABLET ORAL DAILY
Qty: 90 TABLET | Refills: 1 | Status: SHIPPED | OUTPATIENT
Start: 2018-01-29

## 2018-01-29 RX ORDER — MELOXICAM 15 MG/1
15 TABLET ORAL DAILY
Qty: 90 TABLET | Refills: 1 | Status: SHIPPED | OUTPATIENT
Start: 2018-01-29 | End: 2021-04-15

## 2018-01-29 RX ORDER — CIPROFLOXACIN AND DEXAMETHASONE 3; 1 MG/ML; MG/ML
4 SUSPENSION/ DROPS AURICULAR (OTIC) 3 TIMES DAILY
Qty: 7.5 ML | Refills: 0 | Status: SHIPPED | OUTPATIENT
Start: 2018-01-29 | End: 2021-04-15

## 2018-02-26 ENCOUNTER — OFFICE VISIT (OUTPATIENT)
Dept: FAMILY MEDICINE CLINIC | Facility: CLINIC | Age: 68
End: 2018-02-26

## 2018-02-26 VITALS
SYSTOLIC BLOOD PRESSURE: 132 MMHG | HEART RATE: 82 BPM | HEIGHT: 69 IN | BODY MASS INDEX: 35.1 KG/M2 | DIASTOLIC BLOOD PRESSURE: 86 MMHG | WEIGHT: 237 LBS | TEMPERATURE: 97.9 F | OXYGEN SATURATION: 88 %

## 2018-02-26 DIAGNOSIS — J43.2 CENTRILOBULAR EMPHYSEMA (HCC): Chronic | ICD-10-CM

## 2018-02-26 DIAGNOSIS — E11.8 TYPE 2 DIABETES MELLITUS WITH COMPLICATION, WITHOUT LONG-TERM CURRENT USE OF INSULIN (HCC): ICD-10-CM

## 2018-02-26 DIAGNOSIS — R09.02 HYPOXIA: Primary | ICD-10-CM

## 2018-02-26 PROCEDURE — 99213 OFFICE O/P EST LOW 20 MIN: CPT | Performed by: INTERNAL MEDICINE

## 2018-02-26 RX ORDER — FLUTICASONE PROPIONATE 50 MCG
2 SPRAY, SUSPENSION (ML) NASAL NIGHTLY PRN
Qty: 3 BOTTLE | Refills: 1 | Status: SHIPPED | OUTPATIENT
Start: 2018-02-26

## 2018-02-26 RX ORDER — ALBUTEROL SULFATE 2.5 MG/3ML
2.5 SOLUTION RESPIRATORY (INHALATION) EVERY 4 HOURS PRN
Qty: 180 VIAL | Refills: 1 | Status: SHIPPED | OUTPATIENT
Start: 2018-02-26

## 2018-03-19 ENCOUNTER — DOCUMENTATION (OUTPATIENT)
Dept: FAMILY MEDICINE CLINIC | Facility: CLINIC | Age: 68
End: 2018-03-19

## 2018-04-10 ENCOUNTER — TRANSCRIBE ORDERS (OUTPATIENT)
Dept: GENERAL RADIOLOGY | Facility: CLINIC | Age: 68
End: 2018-04-10

## 2018-04-10 DIAGNOSIS — J98.4 LUNG DISEASE: Primary | ICD-10-CM

## 2018-04-16 ENCOUNTER — HOSPITAL ENCOUNTER (OUTPATIENT)
Dept: CT IMAGING | Facility: HOSPITAL | Age: 68
Discharge: HOME | End: 2018-04-16
Admitting: INTERNAL MEDICINE

## 2018-04-16 DIAGNOSIS — J98.4 DISEASE OF LUNG: ICD-10-CM

## 2018-04-16 PROCEDURE — 71250 CT THORAX DX C-: CPT

## 2018-04-18 ENCOUNTER — TRANSCRIBE ORDERS (OUTPATIENT)
Dept: ADMINISTRATIVE | Facility: HOSPITAL | Age: 68
End: 2018-04-18

## 2018-04-18 DIAGNOSIS — J43.2 CENTRILOBULAR EMPHYSEMA (HCC): Primary | ICD-10-CM

## 2018-04-25 ENCOUNTER — OFFICE VISIT (OUTPATIENT)
Dept: PULMONOLOGY | Facility: CLINIC | Age: 68
End: 2018-04-25

## 2018-04-25 DIAGNOSIS — J43.2 CENTRILOBULAR EMPHYSEMA (HCC): ICD-10-CM

## 2018-04-25 PROCEDURE — 94727 GAS DIL/WSHOT DETER LNG VOL: CPT | Performed by: INTERNAL MEDICINE

## 2018-04-25 PROCEDURE — 94060 EVALUATION OF WHEEZING: CPT | Performed by: INTERNAL MEDICINE

## 2018-04-25 PROCEDURE — 94729 DIFFUSING CAPACITY: CPT | Performed by: INTERNAL MEDICINE

## 2018-06-20 ENCOUNTER — TELEPHONE (OUTPATIENT)
Dept: CARDIAC REHAB | Facility: HOSPITAL | Age: 68
End: 2018-06-20

## 2018-06-20 NOTE — TELEPHONE ENCOUNTER
Pt was called to set patient up for Pulmonary Rehab.  I explained the reason for rehab as well as the frequency and what it consists of.  Pt states that he will think about it and call back if he decides to participate.

## 2018-07-19 ENCOUNTER — PREP FOR SURGERY (OUTPATIENT)
Dept: OTHER | Facility: HOSPITAL | Age: 68
End: 2018-07-19

## 2018-07-19 DIAGNOSIS — N20.1 CALCULUS OF URETER: Primary | ICD-10-CM

## 2018-07-19 RX ORDER — LEVOFLOXACIN 5 MG/ML
500 INJECTION, SOLUTION INTRAVENOUS ONCE
Status: CANCELLED | OUTPATIENT
Start: 2018-07-20 | End: 2018-07-20

## 2018-07-19 NOTE — H&P
[facility] History and Physical  JLUIS KILPATRICK  68-year-old; :1950;;male  [address] [place] [address]  Ins: 1) MEDICARE 2) [employer]  Employer: UNEMPLOYED  MRN:[id number]  BRIAN RONDONDAVID RAKEL SALDANA MD  [facility] [place]  2018 11:09 AM  Allergies  Codeine Unknown  ivp dye (Not Checked) Unknown  Current Medications  aspirin 81 mg tablet 1 oral  FLUoxetine 40 mg oral capsule 1 oral  hydrochlorothiazide-lisinopril 25 mg-20 mg oral  tablet 1 oral  pravastatin 80 mg tablet 1 oral  tamsulosin 0.4 mg oral capsule 2 capsule oral at  bedtime  meloxicam 15 mg oral tablet  * Medications Reconciled  Problem List  Ureteric stone  Flank pain  Medical History  HIGH BLOOD PRESSURE  HEART STENTS  TONSILECTOMY  HEART DISEASE  OTH/UNS SLEEP APNEA  Surgical History  HEART STENTS  TONSIL REMOVED  [facility] 08/15/2014  Left  CYSTOSCOPY AND TREATMENT 2017  CYSTOSCOPY AND TREATMENT 2017  stent removal  CRULLS  CYSTOSCOPY AND TREATMENT 2018  stent removal  Psychiatric History  Patient is generally satisfied with life and has no thoughts of suicide. Has depression and anxiety.  Family History  Sister Lung Cancer  Sister HIGH BLOOD PRESSURE  Mother Lung Cancer  Mother HIGH BLOOD PRESSURE  Mother  Father  Brother   Sister  Sister Alive  Social History  SMOKES ONE PACK OF CIGARETTES. DOES  NOT DRINK. DISABLED. . LIVES  WITH WIFE.  Chief Complaint  Kidney Stone  History of Present Illness  JLUIS KILPATRICK is a 68-year-old male here for evaluation. He has a history of left  ureteral stones that he has been unable to pass, he does have 1 small stone he brings  in for analysis, minimal pain, no hematuria, no fever. He does not understand why he  cannot pass the stones.  KUB shows 5 small stones central left kidney, 3 small stones upper left ureter at L2-3  disc level same possition as 1 month ago.  Renal ultrasound shows  mild left hydronephrosis unchanged from Nov. 2017, right  kidney cyst similar to Nov.  Location: LEFT ureter and Kidney.  Severity: moderate  Onset / Duration: months  Timing: intermittent  Modifying factors: Fluids, Flomax  Associated signs and symptoms: mild left flank pain  Review of Systems  Eyes: ; Denies: blurry vision, pain in the eyes and double vision  ENMT: ; Denies: ear pain, sore throat and sinus problems  Cardiovascular: ; Denies: chest pain, varicose veins, angina and syncope  Respiratory: ; Denies: shortness of breath, wheezing and frequent cough  Gastrointestinal: Reports: heartburn; Denies: abdominal pain, nausea, vomiting and  indigestion  Genitourinary: Reports: other symptoms (see HPI)  Musculoskeletal: Reports: neck pain; Denies: joint pain and back pain  Integumentary: ; Denies: skin rash, boils and persistent itch  Neurological: ; Denies: tremors, dizzy spells and numbness / tingling  Psychiatric: Reports: generally satisfied with life; Denies: depression, suicidal  ideation and anxiety  Endocrine: Reports: tired/sluggish; Denies: Excessive thirst, cold intolerance and  heat intolerance  Hematologic/Lymphatic: ; Denies: swollen glands and blood clotting problem  Allergic/Immunologic: ; Denies: hayfever  Constitutional: ; Denies: fever, chills and weight loss  Vital Signs  7/5/2018 11:09:00 AM  BP: 136/74 (mmHg) Heart Rate: 70 (/min)  Weight: 238 (lb) Resp Rate: 16 (/min)  Height: 69 (in) BMI: 35.15  Current every day smoker  Exam  General appearance: The patient appears well developed and well nourished, in no  apparent acute distress.  Examination of pupils and irises: PERRL  Assessment of hearing: Hearing appears normal.  Examination of neck: Neck appears supple.  Assessment of respiratory effort: Respiratory effort appears normal. No apparent  distress.  Auscultation of lungs: Chest exam is clear to auscultation bilaterally.  Auscultation of heart: Normal rate and rhythm noted.  Examination  of Extremities for edema and/or varicosities: None  Inspection of breasts: Deferred.  Examination of abdomen: Soft benign abdomen on exam.  Obtain stool sample: Stool specimen for occult blood is not indicated.  Examination of gait and station: Normal gait and stature.  Head and neck (Assessment of range of motion): Adequate ROM noted in all  extremities.  Head and neck (Assessment of stability): Ambulates without assistance.  Inspection of skin and subcutaneous tissue: Exam of the skin is within normal limits.  The color and skin turgor are normal.  No lesions, bruises, rashes, or jaundice.  Orientation to time, place and person: Oriented to person, place, and time.  Mood and affect: Mood and affect are normal.  Orientation: He appears alert and oriented.  Mood and affect: Mood and affect appear normal.  Data Review  Medications and chart reviewed. History and physical form/ROS reviewed and no  changes noted. Previous encounter reviewed. Last form done 08/085/17.  Assessment - Ureteric stone  DX:  Ureteric stone  SNO: [id number], ICD-9: 592.0, ICD-10: N20.1  Flank pain  SNO: [id number], ICD-9: 789.00, ICD-10: R10.9  Assessment Notes:  no change, likely stricture present  Plan  Plan Notes:  Cystoscopy, left retrograde, ureteroscopy, laser lithotripsy, stent placement.  Education:  Kidney Stones - English  Discussion:  Discussed my findings and plan of action and reasoning behind decision making. All  questions were answered. FINDINGS WERE DISCUSSED WITH PATIENT. RISKS  AND BENEFITS EXPLAINED. PATIENT WISHES TO PROCEED.  Instructions:  Patient is instructed to call with any problems. Patient is instructed to call if the  condition worsens. Patient is instructed to call with any changes in condition. Patient  is instructed to call if he has any intractable pain, fever, chills, nausea, or vomiting.  INCREASE FLUIDS. IF PAIN WORSENS/ UNCONTROLLED OR TEMPERATURE  >101.0 GO IMMEDIATELY TO ER.

## 2018-07-25 ENCOUNTER — ANESTHESIA EVENT (OUTPATIENT)
Dept: PERIOP | Facility: HOSPITAL | Age: 68
End: 2018-07-25

## 2018-07-25 ENCOUNTER — APPOINTMENT (OUTPATIENT)
Dept: GENERAL RADIOLOGY | Facility: HOSPITAL | Age: 68
End: 2018-07-25

## 2018-07-25 ENCOUNTER — ANESTHESIA (OUTPATIENT)
Dept: PERIOP | Facility: HOSPITAL | Age: 68
End: 2018-07-25

## 2018-07-25 ENCOUNTER — HOSPITAL ENCOUNTER (OUTPATIENT)
Facility: HOSPITAL | Age: 68
Setting detail: HOSPITAL OUTPATIENT SURGERY
Discharge: HOME | End: 2018-07-25
Attending: UROLOGY | Admitting: UROLOGY

## 2018-07-25 VITALS
HEART RATE: 75 BPM | TEMPERATURE: 97.7 F | SYSTOLIC BLOOD PRESSURE: 151 MMHG | WEIGHT: 208.34 LBS | BODY MASS INDEX: 30.86 KG/M2 | RESPIRATION RATE: 18 BRPM | DIASTOLIC BLOOD PRESSURE: 70 MMHG | OXYGEN SATURATION: 93 % | HEIGHT: 69 IN

## 2018-07-25 DIAGNOSIS — N20.1 CALCULUS OF URETER: ICD-10-CM

## 2018-07-25 LAB
ANION GAP SERPL CALCULATED.3IONS-SCNC: 13 MMOL/L (ref 5–15)
BUN BLD-MCNC: 17 MG/DL (ref 7–21)
BUN/CREAT SERPL: 20.2 (ref 7–25)
CALCIUM SPEC-SCNC: 9.6 MG/DL (ref 8.4–10.2)
CHLORIDE SERPL-SCNC: 104 MMOL/L (ref 95–110)
CO2 SERPL-SCNC: 20 MMOL/L (ref 22–31)
CREAT BLD-MCNC: 0.84 MG/DL (ref 0.7–1.3)
GFR SERPL CREATININE-BSD FRML MDRD: 91 ML/MIN/1.73 (ref 49–113)
GLUCOSE BLD-MCNC: 81 MG/DL (ref 60–100)
POTASSIUM BLD-SCNC: 3.8 MMOL/L (ref 3.5–5.1)
SODIUM BLD-SCNC: 137 MMOL/L (ref 137–145)

## 2018-07-25 PROCEDURE — C2617 STENT, NON-COR, TEM W/O DEL: HCPCS | Performed by: UROLOGY

## 2018-07-25 PROCEDURE — 25010000002 PROPOFOL 10 MG/ML EMULSION: Performed by: NURSE ANESTHETIST, CERTIFIED REGISTERED

## 2018-07-25 PROCEDURE — 25010000002 FENTANYL CITRATE (PF) 100 MCG/2ML SOLUTION: Performed by: NURSE ANESTHETIST, CERTIFIED REGISTERED

## 2018-07-25 PROCEDURE — C1769 GUIDE WIRE: HCPCS | Performed by: UROLOGY

## 2018-07-25 PROCEDURE — 25010000002 DEXAMETHASONE PER 1 MG: Performed by: NURSE ANESTHETIST, CERTIFIED REGISTERED

## 2018-07-25 PROCEDURE — 74420 UROGRAPHY RTRGR +-KUB: CPT

## 2018-07-25 PROCEDURE — C1894 INTRO/SHEATH, NON-LASER: HCPCS | Performed by: UROLOGY

## 2018-07-25 PROCEDURE — 93010 ELECTROCARDIOGRAM REPORT: CPT | Performed by: INTERNAL MEDICINE

## 2018-07-25 PROCEDURE — 80048 BASIC METABOLIC PNL TOTAL CA: CPT | Performed by: ANESTHESIOLOGY

## 2018-07-25 PROCEDURE — C1758 CATHETER, URETERAL: HCPCS | Performed by: UROLOGY

## 2018-07-25 PROCEDURE — 25010000002 MIDAZOLAM PER 1 MG: Performed by: NURSE ANESTHETIST, CERTIFIED REGISTERED

## 2018-07-25 PROCEDURE — 93005 ELECTROCARDIOGRAM TRACING: CPT | Performed by: ANESTHESIOLOGY

## 2018-07-25 PROCEDURE — 25010000002 ONDANSETRON PER 1 MG: Performed by: NURSE ANESTHETIST, CERTIFIED REGISTERED

## 2018-07-25 PROCEDURE — 25010000002 IOPAMIDOL 61 % SOLUTION: Performed by: UROLOGY

## 2018-07-25 PROCEDURE — 25010000003 CEFAZOLIN PER 500 MG: Performed by: NURSE ANESTHETIST, CERTIFIED REGISTERED

## 2018-07-25 DEVICE — URETERAL STENT
Type: IMPLANTABLE DEVICE | Status: FUNCTIONAL
Brand: POLARIS™ ULTRA

## 2018-07-25 RX ORDER — DEXAMETHASONE SODIUM PHOSPHATE 4 MG/ML
INJECTION, SOLUTION INTRA-ARTICULAR; INTRALESIONAL; INTRAMUSCULAR; INTRAVENOUS; SOFT TISSUE AS NEEDED
Status: DISCONTINUED | OUTPATIENT
Start: 2018-07-25 | End: 2018-07-25 | Stop reason: SURG

## 2018-07-25 RX ORDER — SCOLOPAMINE TRANSDERMAL SYSTEM 1 MG/1
1 PATCH, EXTENDED RELEASE TRANSDERMAL ONCE
Status: DISCONTINUED | OUTPATIENT
Start: 2018-07-25 | End: 2018-07-25 | Stop reason: HOSPADM

## 2018-07-25 RX ORDER — EPHEDRINE SULFATE 50 MG/ML
5 INJECTION, SOLUTION INTRAVENOUS ONCE AS NEEDED
Status: DISCONTINUED | OUTPATIENT
Start: 2018-07-25 | End: 2018-07-25 | Stop reason: HOSPADM

## 2018-07-25 RX ORDER — ONDANSETRON 2 MG/ML
4 INJECTION INTRAMUSCULAR; INTRAVENOUS ONCE AS NEEDED
Status: COMPLETED | OUTPATIENT
Start: 2018-07-25 | End: 2018-07-25

## 2018-07-25 RX ORDER — DOXYCYCLINE HYCLATE 100 MG/1
100 CAPSULE ORAL DAILY
Qty: 7 CAPSULE | Refills: 0 | Status: SHIPPED | OUTPATIENT
Start: 2018-07-25 | End: 2018-08-01

## 2018-07-25 RX ORDER — NALOXONE HCL 0.4 MG/ML
0.2 VIAL (ML) INJECTION AS NEEDED
Status: DISCONTINUED | OUTPATIENT
Start: 2018-07-25 | End: 2018-07-25 | Stop reason: HOSPADM

## 2018-07-25 RX ORDER — SODIUM CHLORIDE 9 MG/ML
1000 INJECTION, SOLUTION INTRAVENOUS CONTINUOUS
Status: DISCONTINUED | OUTPATIENT
Start: 2018-07-25 | End: 2018-07-25 | Stop reason: HOSPADM

## 2018-07-25 RX ORDER — PROPOFOL 10 MG/ML
VIAL (ML) INTRAVENOUS AS NEEDED
Status: DISCONTINUED | OUTPATIENT
Start: 2018-07-25 | End: 2018-07-25 | Stop reason: SURG

## 2018-07-25 RX ORDER — LEVOFLOXACIN 5 MG/ML
500 INJECTION, SOLUTION INTRAVENOUS ONCE
Status: DISCONTINUED | OUTPATIENT
Start: 2018-07-25 | End: 2018-07-25 | Stop reason: HOSPADM

## 2018-07-25 RX ORDER — ACETAMINOPHEN 325 MG/1
650 TABLET ORAL ONCE AS NEEDED
Status: DISCONTINUED | OUTPATIENT
Start: 2018-07-25 | End: 2018-07-25 | Stop reason: HOSPADM

## 2018-07-25 RX ORDER — CEFAZOLIN SODIUM 1 G/3ML
INJECTION, POWDER, FOR SOLUTION INTRAMUSCULAR; INTRAVENOUS AS NEEDED
Status: DISCONTINUED | OUTPATIENT
Start: 2018-07-25 | End: 2018-07-25 | Stop reason: SURG

## 2018-07-25 RX ORDER — ONDANSETRON 2 MG/ML
INJECTION INTRAMUSCULAR; INTRAVENOUS AS NEEDED
Status: DISCONTINUED | OUTPATIENT
Start: 2018-07-25 | End: 2018-07-25 | Stop reason: SURG

## 2018-07-25 RX ORDER — ACETAMINOPHEN 650 MG/1
650 SUPPOSITORY RECTAL ONCE AS NEEDED
Status: DISCONTINUED | OUTPATIENT
Start: 2018-07-25 | End: 2018-07-25 | Stop reason: HOSPADM

## 2018-07-25 RX ORDER — MEPERIDINE HYDROCHLORIDE 50 MG/ML
12.5 INJECTION INTRAMUSCULAR; INTRAVENOUS; SUBCUTANEOUS
Status: DISCONTINUED | OUTPATIENT
Start: 2018-07-25 | End: 2018-07-25 | Stop reason: HOSPADM

## 2018-07-25 RX ORDER — LIDOCAINE HYDROCHLORIDE 20 MG/ML
INJECTION, SOLUTION INFILTRATION; PERINEURAL AS NEEDED
Status: DISCONTINUED | OUTPATIENT
Start: 2018-07-25 | End: 2018-07-25 | Stop reason: SURG

## 2018-07-25 RX ORDER — FENTANYL CITRATE 50 UG/ML
INJECTION, SOLUTION INTRAMUSCULAR; INTRAVENOUS AS NEEDED
Status: DISCONTINUED | OUTPATIENT
Start: 2018-07-25 | End: 2018-07-25 | Stop reason: SURG

## 2018-07-25 RX ORDER — FLUMAZENIL 0.1 MG/ML
0.2 INJECTION INTRAVENOUS AS NEEDED
Status: DISCONTINUED | OUTPATIENT
Start: 2018-07-25 | End: 2018-07-25 | Stop reason: HOSPADM

## 2018-07-25 RX ORDER — MIDAZOLAM HYDROCHLORIDE 1 MG/ML
INJECTION INTRAMUSCULAR; INTRAVENOUS AS NEEDED
Status: DISCONTINUED | OUTPATIENT
Start: 2018-07-25 | End: 2018-07-25 | Stop reason: SURG

## 2018-07-25 RX ORDER — LABETALOL HYDROCHLORIDE 5 MG/ML
5 INJECTION, SOLUTION INTRAVENOUS
Status: DISCONTINUED | OUTPATIENT
Start: 2018-07-25 | End: 2018-07-25 | Stop reason: HOSPADM

## 2018-07-25 RX ORDER — DIPHENHYDRAMINE HYDROCHLORIDE 50 MG/ML
12.5 INJECTION INTRAMUSCULAR; INTRAVENOUS
Status: DISCONTINUED | OUTPATIENT
Start: 2018-07-25 | End: 2018-07-25 | Stop reason: HOSPADM

## 2018-07-25 RX ADMIN — CEFAZOLIN SODIUM 1 G: 1 INJECTION, POWDER, FOR SOLUTION INTRAMUSCULAR; INTRAVENOUS at 18:52

## 2018-07-25 RX ADMIN — FENTANYL CITRATE 25 MCG: 50 INJECTION, SOLUTION INTRAMUSCULAR; INTRAVENOUS at 18:55

## 2018-07-25 RX ADMIN — LIDOCAINE HYDROCHLORIDE 80 MG: 20 INJECTION, SOLUTION INFILTRATION; PERINEURAL at 18:50

## 2018-07-25 RX ADMIN — MIDAZOLAM 2 MG: 1 INJECTION INTRAMUSCULAR; INTRAVENOUS at 18:39

## 2018-07-25 RX ADMIN — ONDANSETRON HYDROCHLORIDE 4 MG: 2 INJECTION, SOLUTION INTRAMUSCULAR; INTRAVENOUS at 19:49

## 2018-07-25 RX ADMIN — FENTANYL CITRATE 25 MCG: 50 INJECTION, SOLUTION INTRAMUSCULAR; INTRAVENOUS at 18:50

## 2018-07-25 RX ADMIN — PROPOFOL 150 MG: 10 INJECTION, EMULSION INTRAVENOUS at 18:50

## 2018-07-25 RX ADMIN — ONDANSETRON 4 MG: 2 INJECTION INTRAMUSCULAR; INTRAVENOUS at 19:18

## 2018-07-25 RX ADMIN — SCOPALAMINE 1 PATCH: 1 PATCH, EXTENDED RELEASE TRANSDERMAL at 16:09

## 2018-07-25 RX ADMIN — FENTANYL CITRATE 50 MCG: 50 INJECTION, SOLUTION INTRAMUSCULAR; INTRAVENOUS at 18:59

## 2018-07-25 RX ADMIN — DEXAMETHASONE SODIUM PHOSPHATE 4 MG: 4 INJECTION, SOLUTION INTRAMUSCULAR; INTRAVENOUS at 19:18

## 2018-07-25 RX ADMIN — SODIUM CHLORIDE 1000 ML: 9 INJECTION, SOLUTION INTRAVENOUS at 15:56

## 2018-07-25 NOTE — ANESTHESIA PREPROCEDURE EVALUATION
" Anesthesia Evaluation     Patient summary reviewed and Nursing notes reviewed   history of anesthetic complications (Scopalmine patch applied pre-op.): PONV  NPO Solid Status: > 8 hours  NPO Liquid Status: > 4 hours           Airway   Mallampati: II  TM distance: >3 FB  Neck ROM: full  possible difficult intubation  Dental    (+) poor dentation        Pulmonary - normal exam    breath sounds clear to auscultation  (+) a smoker Former, COPD moderate, shortness of breath, sleep apnea on CPAP,     ROS comment: 2 L oxygen at night  Cardiovascular - normal exam  Exercise tolerance: poor (<4 METS)    ECG reviewed  PT is on anticoagulation therapy  Rhythm: regular  Rate: normal    (+) hypertension well controlled, past MI  >12 months, CAD, cardiac stents (First stent place January 2007;Second stent for \"complete blockage\" January 2017.) more than 12 months ago hyperlipidemia,   (-) angina, murmur    ROS comment: EKG:Sinus bradycardia with old IMI.    Sinus bradycardia  Inferior infarct (cited on or before 11-JUN-2014)  Abnormal ECG  When compared with ECG of 27-JUN-2017 12:30,  No significant change was found  Confirmed by HEATHERLY     Neuro/Psych  (+) numbness (Cervical radiculopathy.), psychiatric history Depression and Anxiety,     GI/Hepatic/Renal/Endo    (+) obesity,   renal disease stones, diabetes mellitus (glu 101) type 2 well controlled, hypothyroidism,     Musculoskeletal     (+) back pain, neck pain,       ROS comment: Lumbar surgery 2015.  Abdominal   (+) obese,    Substance History   (+) alcohol use (occ),      OB/GYN negative ob/gyn ROS         Other   (+) arthritis     (-) history of cancer                    Anesthesia Plan    ASA 3     general     intravenous induction   Anesthetic plan and risks discussed with patient and spouse/significant other.      "

## 2018-07-25 NOTE — ANESTHESIA PROCEDURE NOTES
Airway  Urgency: elective    Airway not difficult    General Information and Staff    Patient location during procedure: OR    Indications and Patient Condition  Indications for airway management: airway protection    Preoxygenated: yes  MILS maintained throughout  Mask difficulty assessment: 0 - not attempted    Final Airway Details  Final airway type: supraglottic airway      Successful airway: LMA  Size 4    Number of attempts at approach: 1

## 2018-10-31 ENCOUNTER — OFFICE VISIT (OUTPATIENT)
Dept: CARDIOLOGY | Facility: CLINIC | Age: 68
End: 2018-10-31

## 2018-10-31 VITALS
HEART RATE: 58 BPM | SYSTOLIC BLOOD PRESSURE: 124 MMHG | DIASTOLIC BLOOD PRESSURE: 82 MMHG | HEIGHT: 69 IN | WEIGHT: 208 LBS | BODY MASS INDEX: 30.81 KG/M2

## 2018-10-31 DIAGNOSIS — G47.33 OBSTRUCTIVE SLEEP APNEA SYNDROME: ICD-10-CM

## 2018-10-31 DIAGNOSIS — Z01.810 PREOPERATIVE CARDIOVASCULAR EXAMINATION: ICD-10-CM

## 2018-10-31 DIAGNOSIS — R06.09 DYSPNEA ON EXERTION: ICD-10-CM

## 2018-10-31 DIAGNOSIS — I25.10 CHRONIC CORONARY ARTERY DISEASE: ICD-10-CM

## 2018-10-31 DIAGNOSIS — I10 ESSENTIAL HYPERTENSION: Primary | ICD-10-CM

## 2018-10-31 PROBLEM — I27.20 PULMONARY HYPERTENSION (HCC): Status: RESOLVED | Noted: 2017-08-14 | Resolved: 2018-10-31

## 2018-10-31 PROCEDURE — 93000 ELECTROCARDIOGRAM COMPLETE: CPT | Performed by: INTERNAL MEDICINE

## 2018-10-31 PROCEDURE — 99214 OFFICE O/P EST MOD 30 MIN: CPT | Performed by: INTERNAL MEDICINE

## 2018-10-31 NOTE — PROGRESS NOTES
Aman Sanchez  68 y.o. male    10/31/2018  1. Essential hypertension    2. Chronic coronary artery disease    3. Obstructive sleep apnea syndrome    4. Dyspnea on exertion    5. Preoperative cardiovascular examination        History of Present Illness  Mr. Sanchez is a 68-year-old  male who is here to establish with our practice and for a preoperative cardiac evaluation.  He is being considered for back surgery at [facility] in [facility].  He was a patient of Dr. Zamorano in the past.  His history is remarkable for coronary artery disease, obesity, hypertension, COPD, diabetes mellitus, hyperlipidemia.  On review of his history, he has had coronary artery disease and previous PCI to right coronary artery as described below:  On January 9, 2017 patient had cardiac catheterization and stent placement to the right coronary artery.  3.5 x 23 mm Xience Alpine stent was placed to the distal RCA (Dr. Zamorano) and 2007 patient had a 3.5 x 18 mm Cypher stent to the mid RCA by Dr. Mart.    He underwent right heart catheterization by Dr. Zamorano in August 2017  · Pressure measurements:   Pulmonary capillary wedge pressure: mean 16  mmHg  Pulmonary artery pressure: 42/16mmHg, mean 26 mmHg  Right ventricular pressure: 41/6 mmHg, mean 13 mmHg  Right atrium: Mean 12 mmHg     · Saturations:   AO: 97%  PA 59%  RV 59%  RA 12%  · Cardiac output: 3.84 L/m  · Pulmonary vascular resistance :  3 Wood Units.  Final impression:  1.  Based on the above findings the patient has mild pulmonary hypertension  · Left ventricular diastolic dysfunction (grade I) consistent with impaired relaxation.  · Estimated EF appears to be in the range of 56 - 60%     The patient has done reasonably well and denied any chest pain.  He has NYHA class II dyspnea on exertion which is a chronic symptom and most likely secondary to COPD, obesity and sleep apnea and tobacco use.  He unfortunately continues to smoke and we had a discussion about  smoking cessation.  EKG today showed sinus rhythm with heart rate of 58 bpm, small Q waves in the inferior leads and incomplete RBBB.  EKG findings were unchanged from previous studies.    SUBJECTIVE    Allergies   Allergen Reactions   • Codeine Other (See Comments)     unknown         Past Medical History:   Diagnosis Date   • Acute bronchitis    • Anemia    • Breast lump    • Cervical radiculopathy    • COPD (chronic obstructive pulmonary disease) (CMS/HCC)    • Coronary atherosclerosis    • Depressive disorder    • Disease of thyroid gland    • Dysuria    • Essential hypertension    • Flank pain    • Hematochezia    • Hematuria syndrome    • Hyperlipidemia    • Hypoglycemia    • Kidney stone    • Kidney stones    • Mass of neck    • MI (myocardial infarction)    • Neck pain     sprain   • Osteoarthritis    • PONV (postoperative nausea and vomiting)    • Sleep apnea     using c-pap   • Sleep apnea    • Type 2 diabetes mellitus (CMS/HCC)          Past Surgical History:   Procedure Laterality Date   • BACK SURGERY     • CARDIAC CATHETERIZATION N/A 8/23/2017    Procedure: Right Heart Cath;  Surgeon: Mariposa Zamorano MD;  Location: [facility] INVASIVE LOCATION;  Service:    • CHOLECYSTECTOMY     • CYSTOSCOPY  08/29/2014    Left ESWL, cysto and stent removal   • CYSTOSCOPY W/ LITHOLAPAXY / EHL  08/15/2014    Left ESWL   • CYSTOSCOPY, URETEROSCOPY, RETROGRADE PYELOGRAM, STENT INSERTION Left 6/30/2017    Procedure: CYSTOSCOPY, LEFT URETEROSCOPY, RETROGRADE PYELOGRAM, HOLMIUM LASER, STENT INSERTION;  Surgeon: Uday Horne MD;  Location: [facility];  Service:    • CYSTOSCOPY, URETEROSCOPY, RETROGRADE PYELOGRAM, STENT INSERTION Left 9/6/2017    Procedure: CYSTOSCOPY, LEFT URETEROSCOPY, RETROGRADE PYELOGRAM, HOLMIUM LASER, STENT INSERTION;  Surgeon: Uday Horne MD;  Location: [facility];  Service:    • CYSTOSCOPY, URETEROSCOPY, RETROGRADE PYELOGRAM, STENT INSERTION Left 1/17/2018    Procedure: CYSTOSCOPY LEFT  URETEROSCOPY RETROGRADE PYELOGRAM HOLMIUM LASER STENT INSERTION;  Surgeon: Uday Horne MD;  Location: [facility];  Service:    • CYSTOSCOPY, URETEROSCOPY, RETROGRADE PYELOGRAM, STENT INSERTION Left 7/25/2018    Procedure: CYSTOSCOPY URETEROSCOPY RETROGRADE PYELOGRAM HOLMIUM LASER STENT INSERTION;  Surgeon: Uday Horne MD;  Location: [facility];  Service: Urology   • HIP SURGERY     • INJECTION OF MEDICATION  11/11/2013    Kenalog   • INJECTION OF MEDICATION  01/16/2014    Toradol   • JOINT REPLACEMENT Right 2012    total hip replacement   • SKIN GRAFT      on his foot; removed skin from hip   • TONSILLECTOMY     • TRANSESOPHAGEAL ECHOCARDIOGRAM (ОЛЬГА)  07/21/2014    with color flow-Normal LV systolic function with Ef of 60-65%/ Grad1 diastolic dysfunction of the left ventricular myocardium. No evidence of pericardial effusion         Family History   Problem Relation Age of Onset   • Lung cancer Mother    • Rectal cancer Sister          Social History     Social History   • Marital status:      Spouse name: N/A   • Number of children: N/A   • Years of education: N/A     Occupational History   • Not on file.     Social History Main Topics   • Smoking status: Current Every Day Smoker     Packs/day: 0.50     Years: 50.00     Types: Cigarettes   • Smokeless tobacco: Never Used      Comment: 1/2 PPD   • Alcohol use Yes      Comment: Rare   • Drug use: No   • Sexual activity: Defer     Other Topics Concern   • Not on file     Social History Narrative   • No narrative on file         Current Outpatient Prescriptions   Medication Sig Dispense Refill   • albuterol (PROVENTIL HFA;VENTOLIN HFA) 108 (90 Base) MCG/ACT inhaler Inhale 2 puffs Every 4 (Four) Hours As Needed for Wheezing. 8 g 5   • albuterol (PROVENTIL) (2.5 MG/3ML) 0.083% nebulizer solution Take 2.5 mg by nebulization Every 4 (Four) Hours As Needed for Wheezing. 180 vial 1   • aspirin 81 MG EC tablet Take 81 mg by mouth Daily. Last dose 1/8/18     •  ciprofloxacin-dexamethasone (CIPRODEX) 0.3-0.1 % otic suspension Administer 4 drops to the right ear 3 (Three) Times a Day. 7.5 mL 0   • clopidogrel (PLAVIX) 75 MG tablet Take 1 tablet by mouth Daily. 90 tablet 1   • finasteride (PROSCAR) 5 MG tablet Take 1 tablet by mouth Every Night. 90 tablet 1   • FLUoxetine (PROzac) 40 MG capsule Take 1 capsule by mouth Daily. 90 capsule 1   • fluticasone (FLONASE) 50 MCG/ACT nasal spray 2 sprays into each nostril At Night As Needed for Rhinitis. 3 bottle 1   • glucose blood test strip One touch reli-on glucometer 360 each 1   • isosorbide mononitrate (IMDUR) 30 MG 24 hr tablet Take 1 tablet by mouth Daily. 90 tablet 1   • levothyroxine (SYNTHROID) 50 MCG tablet Take 1 tablet by mouth Daily. 90 tablet 1   • lisinopril-hydrochlorothiazide (PRINZIDE,ZESTORETIC) 20-25 MG per tablet Take 1 tablet by mouth Daily. 90 tablet 1   • meloxicam (MOBIC) 15 MG tablet Take 1 tablet by mouth Daily. 90 tablet 1   • metFORMIN (GLUCOPHAGE) 1000 MG tablet Take 1 tablet by mouth Daily With Breakfast. 90 tablet 1   • metFORMIN (GLUCOPHAGE) 500 MG tablet Take 1 tablet by mouth Daily With Breakfast. (Patient taking differently: Take 500 mg by mouth Daily Before Supper.) 90 tablet 1   • mirtazapine (REMERON SOL-TAB) 15 MG disintegrating tablet Take 1 tablet by mouth Every Night. 90 tablet 1   • nitroglycerin (NITROSTAT) 0.4 MG SL tablet Place 1 tablet under the tongue Every 5 (Five) Minutes As Needed for chest pain. 100 tablet 11   • ONE TOUCH LANCETS misc Use as directed test 6 times daily 200 each 1   • oxyCODONE-acetaminophen (PERCOCET) 7.5-325 MG per tablet Take 1 tablet by mouth Every 4 (Four) Hours As Needed.     • pravastatin (PRAVACHOL) 80 MG tablet Take 1 tablet by mouth Every Night. 90 tablet 1   • tamsulosin (FLOMAX) 0.4 MG capsule 24 hr capsule Take 2 capsules by mouth Daily. (Patient taking differently: Take 2 capsules by mouth Every Night.) 180 capsule 1   • traMADol (ULTRAM) 50 MG  "tablet Take 50 mg by mouth Every 6 (Six) Hours As Needed for moderate pain (4-6).       No current facility-administered medications for this visit.          OBJECTIVE    /82   Pulse 58   Ht 175.3 cm (69.02\")   Wt 94.3 kg (208 lb)   BMI 30.70 kg/m²         Review of Systems     Constitutional:  Denies recent weight loss, weight gain, fever or chills, no change in exercise tolerance     HENT:  Denies any hearing loss, epistaxis, hoarseness, or difficulty speaking.     Eyes: Wears eyeglasses or contact lenses     Respiratory:  Dyspnea with exertion,no cough, wheezing, or hemoptysis.  COPD, sleep apnea on CPAP, on home oxygen    Cardiovascular: See HPI    Gastrointestinal:  Denies change in bowel habits, dyspepsia, ulcer disease, hematochezia, or melena.     Endocrine: Negative for cold intolerance, heat intolerance, polydipsia, polyphagia and polyuria.     Genitourinary: Negative.      Musculoskeletal: DJD    Skin:  Denies any change in hair or nails, rashes, or skin lesions.     Allergic/Immunologic: Negative.  Negative for environmental allergies, food allergies and immunocompromised state.     Neurological:  Denies any history of recurrent headaches, strokes, TIA, or seizure disorder.     Hematological: Denies any food allergies, seasonal allergies, bleeding disorders, or lymphadenopathy.     Psychiatric/Behavioral: Denies any history of depression, substance abuse, or change in cognitive function.         Physical Exam     Constitutional: Cooperative, alert and oriented,  in no acute distress.     HENT:   Head: Normocephalic, normal hair patterns, no masses or tenderness.  Ears, Nose, and Throat: No gross abnormalities. No pallor or cyanosis.   Eyes: EOMS intact, PERRL, conjunctivae and lids unremarkable. Fundoscopic exam and visual fields not performed.   Neck: No palpable masses or adenopathy, no thyromegaly, no JVD, carotid pulses are full and equal bilaterally and without  Bruits.     Cardiovascular: " Regular rhythm, S1 and S2 normal, no S3 or S4. No murmurs, gallops, or rubs detected.     Pulmonary/Chest: Chest: normal symmetry, normal respiratory excursion, no intercostal retraction, no use of accessory muscles.            Pulmonary: Normal breath sounds. No rales or ronchi.    Abdominal: Abdomen soft, bowel sounds normoactive, no masses, no hepatosplenomegaly, non-tender, no bruits.     Musculoskeletal: No deformities, clubbing, cyanosis, erythema, or edema observed.    Neurological: No gross motor or sensory deficits noted, affect appropriate, oriented to time, person, place.     Skin: Warm and dry to the touch, no apparent skin lesions or masses noted.     Psychiatric: He has a normal mood and affect. His behavior is normal. Judgment and thought content normal.           ECG 12 Lead  Date/Time: 10/31/2018 8:31 AM  Performed by: ANTOINE NGUYEN  Authorized by: ANTOINE NGUYEN   Comparison: not compared with previous ECG   Rhythm: sinus rhythm  Rate: normal  QRS axis: normal  Comments: Sinus rhythm with heart rate of 58 bpm, small Q waves in the inferior leads, incomplete right bundle-branch block              Lab Results   Component Value Date    WBC 11.97 (H) 01/08/2018    HGB 16.0 01/08/2018    HCT 47.4 01/08/2018    MCV 87.3 01/08/2018     01/08/2018     Lab Results   Component Value Date    GLUCOSE 81 07/25/2018    BUN 17 07/25/2018    CREATININE 0.84 07/25/2018    EGFRIFNONA 91 07/25/2018    BCR 20.2 07/25/2018    CO2 20.0 (L) 07/25/2018    CALCIUM 9.6 07/25/2018    ALBUMIN 4.80 01/08/2018    AST 28 01/08/2018    ALT 49 01/08/2018     Lab Results   Component Value Date    CHOL 137 11/30/2017    CHOL 125 05/03/2017    CHOL 130 03/13/2017     Lab Results   Component Value Date    TRIG 161 11/30/2017    TRIG 162 05/03/2017    TRIG 280 (H) 03/13/2017     Lab Results   Component Value Date    HDL 32 (L) 11/30/2017    HDL 34 (L) 05/03/2017    HDL 39 (L) 03/13/2017     No components  found for: LDLCALC  Lab Results   Component Value Date    LDL 83 11/30/2017    LDL 64 05/03/2017    LDL 67 03/13/2017     No results found for: HDLLDLRATIO  No components found for: CHOLHDL  Lab Results   Component Value Date    HGBA1C 6.8 (H) 11/30/2017     Lab Results   Component Value Date    TSH 1.640 11/30/2017    T5DIDCT 9.88 11/30/2017           ASSESSMENT AND PLAN  Mr. Sanchez has multiple medical issues as discussed in the history of present illness, but is clinically stable with no evidence of angina, arrhythmia or congestive heart failure.  Based on the information I have, he will be low to moderate risk for perioperative cardiac events.  He could go off antiplatelet therapy with aspirin and Plavix for about 7 days prior to surgery.  Medications may be restarted postsurgery.  I have continued antianginal therapy with isosorbide mononitrate, antihypertensive therapy with lisinopril HCTZ and lipid-lowering therapy with pravastatin.  He will be reassessed in 6 months.    Aman was seen today for follow-up.    Diagnoses and all orders for this visit:    Essential hypertension    Chronic coronary artery disease    Obstructive sleep apnea syndrome    Dyspnea on exertion    Preoperative cardiovascular examination        Homero Hargrove MD  10/31/2018  8:13 AM

## 2019-04-11 ENCOUNTER — OFFICE VISIT (OUTPATIENT)
Dept: SLEEP MEDICINE | Facility: HOSPITAL | Age: 69
End: 2019-04-11

## 2019-04-11 VITALS
WEIGHT: 221 LBS | SYSTOLIC BLOOD PRESSURE: 180 MMHG | OXYGEN SATURATION: 99 % | HEART RATE: 73 BPM | BODY MASS INDEX: 32.73 KG/M2 | HEIGHT: 69 IN | DIASTOLIC BLOOD PRESSURE: 104 MMHG

## 2019-04-11 DIAGNOSIS — G47.33 OBSTRUCTIVE SLEEP APNEA, ADULT: Primary | ICD-10-CM

## 2019-04-11 PROCEDURE — 99204 OFFICE O/P NEW MOD 45 MIN: CPT | Performed by: INTERNAL MEDICINE

## 2019-04-11 PROCEDURE — 99406 BEHAV CHNG SMOKING 3-10 MIN: CPT | Performed by: INTERNAL MEDICINE

## 2019-04-11 NOTE — PROGRESS NOTES
New Patient Sleep Medicine Consultation    Encounter Date: 4/11/2019         Patient's PCP: Emile Wynn MD  Referring provider: No ref. provider found  Reason for consultation chief complaint: known ALLEN was seen in [facility] to establish care    Aman Sanchez is a 68 y.o. male who admits to High blod pressure and takes medicine to help go to sleep.   He denies cataplexy, sleep paralysis, or hypnagogic hallucinations. His bedtime is ~ 3466-5362. He  falls asleep after 3-4 minutes, and is up 0 times per night. He endorses 7-8 hours of sleep. He drinks 4 cups of coffee, 0 teas, and 1-2 sodas per day. He drinks 0 alcoholic beverages per week. He is a current smoker. He does not take sedatives or hypnotics. He has some sleepiness with driving. He naps when unstimulated.      PSG @ [facility] - 09/17/2014 - AHI of 28.1, CPAP of 14  PAP Data:    Time frame: 10/10/18-4/10/19   Compliance 98 %  Average use on days used: 10hrs 7 min  Percent of days with usage greater than or equal to 4 hours: 98.9%  PAP range : 16 cm H2O  Average 90% pressure: 16 cmH2O  Leak: 91 minutes  Average AHI 1.2 events/hr  Mask type: FFM  DME: [facility]      [place] - 7    Past Medical History:   Diagnosis Date   • Acute bronchitis    • Anemia    • Breast lump    • Cervical radiculopathy    • COPD (chronic obstructive pulmonary disease) (CMS/[facility])    • Coronary atherosclerosis    • Depressive disorder    • Disease of thyroid gland    • Dysuria    • Essential hypertension    • Flank pain    • Hematochezia    • Hematuria syndrome    • Hyperlipidemia    • Hypoglycemia    • Kidney stone    • Kidney stones    • Mass of neck    • MI (myocardial infarction)    • Neck pain     sprain   • Osteoarthritis    • PONV (postoperative nausea and vomiting)    • Sleep apnea     using c-pap   • Sleep apnea    • Type 2 diabetes mellitus (CMS/[facility])      Social History     Socioeconomic History   • Marital status:      Spouse name: Not on file  "  • Number of children: Not on file   • Years of education: Not on file   • Highest education level: Not on file   Tobacco Use   • Smoking status: Current Every Day Smoker     Packs/day: 0.50     Years: 50.00     Pack years: 25.00     Types: Cigarettes   • Smokeless tobacco: Never Used   • Tobacco comment: 1/2 PPD   Substance and Sexual Activity   • Alcohol use: Yes     Comment: Rare   • Drug use: No   • Sexual activity: Defer     Family History   Problem Relation Age of Onset   • Lung cancer Mother    • Rectal cancer Sister    1 brothers and 2 sisters  Other family history + for: cancer and high blood pressure  Smoking history: smoked 1 ppds from age 22 until today  FH of sleep disorders: none    Review of Systems:  Constitutional: negative  Eyes: negative  Ears, nose, mouth, throat, and face: negative  Respiratory: positive for increased work of breathing  Cardiovascular: negative  Gastrointestinal: negative  Genitourinary:negative  Integument/breast: negative  Hematologic/lymphatic: negative  Musculoskeletal:negative  Neurological: negative  Behavioral/Psych: negative  Endocrine: negative  Allergic/Immunologic: negative Patient advised to discuss any positive ROS with PCP.      Vitals:    04/11/19 1344   BP: (!) 180/104   Pulse: 73   SpO2: 99%           04/11/19  1344   Weight: 100 kg (221 lb)       Body mass index is 32.64 kg/m². Patient's Body mass index is 32.64 kg/m². BMI is above normal parameters. Recommendations include: referral to primary care.    Neck circumference: \"          General: Alert. Cooperative. Well developed. No acute distress.             Head:  Normocephalic. Symmetrical. Atraumatic.              Eyes: Sclera clear. No icterus. PERRLA. Normal EOM.             Ears: No deformities. Normal hearing.             Nose: No septal deviation. No drainage.          Throat: No oral lesions. No thrush. Moist mucous membranes. Trachea midline    Tongue is enlarged    Dentition is good       " Pharynx: Posterior pharyngeal pillars are normal    Mallampati score of IV (only hard palate visible)    Pharynx is normal with unrermarkable tonsils   Chest Wall:  Normal shape. Symmetric expansion with respiration. No tenderness.          Lungs:  Clear to auscultation bilaterally. No wheezes. No rhonchi. No rales. Respirations regular, even and unlabored.            Heart:  Regular rhythm and normal rate. Normal S1 and S2. No murmur.     Abdomen:  Soft, non-tender and non-distended. Normal bowel sounds. No masses.  Extremities:  Moves all extremities well. No edema.           Pulses: Pulses palpable and equal bilaterally.               Skin: Dry. Intact. No bleeding. No rash.           Neuro: Moves all 4 extremities and cranial nerves grossly intact.  Psychiatric: Normal mood and affect.      Current Outpatient Medications:   •  albuterol (PROVENTIL HFA;VENTOLIN HFA) 108 (90 Base) MCG/ACT inhaler, Inhale 2 puffs Every 4 (Four) Hours As Needed for Wheezing., Disp: 8 g, Rfl: 5  •  albuterol (PROVENTIL) (2.5 MG/3ML) 0.083% nebulizer solution, Take 2.5 mg by nebulization Every 4 (Four) Hours As Needed for Wheezing., Disp: 180 vial, Rfl: 1  •  aspirin 81 MG EC tablet, Take 81 mg by mouth Daily. Last dose 1/8/18, Disp: , Rfl:   •  ciprofloxacin-dexamethasone (CIPRODEX) 0.3-0.1 % otic suspension, Administer 4 drops to the right ear 3 (Three) Times a Day., Disp: 7.5 mL, Rfl: 0  •  clopidogrel (PLAVIX) 75 MG tablet, Take 1 tablet by mouth Daily., Disp: 90 tablet, Rfl: 1  •  finasteride (PROSCAR) 5 MG tablet, Take 1 tablet by mouth Every Night., Disp: 90 tablet, Rfl: 1  •  FLUoxetine (PROzac) 40 MG capsule, Take 1 capsule by mouth Daily., Disp: 90 capsule, Rfl: 1  •  fluticasone (FLONASE) 50 MCG/ACT nasal spray, 2 sprays into each nostril At Night As Needed for Rhinitis., Disp: 3 bottle, Rfl: 1  •  glucose blood test strip, One touch reli-on glucometer, Disp: 360 each, Rfl: 1  •  isosorbide mononitrate (IMDUR) 30 MG 24 hr  tablet, Take 1 tablet by mouth Daily., Disp: 90 tablet, Rfl: 1  •  levothyroxine (SYNTHROID) 50 MCG tablet, Take 1 tablet by mouth Daily., Disp: 90 tablet, Rfl: 1  •  lisinopril-hydrochlorothiazide (PRINZIDE,ZESTORETIC) 20-25 MG per tablet, Take 1 tablet by mouth Daily., Disp: 90 tablet, Rfl: 1  •  meloxicam (MOBIC) 15 MG tablet, Take 1 tablet by mouth Daily., Disp: 90 tablet, Rfl: 1  •  metFORMIN (GLUCOPHAGE) 1000 MG tablet, Take 1 tablet by mouth Daily With Breakfast., Disp: 90 tablet, Rfl: 1  •  metFORMIN (GLUCOPHAGE) 500 MG tablet, Take 1 tablet by mouth Daily With Breakfast. (Patient taking differently: Take 500 mg by mouth Daily Before Supper.), Disp: 90 tablet, Rfl: 1  •  mirtazapine (REMERON SOL-TAB) 15 MG disintegrating tablet, Take 1 tablet by mouth Every Night., Disp: 90 tablet, Rfl: 1  •  nitroglycerin (NITROSTAT) 0.4 MG SL tablet, Place 1 tablet under the tongue Every 5 (Five) Minutes As Needed for chest pain., Disp: 100 tablet, Rfl: 11  •  ONE TOUCH LANCETS [facility], Use as directed test 6 times daily, Disp: 200 each, Rfl: 1  •  oxyCODONE-acetaminophen (PERCOCET) 7.5-325 MG per tablet, Take 1 tablet by mouth Every 4 (Four) Hours As Needed., Disp: , Rfl:   •  pravastatin (PRAVACHOL) 80 MG tablet, Take 1 tablet by mouth Every Night., Disp: 90 tablet, Rfl: 1  •  tamsulosin (FLOMAX) 0.4 MG capsule 24 hr capsule, Take 2 capsules by mouth Daily. (Patient taking differently: Take 2 capsules by mouth Every Night.), Disp: 180 capsule, Rfl: 1  •  traMADol (ULTRAM) 50 MG tablet, Take 50 mg by mouth Every 6 (Six) Hours As Needed for moderate pain (4-6)., Disp: , Rfl:     WBC   Date Value Ref Range Status   01/08/2018 11.97 (H) 3.20 - 9.80 10*3/mm3 Final     RBC   Date Value Ref Range Status   01/08/2018 5.43 4.37 - 5.74 10*6/mm3 Final     Hemoglobin   Date Value Ref Range Status   01/08/2018 16.0 13.7 - 17.3 g/dL Final     Hematocrit   Date Value Ref Range Status   01/08/2018 47.4 39.0 - 49.0 % Final     MCV   Date  "Value Ref Range Status   01/08/2018 87.3 80.0 - 98.0 fL Final     MCH   Date Value Ref Range Status   01/08/2018 29.5 26.5 - 34.0 pg Final     MCHC   Date Value Ref Range Status   01/08/2018 33.8 31.5 - 36.3 g/dL Final     RDW   Date Value Ref Range Status   01/08/2018 13.8 11.5 - 14.5 % Final     RDW-SD   Date Value Ref Range Status   01/08/2018 43.7 35.1 - 43.9 fl Final     MPV   Date Value Ref Range Status   01/08/2018 9.7 8.0 - 12.0 fL Final     Platelets   Date Value Ref Range Status   01/08/2018 248 150 - 450 10*3/mm3 Final     Neutrophil %   Date Value Ref Range Status   01/08/2018 73.1 37.0 - 80.0 % Final     Lymphocyte %   Date Value Ref Range Status   01/08/2018 20.3 10.0 - 50.0 % Final     Monocyte %   Date Value Ref Range Status   01/08/2018 6.2 0.0 - 12.0 % Final     Eosinophil %   Date Value Ref Range Status   01/08/2018 0.0 0.0 - 7.0 % Final     Basophil %   Date Value Ref Range Status   01/08/2018 0.1 0.0 - 2.0 % Final     Immature Grans %   Date Value Ref Range Status   01/08/2018 0.3 0.0 - 0.5 % Final     Neutrophils, Absolute   Date Value Ref Range Status   01/08/2018 8.75 (H) 2.00 - 8.60 10*3/mm3 Final     Lymphocytes, Absolute   Date Value Ref Range Status   01/08/2018 2.43 0.60 - 4.20 10*3/mm3 Final     Monocytes, Absolute   Date Value Ref Range Status   01/08/2018 0.74 0.00 - 0.90 10*3/mm3 Final     Eosinophils, Absolute   Date Value Ref Range Status   01/08/2018 0.00 0.00 - 0.70 10*3/mm3 Final     Basophils, Absolute   Date Value Ref Range Status   01/08/2018 0.01 0.00 - 0.20 10*3/mm3 Final     Immature Grans, Absolute   Date Value Ref Range Status   01/08/2018 0.04 (H) 0.00 - 0.02 10*3/mm3 Final     nRBC   Date Value Ref Range Status   11/14/2016 0  Final   11/14/2016 0  Final     ASSESSMENT:  1. Obstructive sleep apnea Established, stable (1)  1. Script for new supplies  2. RTC in 6-12 months  2. RLS/PLMD - resolved  3. COPD with continued tobacco smoking  1. - given [employer] \"Thinking of " "quitting smoking\" flyer and referred patient to call [phone]. Smoking and tobacco use cessation counseling visit was 3 minutes  4. Depression  1. Continue mirtazapine and prozac          This document has been electronically signed by Diogo Garcia MD on April 11, 2019         CC: Emile Wynn MD          No ref. provider found  "

## 2019-05-01 ENCOUNTER — OFFICE VISIT (OUTPATIENT)
Dept: CARDIOLOGY | Facility: CLINIC | Age: 69
End: 2019-05-01

## 2019-05-01 VITALS
HEIGHT: 69 IN | HEART RATE: 83 BPM | WEIGHT: 221 LBS | BODY MASS INDEX: 32.73 KG/M2 | OXYGEN SATURATION: 94 % | DIASTOLIC BLOOD PRESSURE: 62 MMHG | SYSTOLIC BLOOD PRESSURE: 111 MMHG

## 2019-05-01 DIAGNOSIS — I10 ESSENTIAL HYPERTENSION: Primary | ICD-10-CM

## 2019-05-01 DIAGNOSIS — R06.09 DYSPNEA ON EXERTION: ICD-10-CM

## 2019-05-01 DIAGNOSIS — I25.10 CHRONIC CORONARY ARTERY DISEASE: ICD-10-CM

## 2019-05-01 PROCEDURE — 99214 OFFICE O/P EST MOD 30 MIN: CPT | Performed by: INTERNAL MEDICINE

## 2019-05-01 NOTE — PROGRESS NOTES
Aman Sanchez  68 y.o. male    05/01/2019  1. Essential hypertension    2. Chronic coronary artery disease    3. Dyspnea on exertion        History of Present Illness    Mr. Sanchez is a 68-year-old  male who is here for evaluation of his above-stated problems. His history is remarkable for coronary artery disease, obesity, hypertension, COPD, diabetes mellitus, hyperlipidemia.  He has had coronary artery disease and previous PCI to right coronary artery as described below:  On January 9, 2017 patient had cardiac catheterization and stent placement to the right coronary artery.  3.5 x 23 mm Xience Alpine stent was placed to the distal RCA (Dr. Zamorano) and 2007 patient had a 3.5 x 18 mm Cypher stent to the mid RCA by Dr. Mart.  He underwent right heart catheterization by Dr. Zamorano in August 2017  Pressure measurements:   Pulmonary capillary wedge pressure: mean 16  mmHg  Pulmonary artery pressure: 42/16mmHg, mean 26 mmHg  Right ventricular pressure: 41/6 mmHg, mean 13 mmHg  Right atrium: Mean 12 mmHg     The patient has done reasonably well and denied any chest pain.  He has NYHA class II dyspnea on exertion which is a chronic symptom and most likely secondary to COPD, obesity and sleep apnea and tobacco use.  He unfortunately continues to smoke and we had a discussion about smoking cessation.    He recently had upper eyelid surgery bilaterally for drooping eyelids.  His lipid profiles were in the normal range when checked last month.        SUBJECTIVE    Allergies   Allergen Reactions   • Codeine Other (See Comments)     unknown         Past Medical History:   Diagnosis Date   • Acute bronchitis    • Anemia    • Breast lump    • Cervical radiculopathy    • COPD (chronic obstructive pulmonary disease) (CMS/HCC)    • Coronary atherosclerosis    • Depressive disorder    • Disease of thyroid gland    • Dysuria    • Essential hypertension    • Flank pain    • Hematochezia    • Hematuria syndrome    •  Hyperlipidemia    • Hypoglycemia    • Kidney stone    • Kidney stones    • Mass of neck    • MI (myocardial infarction)    • Neck pain     sprain   • Osteoarthritis    • PONV (postoperative nausea and vomiting)    • Sleep apnea     using c-pap   • Sleep apnea    • Type 2 diabetes mellitus (CMS/[facility])          Past Surgical History:   Procedure Laterality Date   • BACK SURGERY     • CARDIAC CATHETERIZATION N/A 8/23/2017    Procedure: Right Heart Cath;  Surgeon: Mariposa Zamorano MD;  Location: [facility] CATH INVASIVE LOCATION;  Service:    • CHOLECYSTECTOMY     • CYSTOSCOPY  08/29/2014    Left ESWL, cysto and stent removal   • CYSTOSCOPY W/ LITHOLAPAXY / EHL  08/15/2014    Left ESWL   • CYSTOSCOPY, URETEROSCOPY, RETROGRADE PYELOGRAM, STENT INSERTION Left 6/30/2017    Procedure: CYSTOSCOPY, LEFT URETEROSCOPY, RETROGRADE PYELOGRAM, HOLMIUM LASER, STENT INSERTION;  Surgeon: Uday Horne MD;  Location: [facility];  Service:    • CYSTOSCOPY, URETEROSCOPY, RETROGRADE PYELOGRAM, STENT INSERTION Left 9/6/2017    Procedure: CYSTOSCOPY, LEFT URETEROSCOPY, RETROGRADE PYELOGRAM, HOLMIUM LASER, STENT INSERTION;  Surgeon: Uday Horne MD;  Location: [facility];  Service:    • CYSTOSCOPY, URETEROSCOPY, RETROGRADE PYELOGRAM, STENT INSERTION Left 1/17/2018    Procedure: CYSTOSCOPY LEFT URETEROSCOPY RETROGRADE PYELOGRAM HOLMIUM LASER STENT INSERTION;  Surgeon: Uday Horne MD;  Location: [facility];  Service:    • CYSTOSCOPY, URETEROSCOPY, RETROGRADE PYELOGRAM, STENT INSERTION Left 7/25/2018    Procedure: CYSTOSCOPY URETEROSCOPY RETROGRADE PYELOGRAM HOLMIUM LASER STENT INSERTION;  Surgeon: Uday Horne MD;  Location: [facility];  Service: Urology   • HIP SURGERY     • INJECTION OF MEDICATION  11/11/2013    Kenalog   • INJECTION OF MEDICATION  01/16/2014    Toradol   • JOINT REPLACEMENT Right 2012    total hip replacement   • SKIN GRAFT      on his foot; removed skin from hip   • TONSILLECTOMY     • TRANSESOPHAGEAL ECHOCARDIOGRAM  (ОЛЬГА)  07/21/2014    with color flow-Normal LV systolic function with Ef of 60-65%/ Grad1 diastolic dysfunction of the left ventricular myocardium. No evidence of pericardial effusion         Family History   Problem Relation Age of Onset   • Lung cancer Mother    • Rectal cancer Sister          Social History     Socioeconomic History   • Marital status:      Spouse name: Not on file   • Number of children: Not on file   • Years of education: Not on file   • Highest education level: Not on file   Tobacco Use   • Smoking status: Current Every Day Smoker     Packs/day: 0.50     Years: 50.00     Pack years: 25.00     Types: Cigarettes   • Smokeless tobacco: Never Used   • Tobacco comment: 1/2 PPD   Substance and Sexual Activity   • Alcohol use: Yes     Comment: Rare   • Drug use: No   • Sexual activity: Defer         Current Outpatient Medications   Medication Sig Dispense Refill   • albuterol (PROVENTIL HFA;VENTOLIN HFA) 108 (90 Base) MCG/ACT inhaler Inhale 2 puffs Every 4 (Four) Hours As Needed for Wheezing. 8 g 5   • albuterol (PROVENTIL) (2.5 MG/3ML) 0.083% nebulizer solution Take 2.5 mg by nebulization Every 4 (Four) Hours As Needed for Wheezing. 180 vial 1   • aspirin 81 MG EC tablet Take 81 mg by mouth Daily. Last dose 1/8/18     • ciprofloxacin-dexamethasone (CIPRODEX) 0.3-0.1 % otic suspension Administer 4 drops to the right ear 3 (Three) Times a Day. 7.5 mL 0   • clopidogrel (PLAVIX) 75 MG tablet Take 1 tablet by mouth Daily. 90 tablet 1   • finasteride (PROSCAR) 5 MG tablet Take 1 tablet by mouth Every Night. 90 tablet 1   • FLUoxetine (PROzac) 40 MG capsule Take 1 capsule by mouth Daily. 90 capsule 1   • fluticasone (FLONASE) 50 MCG/ACT nasal spray 2 sprays into each nostril At Night As Needed for Rhinitis. 3 bottle 1   • glucose blood test strip One touch reli-on glucometer 360 each 1   • isosorbide mononitrate (IMDUR) 30 MG 24 hr tablet Take 1 tablet by mouth Daily. 90 tablet 1   • levothyroxine  "(SYNTHROID) 50 MCG tablet Take 1 tablet by mouth Daily. 90 tablet 1   • lisinopril-hydrochlorothiazide (PRINZIDE,ZESTORETIC) 20-25 MG per tablet Take 1 tablet by mouth Daily. 90 tablet 1   • metFORMIN (GLUCOPHAGE) 1000 MG tablet Take 1 tablet by mouth Daily With Breakfast. 90 tablet 1   • metFORMIN (GLUCOPHAGE) 500 MG tablet Take 1 tablet by mouth Daily With Breakfast. (Patient taking differently: Take 500 mg by mouth Daily Before Supper.) 90 tablet 1   • mirtazapine (REMERON SOL-TAB) 15 MG disintegrating tablet Take 1 tablet by mouth Every Night. 90 tablet 1   • nitroglycerin (NITROSTAT) 0.4 MG SL tablet Place 1 tablet under the tongue Every 5 (Five) Minutes As Needed for chest pain. 100 tablet 11   • ONE TOUCH LANCETS misc Use as directed test 6 times daily 200 each 1   • oxyCODONE-acetaminophen (PERCOCET) 7.5-325 MG per tablet Take 1 tablet by mouth Every 4 (Four) Hours As Needed.     • pravastatin (PRAVACHOL) 80 MG tablet Take 1 tablet by mouth Every Night. 90 tablet 1   • tamsulosin (FLOMAX) 0.4 MG capsule 24 hr capsule Take 2 capsules by mouth Daily. (Patient taking differently: Take 2 capsules by mouth Every Night.) 180 capsule 1   • traMADol (ULTRAM) 50 MG tablet Take 50 mg by mouth Every 6 (Six) Hours As Needed for moderate pain (4-6).     • meloxicam (MOBIC) 15 MG tablet Take 1 tablet by mouth Daily. 90 tablet 1     No current facility-administered medications for this visit.          OBJECTIVE    /62   Pulse 83   Ht 175.3 cm (69.02\")   Wt 100 kg (221 lb)   SpO2 94%   BMI 32.62 kg/m²         Review of Systems     Constitutional:  Denies recent weight loss, weight gain, fever or chills     HENT:  Denies any hearing loss, epistaxis, hoarseness, or difficulty speaking.     Eyes: Wears eyeglasses or contact lenses     Respiratory:  Dyspnea with exertion,no cough, wheezing, or hemoptysis. History of sleep apnea/ COPD    Cardiovascular: Negative for palpations, chest pain, orthopnea, PND, peripheral " edema, syncope, or claudication.     Gastrointestinal:  Denies change in bowel habits, dyspepsia, ulcer disease, hematochezia, or melena.     Endocrine: Negative for cold intolerance, heat intolerance, polydipsia, polyphagia and polyuria.     Genitourinary: Negative.      Musculoskeletal: Denies any history of arthritic symptoms or back problems     Skin:  Denies any change in hair or nails, rashes, or skin lesions.     Allergic/Immunologic: Negative.  Negative for environmental allergies, food allergies and immunocompromised state.     Neurological:  Denies any history of recurrent headaches, strokes, TIA, or seizure disorder.     Hematological: Denies any food allergies, seasonal allergies, bleeding disorders, or lymphadenopathy.     Psychiatric/Behavioral: Denies any history of depression, substance abuse, or change in cognitive function.         Physical Exam     Constitutional: Cooperative, alert and oriented, in no acute distress. obese    HENT:   Head: Normocephalic, normal hair patterns, no masses or tenderness.  Ears, Nose, and Throat: No gross abnormalities. No pallor or cyanosis.   Eyes: EOMS intact, PERRL, conjunctivae and lids unremarkable. Fundoscopic exam and visual fields not performed.   Neck: No palpable masses or adenopathy, no thyromegaly, no JVD, carotid pulses are full and equal bilaterally and without  Bruits.     Cardiovascular: Regular rhythm, S1 and S2 normal, no S3 or S4. No murmurs, gallops, or rubs detected.     Pulmonary/Chest: Chest: normal symmetry,  normal respiratory excursion, no intercostal retraction, no use of accessory muscles.            Pulmonary: Normal breath sounds. No rales or ronchi.    Abdominal: Abdomen soft, bowel sounds normoactive, no masses, no hepatosplenomegaly, non-tender, no bruits.     Musculoskeletal: No deformities, clubbing, cyanosis, erythema, or edema observed.     Neurological: No gross motor or sensory deficits noted, affect appropriate, oriented to  time, person, place.     Skin: Warm and dry to the touch, no apparent skin lesions or masses noted.     Psychiatric: He has a normal mood and affect. His behavior is normal. Judgment and thought content normal.         Procedures      Lab Results   Component Value Date    WBC 11.97 (H) 01/08/2018    HGB 16.0 01/08/2018    HCT 47.4 01/08/2018    MCV 87.3 01/08/2018     01/08/2018     Lab Results   Component Value Date    GLUCOSE 81 07/25/2018    BUN 17 07/25/2018    CREATININE 0.84 07/25/2018    EGFRIFNONA 91 07/25/2018    BCR 20.2 07/25/2018    CO2 20.0 (L) 07/25/2018    CALCIUM 9.6 07/25/2018    ALBUMIN 4.80 01/08/2018    AST 28 01/08/2018    ALT 49 01/08/2018     Lab Results   Component Value Date    CHOL 119 04/19/2019    CHOL 129 06/23/2018    CHOL 168 04/23/2018     Lab Results   Component Value Date    TRIG 160 (H) 04/19/2019    TRIG 161 06/23/2018    TRIG 153 04/23/2018     Lab Results   Component Value Date    HDL 30 (L) 04/19/2019    HDL 30 (L) 06/23/2018    HDL 33 04/23/2018     No components found for: LDLCALC  Lab Results   Component Value Date    LDL 67 04/19/2019    LDL 79 06/23/2018     (H) 04/23/2018     No results found for: HDLLDLRATIO  No components found for: CHOLHDL  Lab Results   Component Value Date    HGBA1C 6.4 (H) 04/19/2019     Lab Results   Component Value Date    TSH 1.86 06/23/2018    J2BWLRW 9.88 11/30/2017           ASSESSMENT AND PLAN  Mr. Sanchez is stable at this time with no clinical evidence of angina, arrhythmia or congestive heart failure.  Smoking cessation was stressed.  Antiplatelet therapy with aspirin and Plavix, antianginal therapy with isosorbide mononitrate, antihypertensive therapy with lisinopril HCTZ, lipid-lowering therapy with pravastatin have been continued.    Aman was seen today for follow-up.    Diagnoses and all orders for this visit:    Essential hypertension    Chronic coronary artery disease    Dyspnea on exertion        Patient's Body mass  index is 32.62 kg/m². BMI is above normal parameters. Recommendations include: exercise counseling and nutrition counseling.      I advised Aman of the risks of continuing to use tobacco, and I provided him with tobacco cessation educational materials in the After Visit Summary.     During this visit, I spent 4 minutes counseling the patient regarding tobacco cessation.      Homero Hargrove MD  5/1/2019  11:09 AM

## 2019-09-09 ENCOUNTER — OFFICE VISIT (OUTPATIENT)
Dept: SLEEP MEDICINE | Facility: HOSPITAL | Age: 69
End: 2019-09-09

## 2019-09-09 VITALS
SYSTOLIC BLOOD PRESSURE: 161 MMHG | DIASTOLIC BLOOD PRESSURE: 89 MMHG | OXYGEN SATURATION: 95 % | HEART RATE: 53 BPM | WEIGHT: 221 LBS | HEIGHT: 69 IN | BODY MASS INDEX: 32.73 KG/M2

## 2019-09-09 DIAGNOSIS — G47.33 OBSTRUCTIVE SLEEP APNEA, ADULT: Primary | ICD-10-CM

## 2019-09-09 PROCEDURE — 99213 OFFICE O/P EST LOW 20 MIN: CPT | Performed by: NURSE PRACTITIONER

## 2019-09-09 NOTE — PROGRESS NOTES
Sleep Clinic Follow Up    Date: 2019  Primary Care Physician: Emile Wynn MD    Last office visit: 2019 (I reviewed this note)    CC: Follow up: ALLEN on CPAP      Interim History:  Since the last visit:    1) moderate ALLEN -  Aman Sanchez has remained compliant with CPAP. He denies mask and machine issues, dry mouth, headaches, or pressures intolerance. He denies abnormal dreams, sleep paralysis, nasal congestion, URI sx.    Sleep Testin. PSG on 2014, AHI of 28.1   2. CPAP recommended 14 cm H2O   3. Currently on 14 cm H2O    PAP Data:    Time frame: 10/13/2014-2019   Compliance 99.7 %  Average use on days used: 9 hrs 55 min  Percent of days with usage greater than or equal to 4 hours: 98.8%  PAP range : 14 cm H2O  Average 90% pressure: 14 cmH2O  Leak: 35 minutes  Average AHI 1.3 events/hr  Mask type: Full face mask  [facility]    Bed time: 9811-0082  Sleep latency: 3-4 minutes  Number of times awakens during the night: 0  Wake time: 7-8  Estimated total sleep time at night: 4 hours  Caffeine intake: 0 cups of coffee, 1-2 cups of tea, and 0 sodas per day  Alcohol intake: 0 drinks per week  Nap time: rarely in the evening   Sleepiness with Driving: none     [place] - 0    2) Patient denies RLS symptoms.     PMHx, FH, SH reviewed and pertinent changes are: Cataract surgery - both eyes.      REVIEW OF SYSTEMS:   Negative for chest pain, SOA, fever, chills, cough, N/V/D, abdominal pain.    Smoking:< 1ppd    Aman Sanchez is a current cigarettes user.  He currently smokes 1 pack of cigarettes per day for a duration of 58 years. I have educated him on the risk of diseases from using tobacco products such as cancer, COPD and heart diease.     I advised him to quit and he is not willing to quit.    I spent 3  minutes counseling the patient.          Exam:  Vitals:    19   BP: 161/89   Pulse: 53   SpO2: 95%           19   Weight: 100 kg (221 lb)     Body  mass index is 32.62 kg/m². Patient's Body mass index is 32.62 kg/m². BMI is above normal parameters. Recommendations include: referral to primary care.      Gen:                No distress, conversant, pleasant, appears stated age, alert, oriented  Eyes:               Anicteric sclera, moist conjunctiva, no lid lag                           PERRL, EOMI   Heent:             NC/AT                          Oropharynx clear                          Normal hearing  Lungs:             Normal effort, non-labored breathing                          Clear to auscultation bilaterally          CV:                  Normal S1/S2, no murmur                          No lower extremity edema  ABD:               Soft, rounded, non-distended                          Normal bowel sounds                    Psych:             Appropriate affect  Neuro:             CN 2-12 appear intact    Past Medical History:   Diagnosis Date   • Acute bronchitis    • Anemia    • Breast lump    • Cervical radiculopathy    • COPD (chronic obstructive pulmonary disease) (CMS/[facility])    • Coronary atherosclerosis    • Depressive disorder    • Disease of thyroid gland    • Dysuria    • Essential hypertension    • Flank pain    • Hematochezia    • Hematuria syndrome    • Hyperlipidemia    • Hypoglycemia    • Kidney stone    • Kidney stones    • Mass of neck    • MI (myocardial infarction) (CMS/[facility])    • Neck pain     sprain   • Osteoarthritis    • PONV (postoperative nausea and vomiting)    • Sleep apnea     using c-pap   • Sleep apnea    • Type 2 diabetes mellitus (CMS/[facility])        Current Outpatient Medications:   •  albuterol (PROVENTIL HFA;VENTOLIN HFA) 108 (90 Base) MCG/ACT inhaler, Inhale 2 puffs Every 4 (Four) Hours As Needed for Wheezing., Disp: 8 g, Rfl: 5  •  albuterol (PROVENTIL) (2.5 MG/3ML) 0.083% nebulizer solution, Take 2.5 mg by nebulization Every 4 (Four) Hours As Needed for Wheezing., Disp: 180 vial, Rfl: 1  •  aspirin 81 MG EC tablet, Take 81  mg by mouth Daily. Last dose 1/8/18, Disp: , Rfl:   •  ciprofloxacin-dexamethasone (CIPRODEX) 0.3-0.1 % otic suspension, Administer 4 drops to the right ear 3 (Three) Times a Day., Disp: 7.5 mL, Rfl: 0  •  clopidogrel (PLAVIX) 75 MG tablet, Take 1 tablet by mouth Daily., Disp: 90 tablet, Rfl: 1  •  finasteride (PROSCAR) 5 MG tablet, Take 1 tablet by mouth Every Night., Disp: 90 tablet, Rfl: 1  •  FLUoxetine (PROzac) 40 MG capsule, Take 1 capsule by mouth Daily., Disp: 90 capsule, Rfl: 1  •  fluticasone (FLONASE) 50 MCG/ACT nasal spray, 2 sprays into each nostril At Night As Needed for Rhinitis., Disp: 3 bottle, Rfl: 1  •  glucose blood test strip, One touch reli-on glucometer, Disp: 360 each, Rfl: 1  •  isosorbide mononitrate (IMDUR) 30 MG 24 hr tablet, Take 1 tablet by mouth Daily., Disp: 90 tablet, Rfl: 1  •  levothyroxine (SYNTHROID) 50 MCG tablet, Take 1 tablet by mouth Daily., Disp: 90 tablet, Rfl: 1  •  lisinopril-hydrochlorothiazide (PRINZIDE,ZESTORETIC) 20-25 MG per tablet, Take 1 tablet by mouth Daily., Disp: 90 tablet, Rfl: 1  •  meloxicam (MOBIC) 15 MG tablet, Take 1 tablet by mouth Daily., Disp: 90 tablet, Rfl: 1  •  metFORMIN (GLUCOPHAGE) 1000 MG tablet, Take 1 tablet by mouth Daily With Breakfast., Disp: 90 tablet, Rfl: 1  •  metFORMIN (GLUCOPHAGE) 500 MG tablet, Take 1 tablet by mouth Daily With Breakfast. (Patient taking differently: Take 500 mg by mouth Daily Before Supper.), Disp: 90 tablet, Rfl: 1  •  mirtazapine (REMERON SOL-TAB) 15 MG disintegrating tablet, Take 1 tablet by mouth Every Night., Disp: 90 tablet, Rfl: 1  •  nitroglycerin (NITROSTAT) 0.4 MG SL tablet, Place 1 tablet under the tongue Every 5 (Five) Minutes As Needed for chest pain., Disp: 100 tablet, Rfl: 11  •  ONE TOUCH LANCETS misc, Use as directed test 6 times daily, Disp: 200 each, Rfl: 1  •  oxyCODONE-acetaminophen (PERCOCET) 7.5-325 MG per tablet, Take 1 tablet by mouth Every 4 (Four) Hours As Needed., Disp: , Rfl:   •   "pravastatin (PRAVACHOL) 80 MG tablet, Take 1 tablet by mouth Every Night., Disp: 90 tablet, Rfl: 1  •  tamsulosin (FLOMAX) 0.4 MG capsule 24 hr capsule, Take 2 capsules by mouth Daily. (Patient taking differently: Take 2 capsules by mouth Every Night.), Disp: 180 capsule, Rfl: 1  •  traMADol (ULTRAM) 50 MG tablet, Take 50 mg by mouth Every 6 (Six) Hours As Needed for moderate pain (4-6)., Disp: , Rfl:       Assessment and Plan:    1. Obstructive sleep apnea Established, stable (1)  1. Compliant with PAP therapy  2. Continue PAP as prescribed  3. Script for PAP supplies  4. Return to clinic in 1 year with compliance report unless change in symptoms in interim period  2. COPD  3. HTN  4. Type 2 DM  5. Depression  6. Nicotine dependence with complication Established, not controlled (2)  1. Given [employer] \"Thinking of quitting smoking\" flyer and referred patient to call [phone] FocusQUIT-NOW. Smoking and tobacco use cessation counseling visit was 3 minutes.       Total time 15 min, more than half spent in face to face counseling and coordination of care.    RTC in 12 months. Patient agrees to return sooner if changes in symptoms.        This document has been electronically signed by BRIAN Robles on September 9, 2019 9:27 AM          CC: Emile Wynn MD          No ref. provider found  "

## 2019-10-16 ENCOUNTER — OFFICE VISIT (OUTPATIENT)
Dept: CARDIOLOGY | Facility: CLINIC | Age: 69
End: 2019-10-16

## 2019-10-16 VITALS
HEIGHT: 69 IN | HEART RATE: 90 BPM | OXYGEN SATURATION: 97 % | WEIGHT: 221 LBS | SYSTOLIC BLOOD PRESSURE: 100 MMHG | BODY MASS INDEX: 32.73 KG/M2 | DIASTOLIC BLOOD PRESSURE: 60 MMHG

## 2019-10-16 DIAGNOSIS — I25.10 CHRONIC CORONARY ARTERY DISEASE: Primary | ICD-10-CM

## 2019-10-16 DIAGNOSIS — F17.200 TOBACCO USE DISORDER: ICD-10-CM

## 2019-10-16 DIAGNOSIS — I10 ESSENTIAL HYPERTENSION: ICD-10-CM

## 2019-10-16 DIAGNOSIS — E78.2 MIXED HYPERLIPIDEMIA: ICD-10-CM

## 2019-10-16 PROCEDURE — 99214 OFFICE O/P EST MOD 30 MIN: CPT | Performed by: INTERNAL MEDICINE

## 2019-10-16 NOTE — PROGRESS NOTES
Aman Sanchez  69 y.o. male    10/16/2019  1. Chronic coronary artery disease    2. Essential hypertension    3. Tobacco use disorder    4. Mixed hyperlipidemia        History of Present Illness  Mr. Sanchez is a 68-year-old  male who is here for evaluation of his above-stated problems. His history is remarkable for coronary artery disease, obesity, hypertension, COPD, diabetes mellitus, hyperlipidemia.  He has had coronary artery disease and previous PCI to right coronary artery as described below:  On January 9, 2017 patient had cardiac catheterization and stent placement to the right coronary artery.  3.5 x 23 mm Xience Alpine stent was placed to the distal RCA (Dr. Zamorano) and 2007 patient had a 3.5 x 18 mm Cypher stent to the mid RCA by Dr. Mart.  He underwent right heart catheterization by Dr. Zamorano in August 2017  Pressure measurements:   Pulmonary capillary wedge pressure: mean 16  mmHg  Pulmonary artery pressure: 42/16mmHg, mean 26 mmHg  Right ventricular pressure: 41/6 mmHg, mean 13 mmHg  Right atrium: Mean 12 mmHg     The patient has done reasonably well and denied any chest pain.  He has NYHA class II dyspnea on exertion which is a chronic symptom and most likely secondary to COPD, obesity and sleep apnea and tobacco use.  He unfortunately continues to smoke and we had a discussion about smoking cessation.     SUBJECTIVE    Allergies   Allergen Reactions   • Codeine Other (See Comments)     unknown         Past Medical History:   Diagnosis Date   • Acute bronchitis    • Anemia    • Breast lump    • Cervical radiculopathy    • COPD (chronic obstructive pulmonary disease) (CMS/[facility])    • Coronary atherosclerosis    • Depressive disorder    • Disease of thyroid gland    • Dysuria    • Essential hypertension    • Flank pain    • Hematochezia    • Hematuria syndrome    • Hyperlipidemia    • Hypoglycemia    • Kidney stone    • Kidney stones    • Mass of neck    • MI (myocardial infarction)  (CMS/[facility])    • Neck pain     sprain   • Osteoarthritis    • PONV (postoperative nausea and vomiting)    • Sleep apnea     using c-pap   • Sleep apnea    • Type 2 diabetes mellitus (CMS/HCC)          Past Surgical History:   Procedure Laterality Date   • BACK SURGERY     • CARDIAC CATHETERIZATION N/A 8/23/2017    Procedure: Right Heart Cath;  Surgeon: Mariposa Zamorano MD;  Location: [facility] CATH INVASIVE LOCATION;  Service:    • CHOLECYSTECTOMY     • CYSTOSCOPY  08/29/2014    Left ESWL, cysto and stent removal   • CYSTOSCOPY W/ LITHOLAPAXY / EHL  08/15/2014    Left ESWL   • CYSTOSCOPY, URETEROSCOPY, RETROGRADE PYELOGRAM, STENT INSERTION Left 6/30/2017    Procedure: CYSTOSCOPY, LEFT URETEROSCOPY, RETROGRADE PYELOGRAM, HOLMIUM LASER, STENT INSERTION;  Surgeon: Uday Horne MD;  Location: [facility];  Service:    • CYSTOSCOPY, URETEROSCOPY, RETROGRADE PYELOGRAM, STENT INSERTION Left 9/6/2017    Procedure: CYSTOSCOPY, LEFT URETEROSCOPY, RETROGRADE PYELOGRAM, HOLMIUM LASER, STENT INSERTION;  Surgeon: Uday Horne MD;  Location: [facility];  Service:    • CYSTOSCOPY, URETEROSCOPY, RETROGRADE PYELOGRAM, STENT INSERTION Left 1/17/2018    Procedure: CYSTOSCOPY LEFT URETEROSCOPY RETROGRADE PYELOGRAM HOLMIUM LASER STENT INSERTION;  Surgeon: Uday Horne MD;  Location: [facility] OR;  Service:    • CYSTOSCOPY, URETEROSCOPY, RETROGRADE PYELOGRAM, STENT INSERTION Left 7/25/2018    Procedure: CYSTOSCOPY URETEROSCOPY RETROGRADE PYELOGRAM HOLMIUM LASER STENT INSERTION;  Surgeon: Uday Horne MD;  Location: [facility] OR;  Service: Urology   • HIP SURGERY     • INJECTION OF MEDICATION  11/11/2013    Kenalog   • INJECTION OF MEDICATION  01/16/2014    Toradol   • JOINT REPLACEMENT Right 2012    total hip replacement   • SKIN GRAFT      on his foot; removed skin from hip   • TONSILLECTOMY     • TRANSESOPHAGEAL ECHOCARDIOGRAM (ОЛЬГА)  07/21/2014    with color flow-Normal LV systolic function with Ef of 60-65%/ Grad1 diastolic dysfunction  of the left ventricular myocardium. No evidence of pericardial effusion         Family History   Problem Relation Age of Onset   • Lung cancer Mother    • Rectal cancer Sister          Social History     Socioeconomic History   • Marital status:      Spouse name: Not on file   • Number of children: Not on file   • Years of education: Not on file   • Highest education level: Not on file   Tobacco Use   • Smoking status: Current Every Day Smoker     Packs/day: 0.50     Years: 50.00     Pack years: 25.00     Types: Cigarettes   • Smokeless tobacco: Never Used   • Tobacco comment: 1/2 PPD   Substance and Sexual Activity   • Alcohol use: Yes     Comment: Rare   • Drug use: No   • Sexual activity: Defer         Current Outpatient Medications   Medication Sig Dispense Refill   • albuterol (PROVENTIL HFA;VENTOLIN HFA) 108 (90 Base) MCG/ACT inhaler Inhale 2 puffs Every 4 (Four) Hours As Needed for Wheezing. 8 g 5   • albuterol (PROVENTIL) (2.5 MG/3ML) 0.083% nebulizer solution Take 2.5 mg by nebulization Every 4 (Four) Hours As Needed for Wheezing. 180 vial 1   • aspirin 81 MG EC tablet Take 81 mg by mouth Daily. Last dose 1/8/18     • ciprofloxacin-dexamethasone (CIPRODEX) 0.3-0.1 % otic suspension Administer 4 drops to the right ear 3 (Three) Times a Day. 7.5 mL 0   • clopidogrel (PLAVIX) 75 MG tablet Take 1 tablet by mouth Daily. 90 tablet 1   • finasteride (PROSCAR) 5 MG tablet Take 1 tablet by mouth Every Night. 90 tablet 1   • FLUoxetine (PROzac) 40 MG capsule Take 1 capsule by mouth Daily. 90 capsule 1   • fluticasone (FLONASE) 50 MCG/ACT nasal spray 2 sprays into each nostril At Night As Needed for Rhinitis. 3 bottle 1   • glucose blood test strip One touch reli-on glucometer 360 each 1   • isosorbide mononitrate (IMDUR) 30 MG 24 hr tablet Take 1 tablet by mouth Daily. 90 tablet 1   • levothyroxine (SYNTHROID) 50 MCG tablet Take 1 tablet by mouth Daily. 90 tablet 1   • lisinopril-hydrochlorothiazide  "(PRINZIDE,ZESTORETIC) 20-25 MG per tablet Take 1 tablet by mouth Daily. 90 tablet 1   • meloxicam (MOBIC) 15 MG tablet Take 1 tablet by mouth Daily. 90 tablet 1   • metFORMIN (GLUCOPHAGE) 1000 MG tablet Take 1 tablet by mouth Daily With Breakfast. 90 tablet 1   • metFORMIN (GLUCOPHAGE) 500 MG tablet Take 1 tablet by mouth Daily With Breakfast. (Patient taking differently: Take 500 mg by mouth Daily Before Supper.) 90 tablet 1   • mirtazapine (REMERON SOL-TAB) 15 MG disintegrating tablet Take 1 tablet by mouth Every Night. 90 tablet 1   • nitroglycerin (NITROSTAT) 0.4 MG SL tablet Place 1 tablet under the tongue Every 5 (Five) Minutes As Needed for chest pain. 100 tablet 11   • ONE TOUCH LANCETS misc Use as directed test 6 times daily 200 each 1   • oxyCODONE-acetaminophen (PERCOCET) 7.5-325 MG per tablet Take 1 tablet by mouth Every 4 (Four) Hours As Needed.     • pravastatin (PRAVACHOL) 80 MG tablet Take 1 tablet by mouth Every Night. 90 tablet 1   • tamsulosin (FLOMAX) 0.4 MG capsule 24 hr capsule Take 2 capsules by mouth Daily. (Patient taking differently: Take 2 capsules by mouth Every Night.) 180 capsule 1   • traMADol (ULTRAM) 50 MG tablet Take 50 mg by mouth Every 6 (Six) Hours As Needed for moderate pain (4-6).       No current facility-administered medications for this visit.          OBJECTIVE    /60   Pulse 90   Ht 175.3 cm (69.02\")   Wt 100 kg (221 lb)   SpO2 97%   BMI 32.62 kg/m²         Review of Systems     Constitutional:  Denies recent weight loss, weight gain, fever or chills, no change in exercise tolerance     HENT:  Denies any hearing loss, epistaxis, hoarseness, or difficulty speaking.     Eyes: Wears eyeglasses or contact lenses     Respiratory:  Dyspnea with exertion,no cough, wheezing, or hemoptysis.     Cardiovascular: Negative for palpations, chest pain, orthopnea, PND    Gastrointestinal:  Denies change in bowel habits, dyspepsia, ulcer disease, hematochezia, or melena. "     Endocrine: Negative for cold intolerance, heat intolerance, polydipsia, polyphagia and polyuria.     Genitourinary: Negative.      Musculoskeletal: DJD    Skin:  Denies any change in hair or nails, rashes, or skin lesions.     Allergic/Immunologic: Negative.  Negative for environmental allergies, food allergies and immunocompromised state.     Neurological:  Denies any history of recurrent headaches, strokes, TIA, or seizure disorder.     Hematological: Denies any food allergies, seasonal allergies, bleeding disorders, or lymphadenopathy.     Psychiatric/Behavioral: history of depression, no substance abuse, or change in cognitive function.         Physical Exam     Constitutional: Cooperative, alert and oriented, in no acute distress.     HENT:   Head: Normocephalic, normal hair patterns, no masses or tenderness.  Ears, Nose, and Throat: No gross abnormalities. No pallor or cyanosis.   Eyes: EOMS intact, PERRL, conjunctivae and lids unremarkable. Fundoscopic exam and visual fields not performed.   Neck: No palpable masses or adenopathy, no thyromegaly, no JVD, carotid pulses are full and equal bilaterally and without  Bruits.     Cardiovascular: Regular rhythm, S1 and S2 normal, no S3 or S4. Apical impulse not displaced. No murmurs, gallops, or rubs detected.     Pulmonary/Chest: Chest: normal symmetry, normal respiratory excursion, no intercostal retraction, no use of accessory muscles.            Pulmonary: Normal breath sounds. No rales or ronchi.    Abdominal: Abdomen soft, bowel sounds normoactive, no masses, no hepatosplenomegaly, non-tender, no bruits.     Musculoskeletal: No deformities, clubbing, cyanosis, erythema, or edema observed.     Neurological: No gross motor or sensory deficits noted, affect appropriate, oriented to time, person, place.     Skin: Warm and dry to the touch, no apparent skin lesions or masses noted.     Psychiatric: He has a normal mood and affect. His behavior is normal.  Judgment and thought content normal.         Procedures      Lab Results   Component Value Date    WBC 9.2 04/19/2019    HGB 16.1 04/19/2019    HCT 49 04/19/2019    MCV 90 04/19/2019     04/19/2019     Lab Results   Component Value Date    GLUCOSE 81 07/25/2018    BUN 17 07/25/2018    CREATININE 0.84 07/25/2018    EGFRIFNONA 91 07/25/2018    EGFRIFAFRI 67 01/23/2018    BCR 20.2 07/25/2018    CO2 20.0 (L) 07/25/2018    CALCIUM 9.6 07/25/2018    ALBUMIN 3.7 06/23/2018    AST 17 06/23/2018    ALT 29 (L) 06/23/2018     Lab Results   Component Value Date    CHOL 119 04/19/2019    CHOL 129 06/23/2018    CHOL 168 04/23/2018     Lab Results   Component Value Date    TRIG 160 (H) 04/19/2019    TRIG 161 06/23/2018    TRIG 153 04/23/2018     Lab Results   Component Value Date    HDL 30 (L) 04/19/2019    HDL 30 (L) 06/23/2018    HDL 33 04/23/2018     No components found for: LDLCALC  Lab Results   Component Value Date    LDL 67 04/19/2019    LDL 79 06/23/2018     (H) 04/23/2018     No results found for: HDLLDLRATIO  No components found for: CHOLHDL  Lab Results   Component Value Date    HGBA1C 6.4 (H) 04/19/2019     Lab Results   Component Value Date    TSH 1.86 06/23/2018    [id number] 9.88 11/30/2017           ASSESSMENT AND PLAN  Mr. Sanchez is stable at this time with no clinical evidence of angina, arrhythmia or congestive heart failure. Smoking cessation was stressed.  Antiplatelet therapy with aspirin and Plavix, antianginal therapy with isosorbide mononitrate, antihypertensive therapy with lisinopril HCTZ, lipid-lowering therapy with pravastatin have been continued.    Aman was seen today for fatigue.    Diagnoses and all orders for this visit:    Chronic coronary artery disease    Essential hypertension    Tobacco use disorder    Mixed hyperlipidemia        Patient's Body mass index is 32.62 kg/m². BMI is above normal parameters. Recommendations include: exercise counseling and nutrition  counseling.      Aman Sanchez is a current cigarettes user.  He currently smokes 1 pack of cigarettes per day for a duration of 50 years. I have educated him on the risk of diseases from using tobacco products such as cancer, COPD and heart diease.     I advised him to quit and he is not willing to quit.    I spent 3  minutes counseling the patient.          Homero Hargrove MD  10/16/2019  3:53 PM

## 2019-10-28 DIAGNOSIS — G47.33 OBSTRUCTIVE SLEEP APNEA, ADULT: Primary | ICD-10-CM

## 2020-10-16 ENCOUNTER — OFFICE VISIT (OUTPATIENT)
Dept: CARDIOLOGY | Facility: CLINIC | Age: 70
End: 2020-10-16

## 2020-10-16 VITALS
BODY MASS INDEX: 32.62 KG/M2 | SYSTOLIC BLOOD PRESSURE: 142 MMHG | OXYGEN SATURATION: 99 % | DIASTOLIC BLOOD PRESSURE: 86 MMHG | HEART RATE: 74 BPM | HEIGHT: 69 IN

## 2020-10-16 DIAGNOSIS — E78.2 MIXED HYPERLIPIDEMIA: ICD-10-CM

## 2020-10-16 DIAGNOSIS — I10 ESSENTIAL HYPERTENSION: ICD-10-CM

## 2020-10-16 DIAGNOSIS — I25.10 CHRONIC CORONARY ARTERY DISEASE: Primary | ICD-10-CM

## 2020-10-16 DIAGNOSIS — E66.01 MORBID OBESITY DUE TO EXCESS CALORIES (HCC): ICD-10-CM

## 2020-10-16 DIAGNOSIS — R06.09 DYSPNEA ON EXERTION: ICD-10-CM

## 2020-10-16 PROCEDURE — 93000 ELECTROCARDIOGRAM COMPLETE: CPT | Performed by: INTERNAL MEDICINE

## 2020-10-16 PROCEDURE — 99214 OFFICE O/P EST MOD 30 MIN: CPT | Performed by: INTERNAL MEDICINE

## 2020-10-16 NOTE — PROGRESS NOTES
Aman Sanchez  70 y.o. male    1. Chronic coronary artery disease    2. Essential hypertension    3. Mixed hyperlipidemia    4. Dyspnea on exertion    5. Morbid obesity due to excess calories (CMS/[facility])        History of Present Illness  Mr. Sanchez is a 70-year-old  male who is here for evaluation of his above-stated problems. His history is remarkable for coronary artery disease, obesity, hypertension, COPD, diabetes mellitus, hyperlipidemia.    He has had coronary artery disease and previous PCI to right coronary artery as described below:  On January 9, 2017 he underwent PCI to right coronary artery with placement of 3.5 x 25 mm Xience Alpine stent to distal RCA.  In 2007 he underwent placement of 3.5 x 18 mm Cypher stent to mid RCA.    He has mild pulmonary hypertension documented by cardiac catheterization.     He has done reasonably well and denied any chest pain.  He has chronic NYHA class II dyspnea on exertion most likely secondary to COPD, obesity and sleep apnea and tobacco use.  He unfortunately continues to smoke and we had a discussion about smoking cessation.  He does not wish to quit.     EKG today showed sinus rhythm with heart rate of 69 bpm.  Right bundle branch block.  Old inferior wall myocardial infarction.  No acute ST-T wave changes.    Clinical exam today did not reveal any bronchospasm or signs of congestive heart failure.  SUBJECTIVE    No Known Allergies      Past Medical History:   Diagnosis Date   • Acute bronchitis    • Anemia    • Breast lump    • Cervical radiculopathy    • COPD (chronic obstructive pulmonary disease) (CMS/[facility])    • Coronary atherosclerosis    • Depressive disorder    • Disease of thyroid gland    • Dysuria    • Essential hypertension    • Flank pain    • Hematochezia    • Hematuria syndrome    • Hyperlipidemia    • Hypoglycemia    • Kidney stone    • Kidney stones    • Mass of neck    • MI (myocardial infarction) (CMS/[facility])    • Neck pain     sprain   •  Osteoarthritis    • PONV (postoperative nausea and vomiting)    • Sleep apnea     using c-pap   • Sleep apnea    • Type 2 diabetes mellitus (CMS/[facility])          Past Surgical History:   Procedure Laterality Date   • BACK SURGERY     • CARDIAC CATHETERIZATION N/A 8/23/2017    Procedure: Right Heart Cath;  Surgeon: Mariposa Zamorano MD;  Location: [facility] CATH INVASIVE LOCATION;  Service:    • CHOLECYSTECTOMY     • CYSTOSCOPY  08/29/2014    Left ESWL, cysto and stent removal   • CYSTOSCOPY W/ LITHOLAPAXY / EHL  08/15/2014    Left ESWL   • CYSTOSCOPY, URETEROSCOPY, RETROGRADE PYELOGRAM, STENT INSERTION Left 6/30/2017    Procedure: CYSTOSCOPY, LEFT URETEROSCOPY, RETROGRADE PYELOGRAM, HOLMIUM LASER, STENT INSERTION;  Surgeon: Uday Horne MD;  Location: [facility];  Service:    • CYSTOSCOPY, URETEROSCOPY, RETROGRADE PYELOGRAM, STENT INSERTION Left 9/6/2017    Procedure: CYSTOSCOPY, LEFT URETEROSCOPY, RETROGRADE PYELOGRAM, HOLMIUM LASER, STENT INSERTION;  Surgeon: Uday Horne MD;  Location: [facility];  Service:    • CYSTOSCOPY, URETEROSCOPY, RETROGRADE PYELOGRAM, STENT INSERTION Left 1/17/2018    Procedure: CYSTOSCOPY LEFT URETEROSCOPY RETROGRADE PYELOGRAM HOLMIUM LASER STENT INSERTION;  Surgeon: Uday Horne MD;  Location: [facility] OR;  Service:    • CYSTOSCOPY, URETEROSCOPY, RETROGRADE PYELOGRAM, STENT INSERTION Left 7/25/2018    Procedure: CYSTOSCOPY URETEROSCOPY RETROGRADE PYELOGRAM HOLMIUM LASER STENT INSERTION;  Surgeon: Uday Horne MD;  Location: [facility];  Service: Urology   • HIP SURGERY     • INJECTION OF MEDICATION  11/11/2013    Kenalog   • INJECTION OF MEDICATION  01/16/2014    Toradol   • JOINT REPLACEMENT Right 2012    total hip replacement   • SKIN GRAFT      on his foot; removed skin from hip   • TONSILLECTOMY     • TRANSESOPHAGEAL ECHOCARDIOGRAM (ОЛЬГА)  07/21/2014    with color flow-Normal LV systolic function with Ef of 60-65%/ Grad1 diastolic dysfunction of the left ventricular myocardium. No  evidence of pericardial effusion         Family History   Problem Relation Age of Onset   • Lung cancer Mother    • Rectal cancer Sister          Social History     Socioeconomic History   • Marital status:      Spouse name: Not on file   • Number of children: Not on file   • Years of education: Not on file   • Highest education level: Not on file   Tobacco Use   • Smoking status: Current Every Day Smoker     Packs/day: 0.50     Years: 50.00     Pack years: 25.00     Types: Cigarettes   • Smokeless tobacco: Never Used   • Tobacco comment: 1/2 PPD   Substance and Sexual Activity   • Alcohol use: Yes     Frequency: Monthly or less     Comment: Rare   • Drug use: No   • Sexual activity: Defer         Current Outpatient Medications   Medication Sig Dispense Refill   • albuterol (PROVENTIL HFA;VENTOLIN HFA) 108 (90 Base) MCG/ACT inhaler Inhale 2 puffs Every 4 (Four) Hours As Needed for Wheezing. 8 g 5   • albuterol (PROVENTIL) (2.5 MG/3ML) 0.083% nebulizer solution Take 2.5 mg by nebulization Every 4 (Four) Hours As Needed for Wheezing. 180 vial 1   • aspirin 81 MG EC tablet Take 81 mg by mouth Daily. Last dose 1/8/18     • ciprofloxacin-dexamethasone (CIPRODEX) 0.3-0.1 % otic suspension Administer 4 drops to the right ear 3 (Three) Times a Day. 7.5 mL 0   • clopidogrel (PLAVIX) 75 MG tablet Take 1 tablet by mouth Daily. 90 tablet 1   • finasteride (PROSCAR) 5 MG tablet Take 1 tablet by mouth Every Night. 90 tablet 1   • FLUoxetine (PROzac) 40 MG capsule Take 1 capsule by mouth Daily. 90 capsule 1   • fluticasone (FLONASE) 50 MCG/ACT nasal spray 2 sprays into each nostril At Night As Needed for Rhinitis. 3 bottle 1   • glucose blood test strip One touch reli-on glucometer 360 each 1   • isosorbide mononitrate (IMDUR) 30 MG 24 hr tablet Take 1 tablet by mouth Daily. 90 tablet 1   • levothyroxine (SYNTHROID) 50 MCG tablet Take 1 tablet by mouth Daily. 90 tablet 1   • lisinopril-hydrochlorothiazide  "(PRINZIDE,ZESTORETIC) 20-25 MG per tablet Take 1 tablet by mouth Daily. 90 tablet 1   • meloxicam (MOBIC) 15 MG tablet Take 1 tablet by mouth Daily. 90 tablet 1   • metFORMIN (GLUCOPHAGE) 500 MG tablet Take 1 tablet by mouth Daily With Breakfast. (Patient taking differently: Take 500 mg by mouth Daily Before Supper.) 90 tablet 1   • mirtazapine (REMERON SOL-TAB) 15 MG disintegrating tablet Take 1 tablet by mouth Every Night. 90 tablet 1   • nitroglycerin (NITROSTAT) 0.4 MG SL tablet Place 1 tablet under the tongue Every 5 (Five) Minutes As Needed for chest pain. 100 tablet 11   • ONE TOUCH LANCETS misc Use as directed test 6 times daily 200 each 1   • oxyCODONE-acetaminophen (PERCOCET) 7.5-325 MG per tablet Take 1 tablet by mouth Every 4 (Four) Hours As Needed.     • pravastatin (PRAVACHOL) 80 MG tablet Take 1 tablet by mouth Every Night. 90 tablet 1   • tamsulosin (FLOMAX) 0.4 MG capsule 24 hr capsule Take 2 capsules by mouth Daily. (Patient taking differently: Take 2 capsules by mouth Every Night.) 180 capsule 1   • traMADol (ULTRAM) 50 MG tablet Take 50 mg by mouth Every 6 (Six) Hours As Needed for moderate pain (4-6).       No current facility-administered medications for this visit.          OBJECTIVE    /86 (BP Location: Left arm, Patient Position: Sitting, Cuff Size: Adult)   Pulse 74   Ht 175.3 cm (69.02\")   SpO2 99%   BMI 32.62 kg/m²         Review of Systems     Constitutional:  Denies recent weight loss, weight gain, fever or chills, no change in exercise tolerance     HENT:  Denies any hearing loss, epistaxis, hoarseness, or difficulty speaking.     Eyes: Wears eyeglasses or contact lenses     Respiratory:  Dyspnea with exertion,no cough, wheezing, or hemoptysis.     Cardiovascular: Negative for palpations, chest pain, orthopnea, PND    Gastrointestinal:  Denies change in bowel habits, dyspepsia, ulcer disease, hematochezia, or melena.     Endocrine: Negative for cold intolerance, heat " intolerance, polydipsia, polyphagia and polyuria.     Genitourinary: Negative.      Musculoskeletal: DJD    Skin:  Denies any change in hair or nails, rashes, or skin lesions.     Allergic/Immunologic: Negative.  Negative for environmental allergies, food allergies and immunocompromised state.     Neurological:  Denies any history of recurrent headaches, strokes, TIA, or seizure disorder.     Hematological: Denies any food allergies, seasonal allergies, bleeding disorders, or lymphadenopathy.     Psychiatric/Behavioral: history of depression, no substance abuse, or change in cognitive function.         Physical Exam     Constitutional: Cooperative, alert and oriented, in no acute distress.     HENT:   Head: Normocephalic, normal hair patterns, no masses or tenderness.  Ears, Nose, and Throat: No gross abnormalities. No pallor or cyanosis.   Eyes: EOMS intact, PERRL, conjunctivae and lids unremarkable. Fundoscopic exam and visual fields not performed.   Neck: No palpable masses or adenopathy, no thyromegaly, no JVD, carotid pulses are full and equal bilaterally and without  Bruits.     Cardiovascular: Regular rhythm, S1 and S2 normal, no S3 or S4. Apical impulse not displaced. No murmurs, gallops, or rubs detected.     Pulmonary/Chest: Chest: normal symmetry, normal respiratory excursion, no intercostal retraction, no use of accessory muscles.            Pulmonary: Normal breath sounds. No rales or ronchi.    Abdominal: Abdomen soft, bowel sounds normoactive, no masses, no hepatosplenomegaly, non-tender, no bruits.     Musculoskeletal: No deformities, clubbing, cyanosis, erythema, or edema observed.     Neurological: No gross motor or sensory deficits noted, affect appropriate, oriented to time, person, place.     Skin: Warm and dry to the touch, no apparent skin lesions or masses noted.     Psychiatric: He has a normal mood and affect. His behavior is normal. Judgment and thought content normal.          Procedures      Lab Results   Component Value Date    WBC 9.2 04/19/2019    HGB 16.1 04/19/2019    HCT 49 04/19/2019    MCV 90 04/19/2019     04/19/2019     Lab Results   Component Value Date    GLUCOSE 81 07/25/2018    BUN 17 07/25/2018    CREATININE 0.84 07/25/2018    EGFRIFNONA 91 07/25/2018    EGFRIFAFRI 67 01/23/2018    BCR 20.2 07/25/2018    CO2 20.0 (L) 07/25/2018    CALCIUM 9.6 07/25/2018    ALBUMIN 3.7 06/23/2018    AST 17 06/23/2018    ALT 29 (L) 06/23/2018     Lab Results   Component Value Date    CHOL 141 07/15/2020    CHOL 119 04/19/2019    CHOL 129 06/23/2018     Lab Results   Component Value Date    TRIG 129 07/15/2020    TRIG 160 (H) 04/19/2019    TRIG 161 06/23/2018     Lab Results   Component Value Date    HDL 31 (L) 07/15/2020    HDL 30 (L) 04/19/2019    HDL 30 (L) 06/23/2018     No components found for: LDLCALC  Lab Results   Component Value Date    LDL 95 07/15/2020    LDL 67 04/19/2019    LDL 79 06/23/2018     No results found for: HDLLDLRATIO  No components found for: CHOLHDL  Lab Results   Component Value Date    HGBA1C 6.3 (H) 07/15/2020     Lab Results   Component Value Date    TSH 1.86 06/23/2018    P8JGEBS 9.88 11/30/2017           ASSESSMENT AND PLAN  Mr. Sanchez is stable at this time with no clinical evidence of angina, arrhythmia or congestive heart failure. Smoking cessation was stressed.  Antiplatelet therapy with aspirin and Plavix, antianginal therapy with isosorbide mononitrate, antihypertensive therapy with lisinopril HCTZ, lipid-lowering therapy with pravastatin have been continued.  An echocardiogram to assess left ventricular and valvular function has been arranged.    Diagnoses and all orders for this visit:    1. Chronic coronary artery disease (Primary)  -     ECG 12 Lead  -     Adult Transthoracic Echo Complete W/ Cont if Necessary Per Protocol; Future    2. Essential hypertension  -     Adult Transthoracic Echo Complete W/ Cont if Necessary Per Protocol;  Future    3. Mixed hyperlipidemia  -     Adult Transthoracic Echo Complete W/ Cont if Necessary Per Protocol; Future    4. Dyspnea on exertion  -     Adult Transthoracic Echo Complete W/ Cont if Necessary Per Protocol; Future    5. Morbid obesity due to excess calories (CMS/HCC)  -     Adult Transthoracic Echo Complete W/ Cont if Necessary Per Protocol; Future        Patient's Body mass index is 32.62 kg/m². BMI is above normal parameters. Recommendations include: exercise counseling and nutrition counseling.      Aman Sanchez is a current cigarettes user.  He currently smokes 1 pack of cigarettes per day for a duration of 50 years. I have educated him on the risk of diseases from using tobacco products such as cancer, COPD and heart diease.     I advised him to quit and he is not willing to quit.    I spent 3  minutes counseling the patient.      Homero Hargrove MD  10/16/2020  17:18 CDT

## 2020-11-11 ENCOUNTER — DOCUMENTATION (OUTPATIENT)
Dept: CARDIOLOGY | Facility: CLINIC | Age: 70
End: 2020-11-11

## 2020-12-03 ENCOUNTER — OFFICE VISIT (OUTPATIENT)
Dept: SLEEP MEDICINE | Facility: HOSPITAL | Age: 70
End: 2020-12-03

## 2020-12-03 VITALS
BODY MASS INDEX: 32.73 KG/M2 | OXYGEN SATURATION: 97 % | SYSTOLIC BLOOD PRESSURE: 134 MMHG | HEIGHT: 69 IN | DIASTOLIC BLOOD PRESSURE: 83 MMHG | HEART RATE: 74 BPM | WEIGHT: 221 LBS

## 2020-12-03 DIAGNOSIS — G47.33 OBSTRUCTIVE SLEEP APNEA, ADULT: Primary | ICD-10-CM

## 2020-12-03 PROCEDURE — 99213 OFFICE O/P EST LOW 20 MIN: CPT | Performed by: NURSE PRACTITIONER

## 2020-12-03 RX ORDER — TRAZODONE HYDROCHLORIDE 150 MG/1
150 TABLET ORAL NIGHTLY
COMMUNITY
Start: 2020-11-12

## 2020-12-03 RX ORDER — FLUOXETINE HYDROCHLORIDE 20 MG/1
20 CAPSULE ORAL DAILY
COMMUNITY
Start: 2020-11-05

## 2020-12-03 NOTE — PROGRESS NOTES
Sleep Clinic Follow Up    Date: 12/3/2020  Primary Care Physician: Emile Wynn MD    Last office visit: 2019 (I reviewed this note)    CC: Follow up: ALLEN on CPAP      Interim History:  Since the last visit:    1) moderate ALLEN -  Aman Sanchez has remained compliant with CPAP. He denies mask and machine issues, dry mouth, headaches, or pressures intolerance. He denies abnormal dreams, sleep paralysis, nasal congestion, URI sx.    Sleep Testin. PSG on 2014, AHI of 28.1   2. CPAP titration recommended 14 cm H2O   3. Currently on 12 cm H2O    PAP Data:    Time frame: 2019-   Compliance: 100 %  Average use on days used: 9 hrs 51 min  Percent of days with usage greater than or equal to 4 hours: 100%  PAP range: 12 cm H2O  Average 90% pressure: 12 cmH2O  Leak: 45 minutes  Average AHI: 1.4 events/hr  Mask type: Full face mask  DME: [facility]    Bed time: 2200  Sleep latency: 1 minutes  Number of times awakens during the night: 0-1  Wake time: 1879-6868  Estimated total sleep time at night: 10 hours  Caffeine intake: 3 cups of coffee, 0 cups of tea, and 1 sodas per day  Alcohol intake: 0 drinks per week  Nap time: very rare   Sleepiness with Driving: none     [place] - 0    2) Patient denies RLS symptoms.    PMHx, FH, SH reviewed and pertinent changes are: Having back pain.       REVIEW OF SYSTEMS:   Negative for chest pain, SOA, fever, chills, cough, N/V/D, abdominal pain.    Smokin-2 ppd    Aman Sanchez  reports that he has been smoking cigarettes. He has a 25.00 pack-year smoking history. He has never used smokeless tobacco.. I have educated him on the risk of diseases from using tobacco products such as cancer, COPD and heart disease.     I advised him to quit and he is not willing to quit.    I spent 3.5 minutes counseling the patient.        Exam:  Vitals:    20   BP: 134/83   Pulse: 74   SpO2: 97%           20   Weight: 100 kg (221 lb)      Body mass index is 32.62 kg/m². Patient's Body mass index is 32.62 kg/m². BMI is above normal parameters. Recommendations include: referral to primary care.      Gen:                No distress, conversant, pleasant, appears stated age, alert, oriented  Eyes:               Anicteric sclera, moist conjunctiva, no lid lag                           PERRL, EOMI   Heent:             NC/AT                          Oropharynx clear                          Normal hearing  Lungs:             Normal effort, non-labored breathing                          Clear to auscultation bilaterally          CV:                  Normal S1/S2, no murmur                          No lower extremity edema  ABD:               Soft, rounded, non-distended                          Normal bowel sounds                    Psych:             Appropriate affect  Neuro:             CN 2-12 appear intact    Past Medical History:   Diagnosis Date   • Acute bronchitis    • Anemia    • Breast lump    • Cervical radiculopathy    • COPD (chronic obstructive pulmonary disease) (CMS/[facility])    • Coronary atherosclerosis    • Depressive disorder    • Disease of thyroid gland    • Dysuria    • Essential hypertension    • Flank pain    • Hematochezia    • Hematuria syndrome    • Hyperlipidemia    • Hypoglycemia    • Kidney stone    • Kidney stones    • Mass of neck    • MI (myocardial infarction) (CMS/[facility])    • Neck pain     sprain   • Osteoarthritis    • PONV (postoperative nausea and vomiting)    • Sleep apnea     using c-pap   • Sleep apnea    • Type 2 diabetes mellitus (CMS/[facility])        Current Outpatient Medications:   •  albuterol (PROVENTIL HFA;VENTOLIN HFA) 108 (90 Base) MCG/ACT inhaler, Inhale 2 puffs Every 4 (Four) Hours As Needed for Wheezing., Disp: 8 g, Rfl: 5  •  albuterol (PROVENTIL) (2.5 MG/3ML) 0.083% nebulizer solution, Take 2.5 mg by nebulization Every 4 (Four) Hours As Needed for Wheezing., Disp: 180 vial, Rfl: 1  •  aspirin 81 MG EC tablet,  Take 81 mg by mouth Daily. Last dose 1/8/18, Disp: , Rfl:   •  ciprofloxacin-dexamethasone (CIPRODEX) 0.3-0.1 % otic suspension, Administer 4 drops to the right ear 3 (Three) Times a Day., Disp: 7.5 mL, Rfl: 0  •  clopidogrel (PLAVIX) 75 MG tablet, Take 1 tablet by mouth Daily., Disp: 90 tablet, Rfl: 1  •  finasteride (PROSCAR) 5 MG tablet, Take 1 tablet by mouth Every Night., Disp: 90 tablet, Rfl: 1  •  FLUoxetine (PROzac) 20 MG capsule, , Disp: , Rfl:   •  FLUoxetine (PROzac) 40 MG capsule, Take 1 capsule by mouth Daily., Disp: 90 capsule, Rfl: 1  •  fluticasone (FLONASE) 50 MCG/ACT nasal spray, 2 sprays into each nostril At Night As Needed for Rhinitis., Disp: 3 bottle, Rfl: 1  •  glucose blood test strip, One touch reli-on glucometer, Disp: 360 each, Rfl: 1  •  isosorbide mononitrate (IMDUR) 30 MG 24 hr tablet, Take 1 tablet by mouth Daily., Disp: 90 tablet, Rfl: 1  •  levothyroxine (SYNTHROID) 50 MCG tablet, Take 1 tablet by mouth Daily., Disp: 90 tablet, Rfl: 1  •  lisinopril-hydrochlorothiazide (PRINZIDE,ZESTORETIC) 20-25 MG per tablet, Take 1 tablet by mouth Daily., Disp: 90 tablet, Rfl: 1  •  meloxicam (MOBIC) 15 MG tablet, Take 1 tablet by mouth Daily., Disp: 90 tablet, Rfl: 1  •  metFORMIN (GLUCOPHAGE) 500 MG tablet, Take 1 tablet by mouth Daily With Breakfast. (Patient taking differently: Take 500 mg by mouth Daily Before Supper.), Disp: 90 tablet, Rfl: 1  •  mirtazapine (REMERON SOL-TAB) 15 MG disintegrating tablet, Take 1 tablet by mouth Every Night., Disp: 90 tablet, Rfl: 1  •  nitroglycerin (NITROSTAT) 0.4 MG SL tablet, Place 1 tablet under the tongue Every 5 (Five) Minutes As Needed for chest pain., Disp: 100 tablet, Rfl: 11  •  ONE TOUCH LANCETS misc, Use as directed test 6 times daily, Disp: 200 each, Rfl: 1  •  oxyCODONE-acetaminophen (PERCOCET) 7.5-325 MG per tablet, Take 1 tablet by mouth Every 4 (Four) Hours As Needed., Disp: , Rfl:   •  pravastatin (PRAVACHOL) 80 MG tablet, Take 1 tablet by  "mouth Every Night., Disp: 90 tablet, Rfl: 1  •  tamsulosin (FLOMAX) 0.4 MG capsule 24 hr capsule, Take 2 capsules by mouth Daily. (Patient taking differently: Take 2 capsules by mouth Every Night.), Disp: 180 capsule, Rfl: 1  •  traMADol (ULTRAM) 50 MG tablet, Take 50 mg by mouth Every 6 (Six) Hours As Needed for moderate pain (4-6)., Disp: , Rfl:   •  traZODone (DESYREL) 150 MG tablet, , Disp: , Rfl:       Assessment and Plan:    1. Obstructive sleep apnea Established, stable (1)  1. Compliant with PAP therapy  2. Continue PAP as prescribed  3. Script for PAP supplies  4. Return to clinic in 1 year with compliance report unless change in symptoms in interim period  2. Nicotine dependence with complication self-limited or minor problem  1. Given [employer]\"s \"Thinking of quitting smoking\" flyer and referred patient to call [phone]-NOW. Smoking and tobacco use cessation counseling visit was 3.5 minutes.       8 of 15 minutes spent face-to-face counseling patient extensively regarding:   PAP therapy, PAP compliance, PAP maintenance and Tobacco cessation      RTC in 12 months. Patient agrees to return sooner if changes in symptoms.        This document has been electronically signed by BRIAN Robles on December 3, 2020 10:03 CST          CC: Emile Wynn MD          No ref. provider found  "

## 2021-04-07 ENCOUNTER — PREP FOR SURGERY (OUTPATIENT)
Dept: OTHER | Facility: HOSPITAL | Age: 71
End: 2021-04-07

## 2021-04-07 DIAGNOSIS — N13.8 ENLARGED PROSTATE WITH URINARY OBSTRUCTION: Primary | ICD-10-CM

## 2021-04-07 DIAGNOSIS — N40.1 ENLARGED PROSTATE WITH URINARY OBSTRUCTION: Primary | ICD-10-CM

## 2021-04-15 ENCOUNTER — PRE-ADMISSION TESTING (OUTPATIENT)
Dept: PREADMISSION TESTING | Facility: HOSPITAL | Age: 71
End: 2021-04-15

## 2021-04-15 VITALS
HEIGHT: 69 IN | DIASTOLIC BLOOD PRESSURE: 82 MMHG | BODY MASS INDEX: 33.62 KG/M2 | HEART RATE: 73 BPM | WEIGHT: 227 LBS | SYSTOLIC BLOOD PRESSURE: 156 MMHG | RESPIRATION RATE: 20 BRPM | OXYGEN SATURATION: 94 %

## 2021-04-15 DIAGNOSIS — N13.8 ENLARGED PROSTATE WITH URINARY OBSTRUCTION: ICD-10-CM

## 2021-04-15 DIAGNOSIS — N40.1 ENLARGED PROSTATE WITH URINARY OBSTRUCTION: ICD-10-CM

## 2021-04-15 LAB
ANION GAP SERPL CALCULATED.3IONS-SCNC: 9 MMOL/L (ref 5–15)
BILIRUB UR QL STRIP: NEGATIVE
BUN SERPL-MCNC: 12 MG/DL (ref 8–23)
BUN/CREAT SERPL: 12.5 (ref 7–25)
CALCIUM SPEC-SCNC: 10.7 MG/DL (ref 8.6–10.5)
CHLORIDE SERPL-SCNC: 102 MMOL/L (ref 98–107)
CLARITY UR: CLEAR
CO2 SERPL-SCNC: 30 MMOL/L (ref 22–29)
COLOR UR: YELLOW
CREAT SERPL-MCNC: 0.96 MG/DL (ref 0.76–1.27)
GFR SERPL CREATININE-BSD FRML MDRD: 78 ML/MIN/1.73
GLUCOSE SERPL-MCNC: 154 MG/DL (ref 65–99)
GLUCOSE UR STRIP-MCNC: NEGATIVE MG/DL
HGB UR QL STRIP.AUTO: ABNORMAL
KETONES UR QL STRIP: NEGATIVE
LEUKOCYTE ESTERASE UR QL STRIP.AUTO: ABNORMAL
NITRITE UR QL STRIP: NEGATIVE
PH UR STRIP.AUTO: 6 [PH] (ref 5–9)
POTASSIUM SERPL-SCNC: 4.5 MMOL/L (ref 3.5–5.2)
PROT UR QL STRIP: ABNORMAL
QT INTERVAL: 422 MS
QTC INTERVAL: 462 MS
SODIUM SERPL-SCNC: 141 MMOL/L (ref 136–145)
SP GR UR STRIP: 1.02 (ref 1–1.03)
UROBILINOGEN UR QL STRIP: ABNORMAL

## 2021-04-15 PROCEDURE — 36415 COLL VENOUS BLD VENIPUNCTURE: CPT

## 2021-04-15 PROCEDURE — 93010 ELECTROCARDIOGRAM REPORT: CPT | Performed by: INTERNAL MEDICINE

## 2021-04-15 PROCEDURE — 93005 ELECTROCARDIOGRAM TRACING: CPT

## 2021-04-15 PROCEDURE — 80048 BASIC METABOLIC PNL TOTAL CA: CPT

## 2021-04-15 PROCEDURE — 81003 URINALYSIS AUTO W/O SCOPE: CPT

## 2021-04-15 RX ORDER — LANOLIN ALCOHOL/MO/W.PET/CERES
1000 CREAM (GRAM) TOPICAL DAILY
COMMUNITY

## 2021-04-15 RX ORDER — CETIRIZINE HYDROCHLORIDE 10 MG/1
10 TABLET ORAL DAILY
COMMUNITY

## 2021-04-15 RX ORDER — GUAIFENESIN 600 MG/1
600 TABLET, EXTENDED RELEASE ORAL 2 TIMES DAILY
COMMUNITY

## 2021-04-15 RX ORDER — SODIUM CHLORIDE 9 MG/ML
1000 INJECTION, SOLUTION INTRAVENOUS CONTINUOUS
Status: CANCELLED | OUTPATIENT
Start: 2021-04-20

## 2021-04-15 NOTE — DISCHARGE INSTRUCTIONS
[facility]  Pre-op Information and Guidelines    You will be called after 2 p.m. the day before your surgery (Friday for Monday surgery) and notified of your time for arrival and approximate surgery time.  If you have not received a call by 4P.M., please contact Same Day Surgery at [phone] of if outside [facility] call [phone].    Please Follow these Important Safety Guidelines:    • The morning of your procedure, take only the medications listed below with   A sip of water:_____________________________________________       ______________________________________________    • DO NOT eat or drink anything after 12:00 midnight the night before surgery  Specific instructions concerning drinking clear liquids will be discussed during  the pre-surgery instruction call the day before your surgery.    • If you take a blood thinner (ex. Plavix, Coumadin, aspirin), ask your doctor when to stop it before surgery  STOP DATE: _________________    • Only 2 visitors are allowed in patient rooms at a time  Your visitors will be asked to wait in the lobby until the admission process is complete with the exception of a parent with a child and patients in need of special assistance.    • YOU CANNOT DRIVE YOURSELF HOME  You must be accompanied by someone who will be responsible for driving you home after surgery and for your care at home.    • DO NOT chew gum, use breath mints, hard candy, or smoke the day of surgery  • DO NOT drink alcohol for at least 24 hours before your surgery  • DO NOT wear any jewelry and remove all body piercing before coming to the hospital  • DO NOT wear make-up to the hospital  • If you are having surgery on an extremity (arm/leg/foot) remove nail polish/artificial nails on the surgical side  • Clothing, glasses, contacts, dentures, and hairpieces must be removed before surgery  • Bathe the night before or the morning of your surgery and do not use powders/lotions on  skin.

## 2021-04-15 NOTE — PAT
Patient states he was told by Dr. Horne to stop Aspirin and Plavix on 4/8 for procedure..    Dr. Shah here to speak with patient and review EKG. No orders received.

## 2021-04-17 ENCOUNTER — LAB (OUTPATIENT)
Dept: LAB | Facility: HOSPITAL | Age: 71
End: 2021-04-17

## 2021-04-17 DIAGNOSIS — Z01.818 PREOP TESTING: Primary | ICD-10-CM

## 2021-04-17 LAB — SARS-COV-2 N GENE RESP QL NAA+PROBE: NOT DETECTED

## 2021-04-17 PROCEDURE — C9803 HOPD COVID-19 SPEC COLLECT: HCPCS

## 2021-04-17 PROCEDURE — 87635 SARS-COV-2 COVID-19 AMP PRB: CPT

## 2021-04-19 ENCOUNTER — ANESTHESIA EVENT (OUTPATIENT)
Dept: PERIOP | Facility: HOSPITAL | Age: 71
End: 2021-04-19

## 2021-04-19 NOTE — H&P
UROLOGY [facility] History and Physical  JLUIS KILPATRICK  70-year-old; :1950;;male  [address] [facility];[place] [address]  Ins: 1) MEDICARE 2) [employer]  MRN:[id number]  JV SALDANA MD  UROLOGY [facility]  Allergies  Codeine Unknown  ivp dye (Not Checked) Unknown  Current Medications  Albuterol (Eqv-ProAir HFA) 90 mcg/inh inhalation aerosol  All Day Allergy 10 mg oral tablet  Plavix 75 mg oral tablet  Proscar 5 mg oral tablet  Flonase 50 mcg/inh nasal spray  Mucinex 600 mg oral tablet, extended release  isosorbide mononitrate 20 mg oral tablet  levothyroxine 50 mcg (0.05 mg) oral capsule  lisinopril 20 mg oral tablet  metFORMIN 500 mg oral tablet  traZODone 100 mg oral tablet  meloxicam 15 mg oral tablet  aspirin 81 mg tablet 1 oral  hydrochlorothiazide-lisinopril 25 mg-20 mg oral tablet 1 oral  pravastatin 80 mg tablet 1 oral  FLUoxetine 40 mg oral capsule 1 oral  tamsulosin 0.4 mg oral capsule 2 capsule oral at bedtime  * Medications Reconciled  Problem List  Benign prostatic hypertrophy with outflow obstruction 2021  Medical History  HIGH BLOOD PRESSURE  HEART STENTS  TONSILECTOMY  HEART DISEASE  OTH/UNS SLEEP APNEA  Disorder Of Kidney And Ureter, Unspecified  Chronic obstructive lung disease  HAS HAD COLONSCOPY IN LAST 9 YEARS  Surgical History  HEART STENTS  TONSIL REMOVED  ESWL 08/15/2014 Left  CYSTOSCOPY AND TREATMENT 2017  CYSTOSCOPY AND TREATMENT 2017  stent removal  CRULLS  CYSTOSCOPY AND TREATMENT 2018  stent removal  Psychiatric History  Patient is generally satisfied with life and has no thoughts of suicide. Has depression and anxiety.  Family History  Sister Lung Cancer  Sister HIGH BLOOD PRESSURE  Mother Lung Cancer  Mother HIGH BLOOD PRESSURE  Heart Disease, Unspecified  Type 2 Diabetes Mellitus Without Complications  Disorder Of Kidney And Ureter, Unspecified  Mother  Father  Brother   Sister   Social  History  Currently smokes cigarettes. Currently drinks  alcohol. Disabled. . Lives with his wife.  Imm/Inj/Office Meds History Comments  PT HAS HAD FLU SHOT BUT NOT HAD  PNEUMONIA VACCINE.  Chief Complaint  BPH  History of Present Illness  JLUIS KILPATRICK is a 70-year-old male here for evaluation. He has a history of BPH with lower urinary tract symptoms. Watched [employer] video and all questions were answered. Complains of nocturia, frequency, and incontinence. Urinary stream is not always strong and he is not always emptying his bladder well. Cystoscopy from 04/01/2020 shows a moderately obstructing prostate.  Location: Prostate  Associated signs and symptoms: No fever  Review of Systems  Eyes: Denies: blurry vision, pain in the eyes and double vision  ENMT: Denies: ear pain, sore throat and sinus problems  Cardiovascular: Denies: chest pain, varicose veins, angina and syncope  Respiratory: Reports: shortness of breath and frequent cough; Denies: wheezing  Gastrointestinal: Denies: abdominal pain, nausea, vomiting, indigestion and  heartburn  Genitourinary: Reports: other symptoms (see HPI)  Musculoskeletal: Reports: back pain; Denies: joint pain and neck pain  Integumentary: Denies: skin rash, boils and persistent itch  Neurological: Denies: tremors, dizzy spells and numbness / tingling  Psychiatric: Reports: depression; Denies: generally satisfied with life, suicidal  ideation and anxiety  Endocrine: Reports: cold intolerance and heat intolerance; Denies: Excessive thirst and tired/sluggish  Hematologic/Lymphatic: Denies: swollen glands and blood clotting problem  Allergic/Immunologic: Denies: hayfever  Constitutional: Denies: fever, chills and weight loss  Vital Signs  VITAL SIGNS NOT TAKEN DUE TO COVID 19 RESTRICTIONS  Weight: 230 (lb) Resp Rate: 18 (/min)  Height: 67 (in) BMI: 36.02  Smoking Status: Current every day smoker  Exam  General appearance: The patient appears well developed and well nourished, in  no apparent acute distress.  Examination of pupils and irises: No redness or drainage noted.  Assessment of hearing: Responds to verbal command.  Examination of neck: Neck appears supple. No masses noted.  Assessment of respiratory effort: Respiratory effort appears normal. No apparent distress.  Examination of gait and station: Normal gait and stature.  Head and neck (Assessment of range of motion): Adequate ROM noted in all  extremities.  Head and neck (Assessment of muscle strength and tone): Muscle strength and tone normal for age.  Inspection of skin and subcutaneous tissue: Exam of the skin is within normal limits. The color and skin turgor are normal. No lesions, bruises, rashes, or jaundice.  Orientation to time, place and person: Oriented to person, place, and time.  Mood and affect: Normal mood and affect.  Data Review  Medications and chart reviewed. History and physical form/ROS reviewed and no changes noted. Previous encounter reviewed.   Assessment - Benign prostatic hypertrophy with outflow obstruction  DX:  Benign prostatic hypertrophy with outflow obstruction  SNO: [id number], ICD-9: 600.01, ICD-10: N40.1, ICD-10: N13.8  Assessment Notes:  BPH with lower urinary tract symptoms  Plan  Plan Notes:  UROLIFT procedure.  Education:  Enlarged Prostate (BPH)  Discussion:  Discussed my findings and plan of action and reasoning behind decision making. All questions were answered. FINDINGS WERE DISCUSSED WITH PATIENT. RISKS AND BENEFITS EXPLAINED. PATIENT WISHES TO PROCEED.  Instructions:  Patient is instructed to call with any problems. Patient is instructed to call if the  condition worsens. Patient is instructed to call with any changes in condition.

## 2021-04-20 ENCOUNTER — HOSPITAL ENCOUNTER (OUTPATIENT)
Facility: HOSPITAL | Age: 71
Setting detail: HOSPITAL OUTPATIENT SURGERY
Discharge: HOME | End: 2021-04-20
Attending: UROLOGY | Admitting: UROLOGY

## 2021-04-20 ENCOUNTER — ANESTHESIA (OUTPATIENT)
Dept: PERIOP | Facility: HOSPITAL | Age: 71
End: 2021-04-20

## 2021-04-20 VITALS
HEIGHT: 69 IN | HEART RATE: 74 BPM | OXYGEN SATURATION: 94 % | SYSTOLIC BLOOD PRESSURE: 124 MMHG | BODY MASS INDEX: 33.5 KG/M2 | RESPIRATION RATE: 20 BRPM | DIASTOLIC BLOOD PRESSURE: 60 MMHG | WEIGHT: 226.19 LBS | TEMPERATURE: 96.8 F

## 2021-04-20 DIAGNOSIS — N13.8 ENLARGED PROSTATE WITH URINARY OBSTRUCTION: ICD-10-CM

## 2021-04-20 DIAGNOSIS — N40.1 ENLARGED PROSTATE WITH URINARY OBSTRUCTION: ICD-10-CM

## 2021-04-20 LAB — GLUCOSE BLDC GLUCOMTR-MCNC: 126 MG/DL (ref 70–130)

## 2021-04-20 PROCEDURE — C1889 IMPLANT/INSERT DEVICE, NOC: HCPCS | Performed by: UROLOGY

## 2021-04-20 PROCEDURE — 25010000002 MIDAZOLAM PER 1 MG: Performed by: NURSE ANESTHETIST, CERTIFIED REGISTERED

## 2021-04-20 PROCEDURE — 25010000002 FENTANYL CITRATE (PF) 100 MCG/2ML SOLUTION: Performed by: NURSE ANESTHETIST, CERTIFIED REGISTERED

## 2021-04-20 PROCEDURE — 25010000002 DEXAMETHASONE PER 1 MG: Performed by: NURSE ANESTHETIST, CERTIFIED REGISTERED

## 2021-04-20 PROCEDURE — 25010000002 CEFTRIAXONE: Performed by: UROLOGY

## 2021-04-20 PROCEDURE — 82962 GLUCOSE BLOOD TEST: CPT

## 2021-04-20 PROCEDURE — 25010000002 PROPOFOL 10 MG/ML EMULSION: Performed by: NURSE ANESTHETIST, CERTIFIED REGISTERED

## 2021-04-20 PROCEDURE — 25010000002 ONDANSETRON PER 1 MG: Performed by: NURSE ANESTHETIST, CERTIFIED REGISTERED

## 2021-04-20 DEVICE — SYS UROLIFT PROSTATIC RETR: Type: IMPLANTABLE DEVICE | Site: PROSTATE | Status: FUNCTIONAL

## 2021-04-20 RX ORDER — DEXAMETHASONE SODIUM PHOSPHATE 4 MG/ML
INJECTION, SOLUTION INTRA-ARTICULAR; INTRALESIONAL; INTRAMUSCULAR; INTRAVENOUS; SOFT TISSUE AS NEEDED
Status: DISCONTINUED | OUTPATIENT
Start: 2021-04-20 | End: 2021-04-20 | Stop reason: SURG

## 2021-04-20 RX ORDER — PROPOFOL 10 MG/ML
VIAL (ML) INTRAVENOUS AS NEEDED
Status: DISCONTINUED | OUTPATIENT
Start: 2021-04-20 | End: 2021-04-20 | Stop reason: SURG

## 2021-04-20 RX ORDER — FENTANYL CITRATE 50 UG/ML
INJECTION, SOLUTION INTRAMUSCULAR; INTRAVENOUS AS NEEDED
Status: DISCONTINUED | OUTPATIENT
Start: 2021-04-20 | End: 2021-04-20 | Stop reason: SURG

## 2021-04-20 RX ORDER — ONDANSETRON 2 MG/ML
4 INJECTION INTRAMUSCULAR; INTRAVENOUS ONCE AS NEEDED
Status: DISCONTINUED | OUTPATIENT
Start: 2021-04-20 | End: 2021-04-20 | Stop reason: HOSPADM

## 2021-04-20 RX ORDER — LIDOCAINE HYDROCHLORIDE 20 MG/ML
INJECTION, SOLUTION INFILTRATION; PERINEURAL AS NEEDED
Status: DISCONTINUED | OUTPATIENT
Start: 2021-04-20 | End: 2021-04-20 | Stop reason: SURG

## 2021-04-20 RX ORDER — MIDAZOLAM HYDROCHLORIDE 1 MG/ML
INJECTION INTRAMUSCULAR; INTRAVENOUS AS NEEDED
Status: DISCONTINUED | OUTPATIENT
Start: 2021-04-20 | End: 2021-04-20 | Stop reason: SURG

## 2021-04-20 RX ORDER — ONDANSETRON 2 MG/ML
INJECTION INTRAMUSCULAR; INTRAVENOUS AS NEEDED
Status: DISCONTINUED | OUTPATIENT
Start: 2021-04-20 | End: 2021-04-20 | Stop reason: SURG

## 2021-04-20 RX ORDER — SODIUM CHLORIDE 9 MG/ML
1000 INJECTION, SOLUTION INTRAVENOUS CONTINUOUS
Status: DISCONTINUED | OUTPATIENT
Start: 2021-04-20 | End: 2021-04-20 | Stop reason: HOSPADM

## 2021-04-20 RX ORDER — OXYCODONE AND ACETAMINOPHEN 7.5; 325 MG/1; MG/1
1-2 TABLET ORAL EVERY 4 HOURS PRN
Qty: 10 TABLET | Refills: 0 | Status: SHIPPED | OUTPATIENT
Start: 2021-04-20 | End: 2022-01-01

## 2021-04-20 RX ORDER — LEVOFLOXACIN 250 MG/1
250 TABLET ORAL DAILY
Qty: 7 TABLET | Refills: 0 | Status: SHIPPED | OUTPATIENT
Start: 2021-04-20 | End: 2021-04-27

## 2021-04-20 RX ADMIN — FENTANYL CITRATE 25 MCG: 50 INJECTION INTRAMUSCULAR; INTRAVENOUS at 08:46

## 2021-04-20 RX ADMIN — ONDANSETRON 4 MG: 2 INJECTION INTRAMUSCULAR; INTRAVENOUS at 09:07

## 2021-04-20 RX ADMIN — SODIUM CHLORIDE 1000 ML: 9 INJECTION, SOLUTION INTRAVENOUS at 07:10

## 2021-04-20 RX ADMIN — FENTANYL CITRATE 25 MCG: 50 INJECTION INTRAMUSCULAR; INTRAVENOUS at 09:02

## 2021-04-20 RX ADMIN — FENTANYL CITRATE 25 MCG: 50 INJECTION INTRAMUSCULAR; INTRAVENOUS at 09:04

## 2021-04-20 RX ADMIN — MIDAZOLAM HYDROCHLORIDE 2 MG: 2 INJECTION, SOLUTION INTRAMUSCULAR; INTRAVENOUS at 08:34

## 2021-04-20 RX ADMIN — PROPOFOL 150 MG: 10 INJECTION, EMULSION INTRAVENOUS at 08:46

## 2021-04-20 RX ADMIN — DEXAMETHASONE SODIUM PHOSPHATE 4 MG: 4 INJECTION, SOLUTION INTRAMUSCULAR; INTRAVENOUS at 08:53

## 2021-04-20 RX ADMIN — LIDOCAINE HYDROCHLORIDE 100 MG: 20 INJECTION, SOLUTION INFILTRATION; PERINEURAL at 08:46

## 2021-04-20 RX ADMIN — CEFTRIAXONE 1 G: 1 INJECTION, POWDER, FOR SOLUTION INTRAMUSCULAR; INTRAVENOUS at 08:48

## 2021-04-20 NOTE — DISCHARGE INSTRUCTIONS
Discharge Instructions Following UroLift® Transprostatic Implant Treatment    GENERAL EXPECTATIONS  Some men may experience discomfort after the procedure. On occasion, some bloody discharge may be apparent from the penis. You may have soreness in the lower abdomen, and it may be uncomfortable to sit. You may experience the need to urinate more frequently and with greater urgency. These are all normal reactions to the procedure. It is important to take care of yourself the next couple of days to facilitate a speedy recovery. The following are some suggestions:  1. Have someone drive you home after the procedure.  2. Drink plenty of water.  3. Take your medication as prescribed.  4. If you have a catheter placed, do not engage in strenuous activity until your catheter has been removed. You may take a shower but avoid a bath while you have a catheter.      MEDICATIONS    When taking pain medications, you may experience dizziness or drowsiness. Do not drink alcohol or drive when you are taking these medications.    If you are given an antibiotic to prevent urinary tract infection, it is important to finish all medications as directed.      COMPLICATIONS  You should contact your physician at [phone] if you experience any of the followin. Temperature above 101.5° (taken by mouth).  2. Excessive urinary bleeding or bleeding from the penis.  3. Continuous bladder spasms.  4. Painful, swollen and/or inflated testicle(s) or scrotum.  5. Unable to void spontaneously or the indwelling catheter is not draining urine or is blocked.    If you need immediate attention, go to the hospital emergency room for treatment. Always call your physician before going to the emergency room. If your doctor suggests that you go to the emergency room or other facility for catheterization for inability to urinate, be sure to tell the facility personnel to use a Coudé (pronounced -day) tipped catheter.    **If you have a catheter  placed, see instructions for Care of Catheter and Instructions for Self Removal of Catheter

## 2021-04-20 NOTE — ANESTHESIA PROCEDURE NOTES
Airway  Date/Time: 4/20/2021 8:47 AM  End Time:4/20/2021 8:47 AM  Airway not difficult    General Information and Staff    CRNA: Zohra Tapia CRNA    Indications and Patient Condition  Indications for airway management: airway protection    Preoxygenated: yes  Mask difficulty assessment: 1 - vent by mask    Final Airway Details  Final airway type: supraglottic airway      Successful airway: I-gel  Size 4    Number of attempts at approach: 1  Assessment: lips, teeth, and gum same as pre-op

## 2021-04-20 NOTE — ANESTHESIA POSTPROCEDURE EVALUATION
Patient: Aman Sanchez    Procedure Summary     Date: 04/20/21 Room / Location: [facility] OR 02 /  [facility] OR    Anesthesia Start: 0838 Anesthesia Stop: 0916    Procedure: UROLIFT PROCEDURE (N/A ) Diagnosis:       Enlarged prostate with urinary obstruction      (Enlarged prostate with urinary obstruction [N40.1, N13.8])    Surgeons: Uday Horne MD Provider: Zia Potter MD    Anesthesia Type: general, MAC ASA Status: 3          Anesthesia Type: general, MAC    Vitals  No vitals data found for the desired time range.          Post Anesthesia Care and Evaluation    Patient location during evaluation: PACU  Patient participation: waiting for patient participation  Pain management: adequate  Anesthetic complications: No anesthetic complications  PONV Status: none  Cardiovascular status: hemodynamically stable  Respiratory status: spontaneous ventilation (LMA in place)  Hydration status: acceptable

## 2021-04-20 NOTE — ANESTHESIA PREPROCEDURE EVALUATION
" Anesthesia Evaluation     Patient summary reviewed and Nursing notes reviewed   history of anesthetic complications (Scopalmine patch applied pre-op.): PONV  NPO Solid Status: > 8 hours  NPO Liquid Status: > 2 hours           Airway   Mallampati: II  TM distance: >3 FB  Neck ROM: full  possible difficult intubation and Large neck circumference  Dental    (+) poor dentation        Pulmonary - normal exam    breath sounds clear to auscultation  (+) a smoker Current Smoked day of surgery, COPD moderate, shortness of breath, sleep apnea,     ROS comment: 2 L oxygen at night  Cardiovascular - normal exam    ECG reviewed  PT is on anticoagulation therapy  Rhythm: regular  Rate: normal    (+) hypertension, past MI  >12 months, CAD, cardiac stents (First stent place January 2007;Second stent for \"complete blockage\" January 2017.) SCHWAB, hyperlipidemia,   (-) angina, murmur    ROS comment: EKG:Sinus bradycardia with old IMI.    Sinus bradycardia  Inferior infarct (cited on or before 11-JUN-2014)  Abnormal ECG  When compared with ECG of 27-JUN-2017 12:30,  No significant change was found  Confirmed by HEATHERLY Normal sinus rhythm with sinus arrhythmia  Possible Left atrial enlargement  Left axis deviation  Right bundle branch block  Inferior infarct (cited on or before 11-JUN-2014)  Abnormal ECG  When compared with ECG of 16-OCT-2020 10:59,  Nonspecific T wave abnormality, worse in Anterolateral leads  Confirmed by GAUTAM CANO ([id number]) on 4/15/2021 1:37:23 PM· Left ventricular wall thickness is consistent with concentric hypertrophy.  · Estimated left ventricular EF = 63% Left ventricular ejection fraction appears to be 61 - 65%. Left ventricular systolic function is normal.  · Left ventricular diastolic function is consistent with (grade I) impaired relaxation.  · The right ventricular cavity is mildly dilated. Normal systolic function  · The left atrial cavity is mildly dilated.  · Trace to mild mitral valve regurgitation is " "present.  · Estimated right ventricular systolic pressure from tricuspid regurgitation is normal (<35 mmHg).       Neuro/Psych  (+) numbness (Cervical radiculopathy.), psychiatric history Depression,     GI/Hepatic/Renal/Endo    (+) obesity,   renal disease stones, diabetes mellitus (glu 154) type 2, thyroid problem hypothyroidism    Musculoskeletal         ROS comment: Lumbar surgery 2015.  Abdominal   (+) obese,    Substance History   (+) alcohol use (occ),      OB/GYN negative ob/gyn ROS         Other   arthritis,      (-) history of cancer          Anesthesia Evaluation     Patient summary reviewed and Nursing notes reviewed   history of anesthetic complications (Scopalmine patch applied pre-op.): PONV  NPO Solid Status: > 8 hours  NPO Liquid Status: > 4 hours     Airway   Mallampati: II  TM distance: >3 FB  Neck ROM: full  possible difficult intubation  Dental    (+) poor dentation    Pulmonary - normal exam    breath sounds clear to auscultation  (+) a smoker Former, COPD moderate, shortness of breath, sleep apnea,   Cardiovascular - normal exam    ECG reviewed  PT is on anticoagulation therapy  Rhythm: regular  Rate: normal    (+) hypertension, past MI  >12 months, CAD, cardiac stents (First stent place January 2007;Second stent for \"complete blockage\" January 2017.) more than 12 months ago hyperlipidemia    ROS comment: EKG:Sinus bradycardia with old IMI.    Neuro/Psych  (+) numbness (Cervical radiculopathy.), psychiatric history Depression,    GI/Hepatic/Renal/Endo    (+) morbid obesity, diabetes mellitus type 2 poorly controlled, hypothyroidism,     Musculoskeletal     (+) back pain, neck pain,       ROS comment: Lumbar surgery 2015.  Abdominal   (+) obese,    Substance History - negative use     OB/GYN negative ob/gyn ROS         Other   (+) arthritis                                     Anesthesia Plan    ASA 3     general     intravenous induction   Anesthetic plan and risks discussed with patient and " spouse/significant other.               Anesthesia Plan    ASA 3     general and MAC   total IV anesthesia  intravenous induction     Anesthetic plan, all risks, benefits, and alternatives have been provided, discussed and informed consent has been obtained with: patient.

## 2021-04-22 NOTE — OP NOTE
PROSTATIC URETHRAL LIFT  Procedure Note    Aman Sanchez  4/20/2021    Pre-op Diagnosis:   Enlarged prostate with urinary obstruction [N40.1, N13.8]    Post-op Diagnosis:     Post-Op Diagnosis Codes:     * Enlarged prostate with urinary obstruction [N40.1, N13.8]    Procedure(s):   UROLIFT PROCEDURE    Surgeon(s):  Uday Horne MD    Anesthesia: Monitored Anesthesia Care    Staff:   Circulator: Verna Wang RN  Scrub Person: Analilia Paz          Estimated Blood Loss: none    Specimens:                None      Drains:   Urethral Catheter Silicone 16 Fr. (Active)   Daily Indications Selected surgeries ( tract, abdomen) 04/20/21 1010   Securement Method Securing device 04/20/21 1010     Fs: Obstructing prostate    Complications: None    Indications: Same    Description of Procedure: Taken to the cystoscopy suite and placed in the dorsolithotomy position. A sterile prep and drape of genitalia was done in a routine fashion.  A # 22 [place] cystoscope was passed into the anterior urethra and prostate fossa lateral lobe obstruction was noted 35 to 40 g prostate. The first treatment site was the patient's right lateral lobe prostate proximal to the veramontanum. The distal tip of the delivery device was angled laterally approximately 20° at this position to compress the lateral lobe. The trigger was pulled thereby delivering a needle containing the implant through the prostate. The needle was then retracted allowing one end of the implant to be delivered to the capsular surface of the prostate. The implant was then tensioned to assure capsular seating and removal of the slack monofilament.  The device was then angled back toward the midline and slowly advanced proximally until our cystoscopic verification of the monofilament being centered in the delivery bay. The urethral end piece was then affixed to the monofilament thereby tailoring the size of the implant. Excess filament was then severed. The  delivery device was then advanced into the bladder. The delivery device was then replaced with cystoscope and bridge and the implant location and opening effect were confirmed cystoscopically. The same procedure was then repeated on the left side as well as one additional implants delivered proximal to these implants at the   2 o'clock position. Cystoscopy at this point revealed a persistent area of obstruction and two more implants were delivered in the mid prostate. A final cystoscopy was conducted first to inspect the location and state of each implant and second to confirm the presence of a continuous anterior channel. No bleeding was found and the patient had no catheter placed. Patient was transported to the recovery room having tolerated the procedure well. The patient was given a voiding trial in same day surgery and it was successful.  A total of 5 implants placed    Uday Horne MD     Date: 4/22/2021  Time: 06:21 CDT

## 2021-06-17 NOTE — PROGRESS NOTES
Chief complaint : Coronary artery disease      History of Present Illness very pleasant 66-year-old gentleman who comes for follow-up visit today.  He underwent cardiac catheterization and successful PCI to the right coronary artery.  Patient however continues to have dyspnea on minimal to moderate exertion.    Subjective      Review of Systems   Constitution: Negative. Negative for decreased appetite, diaphoresis, weakness, night sweats, weight gain and weight loss.   HENT: Negative for headaches, hearing loss, nosebleeds and sore throat.    Eyes: Negative.  Negative for blurred vision and photophobia.   Cardiovascular: Negative for chest pain, claudication, dyspnea on exertion, irregular heartbeat, leg swelling, palpitations, paroxysmal nocturnal dyspnea and syncope.   Respiratory: Negative for cough, hemoptysis, shortness of breath and wheezing.    Endocrine: Negative for cold intolerance, heat intolerance, polydipsia, polyphagia and polyuria.   Hematologic/Lymphatic: Negative.    Skin: Negative for color change, dry skin, flushing, itching and rash.   Musculoskeletal: Negative.  Negative for muscle cramps, muscle weakness and myalgias.   Gastrointestinal: Negative for abdominal pain, change in bowel habit, diarrhea, hematemesis, melena, nausea and vomiting.   Genitourinary: Negative for dysuria, frequency and hematuria.   Neurological: Negative for dizziness, focal weakness, light-headedness, loss of balance, numbness and seizures.   Psychiatric/Behavioral: Negative.  Negative for substance abuse, suicidal ideas and thoughts of violence.   Allergic/Immunologic: Negative.        Past Medical History   Diagnosis Date   • Acute bronchitis    • Anemia    • Breast lump    • Cervical radiculopathy    • COPD (chronic obstructive pulmonary disease)    • Coronary atherosclerosis    • Depressive disorder    • Dysuria    • Essential hypertension    • Flank pain    • Hematochezia    • Hematuria syndrome    • Hyperlipidemia   Maude Sanchez is a 34 y.o. female who is being evaluated via a billable telephone visit.      What phone number would you like to be contacted at? [phone]  How would you like to obtain your AVS? AVS Preference: Shanice.    Assessment & Plan     Severe episode of recurrent major depressive disorder, without psychotic features (H)  Plan to initiate patient on low-dose Prozac as previously discussed:  - FLUoxetine (PROZAC) 10 MG capsule  Dispense: 90 capsule; Refill: 3  - traZODone (DESYREL) 50 MG tablet  Dispense: 30 tablet; Refill: 0    Primary insomnia  Trial of as needed trazodone to help patient with sleep  - traZODone (DESYREL) 50 MG tablet  Dispense: 30 tablet; Refill: 0        Return in about 3 weeks (around 5/24/2021).    Kirsten Dickson MD  [facility]   Maude Sanchez is 34 y.o. and presents today for the following health issues   HPI   Patient is doing much better since we last spoke.  She never did  the hydroxyzine or the fluoxetine.  She now has medical insurance and is interested in starting medications.  Her anxiety is well controlled and her depression has improved although is still present.  She has trouble sleeping.  This insomnia has been with her for a long time.      Review of Systems  n/a      Objective       Vitals:  No vitals were obtained today due to virtual visit.    Physical Exam  n/a            Phone call duration: 5 minutes         • Hypoglycemia    • Kidney stone    • Mass of neck    • MI (myocardial infarction)    • Neck pain      sprain   • Osteoarthritis    • Type 2 diabetes mellitus        Family History   Problem Relation Age of Onset   • Lung cancer Mother    • Rectal cancer Sister        Codeine and Contrast dye     reports that he has quit smoking. His smoking use included Cigarettes. He smoked 0.25 packs per day. He has never used smokeless tobacco. He reports that he drinks alcohol. He reports that he does not use illicit drugs.    Objective     Vital Signs  Heart Rate:  [60] 60  BP: (136)/(70) 136/70    Physical Exam   Constitutional: He is oriented to person, place, and time. He appears well-developed and well-nourished.   HENT:   Head: Normocephalic and atraumatic.   Eyes: Conjunctivae and EOM are normal. Pupils are equal, round, and reactive to light.   Neck: Neck supple. No JVD present. Carotid bruit is not present. No tracheal deviation and no edema present.   Cardiovascular: Normal rate, regular rhythm, S1 normal, S2 normal, normal heart sounds and intact distal pulses.  Exam reveals no gallop, no S3, no S4 and no friction rub.    No murmur heard.  Pulmonary/Chest: Effort normal and breath sounds normal. He has no wheezes. He has no rales. He exhibits no tenderness.   Abdominal: Bowel sounds are normal. He exhibits no abdominal bruit and no pulsatile midline mass. There is no rebound and no guarding.   Musculoskeletal: Normal range of motion. He exhibits no edema.   Neurological: He is alert and oriented to person, place, and time.   Skin: Skin is warm and dry.   Psychiatric: He has a normal mood and affect.       Procedures    Assessment/Plan     Patient Active Problem List   Diagnosis   • Essential hypertension   • Coronary artery disease involving native coronary artery of native heart without angina pectoris   • Hyperlipidemia   • Tobacco abuse counseling     1. Essential hypertension  Patient brought me his blood  pressure readings from the month of February.  His blood pressure is running relatively high.  Mostly in the 150s and 140s systolic.  Plan:  · I will increase the Norvasc to 10 mg by mouth daily.  · We will continue with risk factor modification and low sodium diet.    2. Coronary artery disease involving native coronary artery of native heart without angina pectoris  January 9, 2017  patient had diagnostic coronary angiogram.  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  3.5 x 23 mm xience alpine stent was deployed to the distal right coronary artery with excellent results.  Plan:  · We will continue with dual antiplatelet treatment and risk factor modification.    3. Hyperlipidemia, unspecified hyperlipidemia type    Component      Latest Ref Rng 11/14/2016 2/24/2017   Total Cholesterol      0 - 199 mg/dL 124 (L) 146   Triglycerides      20 - 199 mg/dL 129 163   HDL Cholesterol      60 - 200 mg/dL 29.8 (L) 41 (L)   LDL Cholesterol      1 - 129 mg/dL 68 72   LDL/HDL Ratio      0.00 - 3.55  1.77     Continue with current dose of pravastatin.    4.  Dyspnea on exertion    · NYHA functional class III.  · We'll obtain a 2-D echocardiogram to assess patient's right cardiac chambers.  We will evaluate for possible pulmonary hypertension.  We may consider right-sided heart catheter at some point.  Currently patient is being followed by a pulmonologist as well.    I discussed the patients findings and my recommendations with patient.    Mariposa Zamorano MD  03/06/17  3:11 PM    EMR Dragon/Transcription disclaimer:   Much of this encounter note is an electronic transcription/translation of spoken language to printed text. The electronic translation of spoken language may permit erroneous, or at times, nonsensical words or phrases to be inadvertently transcribed; Although I have reviewed the note for such errors, some may still exist.

## 2021-11-04 ENCOUNTER — DOCUMENTATION (OUTPATIENT)
Dept: CARDIOLOGY | Facility: CLINIC | Age: 71
End: 2021-11-04

## 2021-11-04 NOTE — PROGRESS NOTES
Aman Sanchez is a 71-year-old male who has history of coronary artery disease, status post PCI to right coronary artery in 2017 and 2007.  Other medical issues include obesity, hypertension, COPD, diabetes mellitus, hypertension.    I understand that he is being considered for caudal HUAN on 11/23/2021.  Based on the information I have, he could go off Plavix for 7 days prior to the procedure.  This medication may be restarted when appropriate.

## 2021-11-11 ENCOUNTER — OFFICE VISIT (OUTPATIENT)
Dept: CARDIOLOGY | Facility: CLINIC | Age: 71
End: 2021-11-11

## 2021-11-11 VITALS
BODY MASS INDEX: 33.47 KG/M2 | HEART RATE: 85 BPM | TEMPERATURE: 97.8 F | DIASTOLIC BLOOD PRESSURE: 78 MMHG | HEIGHT: 69 IN | SYSTOLIC BLOOD PRESSURE: 134 MMHG | OXYGEN SATURATION: 95 % | WEIGHT: 226 LBS

## 2021-11-11 DIAGNOSIS — I25.10 CHRONIC CORONARY ARTERY DISEASE: Primary | ICD-10-CM

## 2021-11-11 DIAGNOSIS — E78.2 MIXED HYPERLIPIDEMIA: ICD-10-CM

## 2021-11-11 DIAGNOSIS — E66.01 MORBID OBESITY DUE TO EXCESS CALORIES (HCC): ICD-10-CM

## 2021-11-11 DIAGNOSIS — F17.200 TOBACCO USE DISORDER: ICD-10-CM

## 2021-11-11 DIAGNOSIS — I10 ESSENTIAL HYPERTENSION: ICD-10-CM

## 2021-11-11 PROCEDURE — 99213 OFFICE O/P EST LOW 20 MIN: CPT | Performed by: INTERNAL MEDICINE

## 2021-11-11 RX ORDER — LEVOFLOXACIN 250 MG/1
TABLET ORAL
COMMUNITY
End: 2022-01-01

## 2021-11-11 RX ORDER — HYDROCODONE BITARTRATE AND ACETAMINOPHEN 10; 325 MG/1; MG/1
TABLET ORAL
COMMUNITY
End: 2022-01-01

## 2021-11-11 RX ORDER — DOXYCYCLINE HYCLATE 100 MG
TABLET ORAL
COMMUNITY
End: 2022-01-01

## 2021-11-11 NOTE — PROGRESS NOTES
Aman Sanchez  71 y.o. male    1. Chronic coronary artery disease    2. Essential hypertension    3. Mixed hyperlipidemia    4. Morbid obesity due to excess calories (HCC)    5. Tobacco use disorder        History of Present Illness  Mr. Sanchez is a 71-year-old  male who is here for evaluation of his above-stated problems after long interval.  His history is remarkable for coronary artery disease, obesity, hypertension, COPD, diabetes mellitus, hyperlipidemia.    He has had coronary artery disease and previous PCI to right coronary artery as described below:  On January 9, 2017 he underwent PCI to right coronary artery with placement of 3.5 x 25 mm Xience Alpine stent to distal RCA.  In 2007 he underwent placement of 3.5 x 18 mm Cypher stent to mid RCA.  He has mild pulmonary hypertension documented by cardiac catheterization.     The patient denied any chest pain but does have chronic dyspnea on exertion secondary to his pulmonary status/COPD, obesity, sleep apnea and continued tobacco use.  We did have discussion about tobacco cessation but the patient does not wish to quit.    Echocardiogram in November 2020 showed:  · Left ventricular wall thickness is consistent with concentric hypertrophy.  · Estimated left ventricular EF = 63% Left ventricular ejection fraction appears to be 61 - 65%. Left ventricular systolic function is normal.  · Left ventricular diastolic function is consistent with (grade I) impaired relaxation.  · The right ventricular cavity is mildly dilated. Normal systolic function  · The left atrial cavity is mildly dilated.  · Trace to mild mitral valve regurgitation is present.  · Estimated right ventricular systolic pressure from tricuspid regurgitation is normal (<35 mmHg).     Clinical exam today did not reveal any bronchospasm or signs of congestive heart failure.    Allergies   Allergen Reactions   • Codeine Other (See Comments) and Unknown - High Severity     unknown          Past Medical History:   Diagnosis Date   • Acute bronchitis    • Anemia    • Breast lump    • Cervical radiculopathy    • COPD (chronic obstructive pulmonary disease) (HCC)    • Coronary atherosclerosis     2 stents, followed by Dr. Hargrove   • Depressive disorder    • Disease of thyroid gland    • Dysuria    • Essential hypertension    • Flank pain    • Hematochezia    • Hematuria syndrome    • Hyperlipidemia    • Hypoglycemia    • Kidney stone    • Kidney stones    • Mass of neck    • MI (myocardial infarction) (HCC)    • Neck pain     sprain   • Osteoarthritis    • PONV (postoperative nausea and vomiting)    • Sleep apnea     using c-pap   • Sleep apnea    • Type 2 diabetes mellitus (HCC)          Past Surgical History:   Procedure Laterality Date   • BACK SURGERY     • CARDIAC CATHETERIZATION N/A 8/23/2017    Procedure: Right Heart Cath;  Surgeon: Mariposa Zamorano MD;  Location: [facility] INVASIVE LOCATION;  Service:    • CATARACT EXTRACTION WITH INTRAOCULAR LENS IMPLANT Bilateral    • CHOLECYSTECTOMY     • CYSTOSCOPY  08/29/2014    Left ESWL, cysto and stent removal   • CYSTOSCOPY W/ LITHOLAPAXY / EHL  08/15/2014    Left ESWL   • CYSTOSCOPY, URETEROSCOPY, RETROGRADE PYELOGRAM, STENT INSERTION Left 6/30/2017    Procedure: CYSTOSCOPY, LEFT URETEROSCOPY, RETROGRADE PYELOGRAM, HOLMIUM LASER, STENT INSERTION;  Surgeon: Uday Horne MD;  Location: [facility];  Service:    • CYSTOSCOPY, URETEROSCOPY, RETROGRADE PYELOGRAM, STENT INSERTION Left 9/6/2017    Procedure: CYSTOSCOPY, LEFT URETEROSCOPY, RETROGRADE PYELOGRAM, HOLMIUM LASER, STENT INSERTION;  Surgeon: Uday Horne MD;  Location: [facility];  Service:    • CYSTOSCOPY, URETEROSCOPY, RETROGRADE PYELOGRAM, STENT INSERTION Left 1/17/2018    Procedure: CYSTOSCOPY LEFT URETEROSCOPY RETROGRADE PYELOGRAM HOLMIUM LASER STENT INSERTION;  Surgeon: Uday Horne MD;  Location: [facility];  Service:    • CYSTOSCOPY, URETEROSCOPY, RETROGRADE PYELOGRAM, STENT  INSERTION Left 7/25/2018    Procedure: CYSTOSCOPY URETEROSCOPY RETROGRADE PYELOGRAM HOLMIUM LASER STENT INSERTION;  Surgeon: Uday Horne MD;  Location: [facility];  Service: Urology   • EYE SURGERY      eye lids lifted   • HIP SURGERY     • INJECTION OF MEDICATION  11/11/2013    Kenalog   • INJECTION OF MEDICATION  01/16/2014    Toradol   • JOINT REPLACEMENT Right 2012    total hip replacement   • SKIN GRAFT      on his foot; removed skin from hip   • TONSILLECTOMY     • TRANSESOPHAGEAL ECHOCARDIOGRAM (ОЛЬГА)  07/21/2014    with color flow-Normal LV systolic function with Ef of 60-65%/ Grad1 diastolic dysfunction of the left ventricular myocardium. No evidence of pericardial effusion         Family History   Problem Relation Age of Onset   • Lung cancer Mother    • Rectal cancer Sister          Social History     Socioeconomic History   • Marital status:    Tobacco Use   • Smoking status: Current Every Day Smoker     Packs/day: 0.50     Years: 50.00     Pack years: 25.00     Types: Cigarettes   • Smokeless tobacco: Never Used   Vaping Use   • Vaping Use: Never used   Substance and Sexual Activity   • Alcohol use: Yes     Comment: occasionally   • Drug use: No   • Sexual activity: Defer         Current Outpatient Medications   Medication Sig Dispense Refill   • aspirin 81 MG EC tablet Take 81 mg by mouth Daily. Last dose 1/8/18     • cetirizine (zyrTEC) 10 MG tablet Take 10 mg by mouth Daily.     • clopidogrel (PLAVIX) 75 MG tablet Take 1 tablet by mouth Daily. 90 tablet 1   • finasteride (PROSCAR) 5 MG tablet Take 1 tablet by mouth Every Night. 90 tablet 1   • FLUoxetine (PROzac) 20 MG capsule Take 20 mg by mouth Daily.     • fluticasone (FLONASE) 50 MCG/ACT nasal spray 2 sprays into each nostril At Night As Needed for Rhinitis. 3 bottle 1   • glucose blood test strip One touch reli-on glucometer 360 each 1   • HYDROcodone-acetaminophen (NORCO)  MG per tablet hydrocodone 10 mg-acetaminophen 325 mg  "tablet   Take 1 tablet every 4 hours by oral route as needed.     • isosorbide mononitrate (IMDUR) 30 MG 24 hr tablet Take 1 tablet by mouth Daily. 90 tablet 1   • levoFLOXacin (LEVAQUIN) 250 MG tablet levofloxacin 250 mg tablet     • levothyroxine (SYNTHROID) 50 MCG tablet Take 1 tablet by mouth Daily. 90 tablet 1   • lisinopril-hydrochlorothiazide (PRINZIDE,ZESTORETIC) 20-25 MG per tablet Take 1 tablet by mouth Daily. 90 tablet 1   • metFORMIN (GLUCOPHAGE) 500 MG tablet Take 1 tablet by mouth Daily With Breakfast. 90 tablet 1   • nitroglycerin (NITROSTAT) 0.4 MG SL tablet Place 1 tablet under the tongue Every 5 (Five) Minutes As Needed for chest pain. 100 tablet 11   • ONE TOUCH LANCETS misc Use as directed test 6 times daily 200 each 1   • pravastatin (PRAVACHOL) 80 MG tablet Take 1 tablet by mouth Every Night. 90 tablet 1   • tamsulosin (FLOMAX) 0.4 MG capsule 24 hr capsule Take 2 capsules by mouth Daily. 180 capsule 1   • traZODone (DESYREL) 150 MG tablet Take 150 mg by mouth Every Night.     • vitamin B-12 (CYANOCOBALAMIN) 1000 MCG tablet Take 1,000 mcg by mouth Daily.     • albuterol (PROVENTIL HFA;VENTOLIN HFA) 108 (90 Base) MCG/ACT inhaler Inhale 2 puffs Every 4 (Four) Hours As Needed for Wheezing. 8 g 5   • albuterol (PROVENTIL) (2.5 MG/3ML) 0.083% nebulizer solution Take 2.5 mg by nebulization Every 4 (Four) Hours As Needed for Wheezing. 180 vial 1   • doxycycline (VIBRAMYICN) 100 MG tablet doxycycline hyclate 100 mg tablet     • guaiFENesin (MUCINEX) 600 MG 12 hr tablet Take 600 mg by mouth 2 (Two) Times a Day.     • oxyCODONE-acetaminophen (PERCOCET) 7.5-325 MG per tablet Take 1-2 tablets by mouth Every 4 (Four) Hours As Needed (Pain). 10 tablet 0     No current facility-administered medications for this visit.         OBJECTIVE    /78 (BP Location: Left arm, Patient Position: Sitting, Cuff Size: Adult)   Pulse 85   Temp 97.8 °F (36.6 °C)   Ht 175.3 cm (69\")   Wt 103 kg (226 lb)   SpO2 95%   " BMI 33.37 kg/m²         Review of Systems : The following systems are reviewed and no changes noted    Constitutional:  Denies recent weight loss, weight gain, fever or chills, no change in exercise tolerance     HENT:  Denies any hearing loss, epistaxis, hoarseness, or difficulty speaking.     Eyes: Wears eyeglasses or contact lenses     Respiratory:  Dyspnea with exertion,no cough, wheezing, or hemoptysis.     Cardiovascular: Negative for palpations, chest pain, orthopnea, PND    Gastrointestinal:  Denies change in bowel habits, dyspepsia, ulcer disease, hematochezia, or melena.     Endocrine: Negative for cold intolerance, heat intolerance, polydipsia, polyphagia and polyuria.     Genitourinary: Negative.      Musculoskeletal: DJD    Skin:  Denies any change in hair or nails, rashes, or skin lesions.     Allergic/Immunologic: Negative.  Negative for environmental allergies, food allergies and immunocompromised state.     Neurological:  Denies any history of recurrent headaches, strokes, TIA, or seizure disorder.     Hematological: Denies any food allergies, seasonal allergies, bleeding disorders, or lymphadenopathy.     Psychiatric/Behavioral: history of depression, no substance abuse, or change in cognitive function.         Physical Exam : The following systems are reviewed and no changes noted    Constitutional: Cooperative, alert and oriented, in no acute distress.     HENT:   Head: Normocephalic, normal hair patterns, no masses or tenderness.  Ears, Nose, and Throat: No gross abnormalities. No pallor or cyanosis.   Eyes: EOMS intact, PERRL, conjunctivae and lids unremarkable. Fundoscopic exam and visual fields not performed.   Neck: No palpable masses or adenopathy, no thyromegaly, no JVD, carotid pulses are full and equal bilaterally and without  Bruits.     Cardiovascular: Regular rhythm, S1 and S2 normal, no S3 or S4. Apical impulse not displaced. No murmurs, gallops, or rubs detected.      Pulmonary/Chest: Chest: normal symmetry, normal respiratory excursion, no intercostal retraction, no use of accessory muscles.            Pulmonary: Normal breath sounds. No rales or ronchi.    Abdominal: Abdomen soft, bowel sounds normoactive, no masses, no hepatosplenomegaly, non-tender, no bruits.     Musculoskeletal: No deformities, clubbing, cyanosis, erythema, or edema observed.     Neurological: No gross motor or sensory deficits noted, affect appropriate, oriented to time, person, place.     Skin: Warm and dry to the touch, no apparent skin lesions or masses noted.     Psychiatric: He has a normal mood and affect. His behavior is normal. Judgment and thought content normal.         Procedures      Lab Results   Component Value Date    WBC 9.2 04/19/2019    HGB 16.1 04/19/2019    HCT 49 04/19/2019    MCV 90 04/19/2019     04/19/2019     Lab Results   Component Value Date    GLUCOSE 154 (H) 04/15/2021    BUN 14 07/08/2021    CREATININE 1.0 07/08/2021    EGFRIFNONA 78 04/15/2021    EGFRIFAFRI 90 07/08/2021    BCR 12.5 04/15/2021    CO2 19 (L) 07/08/2021    CALCIUM 9.6 07/08/2021    ALBUMIN 4.2 07/08/2021    AST 21 07/08/2021    ALT 31 07/08/2021     Lab Results   Component Value Date    CHOL 127 01/07/2021    CHOL 141 07/15/2020    CHOL 119 04/19/2019     Lab Results   Component Value Date    TRIG 130 01/07/2021    TRIG 129 07/15/2020    TRIG 160 (H) 04/19/2019     Lab Results   Component Value Date    HDL 33 01/07/2021    HDL 31 (L) 07/15/2020    HDL 30 (L) 04/19/2019     No components found for: LDLCALC  Lab Results   Component Value Date    LDL 74 01/07/2021    LDL 95 07/15/2020    LDL 67 04/19/2019     No results found for: HDLLDLRATIO  No components found for: CHOLHDL  Lab Results   Component Value Date    HGBA1C 7.4 (H) 07/08/2021     Lab Results   Component Value Date    TSH 1.86 06/23/2018    U5NEWOT 9.88 11/30/2017           ASSESSMENT AND PLAN  Mr. Sanchez is stable at this time with no  clinical evidence of angina, arrhythmia or congestive heart failure. Smoking cessation was stressed.    Antiplatelet therapy with aspirin and Plavix, antianginal therapy with isosorbide mononitrate, antihypertensive therapy with lisinopril HCTZ, lipid-lowering therapy with pravastatin have been continued.    I understand that he is being considered for caudal HUAN on 11/23/2021.  Based on the information I have, he could go off Aspirin and Plavix for 7 days prior to the procedure.  This medication may be restarted when appropriate, postprocedure.      Diagnoses and all orders for this visit:    1. Chronic coronary artery disease (Primary)    2. Essential hypertension    3. Mixed hyperlipidemia    4. Morbid obesity due to excess calories (HCC)    5. Tobacco use disorder        Patient's Body mass index is 33.37 kg/m². BMI is above normal parameters. Recommendations include: exercise counseling and nutrition counseling.      Aman Sanchez is a current cigarettes user.  He currently smokes 1 pack of cigarettes per day for a duration of 50 years. I have educated him on the risk of diseases from using tobacco products such as cancer, COPD and heart diease.     I advised him to quit and he is not willing to quit.    I spent 3  minutes counseling the patient.      Homero Hargrove MD  11/11/2021  17:35 CST

## 2021-11-29 ENCOUNTER — OFFICE VISIT (OUTPATIENT)
Dept: SLEEP MEDICINE | Facility: HOSPITAL | Age: 71
End: 2021-11-29

## 2021-11-29 VITALS
DIASTOLIC BLOOD PRESSURE: 86 MMHG | HEART RATE: 83 BPM | HEIGHT: 69 IN | WEIGHT: 221 LBS | OXYGEN SATURATION: 91 % | BODY MASS INDEX: 32.73 KG/M2 | SYSTOLIC BLOOD PRESSURE: 158 MMHG

## 2021-11-29 DIAGNOSIS — J44.9 OSA AND COPD OVERLAP SYNDROME (HCC): ICD-10-CM

## 2021-11-29 DIAGNOSIS — G47.33 OSA AND COPD OVERLAP SYNDROME (HCC): ICD-10-CM

## 2021-11-29 DIAGNOSIS — G47.33 OBSTRUCTIVE SLEEP APNEA, ADULT: Primary | ICD-10-CM

## 2021-11-29 PROCEDURE — 99213 OFFICE O/P EST LOW 20 MIN: CPT | Performed by: NURSE PRACTITIONER

## 2021-11-29 NOTE — PATIENT INSTRUCTIONS
Thinking about quitting smoking?  You can always call 1-800-QUIT-NOW    Additional Resources to Help You Quit Smoking:  Smokefree.gov  A website dedicated to helping you quit smoking with tailored resources for women, veterans, teens, [place] speakers, and people over 60 years old.    YouCanQuit2  https://www.D2Sq2.org/  A quit-smoking support website for  personnel and their families, sponsored by the Department of Defense.          Quitting smoking can be hard, but it is possible. In fact, every time you put out a cigarette is a new chance to try quitting again, according to the U.S. Food and Drug Administration’s newest tobacco education campaign, “Every Try Counts.”     If you want to quit--almost 70 percent of adult smokers say they do--you may want to use a “smoking cessation” product proven to help. Data has shown that using FDA-approved cessation medicine can double your chance of quitting successfully.    Some products contain nicotine as an active ingredient and others do not. These products include over-the-counter (OTC) options like skin patches, lozenges, and gum, as well as prescription medicines.    Smoking cessation products are intended to help you quit smoking. They are regulated through the FDA’s Center for Drug Evaluation and Research, which ensures that the products are safe and effective and that their benefits outweigh any known associated risks.    The Benefits of Quitting Smoking  No matter how much you smoke--or for how long--quitting will benefit you.    Not only will you lower your risk of getting various cancers, including lung cancer, you’ll also reduce your chances of having heart disease, a stroke, emphysema, and other serious diseases. Quitting also will lower the risk of heart disease and lung cancer in nonsmokers who otherwise would be exposed to your secondhand smoke.    Although there are benefits to quitting at any age, it is important to quit as soon as possible so your  body can begin to heal from the damage caused by smoking. For instance, 12 hours after you quit smoking the carbon monoxide level in your blood drops to normal. Carbon monoxide is harmful because it displaces oxygen in the blood and deprives your heart, brain, and other vital organs of oxygen.    What To Know About Smoking Cessation Products  Understanding how smoking cessation products work--and what side effects they may cause--can help you determine which product may be best for you.    If you’re considering one of these products, reading labels and talking to your pharmacist and other health care providers are good first steps to take.    You also can check the FDA’s website for more information on each product at Drugs@FDA, where you can search for each product by name.    And remember to weigh each product’s benefits and risks, among other considerations.    About Nicotine Replacement Therapy (NRT)  Nicotine is the substance primarily responsible for causing addiction to tobacco products. Tobacco users who are addicted to nicotine are used to having nicotine in their bodies.    As you try to quit smoking, you may have symptoms of nicotine withdrawal. When you quit, this withdrawal may cause symptoms like cravings, or urges, to smoke; depression; trouble sleeping; irritability; anxiety; and increased appetite.    Nicotine withdrawal can discourage some smokers from continuing with a quit attempt. But the FDA has approved several smoking cessation products designed to help users gradually withdraw from smoking (that is, “wean” themselves from smoking) by using specific amounts of nicotine that decrease over time. This type of product is called a “nicotine replacement therapy” or NRT. It supplies nicotine in controlled amounts while sparing you from other chemicals found in tobacco products.    NRTs are available over the counter and by prescription. You should generally use them only for a short time to help you  manage nicotine cravings and withdrawal. However, the FDA recognizes that some people may need to use these products longer to stay smoke-free. Talk to your health care provider to determine the best course of treatment for you.    Over-the-counter NRTs are approved for sale to people age 18 and older. They are available under various brand names and sometimes as generic products. They include:    Skin patches (also called “transdermal nicotine patches”). These patches are placed on the skin, similar to how you would apply an adhesive bandage.  Chewing gum (also called “nicotine gum”). This gum must be chewed according to the labeled instructions to be effective.  Lozenges (also called “nicotine lozenges”). You use these products by dissolving them in your mouth.  For over-the-counter products, it’s important to follow the instructions on the Drug Facts Label (DFL) and to read the enclosed User’s Guide for complete directions and other important information. Ask your health care provider if you have questions.    Currently, prescription nicotine replacement therapy is available only under the brand name Nicotrol, and is available both as a nasal spray and an oral inhaler. The products are FDA-approved only for use by adults.    If you are under age 18 and want to quit smoking, talk to a health care professional about whether you should use nicotine replacement therapies.    Important Advice for People Considering Nicotine Replacement Therapy  Women who are pregnant or breastfeeding should talk to their health care providers and use nicotine replacement products only if the health care providers approve.    Also talk to your health care provider before using these products if you have:    diabetes, heart disease, asthma, or stomach ulcers;  had a recent heart attack;  high blood pressure that is not controlled with medicine;  a history of irregular heartbeat;  or been prescribed medication to help you quit  smoking.  If you take prescription medication for depression or asthma, tell your health care provider if you are quitting smoking because he or she may need to change your prescription dose.    Stop using a nicotine replacement product and call your health care professional if you have any of the following symptoms:  nausea;  dizziness;  weakness;  vomiting;  fast or irregular heartbeat;  mouth problems with the lozenge or gum;  or redness or swelling of the skin around the patch that does not go away.  About Prescription Cessation Medicines Without Nicotine  The FDA has approved two smoking cessation products that do not contain nicotine. They are Chantix (varenicline tartrate) and Zyban (buproprion hydrochloride). Both are available in tablet form and by prescription only.    Chantix acts at sites in the brain affected by nicotine by reducing the rewarding effects of nicotine. The precise way that Zyban helps with smoking cessation is unknown.    As with other prescription products, the FDA has evaluated these medicines and found that the benefits outweigh the risks. For users taking these products, risks include changes in behavior, depressed mood, hostility, aggression, and suicidal thoughts or actions.    The most common side effects of Chantix include nausea; constipation; gas; vomiting; and trouble sleeping or vivid, unusual, or strange dreams. Chantix also may change how you react to alcohol, so talk to your health care provider about your drinking habits (if you drink alcohol) and whether these habits need to change. Chantix is not recommended for people under the age of 18.    The most commonly observed side effects consistently associated with the use of Zyban are dry mouth and insomnia.    Because Zyban contains the same active ingredient as the antidepressant Wellbutrin (bupropion), the FDA encourages people who use Zyban--and those who are considering it--to talk to their health care providers about  the risks of treatment with antidepressant medicines. Zyban has not been studied in children under the age of 18 and is not approved for use in children and teenagers.    Note: If your health care provider prescribes Chantix or Zyban, please read the product’s patient medication guide in its entirety. These guides offer important information on side effects, risks, warnings, product ingredients, and what you should talk about with your health care provider before taking the products.      In addition to the above resources, talk to your doctor about strategies for quitting that may be right for you.

## 2021-11-29 NOTE — PROGRESS NOTES
Sleep Clinic Follow Up    Date: 2021  Primary Care Provider: Emile Wynn MD    Last office visit: 2020 (I reviewed this note)    CC: Follow up: ALLEN on CPAP      Interim History:  Since the last visit:    1) moderate ALLEN -  Aman Sanchez has remained compliant with CPAP. He denies mask and machine issues, dry mouth, headaches, or pressures intolerance. He denies abnormal dreams, sleep paralysis, nasal congestion, URI sx.    2) Patient denies RLS symptoms.     Sleep Testin. PSG on 2014, AHI of 28.1   2. CPAP titration recommended 14 cm H2O   3. Currently on 12 cm H2O with 2L O2 bled in    PAP Data:    Time frame: 2020-2021   Compliance: 100%  Average use on days used: 10 hrs 1 min  Percent of days with usage greater than or equal to 4 hours: 99.7%  PAP range: 12 cm H2O  Average 90% pressure: 12 cmH2O  Leak: 23 minutes  Average AHI: 1.4 events/hr  Mask type: Full face mask  DME: [facility]    Bed time: 2200  Sleep latency: 1 minutes  Number of times awakens during the night: 0-1  Wake time: 5469-3212  Estimated total sleep time at night: 10 hours  Caffeine intake: 2-3 cups of coffee, 0 cups of tea, and 0 sodas per day  Alcohol intake: 0 drinks per week  Nap time: rare if not resting at night   Sleepiness with Driving: none       PMHx, FH, SH reviewed and pertinent changes are: Having shots in back for pain. Issues with kidney stones - possible upcoming surgery.    REVIEW OF SYSTEMS:   Negative for chest pain, SOA, fever, chills, cough, N/V/D, abdominal pain.    Smokin-2 ppd    Aman Sanchez  reports that he has been smoking cigarettes. He has a 25.00 pack-year smoking history. He has never used smokeless tobacco.. I have educated him on the risk of diseases from using tobacco products such as cancer, COPD and heart disease.     I advised him to quit and he is not willing to quit.    I spent 3.5 minutes counseling the patient.      Exam:  Vitals:    21 1101    BP: 158/86   Pulse: 83   SpO2: 91%           11/29/21  1101   Weight: 100 kg (221 lb)     Body mass index is 32.62 kg/m². Patient's Body mass index is 32.62 kg/m². indicating that he is obese (BMI >30). Obesity-related health conditions include the following: obstructive sleep apnea, hypertension, dyslipidemias and osteoarthritis. Obesity is unchanged. BMI is is above average; BMI management plan is completed. I recommend portion control and increasing exercise.      Gen:                No distress, conversant, pleasant, appears stated age, alert, oriented  Eyes:               Anicteric sclera, moist conjunctiva, no lid lag                           PERRL, EOMI   Heent:             NC/AT                          Oropharynx clear                          Normal hearing  Lungs:             Normal effort, non-labored breathing                          Clear to auscultation bilaterally          CV:                  Normal S1/S2, no murmur                          No lower extremity edema  ABD:               Soft, rounded, non-distended                          Normal bowel sounds                    Psych:             Appropriate affect  Neuro:             CN 2-12 appear intact    Past Medical History:   Diagnosis Date   • Acute bronchitis    • Anemia    • Breast lump    • Cervical radiculopathy    • COPD (chronic obstructive pulmonary disease) ([facility])    • Coronary atherosclerosis     2 stents, followed by Dr. Hargrove   • Depressive disorder    • Disease of thyroid gland    • Dysuria    • Essential hypertension    • Flank pain    • Hematochezia    • Hematuria syndrome    • Hyperlipidemia    • Hypoglycemia    • Kidney stone    • Kidney stones    • Mass of neck    • MI (myocardial infarction) ([facility])    • Neck pain     sprain   • Osteoarthritis    • PONV (postoperative nausea and vomiting)    • Sleep apnea     using c-pap   • Sleep apnea    • Type 2 diabetes mellitus ([facility])        Current Outpatient Medications:   •   albuterol (PROVENTIL HFA;VENTOLIN HFA) 108 (90 Base) MCG/ACT inhaler, Inhale 2 puffs Every 4 (Four) Hours As Needed for Wheezing., Disp: 8 g, Rfl: 5  •  albuterol (PROVENTIL) (2.5 MG/3ML) 0.083% nebulizer solution, Take 2.5 mg by nebulization Every 4 (Four) Hours As Needed for Wheezing., Disp: 180 vial, Rfl: 1  •  aspirin 81 MG EC tablet, Take 81 mg by mouth Daily. Last dose 1/8/18, Disp: , Rfl:   •  cetirizine (zyrTEC) 10 MG tablet, Take 10 mg by mouth Daily., Disp: , Rfl:   •  clopidogrel (PLAVIX) 75 MG tablet, Take 1 tablet by mouth Daily., Disp: 90 tablet, Rfl: 1  •  doxycycline (VIBRAMYICN) 100 MG tablet, doxycycline hyclate 100 mg tablet, Disp: , Rfl:   •  finasteride (PROSCAR) 5 MG tablet, Take 1 tablet by mouth Every Night., Disp: 90 tablet, Rfl: 1  •  FLUoxetine (PROzac) 20 MG capsule, Take 20 mg by mouth Daily., Disp: , Rfl:   •  fluticasone (FLONASE) 50 MCG/ACT nasal spray, 2 sprays into each nostril At Night As Needed for Rhinitis., Disp: 3 bottle, Rfl: 1  •  glucose blood test strip, One touch reli-on glucometer, Disp: 360 each, Rfl: 1  •  guaiFENesin (MUCINEX) 600 MG 12 hr tablet, Take 600 mg by mouth 2 (Two) Times a Day., Disp: , Rfl:   •  HYDROcodone-acetaminophen (NORCO)  MG per tablet, hydrocodone 10 mg-acetaminophen 325 mg tablet  Take 1 tablet every 4 hours by oral route as needed., Disp: , Rfl:   •  isosorbide mononitrate (IMDUR) 30 MG 24 hr tablet, Take 1 tablet by mouth Daily., Disp: 90 tablet, Rfl: 1  •  levoFLOXacin (LEVAQUIN) 250 MG tablet, levofloxacin 250 mg tablet, Disp: , Rfl:   •  levothyroxine (SYNTHROID) 50 MCG tablet, Take 1 tablet by mouth Daily., Disp: 90 tablet, Rfl: 1  •  lisinopril-hydrochlorothiazide (PRINZIDE,ZESTORETIC) 20-25 MG per tablet, Take 1 tablet by mouth Daily., Disp: 90 tablet, Rfl: 1  •  metFORMIN (GLUCOPHAGE) 500 MG tablet, Take 1 tablet by mouth Daily With Breakfast., Disp: 90 tablet, Rfl: 1  •  nitroglycerin (NITROSTAT) 0.4 MG SL tablet, Place 1 tablet  under the tongue Every 5 (Five) Minutes As Needed for chest pain., Disp: 100 tablet, Rfl: 11  •  ONE TOUCH LANCETS mis, Use as directed test 6 times daily, Disp: 200 each, Rfl: 1  •  oxyCODONE-acetaminophen (PERCOCET) 7.5-325 MG per tablet, Take 1-2 tablets by mouth Every 4 (Four) Hours As Needed (Pain)., Disp: 10 tablet, Rfl: 0  •  pravastatin (PRAVACHOL) 80 MG tablet, Take 1 tablet by mouth Every Night., Disp: 90 tablet, Rfl: 1  •  tamsulosin (FLOMAX) 0.4 MG capsule 24 hr capsule, Take 2 capsules by mouth Daily., Disp: 180 capsule, Rfl: 1  •  traZODone (DESYREL) 150 MG tablet, Take 150 mg by mouth Every Night., Disp: , Rfl:   •  vitamin B-12 (CYANOCOBALAMIN) 1000 MCG tablet, Take 1,000 mcg by mouth Daily., Disp: , Rfl:     WBC   Date Value Ref Range Status   04/19/2019 9.2 4.0 - 11.0 10*3/uL Final     RBC   Date Value Ref Range Status   04/19/2019 5.47 4.73 - 5.49 10*6/uL Final     Hemoglobin   Date Value Ref Range Status   04/19/2019 16.1 14.4 - 16.6 g/dL Final     Hematocrit   Date Value Ref Range Status   04/19/2019 49 42.9 - 49.1 % Final   01/23/2018 50.1 42 - 54 % Final     MCV   Date Value Ref Range Status   04/19/2019 90 85 - 95 fl Final     MCH   Date Value Ref Range Status   04/19/2019 29.4 28.0 - 32.0 pg Final     MCHC   Date Value Ref Range Status   04/19/2019 32.9 32.0 - 34.0 g/dL Final     RDW   Date Value Ref Range Status   04/19/2019 46.1 37 - 54 fl Final   01/23/2018 14.2 11.5 - 14.5 % Final     RDW-SD   Date Value Ref Range Status   01/08/2018 43.7 35.1 - 43.9 fl Final     MPV   Date Value Ref Range Status   04/19/2019 10 8.0 - 12.0 fl Final   01/23/2018 8.0 7.4 - 10.4 fL Final     Platelets   Date Value Ref Range Status   04/19/2019 231 150 - 450 10*3/uL Final   01/23/2018 253 150 - 450 10*9/L Final     Neutrophil Rel %   Date Value Ref Range Status   01/23/2018 54.9 35 - 80 % Final     Lymphocyte Rel %   Date Value Ref Range Status   04/19/2019 42.5 5 - 55 % Final     Monocyte Rel %   Date  "Value Ref Range Status   04/19/2019 8.5 (H) 3 - 8 % Final     Eosinophil Rel %   Date Value Ref Range Status   04/19/2019 3.3 0 - 5 % Final     Basophil Rel %   Date Value Ref Range Status   04/19/2019 0.7 0 - 2 % Final     Immature Grans %   Date Value Ref Range Status   04/19/2019 44.8 (L) 45 - 80 % Final     Neutrophils, Absolute   Date Value Ref Range Status   01/08/2018 8.75 (H) 2.00 - 8.60 10*3/mm3 Final     Lymphocytes Absolute   Date Value Ref Range Status   01/23/2018 3.3 0.8 - 4.8 10*9/L Final     Monocytes Absolute   Date Value Ref Range Status   01/23/2018 1.1 (H) 0.2 - 0.9 10*9/L Final     Eosinophil Abs   Date Value Ref Range Status   01/23/2018 0.6 0.0 - 0.8 10*9/L Final     Basophils Absolute   Date Value Ref Range Status   01/23/2018 0.1 0.0 - 0.1 10*9/L Final     Immature Grans, Absolute   Date Value Ref Range Status   01/08/2018 0.04 (H) 0.00 - 0.02 10*3/mm3 Final     nRBC   Date Value Ref Range Status   04/19/2019 0 0.0 - 0.0 % Final       Lab Results   Component Value Date    GLUCOSE 154 (H) 04/15/2021    BUN 14 07/08/2021    CREATININE 1.0 07/08/2021    EGFRIFNONA 78 04/15/2021    EGFRIFAFRI 90 07/08/2021    BCR 12.5 04/15/2021    K 4.0 07/08/2021    CO2 19 (L) 07/08/2021    CALCIUM 9.6 07/08/2021    ALBUMIN 4.2 07/08/2021    AST 21 07/08/2021    ALT 31 07/08/2021       Assessment and Plan:    1. Obstructive sleep apnea, ALLEN/COPD overlap syndrome - Established, stable (1)  1. Compliant with PAP therapy with 2L O2 bled into machine  2. Continue PAP as prescribed - patient states that he registered his CPAP machine and it was not affected by the safety recall  3. Script for PAP supplies  4. Return to clinic in 1 year with compliance report unless change in symptoms in interim period  2. Nicotine dependence with complication - stable chronic illness  1. Given [employer] \"Thinking of quitting smoking\" flyer and referred patient to call 7-009-QUIT-NOW. Smoking and tobacco use cessation counseling " visit was 3.5 minutes.       I spent 20 minutes caring for Aman on this date of service. This time includes time spent by me in the following activities: preparing for the visit, reviewing tests, obtaining and/or reviewing a separately obtained history, performing a medically appropriate examination and/or evaluation , counseling and educating the patient/family/caregiver, documenting information in the medical record and care coordination; discussing PAP therapy, PAP compliance, PAP maintenance and Tobacco cessation    RTC in 12 months. Patient agrees to return sooner if changes in symptoms.      This document has been electronically signed by BRIAN Ceron on November 29, 2021 11:05 CST          CC: Emile Wynn MD          No ref. provider found

## 2021-11-30 ENCOUNTER — TELEPHONE (OUTPATIENT)
Dept: CARDIOLOGY | Facility: CLINIC | Age: 71
End: 2021-11-30

## 2022-01-01 ENCOUNTER — OFFICE VISIT (OUTPATIENT)
Dept: SLEEP MEDICINE | Facility: HOSPITAL | Age: 72
End: 2022-01-01

## 2022-01-01 ENCOUNTER — OFFICE VISIT (OUTPATIENT)
Dept: CARDIOLOGY | Facility: CLINIC | Age: 72
End: 2022-01-01

## 2022-01-01 ENCOUNTER — APPOINTMENT (OUTPATIENT)
Dept: CT IMAGING | Facility: HOSPITAL | Age: 72
End: 2022-01-01

## 2022-01-01 ENCOUNTER — HOSPITAL ENCOUNTER (EMERGENCY)
Facility: HOSPITAL | Age: 72
Discharge: HOME | End: 2022-03-03
Attending: FAMILY MEDICINE | Admitting: FAMILY MEDICINE

## 2022-01-01 VITALS
HEART RATE: 85 BPM | OXYGEN SATURATION: 91 % | BODY MASS INDEX: 30.51 KG/M2 | SYSTOLIC BLOOD PRESSURE: 119 MMHG | DIASTOLIC BLOOD PRESSURE: 77 MMHG | HEIGHT: 69 IN | WEIGHT: 206 LBS

## 2022-01-01 VITALS
HEIGHT: 69 IN | BODY MASS INDEX: 32.44 KG/M2 | DIASTOLIC BLOOD PRESSURE: 87 MMHG | TEMPERATURE: 98.2 F | WEIGHT: 219 LBS | OXYGEN SATURATION: 92 % | SYSTOLIC BLOOD PRESSURE: 152 MMHG | RESPIRATION RATE: 20 BRPM | HEART RATE: 74 BPM

## 2022-01-01 VITALS
SYSTOLIC BLOOD PRESSURE: 127 MMHG | OXYGEN SATURATION: 96 % | HEIGHT: 69 IN | DIASTOLIC BLOOD PRESSURE: 78 MMHG | HEART RATE: 78 BPM | WEIGHT: 220 LBS | BODY MASS INDEX: 32.58 KG/M2

## 2022-01-01 VITALS
BODY MASS INDEX: 32.58 KG/M2 | OXYGEN SATURATION: 95 % | HEIGHT: 69 IN | SYSTOLIC BLOOD PRESSURE: 138 MMHG | HEART RATE: 82 BPM | DIASTOLIC BLOOD PRESSURE: 76 MMHG | TEMPERATURE: 97.1 F | WEIGHT: 220 LBS

## 2022-01-01 DIAGNOSIS — G47.33 OSA AND COPD OVERLAP SYNDROME: ICD-10-CM

## 2022-01-01 DIAGNOSIS — I10 ESSENTIAL HYPERTENSION: Primary | ICD-10-CM

## 2022-01-01 DIAGNOSIS — J44.9 OSA AND COPD OVERLAP SYNDROME: ICD-10-CM

## 2022-01-01 DIAGNOSIS — U09.9 LONG COVID: ICD-10-CM

## 2022-01-01 DIAGNOSIS — R06.09 DOE (DYSPNEA ON EXERTION): ICD-10-CM

## 2022-01-01 DIAGNOSIS — G47.34 NOCTURNAL HYPOXIA: ICD-10-CM

## 2022-01-01 DIAGNOSIS — I25.10 CHRONIC CORONARY ARTERY DISEASE: Primary | ICD-10-CM

## 2022-01-01 DIAGNOSIS — J44.9 MIXED TYPE COPD (CHRONIC OBSTRUCTIVE PULMONARY DISEASE): ICD-10-CM

## 2022-01-01 DIAGNOSIS — R31.9 URINARY TRACT INFECTION WITH HEMATURIA, SITE UNSPECIFIED: Primary | ICD-10-CM

## 2022-01-01 DIAGNOSIS — G47.33 OBSTRUCTIVE SLEEP APNEA, ADULT: Primary | ICD-10-CM

## 2022-01-01 DIAGNOSIS — R09.02 HYPOXIA: ICD-10-CM

## 2022-01-01 DIAGNOSIS — I10 ESSENTIAL HYPERTENSION: ICD-10-CM

## 2022-01-01 DIAGNOSIS — N20.1 URETEROLITHIASIS: ICD-10-CM

## 2022-01-01 DIAGNOSIS — G47.33 OBSTRUCTIVE SLEEP APNEA: Primary | ICD-10-CM

## 2022-01-01 DIAGNOSIS — Z71.6 TOBACCO ABUSE COUNSELING: ICD-10-CM

## 2022-01-01 DIAGNOSIS — N39.0 URINARY TRACT INFECTION WITH HEMATURIA, SITE UNSPECIFIED: Primary | ICD-10-CM

## 2022-01-01 DIAGNOSIS — E66.01 MORBID OBESITY DUE TO EXCESS CALORIES: ICD-10-CM

## 2022-01-01 DIAGNOSIS — E78.2 MIXED HYPERLIPIDEMIA: ICD-10-CM

## 2022-01-01 DIAGNOSIS — G47.33 OBSTRUCTIVE SLEEP APNEA SYNDROME: ICD-10-CM

## 2022-01-01 LAB
ALBUMIN SERPL-MCNC: 3.7 G/DL (ref 3.5–5.2)
ALBUMIN/GLOB SERPL: 1.4 G/DL
ALP SERPL-CCNC: 71 U/L (ref 39–117)
ALT SERPL W P-5'-P-CCNC: 17 U/L (ref 1–41)
ANION GAP SERPL CALCULATED.3IONS-SCNC: 10 MMOL/L (ref 5–15)
AST SERPL-CCNC: 15 U/L (ref 1–40)
BACTERIA SPEC AEROBE CULT: ABNORMAL
BACTERIA UR QL AUTO: ABNORMAL /HPF
BASOPHILS # BLD AUTO: 0.05 10*3/MM3 (ref 0–0.2)
BASOPHILS NFR BLD AUTO: 0.6 % (ref 0–1.5)
BILIRUB SERPL-MCNC: 0.2 MG/DL (ref 0–1.2)
BILIRUB UR QL STRIP: NEGATIVE
BUN SERPL-MCNC: 16 MG/DL (ref 8–23)
BUN/CREAT SERPL: 12.5 (ref 7–25)
CALCIUM SPEC-SCNC: 9.4 MG/DL (ref 8.6–10.5)
CHLORIDE SERPL-SCNC: 104 MMOL/L (ref 98–107)
CK SERPL-CCNC: 95 U/L (ref 20–200)
CLARITY UR: ABNORMAL
CO2 SERPL-SCNC: 26 MMOL/L (ref 22–29)
COLOR UR: YELLOW
CREAT SERPL-MCNC: 1.28 MG/DL (ref 0.76–1.27)
DEPRECATED RDW RBC AUTO: 44.8 FL (ref 37–54)
EGFRCR SERPLBLD CKD-EPI 2021: 60.6 ML/MIN/1.73
EOSINOPHIL # BLD AUTO: 0.44 10*3/MM3 (ref 0–0.4)
EOSINOPHIL NFR BLD AUTO: 5.1 % (ref 0.3–6.2)
ERYTHROCYTE [DISTWIDTH] IN BLOOD BY AUTOMATED COUNT: 14 % (ref 12.3–15.4)
GLOBULIN UR ELPH-MCNC: 2.7 GM/DL
GLUCOSE SERPL-MCNC: 172 MG/DL (ref 65–99)
GLUCOSE UR STRIP-MCNC: NEGATIVE MG/DL
HCT VFR BLD AUTO: 43.1 % (ref 37.5–51)
HGB BLD-MCNC: 14.6 G/DL (ref 13–17.7)
HGB UR QL STRIP.AUTO: ABNORMAL
HOLD SPECIMEN: NORMAL
HYALINE CASTS UR QL AUTO: ABNORMAL /LPF
IMM GRANULOCYTES # BLD AUTO: 0.03 10*3/MM3 (ref 0–0.05)
IMM GRANULOCYTES NFR BLD AUTO: 0.3 % (ref 0–0.5)
KETONES UR QL STRIP: NEGATIVE
LEUKOCYTE ESTERASE UR QL STRIP.AUTO: ABNORMAL
LIPASE SERPL-CCNC: 28 U/L (ref 13–60)
LYMPHOCYTES # BLD AUTO: 3.14 10*3/MM3 (ref 0.7–3.1)
LYMPHOCYTES NFR BLD AUTO: 36.3 % (ref 19.6–45.3)
MAGNESIUM SERPL-MCNC: 1.9 MG/DL (ref 1.6–2.4)
MCH RBC QN AUTO: 30.1 PG (ref 26.6–33)
MCHC RBC AUTO-ENTMCNC: 33.9 G/DL (ref 31.5–35.7)
MCV RBC AUTO: 88.9 FL (ref 79–97)
MONOCYTES # BLD AUTO: 0.83 10*3/MM3 (ref 0.1–0.9)
MONOCYTES NFR BLD AUTO: 9.6 % (ref 5–12)
NEUTROPHILS NFR BLD AUTO: 4.17 10*3/MM3 (ref 1.7–7)
NEUTROPHILS NFR BLD AUTO: 48.1 % (ref 42.7–76)
NITRITE UR QL STRIP: NEGATIVE
NRBC BLD AUTO-RTO: 0 /100 WBC (ref 0–0.2)
PH UR STRIP.AUTO: 6 [PH] (ref 5–9)
PLATELET # BLD AUTO: 208 10*3/MM3 (ref 140–450)
PMV BLD AUTO: 9.4 FL (ref 6–12)
POTASSIUM SERPL-SCNC: 3.8 MMOL/L (ref 3.5–5.2)
PROT SERPL-MCNC: 6.4 G/DL (ref 6–8.5)
PROT UR QL STRIP: ABNORMAL
QT INTERVAL: 428 MS
QTC INTERVAL: 500 MS
RBC # BLD AUTO: 4.85 10*6/MM3 (ref 4.14–5.8)
RBC # UR STRIP: ABNORMAL /HPF
REF LAB TEST METHOD: ABNORMAL
SODIUM SERPL-SCNC: 140 MMOL/L (ref 136–145)
SP GR UR STRIP: 1.01 (ref 1–1.03)
SQUAMOUS #/AREA URNS HPF: ABNORMAL /HPF
UROBILINOGEN UR QL STRIP: ABNORMAL
WBC # UR STRIP: ABNORMAL /HPF
WBC NRBC COR # BLD: 8.66 10*3/MM3 (ref 3.4–10.8)

## 2022-01-01 PROCEDURE — 85025 COMPLETE CBC W/AUTO DIFF WBC: CPT | Performed by: FAMILY MEDICINE

## 2022-01-01 PROCEDURE — 87186 SC STD MICRODIL/AGAR DIL: CPT | Performed by: FAMILY MEDICINE

## 2022-01-01 PROCEDURE — 93000 ELECTROCARDIOGRAM COMPLETE: CPT | Performed by: INTERNAL MEDICINE

## 2022-01-01 PROCEDURE — 83690 ASSAY OF LIPASE: CPT | Performed by: FAMILY MEDICINE

## 2022-01-01 PROCEDURE — 87086 URINE CULTURE/COLONY COUNT: CPT | Performed by: FAMILY MEDICINE

## 2022-01-01 PROCEDURE — 80053 COMPREHEN METABOLIC PANEL: CPT | Performed by: FAMILY MEDICINE

## 2022-01-01 PROCEDURE — 99214 OFFICE O/P EST MOD 30 MIN: CPT | Performed by: INTERNAL MEDICINE

## 2022-01-01 PROCEDURE — 81001 URINALYSIS AUTO W/SCOPE: CPT | Performed by: FAMILY MEDICINE

## 2022-01-01 PROCEDURE — 51798 US URINE CAPACITY MEASURE: CPT

## 2022-01-01 PROCEDURE — 99283 EMERGENCY DEPT VISIT LOW MDM: CPT

## 2022-01-01 PROCEDURE — 74176 CT ABD & PELVIS W/O CONTRAST: CPT

## 2022-01-01 PROCEDURE — 82550 ASSAY OF CK (CPK): CPT | Performed by: FAMILY MEDICINE

## 2022-01-01 PROCEDURE — 83735 ASSAY OF MAGNESIUM: CPT | Performed by: FAMILY MEDICINE

## 2022-01-01 PROCEDURE — 87077 CULTURE AEROBIC IDENTIFY: CPT | Performed by: FAMILY MEDICINE

## 2022-01-01 PROCEDURE — 99214 OFFICE O/P EST MOD 30 MIN: CPT | Performed by: NURSE PRACTITIONER

## 2022-01-01 PROCEDURE — 99213 OFFICE O/P EST LOW 20 MIN: CPT | Performed by: NURSE PRACTITIONER

## 2022-01-01 RX ORDER — LEVOFLOXACIN 500 MG/1
500 TABLET, FILM COATED ORAL DAILY
Qty: 10 TABLET | Refills: 0 | Status: SHIPPED | OUTPATIENT
Start: 2022-01-01 | End: 2022-01-01

## 2022-01-01 RX ORDER — TRAMADOL HYDROCHLORIDE 50 MG/1
50 TABLET ORAL EVERY 6 HOURS PRN
COMMUNITY
Start: 2022-01-01

## 2022-01-01 RX ORDER — SODIUM CHLORIDE 9 MG/ML
125 INJECTION, SOLUTION INTRAVENOUS CONTINUOUS
Status: DISCONTINUED | OUTPATIENT
Start: 2022-01-01 | End: 2022-01-01 | Stop reason: HOSPADM

## 2022-01-01 RX ORDER — ALLOPURINOL 300 MG/1
TABLET ORAL
COMMUNITY
Start: 2022-01-01

## 2022-01-01 RX ORDER — POTASSIUM CITRATE 15 MEQ/1
2 TABLET, EXTENDED RELEASE ORAL DAILY
COMMUNITY
Start: 2022-01-01

## 2022-01-01 RX ADMIN — SODIUM CHLORIDE 125 ML/HR: 9 INJECTION, SOLUTION INTRAVENOUS at 18:13

## 2022-01-01 RX ADMIN — SODIUM CHLORIDE 500 ML: 9 INJECTION, SOLUTION INTRAVENOUS at 18:33

## 2022-03-03 NOTE — ED PROVIDER NOTES
Subjective   Patient presents to the emergency department with urinary retention.  He states that he has not had any urinary output for the past 7 days since he had a left ureteral stent placed and a right nephrostomy tube placed by Dr. Lucas Avalos in [place].          Urinary Retention  Severity:  Moderate  Onset quality:  Unable to specify  Duration:  7 days  Timing:  Constant  Progression:  Unchanged  Chronicity:  New  Relieved by:  Nothing  Associated symptoms: no abdominal pain, no chest pain, no congestion, no cough, no diarrhea, no ear pain, no fatigue, no fever, no headaches, no myalgias, no nausea, no rash, no rhinorrhea, no shortness of breath, no sore throat, no vomiting and no wheezing        Review of Systems   Constitutional: Negative for appetite change, chills, diaphoresis, fatigue and fever.   HENT: Negative for congestion, ear discharge, ear pain, nosebleeds, rhinorrhea, sinus pressure, sore throat and trouble swallowing.    Eyes: Negative for discharge and redness.   Respiratory: Negative for apnea, cough, chest tightness, shortness of breath and wheezing.    Cardiovascular: Negative for chest pain.   Gastrointestinal: Negative for abdominal pain, diarrhea, nausea and vomiting.   Endocrine: Negative for polyuria.   Genitourinary: Positive for decreased urine volume and difficulty urinating. Negative for dysuria, frequency and urgency.   Musculoskeletal: Negative for myalgias and neck pain.   Skin: Negative for color change and rash.   Allergic/Immunologic: Negative for immunocompromised state.   Neurological: Negative for dizziness, seizures, syncope, weakness, light-headedness and headaches.   Hematological: Negative for adenopathy. Does not bruise/bleed easily.   Psychiatric/Behavioral: Negative for behavioral problems and confusion.   All other systems reviewed and are negative.      Past Medical History:   Diagnosis Date   • Acute bronchitis    • Anemia    • Breast lump    • Cervical  radiculopathy    • COPD (chronic obstructive pulmonary disease) ([facility])    • Coronary atherosclerosis     2 stents, followed by Dr. Hargrove   • Depressive disorder    • Disease of thyroid gland    • Dysuria    • Essential hypertension    • Flank pain    • Hematochezia    • Hematuria syndrome    • Hyperlipidemia    • Hypoglycemia    • Kidney stone    • Kidney stones    • Mass of neck    • MI (myocardial infarction) ([facility])    • Neck pain     sprain   • Osteoarthritis    • PONV (postoperative nausea and vomiting)    • Sleep apnea     using c-pap   • Sleep apnea    • Type 2 diabetes mellitus ([facility])        Allergies   Allergen Reactions   • Codeine Other (See Comments) and Unknown - High Severity     unknown       Past Surgical History:   Procedure Laterality Date   • BACK SURGERY     • CARDIAC CATHETERIZATION N/A 8/23/2017    Procedure: Right Heart Cath;  Surgeon: Mariposa Zamorano MD;  Location: [facility] CATH INVASIVE LOCATION;  Service:    • CATARACT EXTRACTION WITH INTRAOCULAR LENS IMPLANT Bilateral    • CHOLECYSTECTOMY     • CYSTOSCOPY  08/29/2014    Left ESWL, cysto and stent removal   • CYSTOSCOPY W/ LITHOLAPAXY / EHL  08/15/2014    Left ESWL   • CYSTOSCOPY, URETEROSCOPY, RETROGRADE PYELOGRAM, STENT INSERTION Left 6/30/2017    Procedure: CYSTOSCOPY, LEFT URETEROSCOPY, RETROGRADE PYELOGRAM, HOLMIUM LASER, STENT INSERTION;  Surgeon: Uday Horne MD;  Location: [facility];  Service:    • CYSTOSCOPY, URETEROSCOPY, RETROGRADE PYELOGRAM, STENT INSERTION Left 9/6/2017    Procedure: CYSTOSCOPY, LEFT URETEROSCOPY, RETROGRADE PYELOGRAM, HOLMIUM LASER, STENT INSERTION;  Surgeon: Uday Horne MD;  Location: [facility] OR;  Service:    • CYSTOSCOPY, URETEROSCOPY, RETROGRADE PYELOGRAM, STENT INSERTION Left 1/17/2018    Procedure: CYSTOSCOPY LEFT URETEROSCOPY RETROGRADE PYELOGRAM HOLMIUM LASER STENT INSERTION;  Surgeon: Uday Horne MD;  Location: [facility];  Service:    • CYSTOSCOPY, URETEROSCOPY, RETROGRADE PYELOGRAM, STENT  INSERTION Left 7/25/2018    Procedure: CYSTOSCOPY URETEROSCOPY RETROGRADE PYELOGRAM HOLMIUM LASER STENT INSERTION;  Surgeon: Uday Horne MD;  Location: [facility];  Service: Urology   • EYE SURGERY      eye lids lifted   • HIP SURGERY     • INJECTION OF MEDICATION  11/11/2013    Kenalog   • INJECTION OF MEDICATION  01/16/2014    Toradol   • JOINT REPLACEMENT Right 2012    total hip replacement   • SKIN GRAFT      on his foot; removed skin from hip   • TONSILLECTOMY     • TRANSESOPHAGEAL ECHOCARDIOGRAM (ОЛЬГА)  07/21/2014    with color flow-Normal LV systolic function with Ef of 60-65%/ Grad1 diastolic dysfunction of the left ventricular myocardium. No evidence of pericardial effusion       Family History   Problem Relation Age of Onset   • Lung cancer Mother    • Rectal cancer Sister        Social History     Socioeconomic History   • Marital status:    Tobacco Use   • Smoking status: Current Every Day Smoker     Packs/day: 0.50     Years: 50.00     Pack years: 25.00     Types: Cigarettes   • Smokeless tobacco: Never Used   Vaping Use   • Vaping Use: Never used   Substance and Sexual Activity   • Alcohol use: Yes     Comment: occasionally   • Drug use: No   • Sexual activity: Defer           Objective   Physical Exam  Vitals and nursing note reviewed.   Constitutional:       Appearance: He is well-developed.   HENT:      Head: Normocephalic and atraumatic.      Nose: Nose normal.   Eyes:      General: No scleral icterus.        Right eye: No discharge.         Left eye: No discharge.      Conjunctiva/sclera: Conjunctivae normal.      Pupils: Pupils are equal, round, and reactive to light.   Neck:      Trachea: No tracheal deviation.   Cardiovascular:      Rate and Rhythm: Normal rate and regular rhythm.      Heart sounds: Normal heart sounds. No murmur heard.      Pulmonary:      Effort: Pulmonary effort is normal. No respiratory distress.      Breath sounds: Normal breath sounds. No stridor. No wheezing or  rales.   Abdominal:      General: Bowel sounds are normal. There is no distension.      Palpations: Abdomen is soft. There is no mass.      Tenderness: There is no abdominal tenderness. There is no guarding or rebound.   Musculoskeletal:      Cervical back: Normal range of motion and neck supple.   Skin:     General: Skin is warm and dry.      Findings: No erythema or rash.   Neurological:      Mental Status: He is alert and oriented to person, place, and time.      Coordination: Coordination normal.   Psychiatric:         Behavior: Behavior normal.         Thought Content: Thought content normal.         Procedures           ED Course  ED Course as of 03/03/22 1841   Thu Mar 03, 2022   1839 Endings were discussed with patient and spouse.  He states that he has an appointment on Tuesday with his urologist, Dr. Avalos.  He would like to be treated for his urinary tract infection and keep his follow-up appointment on Tuesday, in 4 days.  He does not want any pain medications or antiemetics, and is already taking Flomax.  Patient was started on Levaquin and advised to keep his appointment with his urologist, otherwise he is return to the emergency department should his symptoms worsen. [CB]      ED Course User Index  [CB] Milton Diaz MD             Labs Reviewed   COMPREHENSIVE METABOLIC PANEL - Abnormal; Notable for the following components:       Result Value    Glucose 172 (*)     Creatinine 1.28 (*)     All other components within normal limits    Narrative:     GFR Normal >60  Chronic Kidney Disease <60  Kidney Failure <15     URINALYSIS W/ CULTURE IF INDICATED - Abnormal; Notable for the following components:    Appearance, UA Cloudy (*)     Blood, UA Large (3+) (*)     Protein, UA >=300 mg/dL (3+) (*)     Leuk Esterase, UA Moderate (2+) (*)     All other components within normal limits   CBC WITH AUTO DIFFERENTIAL - Abnormal; Notable for the following components:    Lymphocytes, Absolute 3.14 (*)      Eosinophils, Absolute 0.44 (*)     All other components within normal limits   URINALYSIS, MICROSCOPIC ONLY - Abnormal; Notable for the following components:    RBC, UA Too Numerous to Count (*)     WBC, UA Too Numerous to Count (*)     Bacteria, UA 2+ (*)     Squamous Epithelial Cells, UA 3-5 (*)     All other components within normal limits   LIPASE - Normal   CK - Normal   MAGNESIUM - Normal   URINE CULTURE   CBC AND DIFFERENTIAL    Narrative:     The following orders were created for panel order CBC & Differential.  Procedure                               Abnormality         Status                     ---------                               -----------         ------                     CBC Auto Differential[[id number]]        Abnormal            Final result                 Please view results for these tests on the individual orders.   EXTRA TUBES    Narrative:     The following orders were created for panel order Extra Tubes.  Procedure                               Abnormality         Status                     ---------                               -----------         ------                     Gold Top - SST[[id number]]                                   Final result                 Please view results for these tests on the individual orders.   GOLD TOP - SST       CT Abdomen Pelvis Without Contrast   Final Result   Conclusion:   Adjacent 8 mm and 12 mm calculi proximal left ureter at the L3   level.   Moderate left hydronephrosis.   Bilateral nonobstructive renal calculi.   Right ureteral stent in place with proximal pigtail in the right   renal pelvis and distal pigtail in the left side of the urinary   bladder.   Right percutaneous nephrostomy tube in place in the right renal   pelvis.   4.4 cm right renal cyst with central hemorrhage and minimal   peripheral cyst wall calcification.   The hemorrhage within the cyst may be secondary to the   percutaneous nephrostomy tube.   1.3 cm hyperdense right renal  cyst.   1.4 cm left hyperdense renal cyst.   Prostate implant seeds.   Vasectomy.   Cholecystectomy.   1 cm hepatic cyst.   Diverticulosis of the colon.   Chronic obstructive pulmonary disease and chronic interstitial   changes.   Coronary artery calcifications.   Postoperative changes L4-5 with laminectomy and pedicle screws   and rods.   Degenerative disc disease L3-4 and L5-S1.   Minimal retrolisthesis of L3 on L4.   Right total hip prosthesis.      [id number]      Electronically signed by:  Ermias Paz MD  3/3/2022 5:30 PM [facility]   Workstation: [id number]                                                [facility]    Final diagnoses:   Urinary tract infection with hematuria, site unspecified   Ureterolithiasis       ED Disposition  ED Disposition     ED Disposition Condition Comment    Discharge Stable           Lucas Avalos MD  [address] [facility] [address]  [place] [id number] [phone]    In 4 days  As scheduled, Even if well         Medication List      Changed    levoFLOXacin 500 MG tablet  Commonly known as: LEVAQUIN  Take 1 tablet by mouth Daily for 7 days.  What changed:   · medication strength  · See the new instructions.           Where to Get Your Medications      These medications were sent to [facility] [address] [place] [address]  [facility] [address] - [phone] [facility] [phone] [facility] [employer] [address] [facility] [address]    Phone: [phone]   · levoFLOXacin 500 MG tablet          Milton Diaz MD  03/03/22 6220

## 2022-08-18 NOTE — PROGRESS NOTES
Sleep Clinic Follow Up    Date: 2022  Primary Care Provider: Emile Wynn MD    Last office visit: 2021 (I reviewed this note)    CC: Follow up: ALLEN on CPAP, ALLEN/COPD overlap, on nocturnal O2      Interim History:  Since the last visit:    1) moderate ALLEN -  Aman Sanchez has remained compliant with CPAP. He denies mask and machine issues, dry mouth, headaches, or pressures intolerance. He denies abnormal dreams, sleep paralysis, nasal congestion, URI sx. He feels as though he needs more pressure and he is also wanting to get a new machine.     2) Patient denies RLS symptoms.     Sleep Testin. PSG on 2014, AHI of 28.1   2. CPAP titration recommended 14 cm H2O   3. Currently on 12 cm H2O with 2L O2 bled in    PAP Data:    Time frame: 2021-2022   Compliance: 27.7%  Average use on days used: 10 hrs 15 min  Percent of days with usage greater than or equal to 4 hours: 27.7%  PAP range: 12 cm H2O  Average 90% pressure: 12 cmH2O  Leak: 25 minutes  Average AHI: 1 events/hr  Mask type: Full face mask  DME: [facility], switching to [facility] due to machine availability  Machine type: [employer] [facility]    Bed time: 2200  Sleep latency: 1 minutes  Number of times awakens during the night: 0-1  Wake time: 2068-6563  Estimated total sleep time at night: 10 hours  Caffeine intake: 2-3 cups of coffee, 0 cups of tea, and 0 sodas per day  Alcohol intake: 0 drinks per week  Nap time: occasional   Sleepiness with Driving: none     [place] - 3  [place] Sleepiness Scale  Sitting and reading: High chance of dozing  Watching TV: Would never doze  Sitting, inactive in a public place (e.g. a theatre or a meeting): Would never doze  As a passenger in a car for an hour without a break: Would never doze  Lying down to rest in the afternoon when circumstances permit: Would never doze  Sitting and talking to someone: Would never doze  Sitting quietly after a lunch without alcohol: Would never  doze  In a car, while stopped for a few minutes in traffic: Would never doze  Total score: 3    PMHx, FH, SH reviewed and pertinent changes are: Reportedly unchanged from last office visit with us.      Review of Systems:   Negative for chest pain, SOA, fever, chills, cough, N/V/D, abdominal pain.    Smoking:< 1ppd    Aman Sanchez  reports that he has been smoking cigarettes. He has a 25.00 pack-year smoking history. He has never used smokeless tobacco.. I have educated him on the risk of diseases from using tobacco products such as cancer, COPD and heart disease.     I advised him to quit and he is not willing to quit.    I spent 3.5 minutes counseling the patient.    Physical Exam:  Vitals:    08/18/22 1043   BP: 127/78   Pulse: 78   SpO2: 96%           08/18/22  1043   Weight: 99.8 kg (220 lb)     Body mass index is 32.47 kg/m². BMI is >= 30 and <35. (Class 1 Obesity). The following options were offered after discussion;: referral to primary care    Gen:                No distress, conversant, pleasant, appears stated age, alert, oriented  Eyes:               Anicteric sclera, moist conjunctiva, no lid lag                           PERRL, EOMI   Heent:             NC/AT                          Oropharynx clear                          Normal hearing  Lungs:             Normal effort, non-labored breathing  CV:                  Normal S1/S2                          No lower extremity edema  ABD:               Soft, rounded, non-distended            Psych:             Appropriate affect  Neuro:             CN 2-12 appear intact    Past Medical History:   Diagnosis Date   • Acute bronchitis    • Anemia    • Breast lump    • Cervical radiculopathy    • COPD (chronic obstructive pulmonary disease) (HCC)    • Coronary atherosclerosis     2 stents, followed by Dr. Hargrove   • Depressive disorder    • Disease of thyroid gland    • Dysuria    • Essential hypertension    • Flank pain    • Hematochezia    •  Hematuria syndrome    • Hyperlipidemia    • Hypoglycemia    • Kidney stone    • Kidney stones    • Mass of neck    • MI (myocardial infarction) ([facility])    • Neck pain     sprain   • Osteoarthritis    • PONV (postoperative nausea and vomiting)    • Sleep apnea     using c-pap   • Sleep apnea    • Type 2 diabetes mellitus ([facility])        Current Outpatient Medications:   •  albuterol (PROVENTIL HFA;VENTOLIN HFA) 108 (90 Base) MCG/ACT inhaler, Inhale 2 puffs Every 4 (Four) Hours As Needed for Wheezing., Disp: 8 g, Rfl: 5  •  albuterol (PROVENTIL) (2.5 MG/3ML) 0.083% nebulizer solution, Take 2.5 mg by nebulization Every 4 (Four) Hours As Needed for Wheezing., Disp: 180 vial, Rfl: 1  •  aspirin 81 MG EC tablet, Take 81 mg by mouth Daily. Last dose 1/8/18, Disp: , Rfl:   •  cetirizine (zyrTEC) 10 MG tablet, Take 10 mg by mouth Daily., Disp: , Rfl:   •  clopidogrel (PLAVIX) 75 MG tablet, Take 1 tablet by mouth Daily., Disp: 90 tablet, Rfl: 1  •  doxycycline (VIBRAMYICN) 100 MG tablet, doxycycline hyclate 100 mg tablet, Disp: , Rfl:   •  finasteride (PROSCAR) 5 MG tablet, Take 1 tablet by mouth Every Night., Disp: 90 tablet, Rfl: 1  •  FLUoxetine (PROzac) 20 MG capsule, Take 20 mg by mouth Daily., Disp: , Rfl:   •  fluticasone (FLONASE) 50 MCG/ACT nasal spray, 2 sprays into each nostril At Night As Needed for Rhinitis., Disp: 3 bottle, Rfl: 1  •  glucose blood test strip, One touch reli-on glucometer, Disp: 360 each, Rfl: 1  •  guaiFENesin (MUCINEX) 600 MG 12 hr tablet, Take 600 mg by mouth 2 (Two) Times a Day., Disp: , Rfl:   •  isosorbide mononitrate (IMDUR) 30 MG 24 hr tablet, Take 1 tablet by mouth Daily., Disp: 90 tablet, Rfl: 1  •  levothyroxine (SYNTHROID) 50 MCG tablet, Take 1 tablet by mouth Daily., Disp: 90 tablet, Rfl: 1  •  lisinopril-hydrochlorothiazide (PRINZIDE,ZESTORETIC) 20-25 MG per tablet, Take 1 tablet by mouth Daily., Disp: 90 tablet, Rfl: 1  •  metFORMIN (GLUCOPHAGE) 500 MG tablet, Take 1 tablet by mouth  Daily With Breakfast., Disp: 90 tablet, Rfl: 1  •  nitroglycerin (NITROSTAT) 0.4 MG SL tablet, Place 1 tablet under the tongue Every 5 (Five) Minutes As Needed for chest pain., Disp: 100 tablet, Rfl: 11  •  ONE TOUCH LANCETS misc, Use as directed test 6 times daily, Disp: 200 each, Rfl: 1  •  pravastatin (PRAVACHOL) 80 MG tablet, Take 1 tablet by mouth Every Night., Disp: 90 tablet, Rfl: 1  •  tamsulosin (FLOMAX) 0.4 MG capsule 24 hr capsule, Take 2 capsules by mouth Daily., Disp: 180 capsule, Rfl: 1  •  traZODone (DESYREL) 150 MG tablet, Take 150 mg by mouth Every Night., Disp: , Rfl:   •  vitamin B-12 (CYANOCOBALAMIN) 1000 MCG tablet, Take 1,000 mcg by mouth Daily., Disp: , Rfl:   •  allopurinol (ZYLOPRIM) 300 MG tablet, , Disp: , Rfl:   •  Potassium Citrate ER 15 MEQ (1620 MG) tablet controlled-release, Take 2 tablets by mouth Daily., Disp: , Rfl:   •  traMADol (ULTRAM) 50 MG tablet, Take 50 mg by mouth Every 6 (Six) Hours As Needed. for pain, Disp: , Rfl:     WBC   Date Value Ref Range Status   03/03/2022 8.66 3.40 - 10.80 10*3/mm3 Final   04/19/2019 9.2 4.0 - 11.0 10*3/uL Final     RBC   Date Value Ref Range Status   03/03/2022 4.85 4.14 - 5.80 10*6/mm3 Final   04/19/2019 5.47 4.73 - 5.49 10*6/uL Final     Hemoglobin   Date Value Ref Range Status   03/03/2022 14.6 13.0 - 17.7 g/dL Final   04/19/2019 16.1 14.4 - 16.6 g/dL Final     Hematocrit   Date Value Ref Range Status   03/03/2022 43.1 37.5 - 51.0 % Final   04/19/2019 49 42.9 - 49.1 % Final   01/23/2018 50.1 42 - 54 % Final     MCV   Date Value Ref Range Status   03/03/2022 88.9 79.0 - 97.0 fL Final   04/19/2019 90 85 - 95 fl Final     MCH   Date Value Ref Range Status   03/03/2022 30.1 26.6 - 33.0 pg Final   04/19/2019 29.4 28.0 - 32.0 pg Final     MCHC   Date Value Ref Range Status   03/03/2022 33.9 31.5 - 35.7 g/dL Final   04/19/2019 32.9 32.0 - 34.0 g/dL Final     RDW   Date Value Ref Range Status   03/03/2022 14.0 12.3 - 15.4 % Final   04/19/2019 46.1  37 - 54 fl Final   01/23/2018 14.2 11.5 - 14.5 % Final     RDW-SD   Date Value Ref Range Status   03/03/2022 44.8 37.0 - 54.0 fl Final     MPV   Date Value Ref Range Status   03/03/2022 9.4 6.0 - 12.0 fL Final   04/19/2019 10 8.0 - 12.0 fl Final   01/23/2018 8.0 7.4 - 10.4 fL Final     Platelets   Date Value Ref Range Status   03/03/2022 208 140 - 450 10*3/mm3 Final   04/19/2019 231 150 - 450 10*3/uL Final   01/23/2018 253 150 - 450 10*9/L Final     Neutrophil Rel %   Date Value Ref Range Status   01/23/2018 54.9 35 - 80 % Final     Neutrophil %   Date Value Ref Range Status   03/03/2022 48.1 42.7 - 76.0 % Final     Lymphocyte Rel %   Date Value Ref Range Status   04/19/2019 42.5 5 - 55 % Final     Lymphocyte %   Date Value Ref Range Status   03/03/2022 36.3 19.6 - 45.3 % Final     Monocyte Rel %   Date Value Ref Range Status   04/19/2019 8.5 (H) 3 - 8 % Final     Monocyte %   Date Value Ref Range Status   03/03/2022 9.6 5.0 - 12.0 % Final     Eosinophil Rel %   Date Value Ref Range Status   04/19/2019 3.3 0 - 5 % Final     Eosinophil %   Date Value Ref Range Status   03/03/2022 5.1 0.3 - 6.2 % Final     Basophil Rel %   Date Value Ref Range Status   04/19/2019 0.7 0 - 2 % Final     Basophil %   Date Value Ref Range Status   03/03/2022 0.6 0.0 - 1.5 % Final     Immature Grans %   Date Value Ref Range Status   03/03/2022 0.3 0.0 - 0.5 % Final   04/19/2019 44.8 (L) 45 - 80 % Final     Neutrophils, Absolute   Date Value Ref Range Status   03/03/2022 4.17 1.70 - 7.00 10*3/mm3 Final     Lymphocytes Absolute   Date Value Ref Range Status   01/23/2018 3.3 0.8 - 4.8 10*9/L Final     Lymphocytes, Absolute   Date Value Ref Range Status   03/03/2022 3.14 (H) 0.70 - 3.10 10*3/mm3 Final     Monocytes Absolute   Date Value Ref Range Status   01/23/2018 1.1 (H) 0.2 - 0.9 10*9/L Final     Monocytes, Absolute   Date Value Ref Range Status   03/03/2022 0.83 0.10 - 0.90 10*3/mm3 Final     Eosinophil Abs   Date Value Ref Range Status    01/23/2018 0.6 0.0 - 0.8 10*9/L Final     Eosinophils, Absolute   Date Value Ref Range Status   03/03/2022 0.44 (H) 0.00 - 0.40 10*3/mm3 Final     Basophils Absolute   Date Value Ref Range Status   01/23/2018 0.1 0.0 - 0.1 10*9/L Final     Basophils, Absolute   Date Value Ref Range Status   03/03/2022 0.05 0.00 - 0.20 10*3/mm3 Final     Immature Grans, Absolute   Date Value Ref Range Status   03/03/2022 0.03 0.00 - 0.05 10*3/mm3 Final     nRBC   Date Value Ref Range Status   03/03/2022 0.0 0.0 - 0.2 /100 WBC Final       Lab Results   Component Value Date    GLUCOSE 172 (H) 03/03/2022    BUN 13 03/21/2022    CREATININE 1.2 03/21/2022    EGFRIFNONA 78 04/15/2021    EGFRIFAFRI 90 07/08/2021    BCR 12.5 03/03/2022    K 4.5 03/21/2022    CO2 27 03/21/2022    CALCIUM 9.2 03/21/2022    ALBUMIN 3.9 03/21/2022    AST 12 (L) 03/21/2022    ALT 16 03/21/2022       Assessment and Plan:    1. Obstructive sleep apnea, ALLEN/COPD overlap - Established, stable (1)  1. Compliant with PAP therapy and nocturnal O2 @ 2LPM  2. Continue PAP as prescribed  3. Script for PAP supplies and new CPAP @ 13 cm H2O with adaptive O2 hookup ([facility])  4. Return to clinic in 30-90 days with compliance report unless change in symptoms in interim period  2. Nocturnal hypoxia - Established, stable (1)   1. Continue nocturnal O2 @ 2LPM  10. Nicotine dependence with complication - stable chronic illness  1. Smoking cessation information provided. Smoking and tobacco use cessation counseling visit was 3.5 minutes.       I spent 20 minutes caring for Aman on this date of service. This time includes time spent by me in the following activities: preparing for the visit, reviewing tests, obtaining and/or reviewing a separately obtained history, performing a medically appropriate examination and/or evaluation , counseling and educating the patient/family/caregiver and documenting information in the medical record; discussing PAP therapy, PAP compliance and  PAP maintenance    Patient to follow up in 31-90 days with compliance report. Patient agrees to return sooner if changes in symptoms.        This document has been electronically signed by BRIAN Ceron on August 18, 2022 10:50 CDT          CC: Emile Wynn MD          No ref. provider found

## 2022-11-14 NOTE — PROGRESS NOTES
Aman Sanchez  72 y.o. male    1. Chronic coronary artery disease    2. Essential hypertension    3. Mixed hyperlipidemia    4. Morbid obesity due to excess calories (HCC)    5. Obstructive sleep apnea syndrome    6. Tobacco abuse counseling        History of Present Illness  Mr. Sanchez is a 72-year-old  male who is here for evaluation of his above-stated problems after long interval. His history is remarkable for coronary artery disease, obesity, sleep apnea, hypertension, COPD, diabetes mellitus, hyperlipidemia, renal stones, tobacco abuse.    He has had coronary artery disease and previous PCI to right coronary artery as described below:  On January 9, 2017 he underwent PCI to right coronary artery with placement of 3.5 x 25 mm Xience Alpine stent to distal RCA.  In 2007 he underwent placement of 3.5 x 18 mm Cypher stent to mid RCA.     Echocardiogram in November 2020 showed:  · Left ventricular wall thickness is consistent with concentric hypertrophy.  · Estimated left ventricular EF = 63% Left ventricular ejection fraction appears to be 61 - 65%. Left ventricular systolic function is normal.  · Left ventricular diastolic function is consistent with (grade I) impaired relaxation.  · The right ventricular cavity is mildly dilated. Normal systolic function  · The left atrial cavity is mildly dilated.  · Trace to mild mitral valve regurgitation is present.  · Estimated right ventricular systolic pressure from tricuspid regurgitation is normal (<35 mmHg).     EKG today showed sinus rhythm with heart rate of 82 bpm.  Left axis deviation.  Right bundle branch block.  Inferior wall myocardial infarction.    Allergies   Allergen Reactions   • Codeine Other (See Comments) and Unknown - High Severity     unknown         Past Medical History:   Diagnosis Date   • Acute bronchitis    • Anemia    • Breast lump    • Cervical radiculopathy    • COPD (chronic obstructive pulmonary disease) (HCC)    • Coronary  atherosclerosis     2 stents, followed by Dr. Hargrove   • Depressive disorder    • Disease of thyroid gland    • Dysuria    • Essential hypertension    • Flank pain    • Hematochezia    • Hematuria syndrome    • Hyperlipidemia    • Hypoglycemia    • Kidney stone    • Kidney stones    • Mass of neck    • MI (myocardial infarction) (HCC)    • Neck pain     sprain   • Osteoarthritis    • PONV (postoperative nausea and vomiting)    • Sleep apnea     using c-pap   • Sleep apnea    • Type 2 diabetes mellitus (HCC)          Past Surgical History:   Procedure Laterality Date   • BACK SURGERY     • CARDIAC CATHETERIZATION N/A 8/23/2017    Procedure: Right Heart Cath;  Surgeon: Mariposa Zamorano MD;  Location: [facility] CATH INVASIVE LOCATION;  Service:    • CATARACT EXTRACTION WITH INTRAOCULAR LENS IMPLANT Bilateral    • CHOLECYSTECTOMY     • CYSTOSCOPY  08/29/2014    Left ESWL, cysto and stent removal   • CYSTOSCOPY W/ LITHOLAPAXY / EHL  08/15/2014    Left ESWL   • CYSTOSCOPY, URETEROSCOPY, RETROGRADE PYELOGRAM, STENT INSERTION Left 6/30/2017    Procedure: CYSTOSCOPY, LEFT URETEROSCOPY, RETROGRADE PYELOGRAM, HOLMIUM LASER, STENT INSERTION;  Surgeon: Uday Horne MD;  Location: [facility];  Service:    • CYSTOSCOPY, URETEROSCOPY, RETROGRADE PYELOGRAM, STENT INSERTION Left 9/6/2017    Procedure: CYSTOSCOPY, LEFT URETEROSCOPY, RETROGRADE PYELOGRAM, HOLMIUM LASER, STENT INSERTION;  Surgeon: Uday Horne MD;  Location: [facility];  Service:    • CYSTOSCOPY, URETEROSCOPY, RETROGRADE PYELOGRAM, STENT INSERTION Left 1/17/2018    Procedure: CYSTOSCOPY LEFT URETEROSCOPY RETROGRADE PYELOGRAM HOLMIUM LASER STENT INSERTION;  Surgeon: Uday Horne MD;  Location: [facility];  Service:    • CYSTOSCOPY, URETEROSCOPY, RETROGRADE PYELOGRAM, STENT INSERTION Left 7/25/2018    Procedure: CYSTOSCOPY URETEROSCOPY RETROGRADE PYELOGRAM HOLMIUM LASER STENT INSERTION;  Surgeon: Uday Horne MD;  Location: [facility];  Service: Urology   • EYE  SURGERY      eye lids lifted   • HIP SURGERY     • INJECTION OF MEDICATION  11/11/2013    Kenalog   • INJECTION OF MEDICATION  01/16/2014    Toradol   • JOINT REPLACEMENT Right 2012    total hip replacement   • SKIN GRAFT      on his foot; removed skin from hip   • TONSILLECTOMY     • TRANSESOPHAGEAL ECHOCARDIOGRAM (ОЛЬГА)  07/21/2014    with color flow-Normal LV systolic function with Ef of 60-65%/ Grad1 diastolic dysfunction of the left ventricular myocardium. No evidence of pericardial effusion         Family History   Problem Relation Age of Onset   • Lung cancer Mother    • Rectal cancer Sister          Social History     Socioeconomic History   • Marital status:    Tobacco Use   • Smoking status: Every Day     Packs/day: 0.50     Years: 50.00     Pack years: 25.00     Types: Cigarettes   • Smokeless tobacco: Never   Vaping Use   • Vaping Use: Never used   Substance and Sexual Activity   • Alcohol use: Yes     Comment: occasionally   • Drug use: No   • Sexual activity: Defer         Current Outpatient Medications   Medication Sig Dispense Refill   • albuterol (PROVENTIL HFA;VENTOLIN HFA) 108 (90 Base) MCG/ACT inhaler Inhale 2 puffs Every 4 (Four) Hours As Needed for Wheezing. 8 g 5   • albuterol (PROVENTIL) (2.5 MG/3ML) 0.083% nebulizer solution Take 2.5 mg by nebulization Every 4 (Four) Hours As Needed for Wheezing. 180 vial 1   • allopurinol (ZYLOPRIM) 300 MG tablet      • aspirin 81 MG EC tablet Take 81 mg by mouth Daily. Last dose 1/8/18     • cetirizine (zyrTEC) 10 MG tablet Take 10 mg by mouth Daily.     • clopidogrel (PLAVIX) 75 MG tablet Take 1 tablet by mouth Daily. 90 tablet 1   • finasteride (PROSCAR) 5 MG tablet Take 1 tablet by mouth Every Night. 90 tablet 1   • FLUoxetine (PROzac) 20 MG capsule Take 20 mg by mouth Daily.     • fluticasone (FLONASE) 50 MCG/ACT nasal spray 2 sprays into each nostril At Night As Needed for Rhinitis. 3 bottle 1   • glucose blood test strip One touch reli-on  "glucometer 360 each 1   • guaiFENesin (MUCINEX) 600 MG 12 hr tablet Take 600 mg by mouth 2 (Two) Times a Day.     • isosorbide mononitrate (IMDUR) 30 MG 24 hr tablet Take 1 tablet by mouth Daily. 90 tablet 1   • levothyroxine (SYNTHROID) 50 MCG tablet Take 1 tablet by mouth Daily. 90 tablet 1   • lisinopril-hydrochlorothiazide (PRINZIDE,ZESTORETIC) 20-25 MG per tablet Take 1 tablet by mouth Daily. 90 tablet 1   • metFORMIN (GLUCOPHAGE) 500 MG tablet Take 1 tablet by mouth Daily With Breakfast. 90 tablet 1   • nitroglycerin (NITROSTAT) 0.4 MG SL tablet Place 1 tablet under the tongue Every 5 (Five) Minutes As Needed for chest pain. 100 tablet 11   • ONE TOUCH LANCETS misc Use as directed test 6 times daily 200 each 1   • Potassium Citrate ER 15 MEQ (1620 MG) tablet controlled-release Take 2 tablets by mouth Daily.     • pravastatin (PRAVACHOL) 80 MG tablet Take 1 tablet by mouth Every Night. 90 tablet 1   • tamsulosin (FLOMAX) 0.4 MG capsule 24 hr capsule Take 2 capsules by mouth Daily. 180 capsule 1   • traMADol (ULTRAM) 50 MG tablet Take 50 mg by mouth Every 6 (Six) Hours As Needed. for pain     • traZODone (DESYREL) 150 MG tablet Take 150 mg by mouth Every Night.     • vitamin B-12 (CYANOCOBALAMIN) 1000 MCG tablet Take 1,000 mcg by mouth Daily.       No current facility-administered medications for this visit.         OBJECTIVE    /76 (BP Location: Left arm, Patient Position: Sitting, Cuff Size: Adult)   Pulse 82   Temp 97.1 °F (36.2 °C)   Ht 175.3 cm (69\")   Wt 99.8 kg (220 lb)   SpO2 95%   BMI 32.49 kg/m²         Review of Systems : The following systems are reviewed and no changes noted as indicated below    Constitutional:  Denies recent weight loss, weight gain, fever or chills, no change in exercise tolerance     HENT:  Denies any hearing loss, epistaxis, hoarseness, or difficulty speaking.     Eyes: Wears eyeglasses or contact lenses     Respiratory:  Dyspnea with exertion,no cough, wheezing, or " hemoptysis.     Cardiovascular: Negative for palpations, chest pain, orthopnea, PND    Gastrointestinal:  Denies change in bowel habits, dyspepsia, ulcer disease, hematochezia, or melena.     Endocrine: Negative for cold intolerance, heat intolerance, polydipsia, polyphagia and polyuria.     Genitourinary: Renal stones      Musculoskeletal: DJD    Neurological:  Denies any history of recurrent headaches, strokes, TIA, or seizure disorder.     Hematological: Denies any food allergies, seasonal allergies, bleeding disorders, or lymphadenopathy.     Psychiatric/Behavioral: history of depression, no substance abuse, or change in cognitive function.         Physical Exam : The following systems are reviewed and no changes noted    Constitutional: Cooperative, alert and oriented, in no acute distress.     HENT:   Head: Normocephalic, normal hair patterns, no masses or tenderness.  Ears, Nose, and Throat: No gross abnormalities. No pallor or cyanosis.   Eyes: EOMS intact, PERRL, conjunctivae and lids unremarkable. Fundoscopic exam and visual fields not performed.   Neck: No palpable masses or adenopathy, no thyromegaly, no JVD, carotid pulses are full and equal bilaterally and without  Bruits.     Cardiovascular: Regular rhythm, S1 and S2 normal, no S3 or S4. No murmurs, gallops, or rubs detected.     Pulmonary/Chest: Chest: normal symmetry, normal respiratory excursion, no intercostal retraction, no use of accessory muscles.            Pulmonary: Normal breath sounds. No rales or ronchi.    Abdominal: Abdomen soft, bowel sounds normoactive, no masses, no hepatosplenomegaly, non-tender, no bruits.     Musculoskeletal: No deformities, clubbing, cyanosis, erythema, or edema observed.     Neurological: No gross motor or sensory deficits noted, affect appropriate, oriented to time, person, place.     Skin: Warm and dry to the touch, no apparent skin lesions or masses noted.     Psychiatric: He has a normal mood and affect. His  behavior is normal. Judgment and thought content normal.         Procedures      Lab Results   Component Value Date    WBC 8.66 03/03/2022    HGB 14.6 03/03/2022    HCT 43.1 03/03/2022    MCV 88.9 03/03/2022     03/03/2022     Lab Results   Component Value Date    GLUCOSE 172 (H) 03/03/2022    BUN 13 03/21/2022    CREATININE 1.2 03/21/2022    EGFRIFNONA 78 04/15/2021    EGFRIFAFRI 90 07/08/2021    BCR 12.5 03/03/2022    CO2 27 03/21/2022    CALCIUM 9.2 03/21/2022    ALBUMIN 3.9 03/21/2022    AST 12 (L) 03/21/2022    ALT 16 03/21/2022     Lab Results   Component Value Date    CHOL 127 01/07/2021    CHOL 141 07/15/2020    CHOL 119 04/19/2019     Lab Results   Component Value Date    TRIG 130 01/07/2021    TRIG 129 07/15/2020    TRIG 160 (H) 04/19/2019     Lab Results   Component Value Date    HDL 33 01/07/2021    HDL 31 (L) 07/15/2020    HDL 30 (L) 04/19/2019     No components found for: LDLCALC  Lab Results   Component Value Date    LDL 74 01/07/2021    LDL 95 07/15/2020    LDL 67 04/19/2019     No results found for: HDLLDLRATIO  No components found for: CHOLHDL  Lab Results   Component Value Date    HGBA1C 7.4 (H) 03/21/2022     Lab Results   Component Value Date    TSH 1.89 03/21/2022    M0IBMQV 9.88 11/30/2017           ASSESSMENT AND PLAN  Mr. Sanchez has no clinical evidence of progression of coronary artery disease without angina, arrhythmia or congestive heart failure.  He does have chronic dyspnea with most likely related to his pulmonary status.  I understand that he has been considered for a surgical procedure at [facility] for renal stones.  He has been advised to go off antiplatelet therapy with aspirin and Plavix temporarily by the surgeons. Antianginal therapy with isosorbide mononitrate, antihypertensive therapy with lisinopril HCTZ, lipid-lowering therapy with pravastatin have been continued.    Smoking cessation was once again stressed.  I understand that the patient has an order for blood  test to check his lipid profile and a copy of the results will be obtained.    Diagnoses and all orders for this visit:    1. Chronic coronary artery disease (Primary)    2. Essential hypertension    3. Mixed hyperlipidemia    4. Morbid obesity due to excess calories (HCC)    5. Obstructive sleep apnea syndrome    6. Tobacco abuse counseling        Patient's Body mass index is 32.49 kg/m². BMI is above normal parameters. Recommendations include: exercise counseling and nutrition counseling.      Aman Sanchez is a current cigarettes user.  He currently smokes 1 pack of cigarettes per day for a duration of 50 years. I have educated him on the risk of diseases from using tobacco products such as cancer, COPD and heart diease.     I advised him to quit and he is not willing to quit.    I spent 3  minutes counseling the patient.      Homero Hargrove MD  11/14/2022  11:42 CST

## 2022-11-29 NOTE — PROGRESS NOTES
Sleep Clinic Follow Up    Date: 2022  Primary Care Provider: Emile Wynn MD    Last office visit: 2022 (I reviewed this note)    CC: Follow up: ALLEN on CPAP, ALLEN/COPD overlap, nocturnal O2      Interim History:  Since the last visit:    1) moderate ALLEN/COPD overlap -  Aman Sanchez has remained compliant with CPAP. He denies mask and machine issues, dry mouth, headaches, or pressures intolerance. He denies abnormal dreams, sleep paralysis, nasal congestion, URI sx. He has been using nocturnal O2 bled into his CPAP @ 2LPM.  I had previously adjusted pressure to 13 cm H2O and now patient feels as though he wishes for the pressure to be decreased again to 12 cm H2O.    2) Patient denies RLS symptoms.     3) Hypoxia, dyspnea on exertion - Patient had Covid ~2 weeks ago and has been experiencing increased fatigue, dyspnea on exertion, and shortness of air since that time. His baseline SpO2 today was 91% on room air. I performed a 6-minute walk test in office. His SpO2 dropped to 87%. Patient was placed on supplemental O2 @ 2LPM via nasal cannula. His O2 sat improved to 98% with O2. I will order supplemental O2 @ 2LPM with exertion.    Sleep Testin. PSG on 2014, AHI of 28.1   2. CPAP titration recommended 14 cm H2O   3. Currently on 13 cm H2O with 2L O2 bled in    PAP Data:    Time frame: 2022-2022   Compliance: 97.8%  Average use on days used: 10 hrs 52 min  Percent of days with usage greater than or equal to 4 hours: 95.6%  PAP range: 13 cm H2O  Average 90% pressure: 13 cmH2O  Leak: 20 minutes  Average AHI: 0.1 events/hr  Mask type: Full face mask  DME: [facility]  Machine type: 3B Medical [employer] II    Bed time: 3142-9926  Sleep latency: 10 minutes  Number of times awakens during the night: 2  Wake time: 9935-1188  Estimated total sleep time at night: 7 hours  Caffeine intake: 1-1.5 cups of coffee, 0 cups of tea, and 2 sodas per day  Alcohol intake: 0 drinks per  week  Nap time: most days lately   Sleepiness with Driving: none     [place] - 0  [place] Sleepiness Scale  Sitting and reading: Would never doze  Watching TV: Would never doze  Sitting, inactive in a public place (e.g. a theatre or a meeting): Would never doze  As a passenger in a car for an hour without a break: Would never doze  Lying down to rest in the afternoon when circumstances permit: Would never doze  Sitting and talking to someone: Would never doze  Sitting quietly after a lunch without alcohol: Would never doze  In a car, while stopped for a few minutes in traffic: Would never doze  Total score: 0    PMHx, FH, SH reviewed and pertinent changes are: Had kidney stones. Had Covid ~2 weeks ago, having increase in dyspnea on exertion, shortness of breath.    Review of Systems:   +SOB, dyspnea on exertion, fatigue  Negative for chest pain, fever, chills, cough, N/V/D, abdominal pain.    Smokin ppd    Aman Sanchez  reports that he has been smoking cigarettes. He has a 25.00 pack-year smoking history. He has never used smokeless tobacco.. I have educated him on the risk of diseases from using tobacco products such as cancer, COPD and heart disease.     I advised him to quit and he is not willing to quit.    I spent 3.5 minutes counseling the patient.    Physical Exam:  Vitals:    22 1044   BP: 119/77   Pulse: 85   SpO2: 91%           22  1044   Weight: 93.4 kg (206 lb)     Body mass index is 30.41 kg/m². BMI is >= 30 and <35. (Class 1 Obesity). The following options were offered after discussion;: referral to primary care    Gen:                No distress, conversant, pleasant, appears stated age, alert, oriented  Eyes:               Anicteric sclera, moist conjunctiva, no lid lag                           PERRL, EOMI   Heent:             NC/AT                          Oropharynx clear                          Normal hearing  Lungs:             Increased effort, slightly labored breathing                           Clear to auscultation bilaterally          CV:                  Normal S1/S2, no murmur                          No lower extremity edema  ABD:               Soft, rounded, non-distended                          Normal bowel sounds                    Psych:             Appropriate affect  Neuro:             CN 2-12 appear intact    Past Medical History:   Diagnosis Date   • Acute bronchitis    • Anemia    • Breast lump    • Cervical radiculopathy    • COPD (chronic obstructive pulmonary disease) ([facility])    • Coronary atherosclerosis     2 stents, followed by Dr. Hargrove   • Depressive disorder    • Disease of thyroid gland    • Dysuria    • Essential hypertension    • Flank pain    • Hematochezia    • Hematuria syndrome    • Hyperlipidemia    • Hypoglycemia    • Kidney stone    • Kidney stones    • Mass of neck    • MI (myocardial infarction) ([facility])    • Neck pain     sprain   • Osteoarthritis    • PONV (postoperative nausea and vomiting)    • Sleep apnea     using c-pap   • Sleep apnea    • Type 2 diabetes mellitus ([facility])        Current Outpatient Medications:   •  albuterol (PROVENTIL HFA;VENTOLIN HFA) 108 (90 Base) MCG/ACT inhaler, Inhale 2 puffs Every 4 (Four) Hours As Needed for Wheezing., Disp: 8 g, Rfl: 5  •  albuterol (PROVENTIL) (2.5 MG/3ML) 0.083% nebulizer solution, Take 2.5 mg by nebulization Every 4 (Four) Hours As Needed for Wheezing., Disp: 180 vial, Rfl: 1  •  allopurinol (ZYLOPRIM) 300 MG tablet, , Disp: , Rfl:   •  aspirin 81 MG EC tablet, Take 81 mg by mouth Daily. Last dose 1/8/18, Disp: , Rfl:   •  cetirizine (zyrTEC) 10 MG tablet, Take 10 mg by mouth Daily., Disp: , Rfl:   •  clopidogrel (PLAVIX) 75 MG tablet, Take 1 tablet by mouth Daily., Disp: 90 tablet, Rfl: 1  •  finasteride (PROSCAR) 5 MG tablet, Take 1 tablet by mouth Every Night., Disp: 90 tablet, Rfl: 1  •  FLUoxetine (PROzac) 20 MG capsule, Take 20 mg by mouth Daily., Disp: , Rfl:   •  fluticasone (FLONASE) 50  MCG/ACT nasal spray, 2 sprays into each nostril At Night As Needed for Rhinitis., Disp: 3 bottle, Rfl: 1  •  glucose blood test strip, One touch reli-on glucometer, Disp: 360 each, Rfl: 1  •  guaiFENesin (MUCINEX) 600 MG 12 hr tablet, Take 600 mg by mouth 2 (Two) Times a Day., Disp: , Rfl:   •  isosorbide mononitrate (IMDUR) 30 MG 24 hr tablet, Take 1 tablet by mouth Daily., Disp: 90 tablet, Rfl: 1  •  levothyroxine (SYNTHROID) 50 MCG tablet, Take 1 tablet by mouth Daily., Disp: 90 tablet, Rfl: 1  •  lisinopril-hydrochlorothiazide (PRINZIDE,ZESTORETIC) 20-25 MG per tablet, Take 1 tablet by mouth Daily., Disp: 90 tablet, Rfl: 1  •  metFORMIN (GLUCOPHAGE) 500 MG tablet, Take 1 tablet by mouth Daily With Breakfast., Disp: 90 tablet, Rfl: 1  •  nitroglycerin (NITROSTAT) 0.4 MG SL tablet, Place 1 tablet under the tongue Every 5 (Five) Minutes As Needed for chest pain., Disp: 100 tablet, Rfl: 11  •  ONE TOUCH LANCETS misc, Use as directed test 6 times daily, Disp: 200 each, Rfl: 1  •  Potassium Citrate ER 15 MEQ (1620 MG) tablet controlled-release, Take 2 tablets by mouth Daily., Disp: , Rfl:   •  pravastatin (PRAVACHOL) 80 MG tablet, Take 1 tablet by mouth Every Night., Disp: 90 tablet, Rfl: 1  •  tamsulosin (FLOMAX) 0.4 MG capsule 24 hr capsule, Take 2 capsules by mouth Daily., Disp: 180 capsule, Rfl: 1  •  traMADol (ULTRAM) 50 MG tablet, Take 50 mg by mouth Every 6 (Six) Hours As Needed. for pain, Disp: , Rfl:   •  traZODone (DESYREL) 150 MG tablet, Take 150 mg by mouth Every Night., Disp: , Rfl:   •  vitamin B-12 (CYANOCOBALAMIN) 1000 MCG tablet, Take 1,000 mcg by mouth Daily., Disp: , Rfl:     WBC   Date Value Ref Range Status   03/03/2022 8.66 3.40 - 10.80 10*3/mm3 Final   04/19/2019 9.2 4.0 - 11.0 10*3/uL Final     RBC   Date Value Ref Range Status   03/03/2022 4.85 4.14 - 5.80 10*6/mm3 Final   04/19/2019 5.47 4.73 - 5.49 10*6/uL Final     Hemoglobin   Date Value Ref Range Status   03/03/2022 14.6 13.0 - 17.7 g/dL  Final   04/19/2019 16.1 14.4 - 16.6 g/dL Final     Hematocrit   Date Value Ref Range Status   03/03/2022 43.1 37.5 - 51.0 % Final   04/19/2019 49 42.9 - 49.1 % Final   01/23/2018 50.1 42 - 54 % Final     MCV   Date Value Ref Range Status   03/03/2022 88.9 79.0 - 97.0 fL Final   04/19/2019 90 85 - 95 fl Final     MCH   Date Value Ref Range Status   03/03/2022 30.1 26.6 - 33.0 pg Final   04/19/2019 29.4 28.0 - 32.0 pg Final     MCHC   Date Value Ref Range Status   03/03/2022 33.9 31.5 - 35.7 g/dL Final   04/19/2019 32.9 32.0 - 34.0 g/dL Final     RDW   Date Value Ref Range Status   03/03/2022 14.0 12.3 - 15.4 % Final   04/19/2019 46.1 37 - 54 fl Final   01/23/2018 14.2 11.5 - 14.5 % Final     RDW-SD   Date Value Ref Range Status   03/03/2022 44.8 37.0 - 54.0 fl Final     MPV   Date Value Ref Range Status   03/03/2022 9.4 6.0 - 12.0 fL Final   04/19/2019 10 8.0 - 12.0 fl Final   01/23/2018 8.0 7.4 - 10.4 fL Final     Platelets   Date Value Ref Range Status   03/03/2022 208 140 - 450 10*3/mm3 Final   04/19/2019 231 150 - 450 10*3/uL Final   01/23/2018 253 150 - 450 10*9/L Final     Neutrophil Rel %   Date Value Ref Range Status   01/23/2018 54.9 35 - 80 % Final     Neutrophil %   Date Value Ref Range Status   03/03/2022 48.1 42.7 - 76.0 % Final     Lymphocyte Rel %   Date Value Ref Range Status   04/19/2019 42.5 5 - 55 % Final     Lymphocyte %   Date Value Ref Range Status   03/03/2022 36.3 19.6 - 45.3 % Final     Monocyte Rel %   Date Value Ref Range Status   04/19/2019 8.5 (H) 3 - 8 % Final     Monocyte %   Date Value Ref Range Status   03/03/2022 9.6 5.0 - 12.0 % Final     Eosinophil Rel %   Date Value Ref Range Status   04/19/2019 3.3 0 - 5 % Final     Eosinophil %   Date Value Ref Range Status   03/03/2022 5.1 0.3 - 6.2 % Final     Basophil Rel %   Date Value Ref Range Status   04/19/2019 0.7 0 - 2 % Final     Basophil %   Date Value Ref Range Status   03/03/2022 0.6 0.0 - 1.5 % Final     Immature Grans %   Date  Value Ref Range Status   03/03/2022 0.3 0.0 - 0.5 % Final   04/19/2019 44.8 (L) 45 - 80 % Final     Neutrophils, Absolute   Date Value Ref Range Status   03/03/2022 4.17 1.70 - 7.00 10*3/mm3 Final     Lymphocytes Absolute   Date Value Ref Range Status   01/23/2018 3.3 0.8 - 4.8 10*9/L Final     Lymphocytes, Absolute   Date Value Ref Range Status   03/03/2022 3.14 (H) 0.70 - 3.10 10*3/mm3 Final     Monocytes Absolute   Date Value Ref Range Status   01/23/2018 1.1 (H) 0.2 - 0.9 10*9/L Final     Monocytes, Absolute   Date Value Ref Range Status   03/03/2022 0.83 0.10 - 0.90 10*3/mm3 Final     Eosinophil Abs   Date Value Ref Range Status   01/23/2018 0.6 0.0 - 0.8 10*9/L Final     Eosinophils, Absolute   Date Value Ref Range Status   03/03/2022 0.44 (H) 0.00 - 0.40 10*3/mm3 Final     Basophils Absolute   Date Value Ref Range Status   01/23/2018 0.1 0.0 - 0.1 10*9/L Final     Basophils, Absolute   Date Value Ref Range Status   03/03/2022 0.05 0.00 - 0.20 10*3/mm3 Final     Immature Grans, Absolute   Date Value Ref Range Status   03/03/2022 0.03 0.00 - 0.05 10*3/mm3 Final     nRBC   Date Value Ref Range Status   03/03/2022 0.0 0.0 - 0.2 /100 WBC Final       Lab Results   Component Value Date    GLUCOSE 172 (H) 03/03/2022    BUN 13 03/21/2022    CREATININE 1.2 03/21/2022    EGFRIFNONA 78 04/15/2021    EGFRIFAFRI 90 07/08/2021    BCR 12.5 03/03/2022    K 4.5 03/21/2022    CO2 27 03/21/2022    CALCIUM 9.2 03/21/2022    ALBUMIN 3.9 03/21/2022    AST 12 (L) 03/21/2022    ALT 16 03/21/2022       Assessment and Plan:    1. Obstructive sleep apnea, COPD overlap, nocturnal hypoxia - Established, stable (1)  1. Compliant with PAP therapy and nocturnal O2  2. Continue PAP as prescribed  3. Script for PAP supplies  4. Pertinent labs were reviewed as listed above  2. Hypoxia, dyspnea on exertion, long-Covid - New (to me), no additional work-up planned (3)   1. 6-minute walk test performed as listed above (low SpO2 of 87%, recovered to  98% when placed on O2 @ 2LPM)  2. Script for O2 @ 2LPM via nasal cannula during the day/with exertion ([facility])  3. Discussed oxygen safety - do not smoke around O2, do not ignite fires/bauer around O2  4. Go to Urgent Care or ER if symptoms of SCHWAB, SOB worsen or persist  5. Follow up in 6 weeks or sooner if needed      I spent 30 minutes caring for Aman on this date of service. This time includes time spent by me in the following activities: preparing for the visit, reviewing tests, obtaining and/or reviewing a separately obtained history, performing a medically appropriate examination and/or evaluation , counseling and educating the patient/family/caregiver, ordering medications, tests, or procedures, documenting information in the medical record and care coordination; discussing PAP therapy, PAP compliance, PAP maintenance and Further testing    RTC in 6 weeks. Patient agrees to return sooner if changes in symptoms.        This document has been electronically signed by BRIAN Ceron on November 29, 2022 10:54 CST          CC: Emile Wynn MD          No ref. provider found

## 2023-01-01 ENCOUNTER — TELEPHONE (OUTPATIENT)
Dept: NUTRITION | Facility: HOSPITAL | Age: 73
End: 2023-01-01
Payer: MEDICARE

## 2023-01-01 ENCOUNTER — APPOINTMENT (OUTPATIENT)
Dept: GENERAL RADIOLOGY | Facility: HOSPITAL | Age: 73
DRG: 871 | End: 2023-01-01
Payer: MEDICARE

## 2023-01-01 ENCOUNTER — HOME CARE VISIT (OUTPATIENT)
Dept: HOME HEALTH SERVICES | Facility: CLINIC | Age: 73
End: 2023-01-01
Payer: MEDICARE

## 2023-01-01 ENCOUNTER — HOSPICE ADMISSION (OUTPATIENT)
Dept: HOSPICE | Facility: HOSPICE | Age: 73
End: 2023-01-01
Payer: MEDICARE

## 2023-01-01 ENCOUNTER — DOCUMENTATION (OUTPATIENT)
Dept: ONCOLOGY | Facility: CLINIC | Age: 73
End: 2023-01-01
Payer: MEDICARE

## 2023-01-01 ENCOUNTER — PATIENT ROUNDING (BHMG ONLY) (OUTPATIENT)
Dept: CARDIOLOGY | Facility: CLINIC | Age: 73
End: 2023-01-01
Payer: MEDICARE

## 2023-01-01 ENCOUNTER — APPOINTMENT (OUTPATIENT)
Dept: CT IMAGING | Facility: HOSPITAL | Age: 73
DRG: 871 | End: 2023-01-01
Payer: MEDICARE

## 2023-01-01 ENCOUNTER — APPOINTMENT (OUTPATIENT)
Dept: ULTRASOUND IMAGING | Facility: HOSPITAL | Age: 73
DRG: 871 | End: 2023-01-01
Payer: MEDICARE

## 2023-01-01 ENCOUNTER — TRANSCRIBE ORDERS (OUTPATIENT)
Dept: HOSPICE | Facility: HOSPICE | Age: 73
End: 2023-01-01
Payer: OTHER MISCELLANEOUS

## 2023-01-01 ENCOUNTER — HOSPITAL ENCOUNTER (INPATIENT)
Facility: HOSPITAL | Age: 73
LOS: 14 days | Discharge: HOSPICE | DRG: 871 | End: 2023-02-09
Attending: EMERGENCY MEDICINE | Admitting: STUDENT IN AN ORGANIZED HEALTH CARE EDUCATION/TRAINING PROGRAM
Payer: MEDICARE

## 2023-01-01 ENCOUNTER — OFFICE VISIT (OUTPATIENT)
Dept: PULMONOLOGY | Facility: CLINIC | Age: 73
End: 2023-01-01
Payer: MEDICARE

## 2023-01-01 ENCOUNTER — CONSULT (OUTPATIENT)
Dept: ONCOLOGY | Facility: CLINIC | Age: 73
End: 2023-01-01
Payer: MEDICARE

## 2023-01-01 ENCOUNTER — TELEPHONE (OUTPATIENT)
Dept: ONCOLOGY | Facility: CLINIC | Age: 73
End: 2023-01-01
Payer: MEDICARE

## 2023-01-01 ENCOUNTER — HOSPITAL ENCOUNTER (INPATIENT)
Facility: HOSPITAL | Age: 73
LOS: 2 days | DRG: 951 | End: 2023-02-11
Attending: INTERNAL MEDICINE | Admitting: INTERNAL MEDICINE
Payer: OTHER MISCELLANEOUS

## 2023-01-01 ENCOUNTER — APPOINTMENT (OUTPATIENT)
Dept: INTERVENTIONAL RADIOLOGY/VASCULAR | Facility: HOSPITAL | Age: 73
DRG: 871 | End: 2023-01-01
Payer: MEDICARE

## 2023-01-01 ENCOUNTER — HOME CARE VISIT (OUTPATIENT)
Dept: HOSPICE | Facility: HOSPICE | Age: 73
End: 2023-01-01
Payer: MEDICARE

## 2023-01-01 ENCOUNTER — HOSPITAL ENCOUNTER (OUTPATIENT)
Dept: GENERAL RADIOLOGY | Facility: HOSPITAL | Age: 73
Discharge: HOME | End: 2023-01-17
Admitting: INTERNAL MEDICINE
Payer: MEDICARE

## 2023-01-01 VITALS
SYSTOLIC BLOOD PRESSURE: 119 MMHG | OXYGEN SATURATION: 90 % | RESPIRATION RATE: 16 BRPM | TEMPERATURE: 98.9 F | HEART RATE: 122 BPM | DIASTOLIC BLOOD PRESSURE: 76 MMHG

## 2023-01-01 VITALS
SYSTOLIC BLOOD PRESSURE: 130 MMHG | WEIGHT: 204 LBS | HEART RATE: 106 BPM | HEIGHT: 69 IN | OXYGEN SATURATION: 97 % | DIASTOLIC BLOOD PRESSURE: 78 MMHG | BODY MASS INDEX: 30.21 KG/M2

## 2023-01-01 VITALS
HEIGHT: 69 IN | DIASTOLIC BLOOD PRESSURE: 70 MMHG | OXYGEN SATURATION: 90 % | BODY MASS INDEX: 27.78 KG/M2 | RESPIRATION RATE: 22 BRPM | WEIGHT: 187.6 LBS | HEART RATE: 104 BPM | SYSTOLIC BLOOD PRESSURE: 117 MMHG | TEMPERATURE: 97.3 F

## 2023-01-01 VITALS
WEIGHT: 204.1 LBS | HEART RATE: 102 BPM | OXYGEN SATURATION: 92 % | HEIGHT: 69 IN | SYSTOLIC BLOOD PRESSURE: 162 MMHG | DIASTOLIC BLOOD PRESSURE: 92 MMHG | BODY MASS INDEX: 30.23 KG/M2

## 2023-01-01 VITALS
HEART RATE: 104 BPM | SYSTOLIC BLOOD PRESSURE: 117 MMHG | BODY MASS INDEX: 27.7 KG/M2 | DIASTOLIC BLOOD PRESSURE: 70 MMHG | RESPIRATION RATE: 22 BRPM | HEIGHT: 69 IN | WEIGHT: 187 LBS

## 2023-01-01 VITALS
BODY MASS INDEX: 29.8 KG/M2 | DIASTOLIC BLOOD PRESSURE: 85 MMHG | OXYGEN SATURATION: 93 % | HEART RATE: 95 BPM | SYSTOLIC BLOOD PRESSURE: 175 MMHG | WEIGHT: 201.9 LBS

## 2023-01-01 VITALS — HEART RATE: 120 BPM | RESPIRATION RATE: 16 BRPM | TEMPERATURE: 97.6 F

## 2023-01-01 DIAGNOSIS — Z74.09 IMPAIRED FUNCTIONAL MOBILITY AND ACTIVITY TOLERANCE: ICD-10-CM

## 2023-01-01 DIAGNOSIS — R91.8 MASS OF RIGHT LUNG: ICD-10-CM

## 2023-01-01 DIAGNOSIS — Z78.9 IMPAIRED MOBILITY AND ADLS: ICD-10-CM

## 2023-01-01 DIAGNOSIS — J90 PLEURAL EFFUSION: Primary | ICD-10-CM

## 2023-01-01 DIAGNOSIS — R91.8 LUNG MASS: ICD-10-CM

## 2023-01-01 DIAGNOSIS — C34.90 PRIMARY MALIGNANT NEOPLASM OF LUNG METASTATIC TO OTHER SITE, UNSPECIFIED LATERALITY: Primary | ICD-10-CM

## 2023-01-01 DIAGNOSIS — J96.11 CHRONIC RESPIRATORY FAILURE WITH HYPOXIA: ICD-10-CM

## 2023-01-01 DIAGNOSIS — J44.1 ACUTE EXACERBATION OF CHRONIC OBSTRUCTIVE PULMONARY DISEASE (COPD): ICD-10-CM

## 2023-01-01 DIAGNOSIS — R13.11 ORAL PHASE DYSPHAGIA: ICD-10-CM

## 2023-01-01 DIAGNOSIS — D64.9 ANEMIA, UNSPECIFIED TYPE: ICD-10-CM

## 2023-01-01 DIAGNOSIS — J18.9 PNEUMONIA OF RIGHT LUNG DUE TO INFECTIOUS ORGANISM, UNSPECIFIED PART OF LUNG: ICD-10-CM

## 2023-01-01 DIAGNOSIS — J96.21 ACUTE ON CHRONIC RESPIRATORY FAILURE WITH HYPOXIA: Primary | ICD-10-CM

## 2023-01-01 DIAGNOSIS — Z74.09 IMPAIRED MOBILITY AND ADLS: ICD-10-CM

## 2023-01-01 DIAGNOSIS — R91.8 MASS OF RIGHT LUNG: Primary | ICD-10-CM

## 2023-01-01 LAB
ALBUMIN SERPL-MCNC: 3 G/DL (ref 3.5–5.2)
ALBUMIN SERPL-MCNC: 3.1 G/DL (ref 3.5–5.2)
ALBUMIN SERPL-MCNC: 3.3 G/DL (ref 3.5–5.2)
ALBUMIN SERPL-MCNC: 3.4 G/DL (ref 3.5–5.2)
ALBUMIN SERPL-MCNC: 3.5 G/DL (ref 3.5–5.2)
ALBUMIN SERPL-MCNC: 3.7 G/DL (ref 3.5–5.2)
ALBUMIN/GLOB SERPL: 1 G/DL
ALBUMIN/GLOB SERPL: 1 G/DL
ALBUMIN/GLOB SERPL: 1.1 G/DL
ALBUMIN/GLOB SERPL: 1.2 G/DL
ALP SERPL-CCNC: 102 U/L (ref 39–117)
ALP SERPL-CCNC: 116 U/L (ref 39–117)
ALP SERPL-CCNC: 118 U/L (ref 39–117)
ALP SERPL-CCNC: 138 U/L (ref 39–117)
ALP SERPL-CCNC: 96 U/L (ref 39–117)
ALP SERPL-CCNC: 98 U/L (ref 39–117)
ALT SERPL W P-5'-P-CCNC: 42 U/L (ref 1–41)
ALT SERPL W P-5'-P-CCNC: 43 U/L (ref 1–41)
ALT SERPL W P-5'-P-CCNC: 44 U/L (ref 1–41)
ALT SERPL W P-5'-P-CCNC: 45 U/L (ref 1–41)
ALT SERPL W P-5'-P-CCNC: 47 U/L (ref 1–41)
ALT SERPL W P-5'-P-CCNC: 51 U/L (ref 1–41)
ANION GAP SERPL CALCULATED.3IONS-SCNC: 10 MMOL/L (ref 5–15)
ANION GAP SERPL CALCULATED.3IONS-SCNC: 11 MMOL/L (ref 5–15)
ANION GAP SERPL CALCULATED.3IONS-SCNC: 12 MMOL/L (ref 5–15)
ANION GAP SERPL CALCULATED.3IONS-SCNC: 13 MMOL/L (ref 5–15)
ANION GAP SERPL CALCULATED.3IONS-SCNC: 15 MMOL/L (ref 5–15)
ANION GAP SERPL CALCULATED.3IONS-SCNC: 16 MMOL/L (ref 5–15)
ANION GAP SERPL CALCULATED.3IONS-SCNC: 6 MMOL/L (ref 5–15)
ANION GAP SERPL CALCULATED.3IONS-SCNC: 6 MMOL/L (ref 5–15)
ANION GAP SERPL CALCULATED.3IONS-SCNC: 8 MMOL/L (ref 5–15)
ANION GAP SERPL CALCULATED.3IONS-SCNC: 9 MMOL/L (ref 5–15)
ANISOCYTOSIS BLD QL: ABNORMAL
APTT PPP: 32.6 SECONDS (ref 20–40.3)
ARTERIAL PATENCY WRIST A: ABNORMAL
ARTERIAL PATENCY WRIST A: POSITIVE
ARTERIAL PATENCY WRIST A: POSITIVE
AST SERPL-CCNC: 38 U/L (ref 1–40)
AST SERPL-CCNC: 40 U/L (ref 1–40)
AST SERPL-CCNC: 40 U/L (ref 1–40)
AST SERPL-CCNC: 43 U/L (ref 1–40)
AST SERPL-CCNC: 44 U/L (ref 1–40)
AST SERPL-CCNC: 50 U/L (ref 1–40)
ATMOSPHERIC PRESS: 750 MMHG
ATMOSPHERIC PRESS: 750 MMHG
ATMOSPHERIC PRESS: 751 MMHG
ATMOSPHERIC PRESS: 752 MMHG
ATMOSPHERIC PRESS: 754 MMHG
ATMOSPHERIC PRESS: 756 MMHG
BACTERIA FLD CULT: NORMAL
BACTERIA SPEC AEROBE CULT: NORMAL
BACTERIA SPEC AEROBE CULT: NORMAL
BASE EXCESS BLDA CALC-SCNC: -2 MMOL/L (ref 0–2)
BASE EXCESS BLDA CALC-SCNC: -6.3 MMOL/L (ref 0–2)
BASE EXCESS BLDA CALC-SCNC: 0.2 MMOL/L (ref 0–2)
BASE EXCESS BLDA CALC-SCNC: 0.4 MMOL/L (ref 0–2)
BASE EXCESS BLDA CALC-SCNC: 2.2 MMOL/L (ref 0–2)
BASE EXCESS BLDA CALC-SCNC: 3.5 MMOL/L (ref 0–2)
BASOPHILS # BLD AUTO: 0.01 10*3/MM3 (ref 0–0.2)
BASOPHILS # BLD AUTO: 0.02 10*3/MM3 (ref 0–0.2)
BASOPHILS # BLD AUTO: 0.02 10*3/MM3 (ref 0–0.2)
BASOPHILS # BLD AUTO: 0.03 10*3/MM3 (ref 0–0.2)
BASOPHILS # BLD AUTO: 0.04 10*3/MM3 (ref 0–0.2)
BASOPHILS # BLD AUTO: 0.04 10*3/MM3 (ref 0–0.2)
BASOPHILS # BLD AUTO: 0.05 10*3/MM3 (ref 0–0.2)
BASOPHILS # BLD AUTO: 0.06 10*3/MM3 (ref 0–0.2)
BASOPHILS # BLD AUTO: 0.07 10*3/MM3 (ref 0–0.2)
BASOPHILS # BLD AUTO: 0.08 10*3/MM3 (ref 0–0.2)
BASOPHILS NFR BLD AUTO: 0.1 % (ref 0–1.5)
BASOPHILS NFR BLD AUTO: 0.2 % (ref 0–1.5)
BASOPHILS NFR BLD AUTO: 0.3 % (ref 0–1.5)
BASOPHILS NFR BLD AUTO: 0.4 % (ref 0–1.5)
BASOPHILS NFR BLD AUTO: 0.5 % (ref 0–1.5)
BASOPHILS NFR BLD AUTO: 0.5 % (ref 0–1.5)
BDY SITE: ABNORMAL
BILIRUB SERPL-MCNC: 0.2 MG/DL (ref 0–1.2)
BILIRUB SERPL-MCNC: 0.3 MG/DL (ref 0–1.2)
BILIRUB SERPL-MCNC: 0.3 MG/DL (ref 0–1.2)
BILIRUB SERPL-MCNC: 0.4 MG/DL (ref 0–1.2)
BUN SERPL-MCNC: 20 MG/DL (ref 8–23)
BUN SERPL-MCNC: 21 MG/DL (ref 8–23)
BUN SERPL-MCNC: 25 MG/DL (ref 8–23)
BUN SERPL-MCNC: 31 MG/DL (ref 8–23)
BUN SERPL-MCNC: 40 MG/DL (ref 8–23)
BUN SERPL-MCNC: 45 MG/DL (ref 8–23)
BUN SERPL-MCNC: 50 MG/DL (ref 8–23)
BUN SERPL-MCNC: 51 MG/DL (ref 8–23)
BUN SERPL-MCNC: 53 MG/DL (ref 8–23)
BUN SERPL-MCNC: 53 MG/DL (ref 8–23)
BUN SERPL-MCNC: 54 MG/DL (ref 8–23)
BUN SERPL-MCNC: 58 MG/DL (ref 8–23)
BUN/CREAT SERPL: 11.6 (ref 7–25)
BUN/CREAT SERPL: 12.7 (ref 7–25)
BUN/CREAT SERPL: 15 (ref 7–25)
BUN/CREAT SERPL: 19.1 (ref 7–25)
BUN/CREAT SERPL: 23.1 (ref 7–25)
BUN/CREAT SERPL: 29.1 (ref 7–25)
BUN/CREAT SERPL: 29.7 (ref 7–25)
BUN/CREAT SERPL: 30.1 (ref 7–25)
BUN/CREAT SERPL: 31 (ref 7–25)
BUN/CREAT SERPL: 31.6 (ref 7–25)
BUN/CREAT SERPL: 34.7 (ref 7–25)
BUN/CREAT SERPL: 36 (ref 7–25)
CALCIUM SPEC-SCNC: 8.6 MG/DL (ref 8.6–10.5)
CALCIUM SPEC-SCNC: 9 MG/DL (ref 8.6–10.5)
CALCIUM SPEC-SCNC: 9.1 MG/DL (ref 8.6–10.5)
CALCIUM SPEC-SCNC: 9.1 MG/DL (ref 8.6–10.5)
CALCIUM SPEC-SCNC: 9.2 MG/DL (ref 8.6–10.5)
CALCIUM SPEC-SCNC: 9.2 MG/DL (ref 8.6–10.5)
CALCIUM SPEC-SCNC: 9.3 MG/DL (ref 8.6–10.5)
CALCIUM SPEC-SCNC: 9.4 MG/DL (ref 8.6–10.5)
CALCIUM SPEC-SCNC: 9.5 MG/DL (ref 8.6–10.5)
CALCIUM SPEC-SCNC: 9.9 MG/DL (ref 8.6–10.5)
CHLORIDE SERPL-SCNC: 100 MMOL/L (ref 98–107)
CHLORIDE SERPL-SCNC: 100 MMOL/L (ref 98–107)
CHLORIDE SERPL-SCNC: 101 MMOL/L (ref 98–107)
CHLORIDE SERPL-SCNC: 102 MMOL/L (ref 98–107)
CHLORIDE SERPL-SCNC: 94 MMOL/L (ref 98–107)
CHLORIDE SERPL-SCNC: 96 MMOL/L (ref 98–107)
CHLORIDE SERPL-SCNC: 96 MMOL/L (ref 98–107)
CHLORIDE SERPL-SCNC: 98 MMOL/L (ref 98–107)
CHLORIDE SERPL-SCNC: 99 MMOL/L (ref 98–107)
CO2 SERPL-SCNC: 22 MMOL/L (ref 22–29)
CO2 SERPL-SCNC: 23 MMOL/L (ref 22–29)
CO2 SERPL-SCNC: 24 MMOL/L (ref 22–29)
CO2 SERPL-SCNC: 25 MMOL/L (ref 22–29)
CO2 SERPL-SCNC: 26 MMOL/L (ref 22–29)
CO2 SERPL-SCNC: 26 MMOL/L (ref 22–29)
CO2 SERPL-SCNC: 28 MMOL/L (ref 22–29)
CO2 SERPL-SCNC: 28 MMOL/L (ref 22–29)
CO2 SERPL-SCNC: 29 MMOL/L (ref 22–29)
CREAT SERPL-MCNC: 1.44 MG/DL (ref 0.76–1.27)
CREAT SERPL-MCNC: 1.45 MG/DL (ref 0.76–1.27)
CREAT SERPL-MCNC: 1.61 MG/DL (ref 0.76–1.27)
CREAT SERPL-MCNC: 1.62 MG/DL (ref 0.76–1.27)
CREAT SERPL-MCNC: 1.65 MG/DL (ref 0.76–1.27)
CREAT SERPL-MCNC: 1.67 MG/DL (ref 0.76–1.27)
CREAT SERPL-MCNC: 1.71 MG/DL (ref 0.76–1.27)
CREAT SERPL-MCNC: 1.72 MG/DL (ref 0.76–1.27)
CREAT SERPL-MCNC: 1.72 MG/DL (ref 0.76–1.27)
CREAT SERPL-MCNC: 1.73 MG/DL (ref 0.76–1.27)
CREAT SERPL-MCNC: 1.76 MG/DL (ref 0.76–1.27)
CREAT SERPL-MCNC: 1.82 MG/DL (ref 0.76–1.27)
D-DIMER, QUANTITATIVE (MAD,POW, STR): 2700 NG/ML (FEU) (ref 0–700)
D-LACTATE SERPL-SCNC: 3.1 MMOL/L (ref 0.5–2)
D-LACTATE SERPL-SCNC: 3.8 MMOL/L (ref 0.5–2)
D-LACTATE SERPL-SCNC: 4.1 MMOL/L (ref 0.5–2)
D-LACTATE SERPL-SCNC: 4.1 MMOL/L (ref 0.5–2)
D-LACTATE SERPL-SCNC: 4.7 MMOL/L (ref 0.5–2)
DACRYOCYTES BLD QL SMEAR: ABNORMAL
DEPRECATED RDW RBC AUTO: 48.9 FL (ref 37–54)
DEPRECATED RDW RBC AUTO: 48.9 FL (ref 37–54)
DEPRECATED RDW RBC AUTO: 49 FL (ref 37–54)
DEPRECATED RDW RBC AUTO: 49.5 FL (ref 37–54)
DEPRECATED RDW RBC AUTO: 49.8 FL (ref 37–54)
DEPRECATED RDW RBC AUTO: 49.9 FL (ref 37–54)
DEPRECATED RDW RBC AUTO: 50.1 FL (ref 37–54)
DEPRECATED RDW RBC AUTO: 50.2 FL (ref 37–54)
DEPRECATED RDW RBC AUTO: 50.4 FL (ref 37–54)
DEPRECATED RDW RBC AUTO: 51.3 FL (ref 37–54)
DEPRECATED RDW RBC AUTO: 51.5 FL (ref 37–54)
DEPRECATED RDW RBC AUTO: 53.7 FL (ref 37–54)
EGFRCR SERPLBLD CKD-EPI 2021: 39.5 ML/MIN/1.73
EGFRCR SERPLBLD CKD-EPI 2021: 41.1 ML/MIN/1.73
EGFRCR SERPLBLD CKD-EPI 2021: 41.9 ML/MIN/1.73
EGFRCR SERPLBLD CKD-EPI 2021: 42.2 ML/MIN/1.73
EGFRCR SERPLBLD CKD-EPI 2021: 42.2 ML/MIN/1.73
EGFRCR SERPLBLD CKD-EPI 2021: 42.5 ML/MIN/1.73
EGFRCR SERPLBLD CKD-EPI 2021: 43.8 ML/MIN/1.73
EGFRCR SERPLBLD CKD-EPI 2021: 44.4 ML/MIN/1.73
EGFRCR SERPLBLD CKD-EPI 2021: 45.4 ML/MIN/1.73
EGFRCR SERPLBLD CKD-EPI 2021: 45.7 ML/MIN/1.73
EGFRCR SERPLBLD CKD-EPI 2021: 51.8 ML/MIN/1.73
EGFRCR SERPLBLD CKD-EPI 2021: 52.3 ML/MIN/1.73
EOSINOPHIL # BLD AUTO: 0 10*3/MM3 (ref 0–0.4)
EOSINOPHIL # BLD AUTO: 0.01 10*3/MM3 (ref 0–0.4)
EOSINOPHIL # BLD AUTO: 0.02 10*3/MM3 (ref 0–0.4)
EOSINOPHIL # BLD AUTO: 0.02 10*3/MM3 (ref 0–0.4)
EOSINOPHIL # BLD MANUAL: 0.28 10*3/MM3 (ref 0–0.4)
EOSINOPHIL NFR BLD AUTO: 0 % (ref 0.3–6.2)
EOSINOPHIL NFR BLD AUTO: 0.1 % (ref 0.3–6.2)
EOSINOPHIL NFR BLD MANUAL: 2 % (ref 0.3–6.2)
ERYTHROCYTE [DISTWIDTH] IN BLOOD BY AUTOMATED COUNT: 16.6 % (ref 12.3–15.4)
ERYTHROCYTE [DISTWIDTH] IN BLOOD BY AUTOMATED COUNT: 16.7 % (ref 12.3–15.4)
ERYTHROCYTE [DISTWIDTH] IN BLOOD BY AUTOMATED COUNT: 17.2 % (ref 12.3–15.4)
ERYTHROCYTE [DISTWIDTH] IN BLOOD BY AUTOMATED COUNT: 17.3 % (ref 12.3–15.4)
ERYTHROCYTE [DISTWIDTH] IN BLOOD BY AUTOMATED COUNT: 17.4 % (ref 12.3–15.4)
ERYTHROCYTE [DISTWIDTH] IN BLOOD BY AUTOMATED COUNT: 17.4 % (ref 12.3–15.4)
ERYTHROCYTE [DISTWIDTH] IN BLOOD BY AUTOMATED COUNT: 17.9 % (ref 12.3–15.4)
ERYTHROCYTE [DISTWIDTH] IN BLOOD BY AUTOMATED COUNT: 18.3 % (ref 12.3–15.4)
ERYTHROCYTE [DISTWIDTH] IN BLOOD BY AUTOMATED COUNT: 18.7 % (ref 12.3–15.4)
ERYTHROCYTE [DISTWIDTH] IN BLOOD BY AUTOMATED COUNT: 19.1 % (ref 12.3–15.4)
FLUAV SUBTYP SPEC NAA+PROBE: NOT DETECTED
FLUBV RNA ISLT QL NAA+PROBE: NOT DETECTED
GAS FLOW AIRWAY: 15 LPM
GAS FLOW AIRWAY: 18 LPM
GLOBULIN UR ELPH-MCNC: 2.7 GM/DL
GLOBULIN UR ELPH-MCNC: 2.8 GM/DL
GLOBULIN UR ELPH-MCNC: 2.9 GM/DL
GLOBULIN UR ELPH-MCNC: 3.2 GM/DL
GLOBULIN UR ELPH-MCNC: 3.4 GM/DL
GLOBULIN UR ELPH-MCNC: 3.7 GM/DL
GLUCOSE BLDC GLUCOMTR-MCNC: 100 MG/DL (ref 70–130)
GLUCOSE BLDC GLUCOMTR-MCNC: 102 MG/DL (ref 70–130)
GLUCOSE BLDC GLUCOMTR-MCNC: 104 MG/DL (ref 70–130)
GLUCOSE BLDC GLUCOMTR-MCNC: 105 MG/DL (ref 70–130)
GLUCOSE BLDC GLUCOMTR-MCNC: 107 MG/DL (ref 70–130)
GLUCOSE BLDC GLUCOMTR-MCNC: 110 MG/DL (ref 70–130)
GLUCOSE BLDC GLUCOMTR-MCNC: 114 MG/DL (ref 70–130)
GLUCOSE BLDC GLUCOMTR-MCNC: 116 MG/DL (ref 70–130)
GLUCOSE BLDC GLUCOMTR-MCNC: 116 MG/DL (ref 70–130)
GLUCOSE BLDC GLUCOMTR-MCNC: 117 MG/DL (ref 70–130)
GLUCOSE BLDC GLUCOMTR-MCNC: 120 MG/DL (ref 70–130)
GLUCOSE BLDC GLUCOMTR-MCNC: 123 MG/DL (ref 70–130)
GLUCOSE BLDC GLUCOMTR-MCNC: 125 MG/DL (ref 70–130)
GLUCOSE BLDC GLUCOMTR-MCNC: 129 MG/DL (ref 70–130)
GLUCOSE BLDC GLUCOMTR-MCNC: 140 MG/DL (ref 70–130)
GLUCOSE BLDC GLUCOMTR-MCNC: 141 MG/DL (ref 70–130)
GLUCOSE BLDC GLUCOMTR-MCNC: 147 MG/DL (ref 70–130)
GLUCOSE BLDC GLUCOMTR-MCNC: 154 MG/DL (ref 70–130)
GLUCOSE BLDC GLUCOMTR-MCNC: 158 MG/DL (ref 70–130)
GLUCOSE BLDC GLUCOMTR-MCNC: 159 MG/DL (ref 70–130)
GLUCOSE BLDC GLUCOMTR-MCNC: 162 MG/DL (ref 70–130)
GLUCOSE BLDC GLUCOMTR-MCNC: 164 MG/DL (ref 70–130)
GLUCOSE BLDC GLUCOMTR-MCNC: 165 MG/DL (ref 70–130)
GLUCOSE BLDC GLUCOMTR-MCNC: 165 MG/DL (ref 70–130)
GLUCOSE BLDC GLUCOMTR-MCNC: 169 MG/DL (ref 70–130)
GLUCOSE BLDC GLUCOMTR-MCNC: 171 MG/DL (ref 70–130)
GLUCOSE BLDC GLUCOMTR-MCNC: 173 MG/DL (ref 70–130)
GLUCOSE BLDC GLUCOMTR-MCNC: 173 MG/DL (ref 70–130)
GLUCOSE BLDC GLUCOMTR-MCNC: 174 MG/DL (ref 70–130)
GLUCOSE BLDC GLUCOMTR-MCNC: 180 MG/DL (ref 70–130)
GLUCOSE BLDC GLUCOMTR-MCNC: 188 MG/DL (ref 70–130)
GLUCOSE BLDC GLUCOMTR-MCNC: 193 MG/DL (ref 70–130)
GLUCOSE BLDC GLUCOMTR-MCNC: 199 MG/DL (ref 70–130)
GLUCOSE BLDC GLUCOMTR-MCNC: 200 MG/DL (ref 70–130)
GLUCOSE BLDC GLUCOMTR-MCNC: 202 MG/DL (ref 70–130)
GLUCOSE BLDC GLUCOMTR-MCNC: 209 MG/DL (ref 70–130)
GLUCOSE BLDC GLUCOMTR-MCNC: 217 MG/DL (ref 70–130)
GLUCOSE BLDC GLUCOMTR-MCNC: 219 MG/DL (ref 70–130)
GLUCOSE BLDC GLUCOMTR-MCNC: 219 MG/DL (ref 70–130)
GLUCOSE BLDC GLUCOMTR-MCNC: 238 MG/DL (ref 70–130)
GLUCOSE BLDC GLUCOMTR-MCNC: 264 MG/DL (ref 70–130)
GLUCOSE BLDC GLUCOMTR-MCNC: 265 MG/DL (ref 70–130)
GLUCOSE BLDC GLUCOMTR-MCNC: 272 MG/DL (ref 70–130)
GLUCOSE BLDC GLUCOMTR-MCNC: 282 MG/DL (ref 70–130)
GLUCOSE SERPL-MCNC: 106 MG/DL (ref 65–99)
GLUCOSE SERPL-MCNC: 113 MG/DL (ref 65–99)
GLUCOSE SERPL-MCNC: 125 MG/DL (ref 65–99)
GLUCOSE SERPL-MCNC: 135 MG/DL (ref 65–99)
GLUCOSE SERPL-MCNC: 140 MG/DL (ref 65–99)
GLUCOSE SERPL-MCNC: 147 MG/DL (ref 65–99)
GLUCOSE SERPL-MCNC: 157 MG/DL (ref 65–99)
GLUCOSE SERPL-MCNC: 165 MG/DL (ref 65–99)
GLUCOSE SERPL-MCNC: 173 MG/DL (ref 65–99)
GLUCOSE SERPL-MCNC: 182 MG/DL (ref 65–99)
GLUCOSE SERPL-MCNC: 195 MG/DL (ref 65–99)
GLUCOSE SERPL-MCNC: 208 MG/DL (ref 65–99)
GRAM STN SPEC: NORMAL
GRAM STN SPEC: NORMAL
HCO3 BLDA-SCNC: 22.6 MMOL/L (ref 20–26)
HCO3 BLDA-SCNC: 24.5 MMOL/L (ref 20–26)
HCO3 BLDA-SCNC: 25.2 MMOL/L (ref 20–26)
HCO3 BLDA-SCNC: 25.8 MMOL/L (ref 20–26)
HCO3 BLDA-SCNC: 27.4 MMOL/L (ref 20–26)
HCO3 BLDA-SCNC: 28.6 MMOL/L (ref 20–26)
HCT VFR BLD AUTO: 35.5 % (ref 37.5–51)
HCT VFR BLD AUTO: 38.3 % (ref 37.5–51)
HCT VFR BLD AUTO: 38.7 % (ref 37.5–51)
HCT VFR BLD AUTO: 39.9 % (ref 37.5–51)
HCT VFR BLD AUTO: 40.5 % (ref 37.5–51)
HCT VFR BLD AUTO: 41.1 % (ref 37.5–51)
HCT VFR BLD AUTO: 41.5 % (ref 37.5–51)
HCT VFR BLD AUTO: 42.9 % (ref 37.5–51)
HCT VFR BLD AUTO: 43.2 % (ref 37.5–51)
HCT VFR BLD AUTO: 43.6 % (ref 37.5–51)
HCT VFR BLD AUTO: 43.9 % (ref 37.5–51)
HCT VFR BLD AUTO: 47.5 % (ref 37.5–51)
HGB BLD-MCNC: 11.7 G/DL (ref 13–17.7)
HGB BLD-MCNC: 11.8 G/DL (ref 13–17.7)
HGB BLD-MCNC: 12.2 G/DL (ref 13–17.7)
HGB BLD-MCNC: 12.5 G/DL (ref 13–17.7)
HGB BLD-MCNC: 12.7 G/DL (ref 13–17.7)
HGB BLD-MCNC: 12.8 G/DL (ref 13–17.7)
HGB BLD-MCNC: 13 G/DL (ref 13–17.7)
HGB BLD-MCNC: 13.7 G/DL (ref 13–17.7)
HGB BLD-MCNC: 13.8 G/DL (ref 13–17.7)
HGB BLD-MCNC: 14 G/DL (ref 13–17.7)
HGB BLD-MCNC: 14.1 G/DL (ref 13–17.7)
HGB BLD-MCNC: 14.9 G/DL (ref 13–17.7)
HOLD SPECIMEN: NORMAL
HYPOCHROMIA BLD QL: ABNORMAL
IMM GRANULOCYTES # BLD AUTO: 0.29 10*3/MM3 (ref 0–0.05)
IMM GRANULOCYTES # BLD AUTO: 0.32 10*3/MM3 (ref 0–0.05)
IMM GRANULOCYTES # BLD AUTO: 0.35 10*3/MM3 (ref 0–0.05)
IMM GRANULOCYTES # BLD AUTO: 0.43 10*3/MM3 (ref 0–0.05)
IMM GRANULOCYTES # BLD AUTO: 0.47 10*3/MM3 (ref 0–0.05)
IMM GRANULOCYTES # BLD AUTO: 0.5 10*3/MM3 (ref 0–0.05)
IMM GRANULOCYTES # BLD AUTO: 0.52 10*3/MM3 (ref 0–0.05)
IMM GRANULOCYTES # BLD AUTO: 0.67 10*3/MM3 (ref 0–0.05)
IMM GRANULOCYTES # BLD AUTO: 0.83 10*3/MM3 (ref 0–0.05)
IMM GRANULOCYTES # BLD AUTO: 0.91 10*3/MM3 (ref 0–0.05)
IMM GRANULOCYTES NFR BLD AUTO: 2.5 % (ref 0–0.5)
IMM GRANULOCYTES NFR BLD AUTO: 2.8 % (ref 0–0.5)
IMM GRANULOCYTES NFR BLD AUTO: 2.9 % (ref 0–0.5)
IMM GRANULOCYTES NFR BLD AUTO: 4 % (ref 0–0.5)
IMM GRANULOCYTES NFR BLD AUTO: 4.2 % (ref 0–0.5)
IMM GRANULOCYTES NFR BLD AUTO: 4.5 % (ref 0–0.5)
IMM GRANULOCYTES NFR BLD AUTO: 4.8 % (ref 0–0.5)
IMM GRANULOCYTES NFR BLD AUTO: 4.9 % (ref 0–0.5)
IMM GRANULOCYTES NFR BLD AUTO: 4.9 % (ref 0–0.5)
IMM GRANULOCYTES NFR BLD AUTO: 5.3 % (ref 0–0.5)
INHALED O2 CONCENTRATION: 100 %
INHALED O2 CONCENTRATION: 30 %
INHALED O2 CONCENTRATION: 80 %
INR PPP: 1.17 (ref 0.8–1.2)
INR PPP: 1.23 (ref 0.8–1.2)
LYMPHOCYTES # BLD AUTO: 0.98 10*3/MM3 (ref 0.7–3.1)
LYMPHOCYTES # BLD AUTO: 1.17 10*3/MM3 (ref 0.7–3.1)
LYMPHOCYTES # BLD AUTO: 1.2 10*3/MM3 (ref 0.7–3.1)
LYMPHOCYTES # BLD AUTO: 1.45 10*3/MM3 (ref 0.7–3.1)
LYMPHOCYTES # BLD AUTO: 1.67 10*3/MM3 (ref 0.7–3.1)
LYMPHOCYTES # BLD AUTO: 1.68 10*3/MM3 (ref 0.7–3.1)
LYMPHOCYTES # BLD AUTO: 2.12 10*3/MM3 (ref 0.7–3.1)
LYMPHOCYTES # BLD AUTO: 2.21 10*3/MM3 (ref 0.7–3.1)
LYMPHOCYTES # BLD AUTO: 3.51 10*3/MM3 (ref 0.7–3.1)
LYMPHOCYTES # BLD AUTO: 5.64 10*3/MM3 (ref 0.7–3.1)
LYMPHOCYTES # BLD MANUAL: 1.33 10*3/MM3 (ref 0.7–3.1)
LYMPHOCYTES # BLD MANUAL: 1.82 10*3/MM3 (ref 0.7–3.1)
LYMPHOCYTES # BLD MANUAL: 2.5 10*3/MM3 (ref 0.7–3.1)
LYMPHOCYTES NFR BLD AUTO: 10.5 % (ref 19.6–45.3)
LYMPHOCYTES NFR BLD AUTO: 12.1 % (ref 19.6–45.3)
LYMPHOCYTES NFR BLD AUTO: 13.3 % (ref 19.6–45.3)
LYMPHOCYTES NFR BLD AUTO: 14 % (ref 19.6–45.3)
LYMPHOCYTES NFR BLD AUTO: 14.2 % (ref 19.6–45.3)
LYMPHOCYTES NFR BLD AUTO: 17.5 % (ref 19.6–45.3)
LYMPHOCYTES NFR BLD AUTO: 18.6 % (ref 19.6–45.3)
LYMPHOCYTES NFR BLD AUTO: 18.7 % (ref 19.6–45.3)
LYMPHOCYTES NFR BLD AUTO: 33.1 % (ref 19.6–45.3)
LYMPHOCYTES NFR BLD AUTO: 8.5 % (ref 19.6–45.3)
LYMPHOCYTES NFR BLD MANUAL: 10 % (ref 5–12)
LYMPHOCYTES NFR BLD MANUAL: 5 % (ref 5–12)
LYMPHOCYTES NFR BLD MANUAL: 6 % (ref 5–12)
Lab: ABNORMAL
MCH RBC QN AUTO: 25.6 PG (ref 26.6–33)
MCH RBC QN AUTO: 25.7 PG (ref 26.6–33)
MCH RBC QN AUTO: 25.7 PG (ref 26.6–33)
MCH RBC QN AUTO: 25.8 PG (ref 26.6–33)
MCH RBC QN AUTO: 25.8 PG (ref 26.6–33)
MCH RBC QN AUTO: 26.2 PG (ref 26.6–33)
MCH RBC QN AUTO: 26.3 PG (ref 26.6–33)
MCH RBC QN AUTO: 26.3 PG (ref 26.6–33)
MCH RBC QN AUTO: 26.5 PG (ref 26.6–33)
MCH RBC QN AUTO: 26.6 PG (ref 26.6–33)
MCH RBC QN AUTO: 26.7 PG (ref 26.6–33)
MCH RBC QN AUTO: 27.9 PG (ref 26.6–33)
MCHC RBC AUTO-ENTMCNC: 30.5 G/DL (ref 31.5–35.7)
MCHC RBC AUTO-ENTMCNC: 30.6 G/DL (ref 31.5–35.7)
MCHC RBC AUTO-ENTMCNC: 31.1 G/DL (ref 31.5–35.7)
MCHC RBC AUTO-ENTMCNC: 31.2 G/DL (ref 31.5–35.7)
MCHC RBC AUTO-ENTMCNC: 31.3 G/DL (ref 31.5–35.7)
MCHC RBC AUTO-ENTMCNC: 31.4 G/DL (ref 31.5–35.7)
MCHC RBC AUTO-ENTMCNC: 31.9 G/DL (ref 31.5–35.7)
MCHC RBC AUTO-ENTMCNC: 31.9 G/DL (ref 31.5–35.7)
MCHC RBC AUTO-ENTMCNC: 32.1 G/DL (ref 31.5–35.7)
MCHC RBC AUTO-ENTMCNC: 32.1 G/DL (ref 31.5–35.7)
MCHC RBC AUTO-ENTMCNC: 32.9 G/DL (ref 31.5–35.7)
MCHC RBC AUTO-ENTMCNC: 33 G/DL (ref 31.5–35.7)
MCV RBC AUTO: 80.8 FL (ref 79–97)
MCV RBC AUTO: 81.8 FL (ref 79–97)
MCV RBC AUTO: 82 FL (ref 79–97)
MCV RBC AUTO: 82 FL (ref 79–97)
MCV RBC AUTO: 82.4 FL (ref 79–97)
MCV RBC AUTO: 82.5 FL (ref 79–97)
MCV RBC AUTO: 82.5 FL (ref 79–97)
MCV RBC AUTO: 82.9 FL (ref 79–97)
MCV RBC AUTO: 83.5 FL (ref 79–97)
MCV RBC AUTO: 84.5 FL (ref 79–97)
MCV RBC AUTO: 84.7 FL (ref 79–97)
MCV RBC AUTO: 85.3 FL (ref 79–97)
MODALITY: ABNORMAL
MONOCYTES # BLD AUTO: 0.32 10*3/MM3 (ref 0.1–0.9)
MONOCYTES # BLD AUTO: 0.53 10*3/MM3 (ref 0.1–0.9)
MONOCYTES # BLD AUTO: 0.58 10*3/MM3 (ref 0.1–0.9)
MONOCYTES # BLD AUTO: 0.63 10*3/MM3 (ref 0.1–0.9)
MONOCYTES # BLD AUTO: 0.75 10*3/MM3 (ref 0.1–0.9)
MONOCYTES # BLD AUTO: 0.78 10*3/MM3 (ref 0.1–0.9)
MONOCYTES # BLD AUTO: 1.1 10*3/MM3 (ref 0.1–0.9)
MONOCYTES # BLD AUTO: 1.23 10*3/MM3 (ref 0.1–0.9)
MONOCYTES # BLD AUTO: 1.41 10*3/MM3 (ref 0.1–0.9)
MONOCYTES # BLD AUTO: 1.46 10*3/MM3 (ref 0.1–0.9)
MONOCYTES # BLD: 0.68 10*3/MM3 (ref 0.1–0.9)
MONOCYTES # BLD: 0.83 10*3/MM3 (ref 0.1–0.9)
MONOCYTES # BLD: 1.39 10*3/MM3 (ref 0.1–0.9)
MONOCYTES NFR BLD AUTO: 3.1 % (ref 5–12)
MONOCYTES NFR BLD AUTO: 4.6 % (ref 5–12)
MONOCYTES NFR BLD AUTO: 5.5 % (ref 5–12)
MONOCYTES NFR BLD AUTO: 6.6 % (ref 5–12)
MONOCYTES NFR BLD AUTO: 6.6 % (ref 5–12)
MONOCYTES NFR BLD AUTO: 7.5 % (ref 5–12)
MONOCYTES NFR BLD AUTO: 7.8 % (ref 5–12)
MONOCYTES NFR BLD AUTO: 8 % (ref 5–12)
MONOCYTES NFR BLD AUTO: 8.1 % (ref 5–12)
MONOCYTES NFR BLD AUTO: 8.6 % (ref 5–12)
MRSA DNA SPEC QL NAA+PROBE: NEGATIVE
NEUTROPHILS # BLD AUTO: 14.43 10*3/MM3 (ref 1.7–7)
NEUTROPHILS # BLD AUTO: 8.89 10*3/MM3 (ref 1.7–7)
NEUTROPHILS # BLD AUTO: 9.72 10*3/MM3 (ref 1.7–7)
NEUTROPHILS NFR BLD AUTO: 10.29 10*3/MM3 (ref 1.7–7)
NEUTROPHILS NFR BLD AUTO: 11.43 10*3/MM3 (ref 1.7–7)
NEUTROPHILS NFR BLD AUTO: 12.92 10*3/MM3 (ref 1.7–7)
NEUTROPHILS NFR BLD AUTO: 54.9 % (ref 42.7–76)
NEUTROPHILS NFR BLD AUTO: 6.64 10*3/MM3 (ref 1.7–7)
NEUTROPHILS NFR BLD AUTO: 6.68 10*3/MM3 (ref 1.7–7)
NEUTROPHILS NFR BLD AUTO: 68.7 % (ref 42.7–76)
NEUTROPHILS NFR BLD AUTO: 69 % (ref 42.7–76)
NEUTROPHILS NFR BLD AUTO: 71.9 % (ref 42.7–76)
NEUTROPHILS NFR BLD AUTO: 72.3 % (ref 42.7–76)
NEUTROPHILS NFR BLD AUTO: 74.4 % (ref 42.7–76)
NEUTROPHILS NFR BLD AUTO: 75.8 % (ref 42.7–76)
NEUTROPHILS NFR BLD AUTO: 78.3 % (ref 42.7–76)
NEUTROPHILS NFR BLD AUTO: 8 10*3/MM3 (ref 1.7–7)
NEUTROPHILS NFR BLD AUTO: 8.19 10*3/MM3 (ref 1.7–7)
NEUTROPHILS NFR BLD AUTO: 80.2 % (ref 42.7–76)
NEUTROPHILS NFR BLD AUTO: 83.1 % (ref 42.7–76)
NEUTROPHILS NFR BLD AUTO: 9.19 10*3/MM3 (ref 1.7–7)
NEUTROPHILS NFR BLD AUTO: 9.35 10*3/MM3 (ref 1.7–7)
NEUTROPHILS NFR BLD AUTO: 9.56 10*3/MM3 (ref 1.7–7)
NEUTROPHILS NFR BLD MANUAL: 70 % (ref 42.7–76)
NEUTROPHILS NFR BLD MANUAL: 78 % (ref 42.7–76)
NEUTROPHILS NFR BLD MANUAL: 83 % (ref 42.7–76)
NEUTS BAND NFR BLD MANUAL: 4 % (ref 0–5)
NRBC BLD AUTO-RTO: 0 /100 WBC (ref 0–0.2)
NRBC BLD AUTO-RTO: 0 /100 WBC (ref 0–0.2)
NRBC BLD AUTO-RTO: 0.2 /100 WBC (ref 0–0.2)
NRBC BLD AUTO-RTO: 0.3 /100 WBC (ref 0–0.2)
NRBC BLD AUTO-RTO: 0.4 /100 WBC (ref 0–0.2)
NRBC BLD AUTO-RTO: 0.5 /100 WBC (ref 0–0.2)
NRBC BLD AUTO-RTO: 0.6 /100 WBC (ref 0–0.2)
NRBC BLD AUTO-RTO: 0.7 /100 WBC (ref 0–0.2)
NT-PROBNP SERPL-MCNC: 947.7 PG/ML (ref 0–900)
OVALOCYTES BLD QL SMEAR: ABNORMAL
PCO2 BLDA: 40.5 MM HG (ref 35–45)
PCO2 BLDA: 43.7 MM HG (ref 35–45)
PCO2 BLDA: 44.2 MM HG (ref 35–45)
PCO2 BLDA: 44.4 MM HG (ref 35–45)
PCO2 BLDA: 48.2 MM HG (ref 35–45)
PCO2 BLDA: 59.6 MM HG (ref 35–45)
PEEP RESPIRATORY: 5 CM[H2O]
PH BLDA: 7.19 PH UNITS (ref 7.35–7.45)
PH BLDA: 7.32 PH UNITS (ref 7.35–7.45)
PH BLDA: 7.37 PH UNITS (ref 7.35–7.45)
PH BLDA: 7.4 PH UNITS (ref 7.35–7.45)
PH BLDA: 7.41 PH UNITS (ref 7.35–7.45)
PH BLDA: 7.42 PH UNITS (ref 7.35–7.45)
PLATELET # BLD AUTO: 113 10*3/MM3 (ref 140–450)
PLATELET # BLD AUTO: 114 10*3/MM3 (ref 140–450)
PLATELET # BLD AUTO: 144 10*3/MM3 (ref 140–450)
PLATELET # BLD AUTO: 154 10*3/MM3 (ref 140–450)
PLATELET # BLD AUTO: 161 10*3/MM3 (ref 140–450)
PLATELET # BLD AUTO: 163 10*3/MM3 (ref 140–450)
PLATELET # BLD AUTO: 181 10*3/MM3 (ref 140–450)
PLATELET # BLD AUTO: 188 10*3/MM3 (ref 140–450)
PLATELET # BLD AUTO: 192 10*3/MM3 (ref 140–450)
PLATELET # BLD AUTO: 202 10*3/MM3 (ref 140–450)
PLATELET # BLD AUTO: 236 10*3/MM3 (ref 140–450)
PLATELET # BLD AUTO: 251 10*3/MM3 (ref 140–450)
PMV BLD AUTO: 10.1 FL (ref 6–12)
PMV BLD AUTO: 10.3 FL (ref 6–12)
PMV BLD AUTO: 10.4 FL (ref 6–12)
PMV BLD AUTO: 10.5 FL (ref 6–12)
PMV BLD AUTO: 10.6 FL (ref 6–12)
PMV BLD AUTO: 10.8 FL (ref 6–12)
PMV BLD AUTO: 11 FL (ref 6–12)
PMV BLD AUTO: 11 FL (ref 6–12)
PMV BLD AUTO: 12.7 FL (ref 6–12)
PMV BLD AUTO: 9.9 FL (ref 6–12)
PO2 BLDA: 150 MM HG (ref 83–108)
PO2 BLDA: 284 MM HG (ref 83–108)
PO2 BLDA: 62.8 MM HG (ref 83–108)
PO2 BLDA: 74.2 MM HG (ref 83–108)
PO2 BLDA: 82.8 MM HG (ref 83–108)
PO2 BLDA: 85.2 MM HG (ref 83–108)
POTASSIUM SERPL-SCNC: 3.8 MMOL/L (ref 3.5–5.2)
POTASSIUM SERPL-SCNC: 4 MMOL/L (ref 3.5–5.2)
POTASSIUM SERPL-SCNC: 4.1 MMOL/L (ref 3.5–5.2)
POTASSIUM SERPL-SCNC: 4.3 MMOL/L (ref 3.5–5.2)
POTASSIUM SERPL-SCNC: 4.6 MMOL/L (ref 3.5–5.2)
POTASSIUM SERPL-SCNC: 4.8 MMOL/L (ref 3.5–5.2)
POTASSIUM SERPL-SCNC: 4.8 MMOL/L (ref 3.5–5.2)
POTASSIUM SERPL-SCNC: 4.9 MMOL/L (ref 3.5–5.2)
POTASSIUM SERPL-SCNC: 5 MMOL/L (ref 3.5–5.2)
POTASSIUM SERPL-SCNC: 5.1 MMOL/L (ref 3.5–5.2)
POTASSIUM SERPL-SCNC: 5.1 MMOL/L (ref 3.5–5.2)
POTASSIUM SERPL-SCNC: 5.3 MMOL/L (ref 3.5–5.2)
POTASSIUM SERPL-SCNC: 5.7 MMOL/L (ref 3.5–5.2)
PROT SERPL-MCNC: 5.8 G/DL (ref 6–8.5)
PROT SERPL-MCNC: 6 G/DL (ref 6–8.5)
PROT SERPL-MCNC: 6 G/DL (ref 6–8.5)
PROT SERPL-MCNC: 6.6 G/DL (ref 6–8.5)
PROT SERPL-MCNC: 6.9 G/DL (ref 6–8.5)
PROT SERPL-MCNC: 7.4 G/DL (ref 6–8.5)
PROTHROMBIN TIME: 14.9 SECONDS (ref 11.1–15.3)
PROTHROMBIN TIME: 15.4 SECONDS (ref 11.1–15.3)
PSV: 8 CMH2O
QT INTERVAL: 292 MS
QT INTERVAL: 378 MS
QTC INTERVAL: 438 MS
QTC INTERVAL: 490 MS
RBC # BLD AUTO: 4.2 10*6/MM3 (ref 4.14–5.8)
RBC # BLD AUTO: 4.57 10*6/MM3 (ref 4.14–5.8)
RBC # BLD AUTO: 4.64 10*6/MM3 (ref 4.14–5.8)
RBC # BLD AUTO: 4.68 10*6/MM3 (ref 4.14–5.8)
RBC # BLD AUTO: 4.91 10*6/MM3 (ref 4.14–5.8)
RBC # BLD AUTO: 4.96 10*6/MM3 (ref 4.14–5.8)
RBC # BLD AUTO: 4.97 10*6/MM3 (ref 4.14–5.8)
RBC # BLD AUTO: 5.27 10*6/MM3 (ref 4.14–5.8)
RBC # BLD AUTO: 5.31 10*6/MM3 (ref 4.14–5.8)
RBC # BLD AUTO: 5.33 10*6/MM3 (ref 4.14–5.8)
RBC # BLD AUTO: 5.33 10*6/MM3 (ref 4.14–5.8)
RBC # BLD AUTO: 5.79 10*6/MM3 (ref 4.14–5.8)
REF LAB TEST METHOD: NORMAL
SAO2 % BLDCOA: 90.6 % (ref 94–99)
SAO2 % BLDCOA: 95.2 % (ref 94–99)
SAO2 % BLDCOA: 97.2 % (ref 94–99)
SAO2 % BLDCOA: 97.6 % (ref 94–99)
SAO2 % BLDCOA: 98.8 % (ref 94–99)
SAO2 % BLDCOA: >100 % (ref 94–99)
SARS-COV-2 RNA PNL SPEC NAA+PROBE: NOT DETECTED
SET MECH RESP RATE: 18
SET MECH RESP RATE: 20
SMALL PLATELETS BLD QL SMEAR: ABNORMAL
SMALL PLATELETS BLD QL SMEAR: ADEQUATE
SMALL PLATELETS BLD QL SMEAR: ADEQUATE
SODIUM SERPL-SCNC: 132 MMOL/L (ref 136–145)
SODIUM SERPL-SCNC: 133 MMOL/L (ref 136–145)
SODIUM SERPL-SCNC: 134 MMOL/L (ref 136–145)
SODIUM SERPL-SCNC: 135 MMOL/L (ref 136–145)
SODIUM SERPL-SCNC: 135 MMOL/L (ref 136–145)
SODIUM SERPL-SCNC: 136 MMOL/L (ref 136–145)
SODIUM SERPL-SCNC: 137 MMOL/L (ref 136–145)
TROPONIN T SERPL-MCNC: <0.01 NG/ML (ref 0–0.03)
VARIANT LYMPHS NFR BLD MANUAL: 16 % (ref 19.6–45.3)
VARIANT LYMPHS NFR BLD MANUAL: 18 % (ref 19.6–45.3)
VARIANT LYMPHS NFR BLD MANUAL: 8 % (ref 19.6–45.3)
VENTILATOR MODE: ABNORMAL
VENTILATOR MODE: AC
VENTILATOR MODE: AC
VT ON VENT VENT: 450 ML
VT ON VENT VENT: 450 ML
VT ON VENT VENT: 500 ML
WBC MORPH BLD: NORMAL
WBC NRBC COR # BLD: 10.22 10*3/MM3 (ref 3.4–10.8)
WBC NRBC COR # BLD: 11.4 10*3/MM3 (ref 3.4–10.8)
WBC NRBC COR # BLD: 11.46 10*3/MM3 (ref 3.4–10.8)
WBC NRBC COR # BLD: 11.5 10*3/MM3 (ref 3.4–10.8)
WBC NRBC COR # BLD: 13.81 10*3/MM3 (ref 3.4–10.8)
WBC NRBC COR # BLD: 13.89 10*3/MM3 (ref 3.4–10.8)
WBC NRBC COR # BLD: 15.79 10*3/MM3 (ref 3.4–10.8)
WBC NRBC COR # BLD: 16.59 10*3/MM3 (ref 3.4–10.8)
WBC NRBC COR # BLD: 17.03 10*3/MM3 (ref 3.4–10.8)
WBC NRBC COR # BLD: 18.81 10*3/MM3 (ref 3.4–10.8)
WBC NRBC COR # BLD: 8.81 10*3/MM3 (ref 3.4–10.8)
WBC NRBC COR # BLD: 9.62 10*3/MM3 (ref 3.4–10.8)
WHOLE BLOOD HOLD COAG: NORMAL
WHOLE BLOOD HOLD COAG: NORMAL
WHOLE BLOOD HOLD SPECIMEN: NORMAL
WHOLE BLOOD HOLD SPECIMEN: NORMAL

## 2023-01-01 PROCEDURE — 25010000002 FUROSEMIDE PER 20 MG: Performed by: INTERNAL MEDICINE

## 2023-01-01 PROCEDURE — 94002 VENT MGMT INPAT INIT DAY: CPT

## 2023-01-01 PROCEDURE — 94799 UNLISTED PULMONARY SVC/PX: CPT

## 2023-01-01 PROCEDURE — 32551 INSERTION OF CHEST TUBE: CPT | Performed by: THORACIC SURGERY (CARDIOTHORACIC VASCULAR SURGERY)

## 2023-01-01 PROCEDURE — 63710000001 INSULIN ASPART PER 5 UNITS: Performed by: INTERNAL MEDICINE

## 2023-01-01 PROCEDURE — 88112 CYTOPATH CELL ENHANCE TECH: CPT

## 2023-01-01 PROCEDURE — 25010000002 MORPHINE PER 10 MG: Performed by: INTERNAL MEDICINE

## 2023-01-01 PROCEDURE — 94760 N-INVAS EAR/PLS OXIMETRY 1: CPT

## 2023-01-01 PROCEDURE — 94640 AIRWAY INHALATION TREATMENT: CPT

## 2023-01-01 PROCEDURE — 85025 COMPLETE CBC W/AUTO DIFF WBC: CPT | Performed by: EMERGENCY MEDICINE

## 2023-01-01 PROCEDURE — 99222 1ST HOSP IP/OBS MODERATE 55: CPT | Performed by: INTERNAL MEDICINE

## 2023-01-01 PROCEDURE — 25010000002 MIDAZOLAM PER 1 MG: Performed by: FAMILY MEDICINE

## 2023-01-01 PROCEDURE — 82962 GLUCOSE BLOOD TEST: CPT

## 2023-01-01 PROCEDURE — 71046 X-RAY EXAM CHEST 2 VIEWS: CPT

## 2023-01-01 PROCEDURE — 99285 EMERGENCY DEPT VISIT HI MDM: CPT

## 2023-01-01 PROCEDURE — 25010000002 LORAZEPAM PER 2 MG: Performed by: INTERNAL MEDICINE

## 2023-01-01 PROCEDURE — 85610 PROTHROMBIN TIME: CPT | Performed by: STUDENT IN AN ORGANIZED HEALTH CARE EDUCATION/TRAINING PROGRAM

## 2023-01-01 PROCEDURE — 82803 BLOOD GASES ANY COMBINATION: CPT

## 2023-01-01 PROCEDURE — 25010000002 FENTANYL CITRATE (PF) 50 MCG/ML SOLUTION: Performed by: EMERGENCY MEDICINE

## 2023-01-01 PROCEDURE — 25010000002 ONDANSETRON PER 1 MG: Performed by: STUDENT IN AN ORGANIZED HEALTH CARE EDUCATION/TRAINING PROGRAM

## 2023-01-01 PROCEDURE — 36415 COLL VENOUS BLD VENIPUNCTURE: CPT | Performed by: STUDENT IN AN ORGANIZED HEALTH CARE EDUCATION/TRAINING PROGRAM

## 2023-01-01 PROCEDURE — 93010 ELECTROCARDIOGRAM REPORT: CPT | Performed by: INTERNAL MEDICINE

## 2023-01-01 PROCEDURE — 25010000002 FENTANYL CITRATE (PF) 50 MCG/ML SOLUTION: Performed by: FAMILY MEDICINE

## 2023-01-01 PROCEDURE — 97530 THERAPEUTIC ACTIVITIES: CPT

## 2023-01-01 PROCEDURE — 94664 DEMO&/EVAL PT USE INHALER: CPT

## 2023-01-01 PROCEDURE — 83605 ASSAY OF LACTIC ACID: CPT | Performed by: EMERGENCY MEDICINE

## 2023-01-01 PROCEDURE — 87070 CULTURE OTHR SPECIMN AEROBIC: CPT | Performed by: THORACIC SURGERY (CARDIOTHORACIC VASCULAR SURGERY)

## 2023-01-01 PROCEDURE — 25010000002 PROPOFOL 10 MG/ML EMULSION: Performed by: FAMILY MEDICINE

## 2023-01-01 PROCEDURE — 88305 TISSUE EXAM BY PATHOLOGIST: CPT

## 2023-01-01 PROCEDURE — 25010000002 METHYLPREDNISOLONE PER 125 MG: Performed by: STUDENT IN AN ORGANIZED HEALTH CARE EDUCATION/TRAINING PROGRAM

## 2023-01-01 PROCEDURE — 88342 IMHCHEM/IMCYTCHM 1ST ANTB: CPT

## 2023-01-01 PROCEDURE — 63710000001 PREDNISONE PER 1 MG: Performed by: INTERNAL MEDICINE

## 2023-01-01 PROCEDURE — 84132 ASSAY OF SERUM POTASSIUM: CPT | Performed by: STUDENT IN AN ORGANIZED HEALTH CARE EDUCATION/TRAINING PROGRAM

## 2023-01-01 PROCEDURE — 85025 COMPLETE CBC W/AUTO DIFF WBC: CPT | Performed by: STUDENT IN AN ORGANIZED HEALTH CARE EDUCATION/TRAINING PROGRAM

## 2023-01-01 PROCEDURE — 85025 COMPLETE CBC W/AUTO DIFF WBC: CPT | Performed by: INTERNAL MEDICINE

## 2023-01-01 PROCEDURE — 99232 SBSQ HOSP IP/OBS MODERATE 35: CPT | Performed by: NURSE PRACTITIONER

## 2023-01-01 PROCEDURE — 0 IOPAMIDOL PER 1 ML: Performed by: EMERGENCY MEDICINE

## 2023-01-01 PROCEDURE — 97110 THERAPEUTIC EXERCISES: CPT

## 2023-01-01 PROCEDURE — G0463 HOSPITAL OUTPT CLINIC VISIT: HCPCS | Performed by: INTERNAL MEDICINE

## 2023-01-01 PROCEDURE — 25010000002 ONDANSETRON PER 1 MG: Performed by: INTERNAL MEDICINE

## 2023-01-01 PROCEDURE — 84484 ASSAY OF TROPONIN QUANT: CPT | Performed by: EMERGENCY MEDICINE

## 2023-01-01 PROCEDURE — 85730 THROMBOPLASTIN TIME PARTIAL: CPT

## 2023-01-01 PROCEDURE — 25010000002 CEFEPIME PER 500 MG: Performed by: STUDENT IN AN ORGANIZED HEALTH CARE EDUCATION/TRAINING PROGRAM

## 2023-01-01 PROCEDURE — 25010000002 MIDAZOLAM 50 MG/10ML SOLUTION: Performed by: FAMILY MEDICINE

## 2023-01-01 PROCEDURE — 85007 BL SMEAR W/DIFF WBC COUNT: CPT | Performed by: INTERNAL MEDICINE

## 2023-01-01 PROCEDURE — 80048 BASIC METABOLIC PNL TOTAL CA: CPT | Performed by: INTERNAL MEDICINE

## 2023-01-01 PROCEDURE — 25010000002 VANCOMYCIN 10 G RECONSTITUTED SOLUTION: Performed by: STUDENT IN AN ORGANIZED HEALTH CARE EDUCATION/TRAINING PROGRAM

## 2023-01-01 PROCEDURE — 71045 X-RAY EXAM CHEST 1 VIEW: CPT

## 2023-01-01 PROCEDURE — 94761 N-INVAS EAR/PLS OXIMETRY MLT: CPT

## 2023-01-01 PROCEDURE — 97167 OT EVAL HIGH COMPLEX 60 MIN: CPT

## 2023-01-01 PROCEDURE — 36415 COLL VENOUS BLD VENIPUNCTURE: CPT | Performed by: EMERGENCY MEDICINE

## 2023-01-01 PROCEDURE — 94660 CPAP INITIATION&MGMT: CPT

## 2023-01-01 PROCEDURE — 85610 PROTHROMBIN TIME: CPT

## 2023-01-01 PROCEDURE — 80053 COMPREHEN METABOLIC PANEL: CPT | Performed by: STUDENT IN AN ORGANIZED HEALTH CARE EDUCATION/TRAINING PROGRAM

## 2023-01-01 PROCEDURE — 94003 VENT MGMT INPAT SUBQ DAY: CPT

## 2023-01-01 PROCEDURE — 97162 PT EVAL MOD COMPLEX 30 MIN: CPT | Performed by: PHYSICAL THERAPIST

## 2023-01-01 PROCEDURE — 71275 CT ANGIOGRAPHY CHEST: CPT

## 2023-01-01 PROCEDURE — 25010000002 DEXAMETHASONE PER 1 MG: Performed by: EMERGENCY MEDICINE

## 2023-01-01 PROCEDURE — 25010000002 HEPARIN (PORCINE) PER 1000 UNITS: Performed by: FAMILY MEDICINE

## 2023-01-01 PROCEDURE — 99231 SBSQ HOSP IP/OBS SF/LOW 25: CPT | Performed by: THORACIC SURGERY (CARDIOTHORACIC VASCULAR SURGERY)

## 2023-01-01 PROCEDURE — 87636 SARSCOV2 & INF A&B AMP PRB: CPT | Performed by: EMERGENCY MEDICINE

## 2023-01-01 PROCEDURE — 85379 FIBRIN DEGRADATION QUANT: CPT | Performed by: EMERGENCY MEDICINE

## 2023-01-01 PROCEDURE — 74018 RADEX ABDOMEN 1 VIEW: CPT

## 2023-01-01 PROCEDURE — 80053 COMPREHEN METABOLIC PANEL: CPT | Performed by: EMERGENCY MEDICINE

## 2023-01-01 PROCEDURE — 25010000002 METOCLOPRAMIDE PER 10 MG: Performed by: INTERNAL MEDICINE

## 2023-01-01 PROCEDURE — 25010000002 AZITHROMYCIN PER 500 MG: Performed by: EMERGENCY MEDICINE

## 2023-01-01 PROCEDURE — 97535 SELF CARE MNGMENT TRAINING: CPT

## 2023-01-01 PROCEDURE — 25010000002 MIDAZOLAM PER 1 MG

## 2023-01-01 PROCEDURE — 36600 WITHDRAWAL OF ARTERIAL BLOOD: CPT

## 2023-01-01 PROCEDURE — G0299 HHS/HOSPICE OF RN EA 15 MIN: HCPCS

## 2023-01-01 PROCEDURE — 76937 US GUIDE VASCULAR ACCESS: CPT

## 2023-01-01 PROCEDURE — 85007 BL SMEAR W/DIFF WBC COUNT: CPT | Performed by: STUDENT IN AN ORGANIZED HEALTH CARE EDUCATION/TRAINING PROGRAM

## 2023-01-01 PROCEDURE — 88341 IMHCHEM/IMCYTCHM EA ADD ANTB: CPT

## 2023-01-01 PROCEDURE — 25010000002 METHYLPREDNISOLONE PER 40 MG: Performed by: FAMILY MEDICINE

## 2023-01-01 PROCEDURE — 25010000002 SUCCINYLCHOLINE PER 20 MG: Performed by: FAMILY MEDICINE

## 2023-01-01 PROCEDURE — 25010000002 HYDROMORPHONE 1 MG/ML SOLUTION: Performed by: STUDENT IN AN ORGANIZED HEALTH CARE EDUCATION/TRAINING PROGRAM

## 2023-01-01 PROCEDURE — 36415 COLL VENOUS BLD VENIPUNCTURE: CPT

## 2023-01-01 PROCEDURE — 85027 COMPLETE CBC AUTOMATED: CPT | Performed by: STUDENT IN AN ORGANIZED HEALTH CARE EDUCATION/TRAINING PROGRAM

## 2023-01-01 PROCEDURE — 25010000002 CEFTRIAXONE PER 250 MG: Performed by: EMERGENCY MEDICINE

## 2023-01-01 PROCEDURE — 63710000001 INSULIN ASPART PER 5 UNITS: Performed by: STUDENT IN AN ORGANIZED HEALTH CARE EDUCATION/TRAINING PROGRAM

## 2023-01-01 PROCEDURE — 99204 OFFICE O/P NEW MOD 45 MIN: CPT | Performed by: INTERNAL MEDICINE

## 2023-01-01 PROCEDURE — 0BH17EZ INSERTION OF ENDOTRACHEAL AIRWAY INTO TRACHEA, VIA NATURAL OR ARTIFICIAL OPENING: ICD-10-PCS | Performed by: FAMILY MEDICINE

## 2023-01-01 PROCEDURE — 63710000001 ONDANSETRON PER 8 MG: Performed by: INTERNAL MEDICINE

## 2023-01-01 PROCEDURE — 25010000002 CEFEPIME PER 500 MG: Performed by: INTERNAL MEDICINE

## 2023-01-01 PROCEDURE — 87205 SMEAR GRAM STAIN: CPT | Performed by: THORACIC SURGERY (CARDIOTHORACIC VASCULAR SURGERY)

## 2023-01-01 PROCEDURE — 0W9930Z DRAINAGE OF RIGHT PLEURAL CAVITY WITH DRAINAGE DEVICE, PERCUTANEOUS APPROACH: ICD-10-PCS | Performed by: THORACIC SURGERY (CARDIOTHORACIC VASCULAR SURGERY)

## 2023-01-01 PROCEDURE — 92526 ORAL FUNCTION THERAPY: CPT | Performed by: SPEECH-LANGUAGE PATHOLOGIST

## 2023-01-01 PROCEDURE — C1751 CATH, INF, PER/CENT/MIDLINE: HCPCS

## 2023-01-01 PROCEDURE — 92610 EVALUATE SWALLOWING FUNCTION: CPT | Performed by: SPEECH-LANGUAGE PATHOLOGIST

## 2023-01-01 PROCEDURE — 93005 ELECTROCARDIOGRAM TRACING: CPT | Performed by: INTERNAL MEDICINE

## 2023-01-01 PROCEDURE — 83880 ASSAY OF NATRIURETIC PEPTIDE: CPT | Performed by: EMERGENCY MEDICINE

## 2023-01-01 PROCEDURE — 25010000002 HYDROMORPHONE 1 MG/ML SOLUTION

## 2023-01-01 PROCEDURE — 93005 ELECTROCARDIOGRAM TRACING: CPT | Performed by: EMERGENCY MEDICINE

## 2023-01-01 PROCEDURE — 87641 MR-STAPH DNA AMP PROBE: CPT | Performed by: STUDENT IN AN ORGANIZED HEALTH CARE EDUCATION/TRAINING PROGRAM

## 2023-01-01 PROCEDURE — 32555 ASPIRATE PLEURA W/ IMAGING: CPT | Performed by: INTERNAL MEDICINE

## 2023-01-01 PROCEDURE — 36410 VNPNXR 3YR/> PHY/QHP DX/THER: CPT

## 2023-01-01 PROCEDURE — 99231 SBSQ HOSP IP/OBS SF/LOW 25: CPT | Performed by: INTERNAL MEDICINE

## 2023-01-01 PROCEDURE — 5A1945Z RESPIRATORY VENTILATION, 24-96 CONSECUTIVE HOURS: ICD-10-PCS | Performed by: FAMILY MEDICINE

## 2023-01-01 PROCEDURE — 80048 BASIC METABOLIC PNL TOTAL CA: CPT | Performed by: STUDENT IN AN ORGANIZED HEALTH CARE EDUCATION/TRAINING PROGRAM

## 2023-01-01 PROCEDURE — 99223 1ST HOSP IP/OBS HIGH 75: CPT | Performed by: INTERNAL MEDICINE

## 2023-01-01 PROCEDURE — 99223 1ST HOSP IP/OBS HIGH 75: CPT | Performed by: NURSE PRACTITIONER

## 2023-01-01 PROCEDURE — 87040 BLOOD CULTURE FOR BACTERIA: CPT | Performed by: EMERGENCY MEDICINE

## 2023-01-01 RX ORDER — ALBUTEROL SULFATE 2.5 MG/3ML
SOLUTION RESPIRATORY (INHALATION)
Status: DISPENSED
Start: 2023-01-01 | End: 2023-01-01

## 2023-01-01 RX ORDER — ONDANSETRON 4 MG/1
4 TABLET, FILM COATED ORAL EVERY 6 HOURS PRN
Status: DISCONTINUED | OUTPATIENT
Start: 2023-01-01 | End: 2023-01-01 | Stop reason: HOSPADM

## 2023-01-01 RX ORDER — SUCCINYLCHOLINE CHLORIDE 20 MG/ML
100 INJECTION INTRAMUSCULAR; INTRAVENOUS ONCE
Status: COMPLETED | OUTPATIENT
Start: 2023-01-01 | End: 2023-01-01

## 2023-01-01 RX ORDER — METHYLPREDNISOLONE SODIUM SUCCINATE 125 MG/2ML
60 INJECTION, POWDER, LYOPHILIZED, FOR SOLUTION INTRAMUSCULAR; INTRAVENOUS EVERY 6 HOURS
Status: DISCONTINUED | OUTPATIENT
Start: 2023-01-01 | End: 2023-01-01

## 2023-01-01 RX ORDER — LORAZEPAM 2 MG/ML
1 INJECTION INTRAMUSCULAR
Status: DISCONTINUED | OUTPATIENT
Start: 2023-01-01 | End: 2023-01-01 | Stop reason: HOSPADM

## 2023-01-01 RX ORDER — LIDOCAINE HYDROCHLORIDE 20 MG/ML
5 SOLUTION OROPHARYNGEAL EVERY 4 HOURS PRN
Status: DISCONTINUED | OUTPATIENT
Start: 2023-01-01 | End: 2023-01-01 | Stop reason: HOSPADM

## 2023-01-01 RX ORDER — MORPHINE SULFATE 2 MG/ML
2 INJECTION, SOLUTION INTRAMUSCULAR; INTRAVENOUS EVERY 4 HOURS PRN
Status: DISCONTINUED | OUTPATIENT
Start: 2023-01-01 | End: 2023-01-01 | Stop reason: HOSPADM

## 2023-01-01 RX ORDER — GUAIFENESIN 600 MG/1
600 TABLET, EXTENDED RELEASE ORAL EVERY 12 HOURS SCHEDULED
Status: DISCONTINUED | OUTPATIENT
Start: 2023-01-01 | End: 2023-01-01

## 2023-01-01 RX ORDER — LORAZEPAM 2 MG/ML
0.5 INJECTION INTRAMUSCULAR
Status: DISCONTINUED | OUTPATIENT
Start: 2023-01-01 | End: 2023-01-01 | Stop reason: HOSPADM

## 2023-01-01 RX ORDER — ACETAMINOPHEN 650 MG/1
650 SUPPOSITORY RECTAL EVERY 4 HOURS PRN
Status: DISCONTINUED | OUTPATIENT
Start: 2023-01-01 | End: 2023-01-01 | Stop reason: HOSPADM

## 2023-01-01 RX ORDER — MORPHINE SULFATE 20 MG/ML
10 SOLUTION ORAL
Status: DISCONTINUED | OUTPATIENT
Start: 2023-01-01 | End: 2023-01-01 | Stop reason: HOSPADM

## 2023-01-01 RX ORDER — IPRATROPIUM BROMIDE AND ALBUTEROL SULFATE 2.5; .5 MG/3ML; MG/3ML
3 SOLUTION RESPIRATORY (INHALATION)
Status: DISCONTINUED | OUTPATIENT
Start: 2023-01-01 | End: 2023-01-01 | Stop reason: CLARIF

## 2023-01-01 RX ORDER — SODIUM CHLORIDE 0.9 % (FLUSH) 0.9 %
10 SYRINGE (ML) INJECTION AS NEEDED
Status: DISCONTINUED | OUTPATIENT
Start: 2023-01-01 | End: 2023-01-01 | Stop reason: HOSPADM

## 2023-01-01 RX ORDER — FUROSEMIDE 20 MG/1
20 TABLET ORAL
Status: DISCONTINUED | OUTPATIENT
Start: 2023-01-01 | End: 2023-01-01

## 2023-01-01 RX ORDER — MIDAZOLAM HYDROCHLORIDE 1 MG/ML
INJECTION INTRAMUSCULAR; INTRAVENOUS
Status: COMPLETED
Start: 2023-01-01 | End: 2023-01-01

## 2023-01-01 RX ORDER — LORAZEPAM 2 MG/ML
1 CONCENTRATE ORAL
Status: DISCONTINUED | OUTPATIENT
Start: 2023-01-01 | End: 2023-01-01 | Stop reason: HOSPADM

## 2023-01-01 RX ORDER — ACETAMINOPHEN 325 MG/1
650 TABLET ORAL EVERY 4 HOURS PRN
Status: DISCONTINUED | OUTPATIENT
Start: 2023-01-01 | End: 2023-01-01 | Stop reason: HOSPADM

## 2023-01-01 RX ORDER — BISACODYL 10 MG
10 SUPPOSITORY, RECTAL RECTAL DAILY PRN
Status: DISCONTINUED | OUTPATIENT
Start: 2023-01-01 | End: 2023-01-01

## 2023-01-01 RX ORDER — IPRATROPIUM BROMIDE AND ALBUTEROL SULFATE 2.5; .5 MG/3ML; MG/3ML
3 SOLUTION RESPIRATORY (INHALATION) ONCE
Status: COMPLETED | OUTPATIENT
Start: 2023-01-01 | End: 2023-01-01

## 2023-01-01 RX ORDER — DEXTROSE MONOHYDRATE 25 G/50ML
25 INJECTION, SOLUTION INTRAVENOUS
Status: DISCONTINUED | OUTPATIENT
Start: 2023-01-01 | End: 2023-01-01

## 2023-01-01 RX ORDER — LORAZEPAM 2 MG/ML
2 INJECTION INTRAMUSCULAR
Status: DISCONTINUED | OUTPATIENT
Start: 2023-01-01 | End: 2023-01-01 | Stop reason: HOSPADM

## 2023-01-01 RX ORDER — ECHINACEA PURPUREA EXTRACT 125 MG
1 TABLET ORAL
Status: DISCONTINUED | OUTPATIENT
Start: 2023-01-01 | End: 2023-01-01 | Stop reason: HOSPADM

## 2023-01-01 RX ORDER — METHYLPREDNISOLONE SODIUM SUCCINATE 40 MG/ML
40 INJECTION, POWDER, LYOPHILIZED, FOR SOLUTION INTRAMUSCULAR; INTRAVENOUS EVERY 8 HOURS
Status: DISCONTINUED | OUTPATIENT
Start: 2023-01-01 | End: 2023-01-01

## 2023-01-01 RX ORDER — AMOXICILLIN 250 MG
1 CAPSULE ORAL NIGHTLY
Status: DISCONTINUED | OUTPATIENT
Start: 2023-01-01 | End: 2023-01-01

## 2023-01-01 RX ORDER — NICOTINE 21 MG/24HR
1 PATCH, TRANSDERMAL 24 HOURS TRANSDERMAL EVERY 24 HOURS
Status: DISCONTINUED | OUTPATIENT
Start: 2023-01-01 | End: 2023-01-01 | Stop reason: HOSPADM

## 2023-01-01 RX ORDER — TAMSULOSIN HYDROCHLORIDE 0.4 MG/1
0.8 CAPSULE ORAL DAILY
Status: DISCONTINUED | OUTPATIENT
Start: 2023-01-01 | End: 2023-01-01

## 2023-01-01 RX ORDER — METOCLOPRAMIDE HYDROCHLORIDE 5 MG/ML
10 INJECTION INTRAMUSCULAR; INTRAVENOUS EVERY 8 HOURS
Status: DISCONTINUED | OUTPATIENT
Start: 2023-01-01 | End: 2023-01-01 | Stop reason: HOSPADM

## 2023-01-01 RX ORDER — LORAZEPAM 2 MG/ML
2 CONCENTRATE ORAL
Status: DISCONTINUED | OUTPATIENT
Start: 2023-01-01 | End: 2023-01-01 | Stop reason: HOSPADM

## 2023-01-01 RX ORDER — IPRATROPIUM BROMIDE AND ALBUTEROL SULFATE 2.5; .5 MG/3ML; MG/3ML
3 SOLUTION RESPIRATORY (INHALATION) EVERY 4 HOURS PRN
Status: DISCONTINUED | OUTPATIENT
Start: 2023-01-01 | End: 2023-01-01 | Stop reason: HOSPADM

## 2023-01-01 RX ORDER — CARBOXYMETHYLCELLULOSE SODIUM 5 MG/ML
1 SOLUTION/ DROPS OPHTHALMIC
Status: DISCONTINUED | OUTPATIENT
Start: 2023-01-01 | End: 2023-01-01 | Stop reason: HOSPADM

## 2023-01-01 RX ORDER — DOXYCYCLINE 100 MG/1
100 CAPSULE ORAL EVERY 12 HOURS SCHEDULED
Status: DISPENSED | OUTPATIENT
Start: 2023-01-01 | End: 2023-01-01

## 2023-01-01 RX ORDER — FUROSEMIDE 10 MG/ML
20 INJECTION INTRAMUSCULAR; INTRAVENOUS EVERY 6 HOURS PRN
Status: DISCONTINUED | OUTPATIENT
Start: 2023-01-01 | End: 2023-01-01 | Stop reason: HOSPADM

## 2023-01-01 RX ORDER — HALOPERIDOL 2 MG/ML
1 SOLUTION ORAL EVERY 4 HOURS PRN
Status: DISCONTINUED | OUTPATIENT
Start: 2023-01-01 | End: 2023-01-01 | Stop reason: HOSPADM

## 2023-01-01 RX ORDER — LORAZEPAM 0.5 MG/1
0.5 TABLET ORAL
Status: DISCONTINUED | OUTPATIENT
Start: 2023-01-01 | End: 2023-01-01 | Stop reason: HOSPADM

## 2023-01-01 RX ORDER — LORAZEPAM 0.5 MG/1
1 TABLET ORAL
Status: DISCONTINUED | OUTPATIENT
Start: 2023-01-01 | End: 2023-01-01 | Stop reason: HOSPADM

## 2023-01-01 RX ORDER — LORAZEPAM 2 MG/ML
1 INJECTION INTRAMUSCULAR EVERY 4 HOURS PRN
Status: DISCONTINUED | OUTPATIENT
Start: 2023-01-01 | End: 2023-01-01

## 2023-01-01 RX ORDER — ETOMIDATE 2 MG/ML
20 INJECTION INTRAVENOUS ONCE
Status: COMPLETED | OUTPATIENT
Start: 2023-01-01 | End: 2023-01-01

## 2023-01-01 RX ORDER — SODIUM CHLORIDE 9 MG/ML
40 INJECTION, SOLUTION INTRAVENOUS AS NEEDED
Status: DISCONTINUED | OUTPATIENT
Start: 2023-01-01 | End: 2023-01-01 | Stop reason: HOSPADM

## 2023-01-01 RX ORDER — FENTANYL CITRATE 50 UG/ML
50 INJECTION, SOLUTION INTRAMUSCULAR; INTRAVENOUS ONCE
Status: COMPLETED | OUTPATIENT
Start: 2023-01-01 | End: 2023-01-01

## 2023-01-01 RX ORDER — LORAZEPAM 2 MG/ML
0.5 CONCENTRATE ORAL
Status: DISCONTINUED | OUTPATIENT
Start: 2023-01-01 | End: 2023-01-01 | Stop reason: HOSPADM

## 2023-01-01 RX ORDER — LEVOTHYROXINE SODIUM 0.05 MG/1
50 TABLET ORAL
Status: DISCONTINUED | OUTPATIENT
Start: 2023-01-01 | End: 2023-01-01

## 2023-01-01 RX ORDER — DIPHENHYDRAMINE HCL 25 MG
25 CAPSULE ORAL EVERY 6 HOURS PRN
Status: DISCONTINUED | OUTPATIENT
Start: 2023-01-01 | End: 2023-01-01 | Stop reason: HOSPADM

## 2023-01-01 RX ORDER — HEPARIN SODIUM 5000 [USP'U]/ML
5000 INJECTION, SOLUTION INTRAVENOUS; SUBCUTANEOUS EVERY 8 HOURS SCHEDULED
Status: DISCONTINUED | OUTPATIENT
Start: 2023-01-01 | End: 2023-01-01

## 2023-01-01 RX ORDER — TRAMADOL HYDROCHLORIDE 50 MG/1
50 TABLET ORAL EVERY 6 HOURS PRN
Status: DISCONTINUED | OUTPATIENT
Start: 2023-01-01 | End: 2023-01-01 | Stop reason: HOSPADM

## 2023-01-01 RX ORDER — SODIUM CHLORIDE 0.9 % (FLUSH) 0.9 %
10 SYRINGE (ML) INJECTION EVERY 12 HOURS SCHEDULED
Status: DISCONTINUED | OUTPATIENT
Start: 2023-01-01 | End: 2023-01-01 | Stop reason: HOSPADM

## 2023-01-01 RX ORDER — MORPHINE SULFATE 20 MG/ML
20 SOLUTION ORAL
Status: DISCONTINUED | OUTPATIENT
Start: 2023-01-01 | End: 2023-01-01 | Stop reason: HOSPADM

## 2023-01-01 RX ORDER — FLUOXETINE HYDROCHLORIDE 20 MG/1
20 CAPSULE ORAL DAILY
Status: DISCONTINUED | OUTPATIENT
Start: 2023-01-01 | End: 2023-01-01

## 2023-01-01 RX ORDER — MORPHINE SULFATE 2 MG/ML
2 INJECTION, SOLUTION INTRAMUSCULAR; INTRAVENOUS EVERY 4 HOURS PRN
Status: DISPENSED | OUTPATIENT
Start: 2023-01-01 | End: 2023-01-01

## 2023-01-01 RX ORDER — PROMETHAZINE HYDROCHLORIDE 25 MG/1
25 TABLET ORAL
COMMUNITY
Start: 2022-01-01 | End: 2023-01-01

## 2023-01-01 RX ORDER — LACTULOSE 10 G/15ML
20 SOLUTION ORAL ONCE
Status: COMPLETED | OUTPATIENT
Start: 2023-01-01 | End: 2023-01-01

## 2023-01-01 RX ORDER — DIPHENOXYLATE HYDROCHLORIDE AND ATROPINE SULFATE 2.5; .025 MG/1; MG/1
1 TABLET ORAL
Status: DISCONTINUED | OUTPATIENT
Start: 2023-01-01 | End: 2023-01-01 | Stop reason: HOSPADM

## 2023-01-01 RX ORDER — SODIUM CHLORIDE 9 MG/ML
40 INJECTION, SOLUTION INTRAVENOUS AS NEEDED
Status: DISCONTINUED | OUTPATIENT
Start: 2023-01-01 | End: 2023-01-01 | Stop reason: SDUPTHER

## 2023-01-01 RX ORDER — LORAZEPAM 0.5 MG/1
2 TABLET ORAL
Status: DISCONTINUED | OUTPATIENT
Start: 2023-01-01 | End: 2023-01-01 | Stop reason: HOSPADM

## 2023-01-01 RX ORDER — FUROSEMIDE 10 MG/ML
40 INJECTION INTRAMUSCULAR; INTRAVENOUS 2 TIMES DAILY
Status: DISCONTINUED | OUTPATIENT
Start: 2023-01-01 | End: 2023-01-01

## 2023-01-01 RX ORDER — MORPHINE SULFATE 2 MG/ML
2 INJECTION, SOLUTION INTRAMUSCULAR; INTRAVENOUS
Status: DISCONTINUED | OUTPATIENT
Start: 2023-01-01 | End: 2023-01-01 | Stop reason: HOSPADM

## 2023-01-01 RX ORDER — NICOTINE POLACRILEX 4 MG
15 LOZENGE BUCCAL
Status: DISCONTINUED | OUTPATIENT
Start: 2023-01-01 | End: 2023-01-01

## 2023-01-01 RX ORDER — ALBUTEROL SULFATE 90 UG/1
2 AEROSOL, METERED RESPIRATORY (INHALATION)
Status: DISCONTINUED | OUTPATIENT
Start: 2023-01-01 | End: 2023-01-01

## 2023-01-01 RX ORDER — SODIUM CHLORIDE 9 MG/ML
INJECTION, SOLUTION INTRAVENOUS
Status: COMPLETED
Start: 2023-01-01 | End: 2023-01-01

## 2023-01-01 RX ORDER — ONDANSETRON 2 MG/ML
4 INJECTION INTRAMUSCULAR; INTRAVENOUS EVERY 6 HOURS PRN
Status: DISCONTINUED | OUTPATIENT
Start: 2023-01-01 | End: 2023-01-01 | Stop reason: HOSPADM

## 2023-01-01 RX ORDER — HALOPERIDOL 1 MG/1
1 TABLET ORAL EVERY 4 HOURS PRN
Status: DISCONTINUED | OUTPATIENT
Start: 2023-01-01 | End: 2023-01-01 | Stop reason: HOSPADM

## 2023-01-01 RX ORDER — MIDAZOLAM HYDROCHLORIDE 1 MG/ML
2 INJECTION INTRAMUSCULAR; INTRAVENOUS EVERY 4 HOURS PRN
Status: DISCONTINUED | OUTPATIENT
Start: 2023-01-01 | End: 2023-01-01 | Stop reason: SDUPTHER

## 2023-01-01 RX ORDER — IPRATROPIUM BROMIDE AND ALBUTEROL SULFATE 2.5; .5 MG/3ML; MG/3ML
3 SOLUTION RESPIRATORY (INHALATION)
Status: DISCONTINUED | OUTPATIENT
Start: 2023-01-01 | End: 2023-01-01

## 2023-01-01 RX ORDER — BISACODYL 10 MG
10 SUPPOSITORY, RECTAL RECTAL DAILY PRN
Status: DISCONTINUED | OUTPATIENT
Start: 2023-01-01 | End: 2023-01-01 | Stop reason: HOSPADM

## 2023-01-01 RX ORDER — PREDNISONE 20 MG/1
60 TABLET ORAL DAILY
Qty: 15 TABLET | Refills: 0 | Status: SHIPPED | OUTPATIENT
Start: 2023-01-01 | End: 2023-01-01

## 2023-01-01 RX ORDER — INSULIN ASPART 100 [IU]/ML
0-9 INJECTION, SOLUTION INTRAVENOUS; SUBCUTANEOUS
Status: DISCONTINUED | OUTPATIENT
Start: 2023-01-01 | End: 2023-01-01

## 2023-01-01 RX ORDER — MORPHINE SULFATE 2 MG/ML
2 INJECTION, SOLUTION INTRAMUSCULAR; INTRAVENOUS EVERY 4 HOURS PRN
Status: DISCONTINUED | OUTPATIENT
Start: 2023-01-01 | End: 2023-01-01

## 2023-01-01 RX ORDER — GLIMEPIRIDE 2 MG/1
TABLET ORAL
COMMUNITY
Start: 2022-01-01 | End: 2023-01-01

## 2023-01-01 RX ORDER — NICOTINE 21 MG/24HR
1 PATCH, TRANSDERMAL 24 HOURS TRANSDERMAL EVERY 24 HOURS
Qty: 21 PATCH | Refills: 0 | Status: SHIPPED | OUTPATIENT
Start: 2023-01-01

## 2023-01-01 RX ORDER — GLIMEPIRIDE 2 MG/1
TABLET ORAL
COMMUNITY
Start: 2022-01-01

## 2023-01-01 RX ORDER — PROPOFOL 10 MG/ML
VIAL (ML) INTRAVENOUS
Status: DISPENSED
Start: 2023-01-01 | End: 2023-01-01

## 2023-01-01 RX ORDER — FUROSEMIDE 10 MG/ML
20 INJECTION INTRAMUSCULAR; INTRAVENOUS
Status: DISCONTINUED | OUTPATIENT
Start: 2023-01-01 | End: 2023-01-01

## 2023-01-01 RX ORDER — ATROPINE SULFATE 10 MG/ML
2 SOLUTION/ DROPS OPHTHALMIC 2 TIMES DAILY PRN
Status: DISCONTINUED | OUTPATIENT
Start: 2023-01-01 | End: 2023-01-01 | Stop reason: HOSPADM

## 2023-01-01 RX ORDER — POLYETHYLENE GLYCOL 3350 17 G/17G
17 POWDER, FOR SOLUTION ORAL DAILY
Status: DISCONTINUED | OUTPATIENT
Start: 2023-01-01 | End: 2023-01-01 | Stop reason: HOSPADM

## 2023-01-01 RX ORDER — DIPHENHYDRAMINE HYDROCHLORIDE 50 MG/ML
25 INJECTION INTRAMUSCULAR; INTRAVENOUS EVERY 6 HOURS PRN
Status: DISCONTINUED | OUTPATIENT
Start: 2023-01-01 | End: 2023-01-01 | Stop reason: HOSPADM

## 2023-01-01 RX ORDER — LACTULOSE 10 G/15ML
20 SOLUTION ORAL 2 TIMES DAILY
Status: DISCONTINUED | OUTPATIENT
Start: 2023-01-01 | End: 2023-01-01

## 2023-01-01 RX ORDER — FUROSEMIDE 10 MG/ML
40 INJECTION INTRAMUSCULAR; INTRAVENOUS ONCE
Status: COMPLETED | OUTPATIENT
Start: 2023-01-01 | End: 2023-01-01

## 2023-01-01 RX ORDER — PANTOPRAZOLE SODIUM 40 MG/10ML
40 INJECTION, POWDER, LYOPHILIZED, FOR SOLUTION INTRAVENOUS
Status: DISCONTINUED | OUTPATIENT
Start: 2023-01-01 | End: 2023-01-01

## 2023-01-01 RX ORDER — DEXAMETHASONE SODIUM PHOSPHATE 4 MG/ML
10 INJECTION, SOLUTION INTRA-ARTICULAR; INTRALESIONAL; INTRAMUSCULAR; INTRAVENOUS; SOFT TISSUE ONCE
Status: COMPLETED | OUTPATIENT
Start: 2023-01-01 | End: 2023-01-01

## 2023-01-01 RX ORDER — ACETAMINOPHEN 160 MG/5ML
650 SOLUTION ORAL EVERY 4 HOURS PRN
Status: DISCONTINUED | OUTPATIENT
Start: 2023-01-01 | End: 2023-01-01 | Stop reason: HOSPADM

## 2023-01-01 RX ORDER — FUROSEMIDE 10 MG/ML
20 INJECTION INTRAMUSCULAR; INTRAVENOUS EVERY 12 HOURS
Status: COMPLETED | OUTPATIENT
Start: 2023-01-01 | End: 2023-01-01

## 2023-01-01 RX ORDER — PREDNISONE 20 MG/1
40 TABLET ORAL
Status: COMPLETED | OUTPATIENT
Start: 2023-01-01 | End: 2023-01-01

## 2023-01-01 RX ORDER — MIDAZOLAM HYDROCHLORIDE 1 MG/ML
2 INJECTION INTRAMUSCULAR; INTRAVENOUS ONCE
Status: COMPLETED | OUTPATIENT
Start: 2023-01-01 | End: 2023-01-01

## 2023-01-01 RX ORDER — PANTOPRAZOLE SODIUM 40 MG/1
40 TABLET, DELAYED RELEASE ORAL
Status: DISCONTINUED | OUTPATIENT
Start: 2023-01-01 | End: 2023-01-01

## 2023-01-01 RX ORDER — ALBUTEROL SULFATE 2.5 MG/3ML
2.5 SOLUTION RESPIRATORY (INHALATION)
Status: DISCONTINUED | OUTPATIENT
Start: 2023-01-01 | End: 2023-01-01

## 2023-01-01 RX ORDER — HALOPERIDOL 5 MG/ML
1 INJECTION INTRAMUSCULAR EVERY 4 HOURS PRN
Status: DISCONTINUED | OUTPATIENT
Start: 2023-01-01 | End: 2023-01-01 | Stop reason: HOSPADM

## 2023-01-01 RX ORDER — FENTANYL CITRATE 50 UG/ML
25 INJECTION, SOLUTION INTRAMUSCULAR; INTRAVENOUS
Status: DISCONTINUED | OUTPATIENT
Start: 2023-01-01 | End: 2023-01-01 | Stop reason: SDUPTHER

## 2023-01-01 RX ORDER — NALOXONE HCL 0.4 MG/ML
0.4 VIAL (ML) INJECTION
Status: DISCONTINUED | OUTPATIENT
Start: 2023-01-01 | End: 2023-01-01

## 2023-01-01 RX ORDER — ACETAMINOPHEN 325 MG/1
650 TABLET ORAL EVERY 4 HOURS PRN
Status: DISCONTINUED | OUTPATIENT
Start: 2023-01-01 | End: 2023-01-01

## 2023-01-01 RX ADMIN — MORPHINE SULFATE 4 MG: 4 INJECTION, SOLUTION INTRAMUSCULAR; INTRAVENOUS at 06:44

## 2023-01-01 RX ADMIN — GUAIFENESIN 600 MG: 600 TABLET, EXTENDED RELEASE ORAL at 08:23

## 2023-01-01 RX ADMIN — IPRATROPIUM BROMIDE AND ALBUTEROL SULFATE 3 ML: 2.5; .5 SOLUTION RESPIRATORY (INHALATION) at 16:37

## 2023-01-01 RX ADMIN — FUROSEMIDE 20 MG: 10 INJECTION, SOLUTION INTRAMUSCULAR; INTRAVENOUS at 05:31

## 2023-01-01 RX ADMIN — PANTOPRAZOLE SODIUM 40 MG: 40 INJECTION, POWDER, FOR SOLUTION INTRAVENOUS at 05:10

## 2023-01-01 RX ADMIN — PANTOPRAZOLE SODIUM 40 MG: 40 TABLET, DELAYED RELEASE ORAL at 05:06

## 2023-01-01 RX ADMIN — GUAIFENESIN 600 MG: 600 TABLET, EXTENDED RELEASE ORAL at 10:10

## 2023-01-01 RX ADMIN — TRAZODONE HYDROCHLORIDE 150 MG: 150 TABLET ORAL at 20:58

## 2023-01-01 RX ADMIN — POLYETHYLENE GLYCOL 3350 17 G: 17 POWDER, FOR SOLUTION ORAL at 07:41

## 2023-01-01 RX ADMIN — GUAIFENESIN 600 MG: 600 TABLET, EXTENDED RELEASE ORAL at 09:03

## 2023-01-01 RX ADMIN — DEXAMETHASONE SODIUM PHOSPHATE 10 MG: 4 INJECTION, SOLUTION INTRAMUSCULAR; INTRAVENOUS at 15:30

## 2023-01-01 RX ADMIN — PANTOPRAZOLE SODIUM 40 MG: 40 INJECTION, POWDER, FOR SOLUTION INTRAVENOUS at 05:47

## 2023-01-01 RX ADMIN — IPRATROPIUM BROMIDE AND ALBUTEROL SULFATE 3 ML: 2.5; .5 SOLUTION RESPIRATORY (INHALATION) at 20:57

## 2023-01-01 RX ADMIN — HEPARIN SODIUM 5000 UNITS: 5000 INJECTION INTRAVENOUS; SUBCUTANEOUS at 21:11

## 2023-01-01 RX ADMIN — METOPROLOL TARTRATE 25 MG: 25 TABLET, FILM COATED ORAL at 21:47

## 2023-01-01 RX ADMIN — MORPHINE SULFATE 4 MG: 4 INJECTION, SOLUTION INTRAMUSCULAR; INTRAVENOUS at 01:20

## 2023-01-01 RX ADMIN — TRAMADOL HYDROCHLORIDE 50 MG: 50 TABLET, COATED ORAL at 08:15

## 2023-01-01 RX ADMIN — LEVOTHYROXINE SODIUM 50 MCG: 50 TABLET ORAL at 05:45

## 2023-01-01 RX ADMIN — Medication 10 ML: at 09:53

## 2023-01-01 RX ADMIN — TAMSULOSIN HYDROCHLORIDE 0.8 MG: 0.4 CAPSULE ORAL at 09:03

## 2023-01-01 RX ADMIN — LORAZEPAM 2 MG: 0.5 TABLET ORAL at 22:35

## 2023-01-01 RX ADMIN — PANTOPRAZOLE SODIUM 40 MG: 40 INJECTION, POWDER, FOR SOLUTION INTRAVENOUS at 06:02

## 2023-01-01 RX ADMIN — METHYLPREDNISOLONE SODIUM SUCCINATE 40 MG: 40 INJECTION, POWDER, FOR SOLUTION INTRAMUSCULAR; INTRAVENOUS at 09:49

## 2023-01-01 RX ADMIN — PANTOPRAZOLE SODIUM 40 MG: 40 TABLET, DELAYED RELEASE ORAL at 06:09

## 2023-01-01 RX ADMIN — PREDNISONE 40 MG: 20 TABLET ORAL at 08:21

## 2023-01-01 RX ADMIN — IPRATROPIUM BROMIDE AND ALBUTEROL SULFATE 3 ML: 2.5; .5 SOLUTION RESPIRATORY (INHALATION) at 14:56

## 2023-01-01 RX ADMIN — IPRATROPIUM BROMIDE 2 PUFF: 17 AEROSOL, METERED RESPIRATORY (INHALATION) at 04:10

## 2023-01-01 RX ADMIN — FUROSEMIDE 20 MG: 10 INJECTION, SOLUTION INTRAMUSCULAR; INTRAVENOUS at 04:25

## 2023-01-01 RX ADMIN — FLUOXETINE 20 MG: 20 CAPSULE ORAL at 09:25

## 2023-01-01 RX ADMIN — MORPHINE SULFATE 2 MG: 2 INJECTION, SOLUTION INTRAMUSCULAR; INTRAVENOUS at 21:34

## 2023-01-01 RX ADMIN — Medication 10 ML: at 20:27

## 2023-01-01 RX ADMIN — PANTOPRAZOLE SODIUM 40 MG: 40 TABLET, DELAYED RELEASE ORAL at 06:03

## 2023-01-01 RX ADMIN — IPRATROPIUM BROMIDE AND ALBUTEROL SULFATE 3 ML: 2.5; .5 SOLUTION RESPIRATORY (INHALATION) at 16:22

## 2023-01-01 RX ADMIN — MIDAZOLAM 2 MG: 1 INJECTION, SOLUTION INTRAMUSCULAR; INTRAVENOUS at 10:34

## 2023-01-01 RX ADMIN — FUROSEMIDE 20 MG: 10 INJECTION, SOLUTION INTRAMUSCULAR; INTRAVENOUS at 17:44

## 2023-01-01 RX ADMIN — LEVOTHYROXINE SODIUM 50 MCG: 50 TABLET ORAL at 06:25

## 2023-01-01 RX ADMIN — Medication 10 ML: at 21:09

## 2023-01-01 RX ADMIN — ALBUTEROL SULFATE 2 PUFF: 90 AEROSOL, METERED RESPIRATORY (INHALATION) at 04:10

## 2023-01-01 RX ADMIN — MORPHINE SULFATE 2 MG: 2 INJECTION, SOLUTION INTRAMUSCULAR; INTRAVENOUS at 06:13

## 2023-01-01 RX ADMIN — DOCUSATE SODIUM 50 MG AND SENNOSIDES 8.6 MG 1 TABLET: 8.6; 5 TABLET, FILM COATED ORAL at 20:54

## 2023-01-01 RX ADMIN — METOCLOPRAMIDE 10 MG: 5 INJECTION, SOLUTION INTRAMUSCULAR; INTRAVENOUS at 17:24

## 2023-01-01 RX ADMIN — METHYLPREDNISOLONE SODIUM SUCCINATE 60 MG: 125 INJECTION, POWDER, FOR SOLUTION INTRAMUSCULAR; INTRAVENOUS at 21:02

## 2023-01-01 RX ADMIN — TRAZODONE HYDROCHLORIDE 150 MG: 150 TABLET ORAL at 21:47

## 2023-01-01 RX ADMIN — Medication 10 ML: at 09:09

## 2023-01-01 RX ADMIN — LORAZEPAM 2 MG: 2 INJECTION INTRAMUSCULAR at 08:11

## 2023-01-01 RX ADMIN — TRAMADOL HYDROCHLORIDE 50 MG: 50 TABLET, COATED ORAL at 11:07

## 2023-01-01 RX ADMIN — GUAIFENESIN 600 MG: 600 TABLET, EXTENDED RELEASE ORAL at 20:38

## 2023-01-01 RX ADMIN — METHYLPREDNISOLONE SODIUM SUCCINATE 60 MG: 125 INJECTION, POWDER, FOR SOLUTION INTRAMUSCULAR; INTRAVENOUS at 21:08

## 2023-01-01 RX ADMIN — IPRATROPIUM BROMIDE AND ALBUTEROL SULFATE 3 ML: 2.5; .5 SOLUTION RESPIRATORY (INHALATION) at 19:19

## 2023-01-01 RX ADMIN — Medication 10 ML: at 10:09

## 2023-01-01 RX ADMIN — POLYETHYLENE GLYCOL 3350 17 G: 17 POWDER, FOR SOLUTION ORAL at 09:52

## 2023-01-01 RX ADMIN — FUROSEMIDE 20 MG: 10 INJECTION, SOLUTION INTRAMUSCULAR; INTRAVENOUS at 17:24

## 2023-01-01 RX ADMIN — Medication 10 ML: at 20:38

## 2023-01-01 RX ADMIN — HYDROMORPHONE HYDROCHLORIDE 0.5 MG: 1 INJECTION, SOLUTION INTRAMUSCULAR; INTRAVENOUS; SUBCUTANEOUS at 05:25

## 2023-01-01 RX ADMIN — METHYLPREDNISOLONE SODIUM SUCCINATE 60 MG: 125 INJECTION, POWDER, FOR SOLUTION INTRAMUSCULAR; INTRAVENOUS at 09:31

## 2023-01-01 RX ADMIN — IPRATROPIUM BROMIDE AND ALBUTEROL SULFATE 3 ML: 2.5; .5 SOLUTION RESPIRATORY (INHALATION) at 23:12

## 2023-01-01 RX ADMIN — POLYETHYLENE GLYCOL 3350 17 G: 17 POWDER, FOR SOLUTION ORAL at 09:03

## 2023-01-01 RX ADMIN — FUROSEMIDE 40 MG: 10 INJECTION, SOLUTION INTRAMUSCULAR; INTRAVENOUS at 10:36

## 2023-01-01 RX ADMIN — GUAIFENESIN 600 MG: 600 TABLET, EXTENDED RELEASE ORAL at 21:08

## 2023-01-01 RX ADMIN — TRAMADOL HYDROCHLORIDE 50 MG: 50 TABLET, COATED ORAL at 12:10

## 2023-01-01 RX ADMIN — Medication 10 ML: at 11:34

## 2023-01-01 RX ADMIN — NICOTINE 1 PATCH: 21 PATCH, EXTENDED RELEASE TRANSDERMAL at 08:22

## 2023-01-01 RX ADMIN — FUROSEMIDE 20 MG: 10 INJECTION, SOLUTION INTRAMUSCULAR; INTRAVENOUS at 15:49

## 2023-01-01 RX ADMIN — POLYETHYLENE GLYCOL 3350 17 G: 17 POWDER, FOR SOLUTION ORAL at 08:56

## 2023-01-01 RX ADMIN — GUAIFENESIN 600 MG: 600 TABLET, EXTENDED RELEASE ORAL at 20:07

## 2023-01-01 RX ADMIN — LACTULOSE 20 G: 20 SOLUTION ORAL at 20:07

## 2023-01-01 RX ADMIN — DOCUSATE SODIUM 50 MG AND SENNOSIDES 8.6 MG 1 TABLET: 8.6; 5 TABLET, FILM COATED ORAL at 20:58

## 2023-01-01 RX ADMIN — CEFEPIME 2 G: 2 INJECTION, POWDER, FOR SOLUTION INTRAVENOUS at 00:25

## 2023-01-01 RX ADMIN — MIDAZOLAM 4 MG/HR: 5 INJECTION INTRAMUSCULAR; INTRAVENOUS at 21:23

## 2023-01-01 RX ADMIN — PROPOFOL 30 MCG/KG/MIN: 10 INJECTION, EMULSION INTRAVENOUS at 07:33

## 2023-01-01 RX ADMIN — Medication 10 ML: at 08:57

## 2023-01-01 RX ADMIN — MORPHINE SULFATE 4 MG: 4 INJECTION, SOLUTION INTRAMUSCULAR; INTRAVENOUS at 23:18

## 2023-01-01 RX ADMIN — IPRATROPIUM BROMIDE AND ALBUTEROL SULFATE 3 ML: 2.5; .5 SOLUTION RESPIRATORY (INHALATION) at 23:45

## 2023-01-01 RX ADMIN — DOXYCYCLINE 100 MG: 100 CAPSULE ORAL at 20:40

## 2023-01-01 RX ADMIN — HEPARIN SODIUM 5000 UNITS: 5000 INJECTION INTRAVENOUS; SUBCUTANEOUS at 05:47

## 2023-01-01 RX ADMIN — GLYCOPYRROLATE 0.2 MG: 0.2 INJECTION, SOLUTION INTRAMUSCULAR; INTRAVITREAL at 01:43

## 2023-01-01 RX ADMIN — IPRATROPIUM BROMIDE AND ALBUTEROL SULFATE 3 ML: 2.5; .5 SOLUTION RESPIRATORY (INHALATION) at 14:32

## 2023-01-01 RX ADMIN — IPRATROPIUM BROMIDE AND ALBUTEROL SULFATE 3 ML: 2.5; .5 SOLUTION RESPIRATORY (INHALATION) at 04:25

## 2023-01-01 RX ADMIN — INSULIN ASPART 4 UNITS: 100 INJECTION, SOLUTION INTRAVENOUS; SUBCUTANEOUS at 17:12

## 2023-01-01 RX ADMIN — HYDROMORPHONE HYDROCHLORIDE: 1 INJECTION, SOLUTION INTRAMUSCULAR; INTRAVENOUS; SUBCUTANEOUS at 23:36

## 2023-01-01 RX ADMIN — INSULIN ASPART 4 UNITS: 100 INJECTION, SOLUTION INTRAVENOUS; SUBCUTANEOUS at 17:44

## 2023-01-01 RX ADMIN — MORPHINE SULFATE 4 MG: 4 INJECTION, SOLUTION INTRAMUSCULAR; INTRAVENOUS at 18:40

## 2023-01-01 RX ADMIN — TRAMADOL HYDROCHLORIDE 50 MG: 50 TABLET, COATED ORAL at 06:28

## 2023-01-01 RX ADMIN — MORPHINE SULFATE 4 MG: 4 INJECTION, SOLUTION INTRAMUSCULAR; INTRAVENOUS at 05:44

## 2023-01-01 RX ADMIN — DOXYCYCLINE 100 MG: 100 CAPSULE ORAL at 20:23

## 2023-01-01 RX ADMIN — CEFEPIME 2 G: 2 INJECTION, POWDER, FOR SOLUTION INTRAVENOUS at 13:16

## 2023-01-01 RX ADMIN — FUROSEMIDE 20 MG: 20 TABLET ORAL at 09:26

## 2023-01-01 RX ADMIN — MORPHINE SULFATE 2 MG: 2 INJECTION, SOLUTION INTRAMUSCULAR; INTRAVENOUS at 14:33

## 2023-01-01 RX ADMIN — TRAZODONE HYDROCHLORIDE 150 MG: 150 TABLET ORAL at 20:52

## 2023-01-01 RX ADMIN — LEVOTHYROXINE SODIUM 50 MCG: 50 TABLET ORAL at 06:03

## 2023-01-01 RX ADMIN — MORPHINE SULFATE 2 MG: 2 INJECTION, SOLUTION INTRAMUSCULAR; INTRAVENOUS at 13:12

## 2023-01-01 RX ADMIN — MIDAZOLAM 7 MG/HR: 5 INJECTION INTRAMUSCULAR; INTRAVENOUS at 03:05

## 2023-01-01 RX ADMIN — TRAMADOL HYDROCHLORIDE 50 MG: 50 TABLET, COATED ORAL at 21:12

## 2023-01-01 RX ADMIN — MORPHINE SULFATE 2 MG: 2 INJECTION, SOLUTION INTRAMUSCULAR; INTRAVENOUS at 09:18

## 2023-01-01 RX ADMIN — GUAIFENESIN 600 MG: 600 TABLET, EXTENDED RELEASE ORAL at 21:03

## 2023-01-01 RX ADMIN — MORPHINE SULFATE 4 MG: 4 INJECTION, SOLUTION INTRAMUSCULAR; INTRAVENOUS at 04:27

## 2023-01-01 RX ADMIN — LORAZEPAM 2 MG: 2 INJECTION INTRAMUSCULAR at 15:52

## 2023-01-01 RX ADMIN — PROPOFOL 40 MCG/KG/MIN: 10 INJECTION, EMULSION INTRAVENOUS at 21:58

## 2023-01-01 RX ADMIN — METOPROLOL TARTRATE 25 MG: 25 TABLET, FILM COATED ORAL at 09:25

## 2023-01-01 RX ADMIN — PROPOFOL 40 MCG/KG/MIN: 10 INJECTION, EMULSION INTRAVENOUS at 21:21

## 2023-01-01 RX ADMIN — Medication 10 ML: at 20:57

## 2023-01-01 RX ADMIN — Medication 10 ML: at 20:39

## 2023-01-01 RX ADMIN — TRAZODONE HYDROCHLORIDE 150 MG: 150 TABLET ORAL at 21:03

## 2023-01-01 RX ADMIN — METOCLOPRAMIDE 10 MG: 5 INJECTION, SOLUTION INTRAMUSCULAR; INTRAVENOUS at 16:44

## 2023-01-01 RX ADMIN — Medication 10 ML: at 21:03

## 2023-01-01 RX ADMIN — IPRATROPIUM BROMIDE AND ALBUTEROL SULFATE 3 ML: 2.5; .5 SOLUTION RESPIRATORY (INHALATION) at 14:34

## 2023-01-01 RX ADMIN — MORPHINE SULFATE 2 MG: 2 INJECTION, SOLUTION INTRAMUSCULAR; INTRAVENOUS at 13:51

## 2023-01-01 RX ADMIN — LEVOTHYROXINE SODIUM 50 MCG: 50 TABLET ORAL at 06:02

## 2023-01-01 RX ADMIN — TAMSULOSIN HYDROCHLORIDE 0.8 MG: 0.4 CAPSULE ORAL at 09:25

## 2023-01-01 RX ADMIN — MORPHINE SULFATE 2 MG: 2 INJECTION, SOLUTION INTRAMUSCULAR; INTRAVENOUS at 10:03

## 2023-01-01 RX ADMIN — INSULIN ASPART 2 UNITS: 100 INJECTION, SOLUTION INTRAVENOUS; SUBCUTANEOUS at 17:16

## 2023-01-01 RX ADMIN — INSULIN ASPART 4 UNITS: 100 INJECTION, SOLUTION INTRAVENOUS; SUBCUTANEOUS at 13:16

## 2023-01-01 RX ADMIN — MORPHINE SULFATE 2 MG: 2 INJECTION, SOLUTION INTRAMUSCULAR; INTRAVENOUS at 19:14

## 2023-01-01 RX ADMIN — TRAZODONE HYDROCHLORIDE 150 MG: 150 TABLET ORAL at 20:24

## 2023-01-01 RX ADMIN — TAMSULOSIN HYDROCHLORIDE 0.8 MG: 0.4 CAPSULE ORAL at 09:44

## 2023-01-01 RX ADMIN — Medication 10 ML: at 09:51

## 2023-01-01 RX ADMIN — SODIUM ZIRCONIUM CYCLOSILICATE 5 G: 5 POWDER, FOR SUSPENSION ORAL at 11:34

## 2023-01-01 RX ADMIN — CEFEPIME 2 G: 2 INJECTION, POWDER, FOR SOLUTION INTRAVENOUS at 00:19

## 2023-01-01 RX ADMIN — GUAIFENESIN 600 MG: 600 TABLET, EXTENDED RELEASE ORAL at 20:54

## 2023-01-01 RX ADMIN — MIDAZOLAM 7 MG/HR: 5 INJECTION INTRAMUSCULAR; INTRAVENOUS at 18:46

## 2023-01-01 RX ADMIN — Medication 10 ML: at 09:45

## 2023-01-01 RX ADMIN — NICOTINE 1 PATCH: 21 PATCH, EXTENDED RELEASE TRANSDERMAL at 08:09

## 2023-01-01 RX ADMIN — IPRATROPIUM BROMIDE 0.5 MG: 0.5 SOLUTION RESPIRATORY (INHALATION) at 08:46

## 2023-01-01 RX ADMIN — DOXYCYCLINE 100 MG: 100 CAPSULE ORAL at 08:21

## 2023-01-01 RX ADMIN — FLUOXETINE 20 MG: 20 CAPSULE ORAL at 09:44

## 2023-01-01 RX ADMIN — DOCUSATE SODIUM 50 MG AND SENNOSIDES 8.6 MG 1 TABLET: 8.6; 5 TABLET, FILM COATED ORAL at 21:47

## 2023-01-01 RX ADMIN — Medication 10 ML: at 08:29

## 2023-01-01 RX ADMIN — NICOTINE 1 PATCH: 21 PATCH, EXTENDED RELEASE TRANSDERMAL at 09:45

## 2023-01-01 RX ADMIN — NICOTINE 1 PATCH: 21 PATCH, EXTENDED RELEASE TRANSDERMAL at 09:21

## 2023-01-01 RX ADMIN — TAMSULOSIN HYDROCHLORIDE 0.8 MG: 0.4 CAPSULE ORAL at 08:23

## 2023-01-01 RX ADMIN — DOCUSATE SODIUM 50 MG AND SENNOSIDES 8.6 MG 1 TABLET: 8.6; 5 TABLET, FILM COATED ORAL at 21:57

## 2023-01-01 RX ADMIN — IPRATROPIUM BROMIDE AND ALBUTEROL SULFATE 3 ML: 2.5; .5 SOLUTION RESPIRATORY (INHALATION) at 11:13

## 2023-01-01 RX ADMIN — NICOTINE 1 PATCH: 21 PATCH, EXTENDED RELEASE TRANSDERMAL at 07:43

## 2023-01-01 RX ADMIN — PROPOFOL 35 MCG/KG/MIN: 10 INJECTION, EMULSION INTRAVENOUS at 03:05

## 2023-01-01 RX ADMIN — MORPHINE SULFATE 4 MG: 4 INJECTION, SOLUTION INTRAMUSCULAR; INTRAVENOUS at 08:11

## 2023-01-01 RX ADMIN — CEFEPIME 2 G: 2 INJECTION, POWDER, FOR SOLUTION INTRAVENOUS at 11:43

## 2023-01-01 RX ADMIN — CEFEPIME 2 G: 2 INJECTION, POWDER, FOR SOLUTION INTRAVENOUS at 14:55

## 2023-01-01 RX ADMIN — MORPHINE SULFATE 4 MG: 4 INJECTION, SOLUTION INTRAMUSCULAR; INTRAVENOUS at 15:49

## 2023-01-01 RX ADMIN — Medication 10 ML: at 01:17

## 2023-01-01 RX ADMIN — Medication 10 ML: at 09:29

## 2023-01-01 RX ADMIN — TRAZODONE HYDROCHLORIDE 150 MG: 150 TABLET ORAL at 21:25

## 2023-01-01 RX ADMIN — MIDAZOLAM HYDROCHLORIDE: 1 INJECTION INTRAMUSCULAR; INTRAVENOUS at 09:35

## 2023-01-01 RX ADMIN — METHYLPREDNISOLONE SODIUM SUCCINATE 60 MG: 125 INJECTION, POWDER, FOR SOLUTION INTRAMUSCULAR; INTRAVENOUS at 17:00

## 2023-01-01 RX ADMIN — FLUOXETINE 20 MG: 20 CAPSULE ORAL at 08:23

## 2023-01-01 RX ADMIN — GUAIFENESIN 600 MG: 600 TABLET, EXTENDED RELEASE ORAL at 09:52

## 2023-01-01 RX ADMIN — SUCCINYLCHOLINE CHLORIDE 100 MG: 20 INJECTION, SOLUTION INTRAMUSCULAR; INTRAVENOUS at 09:20

## 2023-01-01 RX ADMIN — ONDANSETRON HYDROCHLORIDE 4 MG: 4 TABLET, FILM COATED ORAL at 20:31

## 2023-01-01 RX ADMIN — LORAZEPAM 2 MG: 2 INJECTION INTRAMUSCULAR at 00:12

## 2023-01-01 RX ADMIN — DOXYCYCLINE 100 MG: 100 CAPSULE ORAL at 21:25

## 2023-01-01 RX ADMIN — FUROSEMIDE 20 MG: 10 INJECTION, SOLUTION INTRAMUSCULAR; INTRAVENOUS at 15:40

## 2023-01-01 RX ADMIN — IPRATROPIUM BROMIDE AND ALBUTEROL SULFATE 3 ML: 2.5; .5 SOLUTION RESPIRATORY (INHALATION) at 07:53

## 2023-01-01 RX ADMIN — MIDAZOLAM 1 MG/HR: 5 INJECTION INTRAMUSCULAR; INTRAVENOUS at 10:37

## 2023-01-01 RX ADMIN — IPRATROPIUM BROMIDE AND ALBUTEROL SULFATE 3 ML: 2.5; .5 SOLUTION RESPIRATORY (INHALATION) at 19:50

## 2023-01-01 RX ADMIN — MORPHINE SULFATE 2 MG: 2 INJECTION, SOLUTION INTRAMUSCULAR; INTRAVENOUS at 01:07

## 2023-01-01 RX ADMIN — FLUOXETINE 20 MG: 20 CAPSULE ORAL at 09:03

## 2023-01-01 RX ADMIN — DOXYCYCLINE 100 MG: 100 CAPSULE ORAL at 09:44

## 2023-01-01 RX ADMIN — FLUOXETINE 20 MG: 20 CAPSULE ORAL at 10:09

## 2023-01-01 RX ADMIN — LEVOTHYROXINE SODIUM 50 MCG: 50 TABLET ORAL at 06:09

## 2023-01-01 RX ADMIN — Medication 10 ML: at 08:06

## 2023-01-01 RX ADMIN — TRAMADOL HYDROCHLORIDE 50 MG: 50 TABLET, COATED ORAL at 06:37

## 2023-01-01 RX ADMIN — TRAMADOL HYDROCHLORIDE 50 MG: 50 TABLET, COATED ORAL at 04:46

## 2023-01-01 RX ADMIN — Medication 10 ML: at 08:07

## 2023-01-01 RX ADMIN — INSULIN ASPART 4 UNITS: 100 INJECTION, SOLUTION INTRAVENOUS; SUBCUTANEOUS at 10:35

## 2023-01-01 RX ADMIN — GLYCOPYRROLATE 0.2 MG: 0.2 INJECTION, SOLUTION INTRAMUSCULAR; INTRAVITREAL at 06:40

## 2023-01-01 RX ADMIN — IPRATROPIUM BROMIDE AND ALBUTEROL SULFATE 3 ML: 2.5; .5 SOLUTION RESPIRATORY (INHALATION) at 14:35

## 2023-01-01 RX ADMIN — TAMSULOSIN HYDROCHLORIDE 0.8 MG: 0.4 CAPSULE ORAL at 09:52

## 2023-01-01 RX ADMIN — HYDROMORPHONE HYDROCHLORIDE 0.5 MG: 1 INJECTION, SOLUTION INTRAMUSCULAR; INTRAVENOUS; SUBCUTANEOUS at 08:55

## 2023-01-01 RX ADMIN — MORPHINE SULFATE 4 MG: 4 INJECTION, SOLUTION INTRAMUSCULAR; INTRAVENOUS at 20:52

## 2023-01-01 RX ADMIN — METHYLPREDNISOLONE SODIUM SUCCINATE 40 MG: 40 INJECTION, POWDER, FOR SOLUTION INTRAMUSCULAR; INTRAVENOUS at 01:14

## 2023-01-01 RX ADMIN — AZITHROMYCIN MONOHYDRATE 500 MG: 500 INJECTION, POWDER, LYOPHILIZED, FOR SOLUTION INTRAVENOUS at 18:06

## 2023-01-01 RX ADMIN — HEPARIN SODIUM 5000 UNITS: 5000 INJECTION INTRAVENOUS; SUBCUTANEOUS at 15:42

## 2023-01-01 RX ADMIN — METOPROLOL TARTRATE 25 MG: 25 TABLET, FILM COATED ORAL at 20:07

## 2023-01-01 RX ADMIN — TRAMADOL HYDROCHLORIDE 50 MG: 50 TABLET, COATED ORAL at 22:07

## 2023-01-01 RX ADMIN — POLYETHYLENE GLYCOL 3350 17 G: 17 POWDER, FOR SOLUTION ORAL at 09:25

## 2023-01-01 RX ADMIN — GUAIFENESIN 600 MG: 600 TABLET, EXTENDED RELEASE ORAL at 20:27

## 2023-01-01 RX ADMIN — PROPOFOL 35 MCG/KG/MIN: 10 INJECTION, EMULSION INTRAVENOUS at 05:46

## 2023-01-01 RX ADMIN — IPRATROPIUM BROMIDE AND ALBUTEROL SULFATE 3 ML: 2.5; .5 SOLUTION RESPIRATORY (INHALATION) at 21:39

## 2023-01-01 RX ADMIN — METOPROLOL TARTRATE 25 MG: 25 TABLET, FILM COATED ORAL at 09:21

## 2023-01-01 RX ADMIN — Medication 10 ML: at 09:26

## 2023-01-01 RX ADMIN — MORPHINE SULFATE 4 MG: 4 INJECTION, SOLUTION INTRAMUSCULAR; INTRAVENOUS at 19:04

## 2023-01-01 RX ADMIN — Medication 10 ML: at 07:44

## 2023-01-01 RX ADMIN — METOPROLOL TARTRATE 25 MG: 25 TABLET, FILM COATED ORAL at 20:58

## 2023-01-01 RX ADMIN — Medication 10 ML: at 21:25

## 2023-01-01 RX ADMIN — POLYETHYLENE GLYCOL 3350 17 G: 17 POWDER, FOR SOLUTION ORAL at 09:21

## 2023-01-01 RX ADMIN — Medication 10 ML: at 11:35

## 2023-01-01 RX ADMIN — GUAIFENESIN 600 MG: 600 TABLET, EXTENDED RELEASE ORAL at 08:57

## 2023-01-01 RX ADMIN — CEFEPIME 2 G: 2 INJECTION, POWDER, FOR SOLUTION INTRAVENOUS at 01:30

## 2023-01-01 RX ADMIN — TRAZODONE HYDROCHLORIDE 150 MG: 150 TABLET ORAL at 20:07

## 2023-01-01 RX ADMIN — IPRATROPIUM BROMIDE AND ALBUTEROL SULFATE 3 ML: 2.5; .5 SOLUTION RESPIRATORY (INHALATION) at 02:53

## 2023-01-01 RX ADMIN — ETOMIDATE 20 MG: 2 INJECTION, SOLUTION INTRAVENOUS at 09:20

## 2023-01-01 RX ADMIN — PREDNISONE 40 MG: 20 TABLET ORAL at 08:56

## 2023-01-01 RX ADMIN — Medication 10 ML: at 08:30

## 2023-01-01 RX ADMIN — METOPROLOL TARTRATE 25 MG: 25 TABLET, FILM COATED ORAL at 09:52

## 2023-01-01 RX ADMIN — Medication 10 ML: at 21:10

## 2023-01-01 RX ADMIN — LEVOTHYROXINE SODIUM 50 MCG: 50 TABLET ORAL at 05:48

## 2023-01-01 RX ADMIN — NICOTINE 1 PATCH: 21 PATCH, EXTENDED RELEASE TRANSDERMAL at 09:51

## 2023-01-01 RX ADMIN — NICOTINE 1 PATCH: 21 PATCH, EXTENDED RELEASE TRANSDERMAL at 08:24

## 2023-01-01 RX ADMIN — METOCLOPRAMIDE 10 MG: 5 INJECTION, SOLUTION INTRAMUSCULAR; INTRAVENOUS at 01:17

## 2023-01-01 RX ADMIN — CEFEPIME 2 G: 2 INJECTION, POWDER, FOR SOLUTION INTRAVENOUS at 00:35

## 2023-01-01 RX ADMIN — INSULIN ASPART 2 UNITS: 100 INJECTION, SOLUTION INTRAVENOUS; SUBCUTANEOUS at 18:01

## 2023-01-01 RX ADMIN — METHYLPREDNISOLONE SODIUM SUCCINATE 60 MG: 125 INJECTION, POWDER, FOR SOLUTION INTRAMUSCULAR; INTRAVENOUS at 08:55

## 2023-01-01 RX ADMIN — FENTANYL CITRATE 25 MCG: 50 INJECTION, SOLUTION INTRAMUSCULAR; INTRAVENOUS at 21:08

## 2023-01-01 RX ADMIN — Medication 10 ML: at 21:48

## 2023-01-01 RX ADMIN — MORPHINE SULFATE 4 MG: 4 INJECTION, SOLUTION INTRAMUSCULAR; INTRAVENOUS at 08:06

## 2023-01-01 RX ADMIN — Medication 10 ML: at 08:10

## 2023-01-01 RX ADMIN — IPRATROPIUM BROMIDE AND ALBUTEROL SULFATE 3 ML: 2.5; .5 SOLUTION RESPIRATORY (INHALATION) at 06:24

## 2023-01-01 RX ADMIN — TRAZODONE HYDROCHLORIDE 150 MG: 150 TABLET ORAL at 20:38

## 2023-01-01 RX ADMIN — Medication 10 ML: at 21:58

## 2023-01-01 RX ADMIN — METOPROLOL TARTRATE 25 MG: 25 TABLET, FILM COATED ORAL at 23:10

## 2023-01-01 RX ADMIN — IPRATROPIUM BROMIDE AND ALBUTEROL SULFATE 3 ML: 2.5; .5 SOLUTION RESPIRATORY (INHALATION) at 08:00

## 2023-01-01 RX ADMIN — INSULIN ASPART 2 UNITS: 100 INJECTION, SOLUTION INTRAVENOUS; SUBCUTANEOUS at 17:22

## 2023-01-01 RX ADMIN — Medication 10 ML: at 09:04

## 2023-01-01 RX ADMIN — GUAIFENESIN 600 MG: 600 TABLET, EXTENDED RELEASE ORAL at 09:44

## 2023-01-01 RX ADMIN — PANTOPRAZOLE SODIUM 40 MG: 40 TABLET, DELAYED RELEASE ORAL at 05:39

## 2023-01-01 RX ADMIN — IPRATROPIUM BROMIDE AND ALBUTEROL SULFATE 3 ML: 2.5; .5 SOLUTION RESPIRATORY (INHALATION) at 07:20

## 2023-01-01 RX ADMIN — FUROSEMIDE 20 MG: 10 INJECTION, SOLUTION INTRAMUSCULAR; INTRAVENOUS at 05:05

## 2023-01-01 RX ADMIN — METOCLOPRAMIDE 10 MG: 5 INJECTION, SOLUTION INTRAMUSCULAR; INTRAVENOUS at 09:25

## 2023-01-01 RX ADMIN — Medication 10 ML: at 20:08

## 2023-01-01 RX ADMIN — HEPARIN SODIUM 5000 UNITS: 5000 INJECTION INTRAVENOUS; SUBCUTANEOUS at 22:00

## 2023-01-01 RX ADMIN — TRAMADOL HYDROCHLORIDE 50 MG: 50 TABLET, COATED ORAL at 02:56

## 2023-01-01 RX ADMIN — PANTOPRAZOLE SODIUM 40 MG: 40 INJECTION, POWDER, FOR SOLUTION INTRAVENOUS at 06:25

## 2023-01-01 RX ADMIN — LORAZEPAM 2 MG: 2 INJECTION INTRAMUSCULAR at 03:44

## 2023-01-01 RX ADMIN — LORAZEPAM 2 MG: 2 INJECTION INTRAMUSCULAR at 18:39

## 2023-01-01 RX ADMIN — MIDAZOLAM HYDROCHLORIDE: 1 INJECTION, SOLUTION INTRAMUSCULAR; INTRAVENOUS at 09:35

## 2023-01-01 RX ADMIN — FENTANYL CITRATE 50 MCG: 50 INJECTION, SOLUTION INTRAMUSCULAR; INTRAVENOUS at 19:04

## 2023-01-01 RX ADMIN — MORPHINE SULFATE 2 MG: 2 INJECTION, SOLUTION INTRAMUSCULAR; INTRAVENOUS at 19:36

## 2023-01-01 RX ADMIN — HEPARIN SODIUM 5000 UNITS: 5000 INJECTION INTRAVENOUS; SUBCUTANEOUS at 14:31

## 2023-01-01 RX ADMIN — TAMSULOSIN HYDROCHLORIDE 0.8 MG: 0.4 CAPSULE ORAL at 08:56

## 2023-01-01 RX ADMIN — MIDAZOLAM 5 MG/HR: 5 INJECTION INTRAMUSCULAR; INTRAVENOUS at 06:14

## 2023-01-01 RX ADMIN — NICOTINE 1 PATCH: 21 PATCH, EXTENDED RELEASE TRANSDERMAL at 08:49

## 2023-01-01 RX ADMIN — IPRATROPIUM BROMIDE AND ALBUTEROL SULFATE 3 ML: 2.5; .5 SOLUTION RESPIRATORY (INHALATION) at 11:53

## 2023-01-01 RX ADMIN — GUAIFENESIN 600 MG: 600 TABLET, EXTENDED RELEASE ORAL at 09:20

## 2023-01-01 RX ADMIN — TRAMADOL HYDROCHLORIDE 50 MG: 50 TABLET, COATED ORAL at 09:23

## 2023-01-01 RX ADMIN — NICOTINE 1 PATCH: 21 PATCH, EXTENDED RELEASE TRANSDERMAL at 08:56

## 2023-01-01 RX ADMIN — GUAIFENESIN 600 MG: 600 TABLET, EXTENDED RELEASE ORAL at 21:47

## 2023-01-01 RX ADMIN — IPRATROPIUM BROMIDE AND ALBUTEROL SULFATE 3 ML: 2.5; .5 SOLUTION RESPIRATORY (INHALATION) at 20:22

## 2023-01-01 RX ADMIN — DOXYCYCLINE 100 MG: 100 CAPSULE ORAL at 20:38

## 2023-01-01 RX ADMIN — HYDROMORPHONE HYDROCHLORIDE 0.5 MG: 1 INJECTION, SOLUTION INTRAMUSCULAR; INTRAVENOUS; SUBCUTANEOUS at 21:03

## 2023-01-01 RX ADMIN — NICOTINE 1 PATCH: 21 PATCH, EXTENDED RELEASE TRANSDERMAL at 10:44

## 2023-01-01 RX ADMIN — DOXYCYCLINE 100 MG: 100 CAPSULE ORAL at 12:05

## 2023-01-01 RX ADMIN — PROPOFOL 35 MCG/KG/MIN: 10 INJECTION, EMULSION INTRAVENOUS at 12:17

## 2023-01-01 RX ADMIN — FENTANYL CITRATE 25 MCG: 50 INJECTION, SOLUTION INTRAMUSCULAR; INTRAVENOUS at 16:17

## 2023-01-01 RX ADMIN — METHYLPREDNISOLONE SODIUM SUCCINATE 60 MG: 125 INJECTION, POWDER, FOR SOLUTION INTRAMUSCULAR; INTRAVENOUS at 02:48

## 2023-01-01 RX ADMIN — IPRATROPIUM BROMIDE AND ALBUTEROL SULFATE 3 ML: 2.5; .5 SOLUTION RESPIRATORY (INHALATION) at 20:21

## 2023-01-01 RX ADMIN — MORPHINE SULFATE 6 MG: 4 INJECTION, SOLUTION INTRAMUSCULAR; INTRAVENOUS at 10:35

## 2023-01-01 RX ADMIN — DOXYCYCLINE 100 MG: 100 CAPSULE ORAL at 21:03

## 2023-01-01 RX ADMIN — IPRATROPIUM BROMIDE 2 PUFF: 17 AEROSOL, METERED RESPIRATORY (INHALATION) at 10:29

## 2023-01-01 RX ADMIN — METHYLPREDNISOLONE SODIUM SUCCINATE 60 MG: 125 INJECTION, POWDER, FOR SOLUTION INTRAMUSCULAR; INTRAVENOUS at 12:24

## 2023-01-01 RX ADMIN — PANTOPRAZOLE SODIUM 40 MG: 40 INJECTION, POWDER, FOR SOLUTION INTRAVENOUS at 05:45

## 2023-01-01 RX ADMIN — IPRATROPIUM BROMIDE AND ALBUTEROL SULFATE 3 ML: 2.5; .5 SOLUTION RESPIRATORY (INHALATION) at 14:48

## 2023-01-01 RX ADMIN — PREDNISONE 40 MG: 20 TABLET ORAL at 09:03

## 2023-01-01 RX ADMIN — IOPAMIDOL 90 ML: 755 INJECTION, SOLUTION INTRAVENOUS at 17:21

## 2023-01-01 RX ADMIN — IPRATROPIUM BROMIDE AND ALBUTEROL SULFATE 3 ML: 2.5; .5 SOLUTION RESPIRATORY (INHALATION) at 07:21

## 2023-01-01 RX ADMIN — DOXYCYCLINE 100 MG: 100 CAPSULE ORAL at 08:47

## 2023-01-01 RX ADMIN — METOCLOPRAMIDE 10 MG: 5 INJECTION, SOLUTION INTRAMUSCULAR; INTRAVENOUS at 08:05

## 2023-01-01 RX ADMIN — IPRATROPIUM BROMIDE AND ALBUTEROL SULFATE 3 ML: 2.5; .5 SOLUTION RESPIRATORY (INHALATION) at 19:10

## 2023-01-01 RX ADMIN — MORPHINE SULFATE 2 MG: 2 INJECTION, SOLUTION INTRAMUSCULAR; INTRAVENOUS at 10:51

## 2023-01-01 RX ADMIN — LEVOTHYROXINE SODIUM 50 MCG: 50 TABLET ORAL at 05:10

## 2023-01-01 RX ADMIN — METHYLPREDNISOLONE SODIUM SUCCINATE 40 MG: 40 INJECTION, POWDER, FOR SOLUTION INTRAMUSCULAR; INTRAVENOUS at 08:47

## 2023-01-01 RX ADMIN — LEVOTHYROXINE SODIUM 50 MCG: 50 TABLET ORAL at 06:18

## 2023-01-01 RX ADMIN — ALBUTEROL SULFATE 2 PUFF: 90 AEROSOL, METERED RESPIRATORY (INHALATION) at 07:29

## 2023-01-01 RX ADMIN — HYDROMORPHONE HYDROCHLORIDE 0.5 MG: 1 INJECTION, SOLUTION INTRAMUSCULAR; INTRAVENOUS; SUBCUTANEOUS at 12:34

## 2023-01-01 RX ADMIN — NICOTINE 1 PATCH: 21 PATCH, EXTENDED RELEASE TRANSDERMAL at 09:49

## 2023-01-01 RX ADMIN — DOXYCYCLINE 100 MG: 100 CAPSULE ORAL at 20:52

## 2023-01-01 RX ADMIN — INSULIN ASPART 2 UNITS: 100 INJECTION, SOLUTION INTRAVENOUS; SUBCUTANEOUS at 09:22

## 2023-01-01 RX ADMIN — METHYLPREDNISOLONE SODIUM SUCCINATE 60 MG: 125 INJECTION, POWDER, FOR SOLUTION INTRAMUSCULAR; INTRAVENOUS at 04:46

## 2023-01-01 RX ADMIN — LACTULOSE 20 G: 20 SOLUTION ORAL at 09:03

## 2023-01-01 RX ADMIN — FUROSEMIDE 20 MG: 10 INJECTION, SOLUTION INTRAMUSCULAR; INTRAVENOUS at 17:13

## 2023-01-01 RX ADMIN — ONDANSETRON 4 MG: 2 INJECTION INTRAMUSCULAR; INTRAVENOUS at 21:07

## 2023-01-01 RX ADMIN — CEFEPIME 2 G: 2 INJECTION, POWDER, FOR SOLUTION INTRAVENOUS at 12:10

## 2023-01-01 RX ADMIN — LEVOTHYROXINE SODIUM 50 MCG: 50 TABLET ORAL at 04:46

## 2023-01-01 RX ADMIN — INSULIN ASPART 2 UNITS: 100 INJECTION, SOLUTION INTRAVENOUS; SUBCUTANEOUS at 10:05

## 2023-01-01 RX ADMIN — CEFEPIME 2 G: 2 INJECTION, POWDER, FOR SOLUTION INTRAVENOUS at 23:58

## 2023-01-01 RX ADMIN — LEVOTHYROXINE SODIUM 50 MCG: 50 TABLET ORAL at 06:37

## 2023-01-01 RX ADMIN — HYDROMORPHONE HYDROCHLORIDE 0.5 MG: 1 INJECTION, SOLUTION INTRAMUSCULAR; INTRAVENOUS; SUBCUTANEOUS at 01:19

## 2023-01-01 RX ADMIN — FLUOXETINE 20 MG: 20 CAPSULE ORAL at 09:20

## 2023-01-01 RX ADMIN — GUAIFENESIN 600 MG: 600 TABLET, EXTENDED RELEASE ORAL at 08:47

## 2023-01-01 RX ADMIN — LORAZEPAM 2 MG: 2 INJECTION INTRAMUSCULAR at 05:52

## 2023-01-01 RX ADMIN — IPRATROPIUM BROMIDE AND ALBUTEROL SULFATE 3 ML: 2.5; .5 SOLUTION RESPIRATORY (INHALATION) at 06:49

## 2023-01-01 RX ADMIN — PROPOFOL 40 MCG/KG/MIN: 10 INJECTION, EMULSION INTRAVENOUS at 02:00

## 2023-01-01 RX ADMIN — CEFTRIAXONE SODIUM 2 G: 2 INJECTION, POWDER, FOR SOLUTION INTRAMUSCULAR; INTRAVENOUS at 16:46

## 2023-01-01 RX ADMIN — LACTULOSE 20 G: 20 SOLUTION ORAL at 10:02

## 2023-01-01 RX ADMIN — DOXYCYCLINE 100 MG: 100 CAPSULE ORAL at 21:08

## 2023-01-01 RX ADMIN — CEFEPIME HYDROCHLORIDE 2 G: 2 INJECTION, POWDER, FOR SOLUTION INTRAVENOUS at 20:58

## 2023-01-01 RX ADMIN — IPRATROPIUM BROMIDE 2 PUFF: 17 AEROSOL, METERED RESPIRATORY (INHALATION) at 16:08

## 2023-01-01 RX ADMIN — PROPOFOL 35 MCG/KG/MIN: 10 INJECTION, EMULSION INTRAVENOUS at 00:19

## 2023-01-01 RX ADMIN — MORPHINE SULFATE 6 MG: 4 INJECTION, SOLUTION INTRAMUSCULAR; INTRAVENOUS at 15:52

## 2023-01-01 RX ADMIN — PREDNISONE 40 MG: 20 TABLET ORAL at 07:42

## 2023-01-01 RX ADMIN — TAMSULOSIN HYDROCHLORIDE 0.8 MG: 0.4 CAPSULE ORAL at 08:47

## 2023-01-01 RX ADMIN — INSULIN ASPART 2 UNITS: 100 INJECTION, SOLUTION INTRAVENOUS; SUBCUTANEOUS at 09:28

## 2023-01-01 RX ADMIN — HEPARIN SODIUM 5000 UNITS: 5000 INJECTION INTRAVENOUS; SUBCUTANEOUS at 16:09

## 2023-01-01 RX ADMIN — INSULIN ASPART 2 UNITS: 100 INJECTION, SOLUTION INTRAVENOUS; SUBCUTANEOUS at 08:49

## 2023-01-01 RX ADMIN — PROPOFOL 40 MCG/KG/MIN: 10 INJECTION, EMULSION INTRAVENOUS at 16:45

## 2023-01-01 RX ADMIN — METHYLPREDNISOLONE SODIUM SUCCINATE 40 MG: 40 INJECTION, POWDER, FOR SOLUTION INTRAMUSCULAR; INTRAVENOUS at 17:05

## 2023-01-01 RX ADMIN — IPRATROPIUM BROMIDE AND ALBUTEROL SULFATE 3 ML: 2.5; .5 SOLUTION RESPIRATORY (INHALATION) at 07:30

## 2023-01-01 RX ADMIN — METHYLPREDNISOLONE SODIUM SUCCINATE 40 MG: 40 INJECTION, POWDER, FOR SOLUTION INTRAMUSCULAR; INTRAVENOUS at 00:58

## 2023-01-01 RX ADMIN — IPRATROPIUM BROMIDE 2 PUFF: 17 AEROSOL, METERED RESPIRATORY (INHALATION) at 07:29

## 2023-01-01 RX ADMIN — FUROSEMIDE 20 MG: 10 INJECTION, SOLUTION INTRAMUSCULAR; INTRAVENOUS at 09:26

## 2023-01-01 RX ADMIN — CEFEPIME 2 G: 2 INJECTION, POWDER, FOR SOLUTION INTRAVENOUS at 02:00

## 2023-01-01 RX ADMIN — IPRATROPIUM BROMIDE 2 PUFF: 17 AEROSOL, METERED RESPIRATORY (INHALATION) at 22:20

## 2023-01-01 RX ADMIN — DOCUSATE SODIUM 50 MG AND SENNOSIDES 8.6 MG 1 TABLET: 8.6; 5 TABLET, FILM COATED ORAL at 20:27

## 2023-01-01 RX ADMIN — TAMSULOSIN HYDROCHLORIDE 0.8 MG: 0.4 CAPSULE ORAL at 09:20

## 2023-01-01 RX ADMIN — CEFEPIME HYDROCHLORIDE 2 G: 2 INJECTION, POWDER, FOR SOLUTION INTRAVENOUS at 12:36

## 2023-01-01 RX ADMIN — METOPROLOL TARTRATE 25 MG: 25 TABLET, FILM COATED ORAL at 12:05

## 2023-01-01 RX ADMIN — MORPHINE SULFATE 4 MG: 4 INJECTION, SOLUTION INTRAMUSCULAR; INTRAVENOUS at 15:58

## 2023-01-01 RX ADMIN — METOPROLOL TARTRATE 25 MG: 25 TABLET, FILM COATED ORAL at 08:56

## 2023-01-01 RX ADMIN — GUAIFENESIN 600 MG: 600 TABLET, EXTENDED RELEASE ORAL at 21:57

## 2023-01-01 RX ADMIN — Medication 10 ML: at 08:22

## 2023-01-01 RX ADMIN — GLYCOPYRROLATE 0.2 MG: 0.2 INJECTION, SOLUTION INTRAMUSCULAR; INTRAVITREAL at 04:59

## 2023-01-01 RX ADMIN — ALBUTEROL SULFATE 2 PUFF: 90 AEROSOL, METERED RESPIRATORY (INHALATION) at 19:10

## 2023-01-01 RX ADMIN — Medication 10 ML: at 09:33

## 2023-01-01 RX ADMIN — FLUOXETINE 20 MG: 20 CAPSULE ORAL at 07:41

## 2023-01-01 RX ADMIN — INSULIN ASPART 4 UNITS: 100 INJECTION, SOLUTION INTRAVENOUS; SUBCUTANEOUS at 11:08

## 2023-01-01 RX ADMIN — TRAZODONE HYDROCHLORIDE 150 MG: 150 TABLET ORAL at 20:40

## 2023-01-01 RX ADMIN — METHYLPREDNISOLONE SODIUM SUCCINATE 60 MG: 125 INJECTION, POWDER, FOR SOLUTION INTRAMUSCULAR; INTRAVENOUS at 21:07

## 2023-01-01 RX ADMIN — MORPHINE SULFATE 2 MG: 2 INJECTION, SOLUTION INTRAMUSCULAR; INTRAVENOUS at 09:23

## 2023-01-01 RX ADMIN — PREDNISONE 40 MG: 20 TABLET ORAL at 09:44

## 2023-01-01 RX ADMIN — HYDROMORPHONE HYDROCHLORIDE 0.5 MG: 1 INJECTION, SOLUTION INTRAMUSCULAR; INTRAVENOUS; SUBCUTANEOUS at 18:01

## 2023-01-01 RX ADMIN — FENTANYL CITRATE 50 MCG: 50 INJECTION, SOLUTION INTRAMUSCULAR; INTRAVENOUS at 16:43

## 2023-01-01 RX ADMIN — ONDANSETRON 4 MG: 2 INJECTION INTRAMUSCULAR; INTRAVENOUS at 18:06

## 2023-01-01 RX ADMIN — TRAZODONE HYDROCHLORIDE 150 MG: 150 TABLET ORAL at 21:57

## 2023-01-01 RX ADMIN — Medication 10 ML: at 20:52

## 2023-01-01 RX ADMIN — SODIUM CHLORIDE 250 ML: 9 INJECTION, SOLUTION INTRAVENOUS at 21:02

## 2023-01-01 RX ADMIN — Medication 10 ML: at 20:58

## 2023-01-01 RX ADMIN — INSULIN ASPART 6 UNITS: 100 INJECTION, SOLUTION INTRAVENOUS; SUBCUTANEOUS at 17:00

## 2023-01-01 RX ADMIN — FLUOXETINE 20 MG: 20 CAPSULE ORAL at 08:56

## 2023-01-01 RX ADMIN — PROPOFOL 5 MCG/KG/MIN: 10 INJECTION, EMULSION INTRAVENOUS at 10:15

## 2023-01-01 RX ADMIN — TRAZODONE HYDROCHLORIDE 150 MG: 150 TABLET ORAL at 20:55

## 2023-01-01 RX ADMIN — IPRATROPIUM BROMIDE AND ALBUTEROL SULFATE 3 ML: 2.5; .5 SOLUTION RESPIRATORY (INHALATION) at 06:55

## 2023-01-01 RX ADMIN — IPRATROPIUM BROMIDE AND ALBUTEROL SULFATE 3 ML: 2.5; .5 SOLUTION RESPIRATORY (INHALATION) at 21:19

## 2023-01-01 RX ADMIN — ALBUTEROL SULFATE 2 PUFF: 90 AEROSOL, METERED RESPIRATORY (INHALATION) at 16:08

## 2023-01-01 RX ADMIN — PROPOFOL 35 MCG/KG/MIN: 10 INJECTION, EMULSION INTRAVENOUS at 18:16

## 2023-01-01 RX ADMIN — LEVOTHYROXINE SODIUM 50 MCG: 50 TABLET ORAL at 05:31

## 2023-01-01 RX ADMIN — IPRATROPIUM BROMIDE AND ALBUTEROL SULFATE 3 ML: 2.5; .5 SOLUTION RESPIRATORY (INHALATION) at 07:27

## 2023-01-01 RX ADMIN — GUAIFENESIN 600 MG: 600 TABLET, EXTENDED RELEASE ORAL at 20:58

## 2023-01-01 RX ADMIN — NICOTINE 1 PATCH: 21 PATCH, EXTENDED RELEASE TRANSDERMAL at 09:04

## 2023-01-01 RX ADMIN — CEFEPIME HYDROCHLORIDE 2 G: 2 INJECTION, POWDER, FOR SOLUTION INTRAVENOUS at 09:24

## 2023-01-01 RX ADMIN — IPRATROPIUM BROMIDE AND ALBUTEROL SULFATE 3 ML: 2.5; .5 SOLUTION RESPIRATORY (INHALATION) at 07:11

## 2023-01-01 RX ADMIN — GUAIFENESIN 600 MG: 600 TABLET, EXTENDED RELEASE ORAL at 07:41

## 2023-01-01 RX ADMIN — CEFEPIME 2 G: 2 INJECTION, POWDER, FOR SOLUTION INTRAVENOUS at 12:04

## 2023-01-01 RX ADMIN — HEPARIN SODIUM 5000 UNITS: 5000 INJECTION INTRAVENOUS; SUBCUTANEOUS at 21:29

## 2023-01-01 RX ADMIN — MORPHINE SULFATE 2 MG: 2 INJECTION, SOLUTION INTRAMUSCULAR; INTRAVENOUS at 17:29

## 2023-01-01 RX ADMIN — DOCUSATE SODIUM 50 MG AND SENNOSIDES 8.6 MG 1 TABLET: 8.6; 5 TABLET, FILM COATED ORAL at 20:07

## 2023-01-01 RX ADMIN — PROPOFOL 40 MCG/KG/MIN: 10 INJECTION, EMULSION INTRAVENOUS at 13:13

## 2023-01-01 RX ADMIN — IPRATROPIUM BROMIDE 2 PUFF: 17 AEROSOL, METERED RESPIRATORY (INHALATION) at 19:10

## 2023-01-01 RX ADMIN — TRAMADOL HYDROCHLORIDE 50 MG: 50 TABLET, COATED ORAL at 20:41

## 2023-01-01 RX ADMIN — TRAMADOL HYDROCHLORIDE 50 MG: 50 TABLET, COATED ORAL at 22:35

## 2023-01-01 RX ADMIN — FLUOXETINE 20 MG: 20 CAPSULE ORAL at 08:47

## 2023-01-01 RX ADMIN — TAMSULOSIN HYDROCHLORIDE 0.8 MG: 0.4 CAPSULE ORAL at 07:42

## 2023-01-01 RX ADMIN — LEVOTHYROXINE SODIUM 50 MCG: 50 TABLET ORAL at 05:39

## 2023-01-01 RX ADMIN — PROPOFOL 35 MCG/KG/MIN: 10 INJECTION, EMULSION INTRAVENOUS at 08:16

## 2023-01-01 RX ADMIN — FUROSEMIDE 40 MG: 10 INJECTION, SOLUTION INTRAVENOUS at 09:28

## 2023-01-01 RX ADMIN — MORPHINE SULFATE 2 MG: 2 INJECTION, SOLUTION INTRAMUSCULAR; INTRAVENOUS at 03:36

## 2023-01-01 RX ADMIN — TAMSULOSIN HYDROCHLORIDE 0.8 MG: 0.4 CAPSULE ORAL at 10:10

## 2023-01-01 RX ADMIN — METOCLOPRAMIDE 10 MG: 5 INJECTION, SOLUTION INTRAMUSCULAR; INTRAVENOUS at 09:26

## 2023-01-01 RX ADMIN — DOXYCYCLINE 100 MG: 100 CAPSULE ORAL at 08:23

## 2023-01-01 RX ADMIN — VANCOMYCIN HYDROCHLORIDE 1500 MG: 10 INJECTION, POWDER, LYOPHILIZED, FOR SOLUTION INTRAVENOUS at 21:08

## 2023-01-01 RX ADMIN — IPRATROPIUM BROMIDE AND ALBUTEROL SULFATE 3 ML: 2.5; .5 SOLUTION RESPIRATORY (INHALATION) at 14:11

## 2023-01-01 RX ADMIN — ALBUTEROL SULFATE 2 PUFF: 90 AEROSOL, METERED RESPIRATORY (INHALATION) at 22:20

## 2023-01-01 RX ADMIN — PANTOPRAZOLE SODIUM 40 MG: 40 TABLET, DELAYED RELEASE ORAL at 05:31

## 2023-01-01 RX ADMIN — CEFEPIME 2 G: 2 INJECTION, POWDER, FOR SOLUTION INTRAVENOUS at 12:00

## 2023-01-01 RX ADMIN — METHYLPREDNISOLONE SODIUM SUCCINATE 40 MG: 40 INJECTION, POWDER, FOR SOLUTION INTRAMUSCULAR; INTRAVENOUS at 17:21

## 2023-01-01 RX ADMIN — LEVOTHYROXINE SODIUM 50 MCG: 50 TABLET ORAL at 05:05

## 2023-01-01 RX ADMIN — Medication 10 ML: at 08:00

## 2023-01-01 RX ADMIN — FLUOXETINE 20 MG: 20 CAPSULE ORAL at 09:52

## 2023-01-01 RX ADMIN — FLUOXETINE 20 MG: 20 CAPSULE ORAL at 08:21

## 2023-01-01 RX ADMIN — CEFEPIME 2 G: 2 INJECTION, POWDER, FOR SOLUTION INTRAVENOUS at 01:19

## 2023-01-01 RX ADMIN — GUAIFENESIN 600 MG: 600 TABLET, EXTENDED RELEASE ORAL at 21:25

## 2023-01-01 RX ADMIN — GUAIFENESIN 600 MG: 600 TABLET, EXTENDED RELEASE ORAL at 09:25

## 2023-01-01 RX ADMIN — ALBUTEROL SULFATE 2 PUFF: 90 AEROSOL, METERED RESPIRATORY (INHALATION) at 10:28

## 2023-01-01 RX ADMIN — PROPOFOL 40 MCG/KG/MIN: 10 INJECTION, EMULSION INTRAVENOUS at 17:20

## 2023-01-01 RX ADMIN — ONDANSETRON 4 MG: 2 INJECTION INTRAMUSCULAR; INTRAVENOUS at 09:23

## 2023-01-01 RX ADMIN — IPRATROPIUM BROMIDE AND ALBUTEROL SULFATE 3 ML: 2.5; .5 SOLUTION RESPIRATORY (INHALATION) at 20:01

## 2023-01-01 RX ADMIN — METHYLPREDNISOLONE SODIUM SUCCINATE 60 MG: 125 INJECTION, POWDER, FOR SOLUTION INTRAMUSCULAR; INTRAVENOUS at 03:00

## 2023-01-01 RX ADMIN — IPRATROPIUM BROMIDE AND ALBUTEROL SULFATE 3 ML: 2.5; .5 SOLUTION RESPIRATORY (INHALATION) at 18:26

## 2023-01-01 RX ADMIN — ONDANSETRON 4 MG: 2 INJECTION INTRAMUSCULAR; INTRAVENOUS at 11:31

## 2023-01-01 RX ADMIN — Medication 10 ML: at 20:24

## 2023-01-01 RX ADMIN — MORPHINE SULFATE 2 MG: 2 INJECTION, SOLUTION INTRAMUSCULAR; INTRAVENOUS at 16:11

## 2023-01-01 RX ADMIN — INSULIN ASPART 6 UNITS: 100 INJECTION, SOLUTION INTRAVENOUS; SUBCUTANEOUS at 11:24

## 2023-01-01 RX ADMIN — IPRATROPIUM BROMIDE AND ALBUTEROL SULFATE 3 ML: 2.5; .5 SOLUTION RESPIRATORY (INHALATION) at 07:49

## 2023-01-05 PROBLEM — J96.11 CHRONIC RESPIRATORY FAILURE WITH HYPOXIA (HCC): Status: ACTIVE | Noted: 2023-01-01

## 2023-01-05 PROBLEM — R91.8 MASS OF RIGHT LUNG: Status: ACTIVE | Noted: 2023-01-01

## 2023-01-06 NOTE — ACP (ADVANCE CARE PLANNING)
Oncology SW met face to face with patient in the exam room subsequent to his medical oncology consult.  Pt. Is accompanied in the room by his wife, Jesusita Sanchez,  and his daughter; Praveena Vee, both offer collaborative feedback and support of patient and his goals and needs.     LCSW introduced self and explained role and support to include scope of clinical practice in oncology setting.    Pt. Presents visibly ill and exhibits difficulty breathing.  Pt. Alert and oriented x3 and exhibits full decision making capacity.     Family indicates he utilizes home 02 that is provided by [facility].   Per pt/family direction, SW provided ACP information and pt requested to complete Living will this visit.  chaplain Heidy contacted and served as notary for pt.   Document completed, notarized, and copy obtained for EMR. Pt. Provided original and multiple copies.

## 2023-01-06 NOTE — TELEPHONE ENCOUNTER
Oncology SW contacted wife in follow up to request for wheelchairs.  SW advised that physician order and pertinent info has been faxed to [facility] and they will reach out as to delivery/ . Wife states understanding and will advise if other needs arise. .

## 2023-01-09 NOTE — PROGRESS NOTES
REASON FOR CONSULTATION: lung mass    Provide an opinion on any further workup or treatment                             REQUESTING PHYSICIAN:  Emile Wynn MD      RECORDS OBTAINED:  Records of the patients history including those obtained from the referring provider were reviewed and summarized in detail.      History of Present Illness     This is a pleasant 72-year-old male who was seen in consultation at the request Emile Wynn MD for evaluation of lung mass.  Patient unfortunately is a poor historian.  Most of the history was obtained by discussing case with patient's family and reviewing old medical records.  Patient has advanced COPD, chronic hypoxic respiratory failure on supplemental oxygen.  He had a COVID infection earlier in the year 2022 and since then his shortness of breath has gotten progressively worse.  Patient states that the abdomen pelvis performed in December which showed large mass involving the entire right hilum with extensive mediastinal adenopathy.  There was small liver lesion concerning for liver metastasis as well.  He has been referred to me for further evaluation and management of his lung mass.      Past Medical History:   Diagnosis Date   • Acute bronchitis    • Anemia    • Breast lump    • Cervical radiculopathy    • COPD (chronic obstructive pulmonary disease) (HCC)    • Coronary atherosclerosis     2 stents, followed by Dr. Hargrove   • Depressive disorder    • Disease of thyroid gland    • Dysuria    • Essential hypertension    • Flank pain    • Hematochezia    • Hematuria syndrome    • Hyperlipidemia    • Hypoglycemia    • Kidney stone    • Kidney stones    • Lung cancer (HCC)    • Mass of neck    • MI (myocardial infarction) (HCC)    • Neck pain     sprain   • Osteoarthritis    • PONV (postoperative nausea and vomiting)    • Sleep apnea     using c-pap   • Sleep apnea    • Type 2 diabetes mellitus (HCC)         Past Surgical History:   Procedure Laterality Date    • BACK SURGERY     • CARDIAC CATHETERIZATION N/A 08/23/2017    Procedure: Right Heart Cath;  Surgeon: Mariposa Zamorano MD;  Location: [facility] CATH INVASIVE LOCATION;  Service:    • CATARACT EXTRACTION WITH INTRAOCULAR LENS IMPLANT Bilateral    • CHOLECYSTECTOMY     • CYSTOSCOPY  08/29/2014    Left ESWL, cysto and stent removal   • CYSTOSCOPY W/ LITHOLAPAXY / EHL  08/15/2014    Left ESWL   • CYSTOSCOPY, RETROGRADE PYELOGRAM AND STENT INSERTION  12/29/2022   • CYSTOSCOPY, URETEROSCOPY, RETROGRADE PYELOGRAM, STENT INSERTION Left 06/30/2017    Procedure: CYSTOSCOPY, LEFT URETEROSCOPY, RETROGRADE PYELOGRAM, HOLMIUM LASER, STENT INSERTION;  Surgeon: Uday Horne MD;  Location: [facility];  Service:    • CYSTOSCOPY, URETEROSCOPY, RETROGRADE PYELOGRAM, STENT INSERTION Left 09/06/2017    Procedure: CYSTOSCOPY, LEFT URETEROSCOPY, RETROGRADE PYELOGRAM, HOLMIUM LASER, STENT INSERTION;  Surgeon: Uday Horne MD;  Location: [facility] OR;  Service:    • CYSTOSCOPY, URETEROSCOPY, RETROGRADE PYELOGRAM, STENT INSERTION Left 01/17/2018    Procedure: CYSTOSCOPY LEFT URETEROSCOPY RETROGRADE PYELOGRAM HOLMIUM LASER STENT INSERTION;  Surgeon: Uday Horne MD;  Location: [facility] OR;  Service:    • CYSTOSCOPY, URETEROSCOPY, RETROGRADE PYELOGRAM, STENT INSERTION Left 07/25/2018    Procedure: CYSTOSCOPY URETEROSCOPY RETROGRADE PYELOGRAM HOLMIUM LASER STENT INSERTION;  Surgeon: Uday Horne MD;  Location: [facility];  Service: Urology   • EYE SURGERY      eye lids lifted   • HIP SURGERY     • INJECTION OF MEDICATION  11/11/2013    Kenalog   • INJECTION OF MEDICATION  01/16/2014    Toradol   • JOINT REPLACEMENT Right 2012    total hip replacement   • SKIN GRAFT      on his foot; removed skin from hip   • TONSILLECTOMY     • TRANSESOPHAGEAL ECHOCARDIOGRAM (ОЛЬГА)  07/21/2014    with color flow-Normal LV systolic function with Ef of 60-65%/ Grad1 diastolic dysfunction of the left ventricular myocardium. No evidence of pericardial effusion         Current Outpatient Medications on File Prior to Visit   Medication Sig Dispense Refill   • albuterol (PROVENTIL HFA;VENTOLIN HFA) 108 (90 Base) MCG/ACT inhaler Inhale 2 puffs Every 4 (Four) Hours As Needed for Wheezing. 8 g 5   • albuterol (PROVENTIL) (2.5 MG/3ML) 0.083% nebulizer solution Take 2.5 mg by nebulization Every 4 (Four) Hours As Needed for Wheezing. 180 vial 1   • allopurinol (ZYLOPRIM) 300 MG tablet      • aspirin 81 MG EC tablet Take 81 mg by mouth Daily. Last dose 1/8/18     • cetirizine (zyrTEC) 10 MG tablet Take 10 mg by mouth Daily.     • clopidogrel (PLAVIX) 75 MG tablet Take 1 tablet by mouth Daily. 90 tablet 1   • finasteride (PROSCAR) 5 MG tablet Take 1 tablet by mouth Every Night. 90 tablet 1   • FLUoxetine (PROzac) 20 MG capsule Take 20 mg by mouth Daily.     • fluticasone (FLONASE) 50 MCG/ACT nasal spray 2 sprays into each nostril At Night As Needed for Rhinitis. 3 bottle 1   • glimepiride (AMARYL) 2 MG tablet TAKE 1 TABLET BY MOUTH IN THE MORNING WITH FIRST MEAL OF THE DAY     • glucose blood test strip One touch reli-on glucometer 360 each 1   • guaiFENesin (MUCINEX) 600 MG 12 hr tablet Take 600 mg by mouth 2 (Two) Times a Day.     • isosorbide mononitrate (IMDUR) 30 MG 24 hr tablet Take 1 tablet by mouth Daily. 90 tablet 1   • levothyroxine (SYNTHROID) 50 MCG tablet Take 1 tablet by mouth Daily. 90 tablet 1   • Mirabegron ER (MYRBETRIQ) 50 MG tablet sustained-release 24 hour 24 hr tablet Take 50 mg by mouth Daily.     • nitroglycerin (NITROSTAT) 0.4 MG SL tablet Place 1 tablet under the tongue Every 5 (Five) Minutes As Needed for chest pain. 100 tablet 11   • ONE TOUCH LANCETS misc Use as directed test 6 times daily 200 each 1   • promethazine (PHENERGAN) 25 MG tablet Take 25 mg by mouth.     • tamsulosin (FLOMAX) 0.4 MG capsule 24 hr capsule Take 2 capsules by mouth Daily. 180 capsule 1   • traMADol (ULTRAM) 50 MG tablet Take 50 mg by mouth Every 6 (Six) Hours As Needed. for pain      • traZODone (DESYREL) 150 MG tablet Take 150 mg by mouth Every Night.     • vitamin B-12 (CYANOCOBALAMIN) 1000 MCG tablet Take 1,000 mcg by mouth Daily.     • Potassium Citrate ER 15 MEQ (1620 MG) tablet controlled-release Take 2 tablets by mouth Daily.       No current facility-administered medications on file prior to visit.        ALLERGIES:  No Known Allergies     Social History     Socioeconomic History   • Marital status:    Tobacco Use   • Smoking status: Every Day     Packs/day: 1.00     Years: 61.00     Pack years: 61.00     Types: Cigarettes   • Smokeless tobacco: Never   Vaping Use   • Vaping Use: Never used   Substance and Sexual Activity   • Alcohol use: Yes     Comment: occasionally   • Drug use: No   • Sexual activity: Defer        Family History   Problem Relation Age of Onset   • Lung cancer Mother    • Rectal cancer Sister             Objective     Vitals:    01/05/23 1537   BP: 175/85   Pulse: 95   SpO2: 93%   Weight: 91.6 kg (201 lb 14.4 oz)   PainSc: 0-No pain     No flowsheet data found.    Physical Exam  Vitals and nursing note reviewed.   Constitutional:       General: He is in acute distress.      Appearance: He is ill-appearing.   Cardiovascular:      Rate and Rhythm: Normal rate and regular rhythm.      Heart sounds: No murmur heard.  Pulmonary:      Effort: Respiratory distress present.      Breath sounds: Wheezing and rhonchi present.   Abdominal:      General: There is no distension.      Palpations: Abdomen is soft. There is no mass.      Tenderness: There is no abdominal tenderness.   Neurological:      Mental Status: He is alert.               RECENT LABS:Independently reviewed and summarized  Hematology WBC   Date Value Ref Range Status   03/03/2022 8.66 3.40 - 10.80 10*3/mm3 Final   04/19/2019 9.2 4.0 - 11.0 10*3/uL Final     RBC   Date Value Ref Range Status   03/03/2022 4.85 4.14 - 5.80 10*6/mm3 Final   04/19/2019 5.47 4.73 - 5.49 10*6/uL Final     Hemoglobin   Date  Value Ref Range Status   03/03/2022 14.6 13.0 - 17.7 g/dL Final   04/19/2019 16.1 14.4 - 16.6 g/dL Final     Hematocrit   Date Value Ref Range Status   03/03/2022 43.1 37.5 - 51.0 % Final   04/19/2019 49 42.9 - 49.1 % Final   01/23/2018 50.1 42 - 54 % Final     Platelets   Date Value Ref Range Status   03/03/2022 208 140 - 450 10*3/mm3 Final   04/19/2019 231 150 - 450 10*3/uL Final   01/23/2018 253 150 - 450 10*9/L Final        Lab Results   Component Value Date    GLUCOSE 172 (H) 03/03/2022    BUN 15 12/19/2022    CREATININE 1.7 (H) 12/19/2022    EGFRIFNONA 78 04/15/2021    EGFRIFAFRI 90 07/08/2021    BCR 12.5 03/03/2022    K 4.6 12/19/2022    CO2 26 12/19/2022    CALCIUM 8.9 12/19/2022    ALBUMIN 3.5 12/19/2022    AST 16 12/19/2022    ALT 19 12/19/2022         Imaging (independently reviewed and summarized):     XR Chest 2 View    Result Date: 12/16/2022  1. No active disease 2. The right paratracheal portion of the mediastinum is a little more prominent than in May . By this CT with IV contrast for month follow-up chest film are needed [place] [id number]    CT Chest Without Contrast Diagnostic    Result Date: 12/22/2022  1. Large mass engulfing the entire right hilum with extensive mediastinal adenopathy . 2. There are 2 nodules in the right upper lobe and 2 nodules in the right lower lobe . 3. Small right pleural effusion 4. Questionable small liver metastases 5. Small cell carcinoma is the leading consideration and tissue could probably be obtained by bronchoscopy 6. CT abdomen and pelvis with IV contrast recommended to evaluate the liver more completely and the left kidney [place] [id number]    CT Abdomen Pelvis With Contrast    Result Date: 12/28/2022  1. Right pleural effusion with pleural-based metastatic nodules in the right lung base 2. Numerous liver metastasis 3. Moderate right hydronephrosis secondary to 5 mm stone in the distal right ureter about 2 cm above the UV junction . 4.despite well positioned left  nephrostomy catheter and a left ureteral stent the moderate to marked left hydronephrosis is not decompressed . There are a number of tiny stone fragments that center at the L5-S1 level with a few additional fragments a well-positioned left nephrostomy tube and left ureteral stent the moderate to marked left hydronephrosis is not being compressed . There are numerous stony fragments in the left ureter with the centering at the L5-S1 level [id number]      Aman Sanchez reports a pain score of 0.  Given his pain assessment as noted, treatment options were discussed and the following options were decided upon as a follow-up plan to address the patient's pain: continuation of current treatment plan for pain.     Patient screened negative for depression based on a PHQ-9 score of 0 on 1/5/2023.    Advance Care Planning   ACP discussion was held with the patient during this visit. Patient has an advance directive in EMR which is still valid.          Diagnosis:   (1) Right lung mass  (2) Pleural metastases  (3) Liver lesions   (4) Acute on chronic hypoxic respiratory failure   (5) Acute COPD exacerbation     All are new diagnosis/problems for me.     Assessment & Plan     (1) Right lung mass(2) Pleural metastases(3) Liver lesions     This is a new diagnosis for me.  Patient with large right hilar mass with mediastinal adenopathy, small pleural-based metastasis and small liver lesion concerning for metastatic disease.    I had a very lengthy discussion with patient and family about potential diagnosis, prognosis and treatment options.    Discussed with patient and family.  This is highly suspicious for metastatic lung cancer however we will still need tissue diagnosis to confirm as well as to confirm the subtype of lung cancer.    Result of imaging reviewed.  Liver lesions are too small to biopsy.  I think the best way to approach would be bronchoscopy and biopsy.    I will send urgent referral to pulmonary.    I  did discuss with patient and family that this is likely metastatic lung cancer and the goal of treatment will be systemic chemoimmunotherapy.  Surgery or radiation plays no role in his case.    (4) Acute on chronic hypoxic respiratory failure (5) Acute COPD exacerbation     New diagnosis for me.  His respiratory status appears somewhat tenuous.  He is currently on supplemental oxygen.  Unable to walk even small distances due to shortness of breath.  On exam he has bilateral rhonchi with wheezing.  Recommend he continue nebulizing treatment.  Also recommend prednisone 60 mg daily for 5 days.  New prescription sent to the pharmacy.  His oxygen saturation is normal.  He was instructed to go to hospital if worsening respiratory status.

## 2023-01-10 NOTE — PROGRESS NOTES
Adult Outpatient Nutrition  Assessment    Patient Name:  Aman Sanchez  YOB: 1950  MRN: [id number]    Assessment Date:  Entry from 1/9/23    CHART REVIEW  Aman Sanchez  1950  72 y.o.  Wt: 201.9 lb  Ht: 69 in  Dx: lung mass (possible liver mets)  Tx: workup in progress  Labs: glucose 223/154/261--\"134\" this am    A1C 8.5       Alb 3.5    PATIENT/FAMILY COMMENTS (wife)  Usual Body Weight: 220 lb 11/14/22  Weight Comments: trend down  Diet: diabetic (poor compliance)  Hydration: water/coffee/sweetened soda  Food Allergies: nkfa     (poor tolerance of \"spicy\" food)  Appetite/Intake: decreased  Supplements: na  Meals: 3  Food Preparation: wife  Nutrition Concerns:  * decreased appetite/intake  * early satiety  * nausea (resolved)  * diabetic on steroids  No problems with chewing/swallowing/vomiting/taste change/constipation/diarrhea.    -SOA + food intake worse since dx of covid in past year  -recent kidney stone removal     GOAL(s)  Optimize nutrition in attempt to 1)prevent further loss of LBM 2)control glucose      EDUCATION  -High calorie high protein reduced sugar diet--small frequent feedings  -Supplementation        * Offered samples of commercial supplements & discount coupons--recipes          for homemade diabetic friendly supplements mailed.  -Importance of staying hydrated  -Food safety    To reinforce education, written information with RD's name & number mailed.  Wife receptive to suggestions, and agreed to call on dietitian as needed.                      Electronically signed by:  Rosa Welch RD  01/10/23 06:22 CST

## 2023-01-16 PROBLEM — J90 PLEURAL EFFUSION: Status: ACTIVE | Noted: 2023-01-01

## 2023-01-16 PROBLEM — R91.8 MULTIPLE LUNG NODULES: Status: RESOLVED | Noted: 2017-04-11 | Resolved: 2023-01-01

## 2023-01-16 NOTE — PROGRESS NOTES
Pulmonary Consultation    Gustabo Rincon,    Thank you for asking me to see Aman Sanchez for   Chief Complaint   Patient presents with   • LUNG MASS     CHIEF COMPLAINT   .      History of Present Illness  Aman Sanchez is a 72 y.o. male     Patient referred from Oncology for bronch request    Patient has been having trouble for the last year with kidney stoes, mulitple procedures including nephrosotomy tubes placed, done at [facility] in [facility]. In Nov he was supposed to have have left nephrosotomy tube placed, went back the next day and he was so short of breath and he was positive for COVID. Follow up to that included a CT chest that showed bulky mediastinal adenopathy and RLL mass. Patient has been a life long smoker. He is more short of breath    He has been given an albuterol inhaler and a nebulizer machine with albuterol as well. He uses those as needed. Not really sure if they are helping      Tobacco use history:  Type: cigarettes  Amount: 1-1.5ppd ppd  Duration: 61 years  Cessation: wearing patches now         Review of Systems: History obtained from chart review and the patient.  Review of Systems  As described in the HPI. Otherwise, remainder of ROS (14 systems) were negative.    Patient Active Problem List   Diagnosis   • Essential hypertension   • Chronic coronary artery disease   • Mixed hyperlipidemia   • Tobacco abuse counseling   • Dyspnea on exertion   • Acute retention of urine   • Anemia   • Osteoarthritis of hip   • Hypersomnia   • Hypotension   • Obstructive sleep apnea syndrome   • Diastolic dysfunction   • Morbid obesity due to excess calories (HCC)   • Tobacco use disorder   • Calculus of ureter   • Preoperative cardiovascular examination   • Enlarged prostate with urinary obstruction   • Mass of right lung   • Chronic respiratory failure with hypoxia (HCC)   • Pleural effusion         Current Outpatient Medications:   •  albuterol (PROVENTIL HFA;VENTOLIN  HFA) 108 (90 Base) MCG/ACT inhaler, Inhale 2 puffs Every 4 (Four) Hours As Needed for Wheezing., Disp: 8 g, Rfl: 5  •  albuterol (PROVENTIL) (2.5 MG/3ML) 0.083% nebulizer solution, Take 2.5 mg by nebulization Every 4 (Four) Hours As Needed for Wheezing., Disp: 180 vial, Rfl: 1  •  allopurinol (ZYLOPRIM) 300 MG tablet, , Disp: , Rfl:   •  aspirin 81 MG EC tablet, Take 81 mg by mouth Daily. Last dose 1/8/18, Disp: , Rfl:   •  cetirizine (zyrTEC) 10 MG tablet, Take 10 mg by mouth Daily., Disp: , Rfl:   •  clopidogrel (PLAVIX) 75 MG tablet, Take 1 tablet by mouth Daily., Disp: 90 tablet, Rfl: 1  •  finasteride (PROSCAR) 5 MG tablet, Take 1 tablet by mouth Every Night., Disp: 90 tablet, Rfl: 1  •  FLUoxetine (PROzac) 20 MG capsule, Take 20 mg by mouth Daily., Disp: , Rfl:   •  fluticasone (FLONASE) 50 MCG/ACT nasal spray, 2 sprays into each nostril At Night As Needed for Rhinitis., Disp: 3 bottle, Rfl: 1  •  glimepiride (AMARYL) 2 MG tablet, TAKE 1 TABLET BY MOUTH IN THE MORNING WITH FIRST MEAL OF THE DAY, Disp: , Rfl:   •  glucose blood test strip, One touch reli-on glucometer, Disp: 360 each, Rfl: 1  •  guaiFENesin (MUCINEX) 600 MG 12 hr tablet, Take 600 mg by mouth 2 (Two) Times a Day., Disp: , Rfl:   •  isosorbide mononitrate (IMDUR) 30 MG 24 hr tablet, Take 1 tablet by mouth Daily., Disp: 90 tablet, Rfl: 1  •  levothyroxine (SYNTHROID) 50 MCG tablet, Take 1 tablet by mouth Daily., Disp: 90 tablet, Rfl: 1  •  Mirabegron ER (MYRBETRIQ) 50 MG tablet sustained-release 24 hour 24 hr tablet, Take 50 mg by mouth Daily., Disp: , Rfl:   •  nicotine (NICODERM CQ) 21 MG/24HR patch, Place 1 patch on the skin as directed by provider Daily., Disp: 21 patch, Rfl: 0  •  nitroglycerin (NITROSTAT) 0.4 MG SL tablet, Place 1 tablet under the tongue Every 5 (Five) Minutes As Needed for chest pain., Disp: 100 tablet, Rfl: 11  •  ONE TOUCH LANCETS misc, Use as directed test 6 times daily, Disp: 200 each, Rfl: 1  •  Potassium Citrate ER 15  MEQ (1620 MG) tablet controlled-release, Take 2 tablets by mouth Daily., Disp: , Rfl:   •  promethazine (PHENERGAN) 25 MG tablet, Take 25 mg by mouth., Disp: , Rfl:   •  tamsulosin (FLOMAX) 0.4 MG capsule 24 hr capsule, Take 2 capsules by mouth Daily., Disp: 180 capsule, Rfl: 1  •  traMADol (ULTRAM) 50 MG tablet, Take 50 mg by mouth Every 6 (Six) Hours As Needed. for pain, Disp: , Rfl:   •  traZODone (DESYREL) 150 MG tablet, Take 150 mg by mouth Every Night., Disp: , Rfl:   •  vitamin B-12 (CYANOCOBALAMIN) 1000 MCG tablet, Take 1,000 mcg by mouth Daily., Disp: , Rfl:     No Known Allergies    Past Medical History:   Diagnosis Date   • Acute bronchitis    • Anemia    • Breast lump    • Cervical radiculopathy    • COPD (chronic obstructive pulmonary disease) (HCC)    • Coronary atherosclerosis     2 stents, followed by Dr. Hargrove   • Depressive disorder    • Disease of thyroid gland    • Dysuria    • Essential hypertension    • Flank pain    • Hematochezia    • Hematuria syndrome    • Hyperlipidemia    • Hypoglycemia    • Kidney stone    • Kidney stones    • Lung cancer (HCC)    • Mass of neck    • MI (myocardial infarction) (HCC)    • Neck pain     sprain   • Osteoarthritis    • PONV (postoperative nausea and vomiting)    • Sleep apnea     using c-pap   • Sleep apnea    • Type 2 diabetes mellitus (HCC)      Past Surgical History:   Procedure Laterality Date   • BACK SURGERY     • CARDIAC CATHETERIZATION N/A 08/23/2017    Procedure: Right Heart Cath;  Surgeon: Mariposa Zamorano MD;  Location: [facility] INVASIVE LOCATION;  Service:    • CATARACT EXTRACTION WITH INTRAOCULAR LENS IMPLANT Bilateral    • CHOLECYSTECTOMY     • CYSTOSCOPY  08/29/2014    Left ESWL, cysto and stent removal   • CYSTOSCOPY W/ LITHOLAPAXY / EHL  08/15/2014    Left ESWL   • CYSTOSCOPY, RETROGRADE PYELOGRAM AND STENT INSERTION  12/29/2022   • CYSTOSCOPY, URETEROSCOPY, RETROGRADE PYELOGRAM, STENT INSERTION Left 06/30/2017    Procedure: CYSTOSCOPY,  LEFT URETEROSCOPY, RETROGRADE PYELOGRAM, HOLMIUM LASER, STENT INSERTION;  Surgeon: Uday Horne MD;  Location: [facility];  Service:    • CYSTOSCOPY, URETEROSCOPY, RETROGRADE PYELOGRAM, STENT INSERTION Left 09/06/2017    Procedure: CYSTOSCOPY, LEFT URETEROSCOPY, RETROGRADE PYELOGRAM, HOLMIUM LASER, STENT INSERTION;  Surgeon: Uday Horne MD;  Location: [facility];  Service:    • CYSTOSCOPY, URETEROSCOPY, RETROGRADE PYELOGRAM, STENT INSERTION Left 01/17/2018    Procedure: CYSTOSCOPY LEFT URETEROSCOPY RETROGRADE PYELOGRAM HOLMIUM LASER STENT INSERTION;  Surgeon: Uday Horne MD;  Location: [facility];  Service:    • CYSTOSCOPY, URETEROSCOPY, RETROGRADE PYELOGRAM, STENT INSERTION Left 07/25/2018    Procedure: CYSTOSCOPY URETEROSCOPY RETROGRADE PYELOGRAM HOLMIUM LASER STENT INSERTION;  Surgeon: Uday Horne MD;  Location: [facility];  Service: Urology   • EYE SURGERY      eye lids lifted   • HIP SURGERY     • INJECTION OF MEDICATION  11/11/2013    Kenalog   • INJECTION OF MEDICATION  01/16/2014    Toradol   • JOINT REPLACEMENT Right 2012    total hip replacement   • SKIN GRAFT      on his foot; removed skin from hip   • TONSILLECTOMY     • TRANSESOPHAGEAL ECHOCARDIOGRAM (ОЛЬГА)  07/21/2014    with color flow-Normal LV systolic function with Ef of 60-65%/ Grad1 diastolic dysfunction of the left ventricular myocardium. No evidence of pericardial effusion     Social History     Socioeconomic History   • Marital status:    Tobacco Use   • Smoking status: Every Day     Packs/day: 1.00     Years: 61.00     Pack years: 61.00     Types: Cigarettes   • Smokeless tobacco: Never   Vaping Use   • Vaping Use: Never used   Substance and Sexual Activity   • Alcohol use: Yes     Comment: occasionally   • Drug use: No   • Sexual activity: Defer     Family History   Problem Relation Age of Onset   • Lung cancer Mother    • Rectal cancer Sister           Objective     Blood pressure 162/92, pulse 102, height 175.3 cm  "(69.02\"), weight 92.6 kg (204 lb 1.6 oz), SpO2 92 %.  Physical Exam  Vitals reviewed.   Constitutional:       Appearance: Normal appearance.   HENT:      Head: Normocephalic and atraumatic.      Right Ear: Tympanic membrane normal.      Left Ear: Tympanic membrane normal.      Nose: Nose normal.      Mouth/Throat:      Mouth: Mucous membranes are moist.      Pharynx: Oropharynx is clear.   Eyes:      Conjunctiva/sclera: Conjunctivae normal.      Pupils: Pupils are equal, round, and reactive to light.   Cardiovascular:      Rate and Rhythm: Normal rate and regular rhythm.      Pulses: Normal pulses.      Heart sounds: Normal heart sounds.   Pulmonary:      Effort: Tachypnea and accessory muscle usage present.      Breath sounds: Normal breath sounds.   Abdominal:      General: Abdomen is flat. Bowel sounds are normal.      Palpations: Abdomen is soft.   Musculoskeletal:         General: Normal range of motion.      Cervical back: Normal range of motion and neck supple.   Skin:     General: Skin is warm and dry.   Neurological:      General: No focal deficit present.      Mental Status: He is alert and oriented to person, place, and time.   Psychiatric:         Mood and Affect: Mood normal.         Behavior: Behavior normal.         PFTs:  (independently reviewed and interpreted by me)  None    Radiology (independently reviewed and interpreted by me):   CT chest at [facility] 12/21/22- enlarged mediastinal [employer] in the 7 sarahi station. Enlarged 4R node. Calcifeid nodes as well       Assessment & Plan     Diagnoses and all orders for this visit:    1. Pleural effusion (Primary)    2. Mass of right lung         Discussion/ Recommendations:   Patient with findings on Ct very concerning for malignancy. He is very short of breath today and I do not think he would tolerate a procedure under anesthesia right now. He has a moderate effusion on bedside US today. Will plan for a thoracentesis tomorrow morning " and send that fluid for cytology. If that's positive, then no further procedure should be needed. If it's negative, then will proceed with bronch IF his resp. Status improves. Will also need to do PFTs after thora and likely will need more treatment than just albuterol for his COPD    Follow up tomorrow for thoracentesis    Discussed with Dr Velez in oncology             No follow-ups on file.      Thank you for allowing me to participate in the care of Aman Sanchez. Please do not hesitate to contact me with any questions.         This document has been electronically signed by Kerry Lambert DO on January 16, 2023 14:23 CST

## 2023-01-17 NOTE — PROGRESS NOTES
Pulmonary Office Follow-up    Subjective     Aman Sanchez is seen today at the office for   Chief Complaint   Patient presents with   • thoracentesis         HPI  Aman Sanchez is a 72 y.o. male         Patient Active Problem List   Diagnosis   • Essential hypertension   • Chronic coronary artery disease   • Mixed hyperlipidemia   • Tobacco abuse counseling   • Dyspnea on exertion   • Acute retention of urine   • Anemia   • Osteoarthritis of hip   • Hypersomnia   • Hypotension   • Obstructive sleep apnea syndrome   • Diastolic dysfunction   • Morbid obesity due to excess calories (HCC)   • Tobacco use disorder   • Calculus of ureter   • Preoperative cardiovascular examination   • Enlarged prostate with urinary obstruction   • Mass of right lung   • Chronic respiratory failure with hypoxia (HCC)   • Pleural effusion         Medications, Allergies, Social, and Family Histories reviewed as per EMR.    Objective     Vitals:    01/17/23 0856   BP: 130/78   Pulse: 106   SpO2: 97%         01/17/23  0856   Weight: 92.5 kg (204 lb)         Assessment & Plan     Diagnoses and all orders for this visit:    1. Pleural effusion (Primary)  -     XR Chest 2 View    2. Mass of right lung         Discussion/ Recommendations:   Patient was prepped and draped in sterile fashion. US was used to identify pocket of pleural fluid in the right posterior chest wall. Lidocaine 2% was used to numb the skin and tract. Unfortunately patient had a lot of discomfort with the procedure and I was unable to access the pleural space. Procedure was halted and patient was sent for a chest xray. This did confirm a small ot moderate effusion with no pneumothorax. We discussed options for moving forward. I think the procedure should probably be repeated with better pain control. Patient is apparently going to seek oncology treatment in [place] and therefore it might be better if he is seen up there for any pulmonary procedures as well.  They are agreeable to this so I will refer him up there          No follow-ups on file.          This document has been electronically signed by Kerry Lambert DO on January 17, 2023 10:11 CST

## 2023-01-18 NOTE — PROGRESS NOTES
January 18, 2023    Hello, may I speak with Aman Sanchez?    My name is PAULETTE Billingsley    I am  with [facility]  [address] [place] [address] [phone].    Before we get started may I verify your date of birth? 1950    I am calling to officially welcome you to our practice and ask about your recent visit. Is this a good time to talk? yes    Tell me about your visit with us. What things went well?   is a very caring provider.      We're always looking for ways to make our patients' experiences even better. Do you have recommendations on ways we may improve?  no    Overall were you satisfied with your first visit to our practice? yes       I appreciate you taking the time to speak with me today. Is there anything else I can do for you? no      Thank you, and have a great day.

## 2023-01-23 NOTE — ANESTHESIA PREPROCEDURE EVALUATION
Anesthesia Evaluation     history of anesthetic complications: PONV  NPO Solid Status: > 8 hours  NPO Liquid Status: > 8 hours     Airway   Mallampati: II  TM distance: >3 FB  Neck ROM: full  possible difficult intubation  Dental    (+) poor dentition    Pulmonary    (+) a smoker Former, COPD, shortness of breath, sleep apnea, decreased breath sounds,   Cardiovascular   Exercise tolerance: good (4-7 METS)    ECG reviewed  PT is on anticoagulation therapy  Rhythm: regular  Rate: normal    (+) hypertension well controlled, past MI  >12 months, CAD, hyperlipidemia      Neuro/Psych  (+) numbness, psychiatric history Anxiety and Depression,     GI/Hepatic/Renal/Endo    (+) obesity, morbid obesity, diabetes mellitus type 2 well controlled,     Musculoskeletal     (+) arthralgias, neck pain,   Abdominal   (+) obese,     Abdomen: soft.   Substance History      OB/GYN          Other   (+) arthritis                                             Anesthesia Plan    ASA 3     general     intravenous induction   Anesthetic plan and risks discussed with patient and spouse/significant other.       unable to assess

## 2023-01-26 PROBLEM — J90 LOCULATED PLEURAL EFFUSION: Status: ACTIVE | Noted: 2023-01-01

## 2023-01-26 PROBLEM — A41.9 SEPSIS DUE TO PNEUMONIA (HCC): Status: ACTIVE | Noted: 2023-01-01

## 2023-01-26 PROBLEM — J18.9 SEPSIS DUE TO PNEUMONIA (HCC): Status: ACTIVE | Noted: 2023-01-01

## 2023-01-26 PROBLEM — J96.21 ACUTE ON CHRONIC RESPIRATORY FAILURE WITH HYPOXIA (HCC): Status: ACTIVE | Noted: 2023-01-01

## 2023-02-01 NOTE — PLAN OF CARE
Goal Outcome Evaluation:  Plan of Care Reviewed With: patient, spouse        Progress: improving     Patient has done well this shift. SLP evaluation resulted in a pureed diet,which patient is tolerating just not much of an appetite just yet. VSS. UOP adequate.

## 2023-02-01 NOTE — THERAPY TREATMENT NOTE
Patient Name: Aman Sanchez  : 1950    MRN: [id number]                              Today's Date: 2023       Admit Date: 2023    Visit Dx:     ICD-10-CM ICD-9-CM   1. Acute on chronic respiratory failure with hypoxia (HCC)  J96.21 518.84     799.02   2. Lung mass  R91.8 786.6   3. Pneumonia of right lung due to infectious organism, unspecified part of lung  J18.9 483.8   4. Impaired functional mobility and activity tolerance  Z74.09 V49.89   5. Impaired mobility and ADLs  Z74.09 V49.89    Z78.9    6. Oral phase dysphagia  R13.11 787.21     Patient Active Problem List   Diagnosis   • Essential hypertension   • Chronic coronary artery disease   • Mixed hyperlipidemia   • Tobacco abuse counseling   • Dyspnea on exertion   • Acute retention of urine   • Anemia   • Osteoarthritis of hip   • Hypersomnia   • Hypotension   • Obstructive sleep apnea syndrome   • Diastolic dysfunction   • Morbid obesity due to excess calories (HCC)   • Tobacco use disorder   • Calculus of ureter   • Preoperative cardiovascular examination   • Enlarged prostate with urinary obstruction   • Mass of right lung   • Chronic respiratory failure with hypoxia (HCC)   • Pleural effusion   • Acute on chronic respiratory failure with hypoxia (HCC)   • Sepsis due to pneumonia (HCC)   • Loculated pleural effusion     Past Medical History:   Diagnosis Date   • Acute bronchitis    • Anemia    • Breast lump    • Cervical radiculopathy    • COPD (chronic obstructive pulmonary disease) (HCC)    • Coronary atherosclerosis     2 stents, followed by Dr. Hargrove   • Depressive disorder    • Disease of thyroid gland    • Dysuria    • Essential hypertension    • Flank pain    • Hematochezia    • Hematuria syndrome    • Hyperlipidemia    • Hypoglycemia    • Kidney stone    • Kidney stones    • Lung cancer (HCC)    • Mass of neck    • MI (myocardial infarction) (HCC)    • Neck pain     sprain   • Osteoarthritis    • PONV (postoperative nausea  and vomiting)    • Sleep apnea     using c-pap   • Sleep apnea    • Type 2 diabetes mellitus (HCC)      Past Surgical History:   Procedure Laterality Date   • BACK SURGERY     • CARDIAC CATHETERIZATION N/A 08/23/2017    Procedure: Right Heart Cath;  Surgeon: Mariposa Zamorano MD;  Location: [facility] INVASIVE LOCATION;  Service:    • CATARACT EXTRACTION WITH INTRAOCULAR LENS IMPLANT Bilateral    • CHOLECYSTECTOMY     • CYSTOSCOPY  08/29/2014    Left ESWL, cysto and stent removal   • CYSTOSCOPY W/ LITHOLAPAXY / EHL  08/15/2014    Left ESWL   • CYSTOSCOPY, RETROGRADE PYELOGRAM AND STENT INSERTION  12/29/2022   • CYSTOSCOPY, URETEROSCOPY, RETROGRADE PYELOGRAM, STENT INSERTION Left 06/30/2017    Procedure: CYSTOSCOPY, LEFT URETEROSCOPY, RETROGRADE PYELOGRAM, HOLMIUM LASER, STENT INSERTION;  Surgeon: Uday Horne MD;  Location: [facility];  Service:    • CYSTOSCOPY, URETEROSCOPY, RETROGRADE PYELOGRAM, STENT INSERTION Left 09/06/2017    Procedure: CYSTOSCOPY, LEFT URETEROSCOPY, RETROGRADE PYELOGRAM, HOLMIUM LASER, STENT INSERTION;  Surgeon: Uday Horne MD;  Location: [facility];  Service:    • CYSTOSCOPY, URETEROSCOPY, RETROGRADE PYELOGRAM, STENT INSERTION Left 01/17/2018    Procedure: CYSTOSCOPY LEFT URETEROSCOPY RETROGRADE PYELOGRAM HOLMIUM LASER STENT INSERTION;  Surgeon: Uday Horne MD;  Location: [facility];  Service:    • CYSTOSCOPY, URETEROSCOPY, RETROGRADE PYELOGRAM, STENT INSERTION Left 07/25/2018    Procedure: CYSTOSCOPY URETEROSCOPY RETROGRADE PYELOGRAM HOLMIUM LASER STENT INSERTION;  Surgeon: Uday Horne MD;  Location: [facility];  Service: Urology   • EYE SURGERY      eye lids lifted   • HIP SURGERY     • INJECTION OF MEDICATION  11/11/2013    Kenalog   • INJECTION OF MEDICATION  01/16/2014    Toradol   • JOINT REPLACEMENT Right 2012    total hip replacement   • SKIN GRAFT      on his foot; removed skin from hip   • TONSILLECTOMY     • TRANSESOPHAGEAL ECHOCARDIOGRAM (ОЛЬГА)  07/21/2014    with color  flow-Normal LV systolic function with Ef of 60-65%/ Grad1 diastolic dysfunction of the left ventricular myocardium. No evidence of pericardial effusion      General Information     Row Name 02/01/23 1320          OT Time and Intention    Document Type therapy note (daily note)  -     Mode of Treatment occupational therapy  -     Row Name 02/01/23 1320          General Information    Patient Profile Reviewed yes  -     Existing Precautions/Restrictions fall  Chest tube  -     Barriers to Rehab medically complex  -     Row Name 02/01/23 1320          Cognition    Orientation Status (Cognition) oriented x 3  -[facility] Name 02/01/23 1320          Safety Issues, Functional Mobility    Impairments Affecting Function (Mobility) endurance/activity tolerance;range of motion (ROM);strength;shortness of breath;balance;pain;postural/trunk control  -           User Key  (r) = Recorded By, (t) = Taken By, (c) = Cosigned By    Initials Name Provider Type     Bernice Rivers OT Occupational Therapist                 Mobility/ADL's     Row Name 02/01/23 1320          Bed Mobility    Bed Mobility supine-sit;sit-supine;scooting/bridging  -     Scooting/Bridging [place] (Bed Mobility) dependent (less than 25% patient effort);2 person assist  -     Supine-Sit [place] (Bed Mobility) 2 person assist;maximum assist (25% patient effort)  -     Sit-Supine [place] (Bed Mobility) 2 person assist;dependent (less than 25% patient effort)  -     Assistive Device (Bed Mobility) bed rails;head of bed elevated;draw sheet  -     Comment, (Bed Mobility) Pt able to tolerate sitting EOB ~12 minutes.  -[facility] Name 02/01/23 1320          Bed-Chair Transfer    Bed-Chair [place] (Transfers) not tested  -[facility] Name 02/01/23 1320          Sit-Stand Transfer    Sit-Stand [place] (Transfers) not tested  -[facility] Name 02/01/23 1320          Activities of Daily Living    BADL Assessment/Intervention  feeding;grooming  -     Row Name 02/01/23 1320          Grooming Assessment/Training    [place] Level (Grooming) maximum assist (25% patient effort);wash face, hands  -     Assistive Devices (Grooming) hand over hand  -     Position (Grooming) supine  -     Row Name 02/01/23 1320          Self-Feeding Assessment/Training    [place] Level (Feeding) liquids to mouth;scoop food and bring to mouth;maximum assist (25% patient effort)  -     Assistive Devices (Feeding) hand over hand  -     Position (Self-Feeding) edge of bed sitting  -           User Key  (r) = Recorded By, (t) = Taken By, (c) = Cosigned By    Initials Name Provider Type     Bernice Rivers OT Occupational Therapist               Obj/Interventions     Row Name 02/01/23 1320          Shoulder (Therapeutic Exercise)    Shoulder (Therapeutic Exercise) AAROM (active assistive range of motion)  -     Row Name 02/01/23 1320          Elbow/Forearm (Therapeutic Exercise)    Elbow/Forearm (Therapeutic Exercise) AAROM (active assistive range of motion)  -     Row Name 02/01/23 1320          Wrist (Therapeutic Exercise)    Wrist (Therapeutic Exercise) AAROM (active assistive range of motion)  -     Row Name 02/01/23 1320          Motor Skills    Therapeutic Exercise shoulder;elbow/forearm;wrist;hand  -     Row Name 02/01/23 1320          Balance    Balance Assessment sitting static balance  -           User Key  (r) = Recorded By, (t) = Taken By, (c) = Cosigned By    Initials Name Provider Type     Bernice Rivers OT Occupational Therapist               Goals/Plan     Row Name 02/01/23 1320          Transfer Goal 1 (OT)    Activity/Assistive Device (Transfer Goal 1, OT) toilet  -     [place] Level/Cues Needed (Transfer Goal 1, OT) minimum assist (75% or more patient effort)  -     Time Frame (Transfer Goal 1, OT) long term goal (LTG);by discharge  -     Progress/Outcome (Transfer Goal 1, OT) goal not met  -[facility]  Name 02/01/23 1320          Bathing Goal 1 (OT)    Activity/Device (Bathing Goal 1, OT) lower body bathing  -SA     [place] Level/Cues Needed (Bathing Goal 1, OT) minimum assist (75% or more patient effort)  -SA     Time Frame (Bathing Goal 1, OT) long term goal (LTG);by discharge  -SA     Progress/Outcomes (Bathing Goal 1, OT) goal not met  -SA     Row Name 02/01/23 1320          Dressing Goal 1 (OT)    Activity/Device (Dressing Goal 1, OT) lower body dressing  -SA     [place]/Cues Needed (Dressing Goal 1, OT) minimum assist (75% or more patient effort)  -SA     Time Frame (Dressing Goal 1, OT) long term goal (LTG);by discharge  -SA     Progress/Outcome (Dressing Goal 1, OT) goal not met  -SA     Row Name 02/01/23 1320          Toileting Goal 1 (OT)    Activity/Device (Toileting Goal 1, OT) toileting skills, all  -SA     [place] Level/Cues Needed (Toileting Goal 1, OT) minimum assist (75% or more patient effort)  -SA     Time Frame (Toileting Goal 1, OT) long term goal (LTG);by discharge  -SA     Progress/Outcome (Toileting Goal 1, OT) goal not met  -SA     Row Name 02/01/23 1320          Grooming Goal 1 (OT)    Activity/Device (Grooming Goal 1, OT) grooming skills, all  -SA     [place] (Grooming Goal 1, OT) set-up required  -SA     Time Frame (Grooming Goal 1, OT) long term goal (LTG);by discharge  -SA     Progress/Outcome (Grooming Goal 1, OT) goal not met  -SA           User Key  (r) = Recorded By, (t) = Taken By, (c) = Cosigned By    Initials Name Provider Type    Bernice Boudreaux OT Occupational Therapist               Clinical Impression     Row Name 02/01/23 1320          Pain Assessment    Pretreatment Pain Rating 10/10  -SA     Posttreatment Pain Rating 10/10  -SA     Pain Location lower  -SA     Pain Location - back  -SA     Row Name 02/01/23 1320          Plan of Care Review    Plan of Care Reviewed With patient  -SA     Outcome Evaluation OT treatment completed as co-treatment with  PTA. Pt supine and agreeable to therapy. Pt required max A x 2 assist sup>sit. Dependent sit>sup. Dependent to scoot up in bed. Pt able to tolerate sitting EOB ~12 minutes with mod A to [place] at times to maintain sitting balance. Pt demonstrated heavy posterior lean. Max A to wash face sitting up in bed. Pt required max A with [place] to complete feeding seated EOB. Pt participated in BUE AAROM in all planes and joints x 1 set x 5 reps sitting up in bed. Cont OT POC.  -     Row Name 02/01/23 1320          Therapy Assessment/Plan (OT)    Rehab Potential (OT) good, to achieve stated therapy goals  -     Criteria for Skilled Therapeutic Interventions Met (OT) yes;skilled treatment is necessary  -     Therapy Frequency (OT) other (see comments)  5-7 d/wk  -     Row Name 02/01/23 1320          Therapy Plan Review/Discharge Plan (OT)    Anticipated Discharge Disposition (OT) skilled nursing facility;inpatient rehabilitation facility;LT (long-term [facility])  -     Row Name 02/01/23 1320          Vital Signs    Pre Systolic BP Rehab 150  -SA     Pre Treatment Diastolic BP 85  -SA     Intra Systolic BP Rehab 160  -SA     Intra Treatment Diastolic BP 92  -SA     Post Systolic BP Rehab 148  -SA     Post Treatment Diastolic BP 82  -SA     Pretreatment Heart Rate (beats/min) 93  -SA     Intratreatment Heart Rate (beats/min) 100  -SA     Posttreatment Heart Rate (beats/min) 97  -SA     Pre SpO2 (%) 95  -SA     O2 Delivery Pre Treatment nasal cannula  -SA     Intra SpO2 (%) 95  -SA     O2 Delivery Intra Treatment nasal cannula  -SA     Post SpO2 (%) 92  -SA     O2 Delivery Post Treatment nasal cannula  -SA     Pre Patient Position Supine  -SA     Intra Patient Position Sitting  -SA     Post Patient Position Supine  -SA     Row Name 02/01/23 1320          Positioning and Restraints    Pre-Treatment Position in bed  -SA     Post Treatment Position bed  -SA     In Bed supine;call light within reach;encouraged to call for  assist;exit alarm on;SCD pump applied  -           User Key  (r) = Recorded By, (t) = Taken By, (c) = Cosigned By    Initials Name Provider Type    Bernice Boudreaux OT Occupational Therapist               Outcome Measures     Row Name 02/01/23 1320          How much help from another is currently needed...    Putting on and taking off regular lower body clothing? 1  -SA     Bathing (including washing, rinsing, and drying) 1  -SA     Toileting (which includes using toilet bed pan or urinal) 1  -SA     Putting on and taking off regular upper body clothing 1  -SA     Taking care of personal grooming (such as brushing teeth) 2  -SA     Eating meals 2  -SA     AM-PAC 6 Clicks Score (OT) 8  -     Row Name 02/01/23 0800          How much help from another person do you currently need...    Turning from your back to your side while in flat bed without using bedrails? 2  -CC     Moving from lying on back to sitting on the side of a flat bed without bedrails? 1  -CC     Moving to and from a bed to a chair (including a wheelchair)? 1  -CC     Standing up from a chair using your arms (e.g., wheelchair, bedside chair)? 1  -CC     Climbing 3-5 steps with a railing? 1  -CC     To walk in hospital room? 1  -CC     AM-PAC 6 Clicks Score (PT) 7  -CC     Highest level of mobility 2 --> Bed activities/dependent transfer  -     Row Name 02/01/23 1320          Functional Assessment    Outcome Measure Options AM-PAC 6 Clicks Daily Activity (OT)  -           User Key  (r) = Recorded By, (t) = Taken By, (c) = Cosigned By    Initials Name Provider Type    Jazmní Rubi RN Registered Nurse    Bernice Boudreaux OT Occupational Therapist                Occupational Therapy Education     Title: PT OT SLP Therapies (In Progress)     Topic: Occupational Therapy (In Progress)     Point: ADL training (Done)     Description:   Instruct learner(s) on proper safety adaptation and remediation techniques during self care or transfers.    Instruct in proper use of assistive devices.              Learning Progress Summary           Patient Acceptance, E,TB, VU by  at 2/1/2023 1352    Acceptance, E,TB, VU by  at 1/31/2023 1447    Comment: POC, role of OT, transfer training                   Point: Home exercise program (Not Started)     Description:   Instruct learner(s) on appropriate technique for monitoring, assisting and/or progressing therapeutic exercises/activities.              Learner Progress:  Not documented in this visit.          Point: Precautions (Done)     Description:   Instruct learner(s) on prescribed precautions during self-care and functional transfers.              Learning Progress Summary           Patient Acceptance, E,TB, VU by  at 2/1/2023 1352                   Point: Body mechanics (Done)     Description:   Instruct learner(s) on proper positioning and spine alignment during self-care, functional mobility activities and/or exercises.              Learning Progress Summary           Patient Acceptance, E,TB, VU by  at 2/1/2023 1352                               User Key     Initials Effective Dates Name Provider Type Discipline     06/14/21 -  Brittaney Gilmore OT Occupational Therapist OT     08/11/22 -  Bernice Rivers OT Occupational Therapist OT              OT Recommendation and Plan  Therapy Frequency (OT): other (see comments) (5-7 d/wk)  Plan of Care Review  Plan of Care Reviewed With: patient  Outcome Evaluation: OT treatment completed as co-treatment with PTA. Pt supine and agreeable to therapy. Pt required max A x 2 assist sup>sit. Dependent sit>sup. Dependent to scoot up in bed. Pt able to tolerate sitting EOB ~12 minutes with mod A to [place] at times to maintain sitting balance. Pt demonstrated heavy posterior lean. Max A to wash face sitting up in bed. Pt required max A with [place] to complete feeding seated EOB. Pt participated in BUE AAROM in all planes and joints x 1 set x 5 reps sitting up in bed.  Cont OT POC.     Time Calculation:    Time Calculation- OT     Row Name 02/01/23 1320             Time Calculation- OT    OT Start Time 1340  -SA      OT Stop Time 1407  -SA      OT Time Calculation (min) 27 min  -SA      Total Timed Code Minutes- OT 27 minute(s)  -SA      OT Received On 02/01/23  -         Timed Charges    16417 - OT Therapeutic Activity Minutes 27  -SA         Total Minutes    Timed Charges Total Minutes 27  -SA       Total Minutes 27  -SA            User Key  (r) = Recorded By, (t) = Taken By, (c) = Cosigned By    Initials Name Provider Type     Bernice Rivers OT Occupational Therapist              Therapy Charges for Today     Code Description Service Date Service Provider Modifiers Qty    20059447515 HC OT THERAPEUTIC ACT EA 15 MIN 2/1/2023 Bernice Rivers OT GO 2    47269230246 HC OT THER SUPP EA 15 MIN 2/1/2023 Bernice Rivers OT GO 2               Bernice Rivers OT  2/1/2023

## 2023-02-01 NOTE — THERAPY TREATMENT NOTE
Acute Care - Physical Therapy Treatment Note  [facility]     Patient Name: Aman Sanchez  : 1950  MRN: [id number]  Today's Date: 2023      Visit Dx:     ICD-10-CM ICD-9-CM   1. Acute on chronic respiratory failure with hypoxia (HCC)  J96.21 518.84     799.02   2. Lung mass  R91.8 786.6   3. Pneumonia of right lung due to infectious organism, unspecified part of lung  J18.9 483.8   4. Impaired functional mobility and activity tolerance  Z74.09 V49.89   5. Impaired mobility and ADLs  Z74.09 V49.89    Z78.9    6. Oral phase dysphagia  R13.11 787.21     Patient Active Problem List   Diagnosis   • Essential hypertension   • Chronic coronary artery disease   • Mixed hyperlipidemia   • Tobacco abuse counseling   • Dyspnea on exertion   • Acute retention of urine   • Anemia   • Osteoarthritis of hip   • Hypersomnia   • Hypotension   • Obstructive sleep apnea syndrome   • Diastolic dysfunction   • Morbid obesity due to excess calories (HCC)   • Tobacco use disorder   • Calculus of ureter   • Preoperative cardiovascular examination   • Enlarged prostate with urinary obstruction   • Mass of right lung   • Chronic respiratory failure with hypoxia (HCC)   • Pleural effusion   • Acute on chronic respiratory failure with hypoxia (HCC)   • Sepsis due to pneumonia (HCC)   • Loculated pleural effusion     Past Medical History:   Diagnosis Date   • Acute bronchitis    • Anemia    • Breast lump    • Cervical radiculopathy    • COPD (chronic obstructive pulmonary disease) (HCC)    • Coronary atherosclerosis     2 stents, followed by Dr. Hargrove   • Depressive disorder    • Disease of thyroid gland    • Dysuria    • Essential hypertension    • Flank pain    • Hematochezia    • Hematuria syndrome    • Hyperlipidemia    • Hypoglycemia    • Kidney stone    • Kidney stones    • Lung cancer (HCC)    • Mass of neck    • MI (myocardial infarction) (HCC)    • Neck pain     sprain   • Osteoarthritis    • PONV  (postoperative nausea and vomiting)    • Sleep apnea     using c-pap   • Sleep apnea    • Type 2 diabetes mellitus (HCC)      Past Surgical History:   Procedure Laterality Date   • BACK SURGERY     • CARDIAC CATHETERIZATION N/A 08/23/2017    Procedure: Right Heart Cath;  Surgeon: Mariposa Zamorano MD;  Location: [facility] INVASIVE LOCATION;  Service:    • CATARACT EXTRACTION WITH INTRAOCULAR LENS IMPLANT Bilateral    • CHOLECYSTECTOMY     • CYSTOSCOPY  08/29/2014    Left ESWL, cysto and stent removal   • CYSTOSCOPY W/ LITHOLAPAXY / EHL  08/15/2014    Left ESWL   • CYSTOSCOPY, RETROGRADE PYELOGRAM AND STENT INSERTION  12/29/2022   • CYSTOSCOPY, URETEROSCOPY, RETROGRADE PYELOGRAM, STENT INSERTION Left 06/30/2017    Procedure: CYSTOSCOPY, LEFT URETEROSCOPY, RETROGRADE PYELOGRAM, HOLMIUM LASER, STENT INSERTION;  Surgeon: Uday Horne MD;  Location: [facility];  Service:    • CYSTOSCOPY, URETEROSCOPY, RETROGRADE PYELOGRAM, STENT INSERTION Left 09/06/2017    Procedure: CYSTOSCOPY, LEFT URETEROSCOPY, RETROGRADE PYELOGRAM, HOLMIUM LASER, STENT INSERTION;  Surgeon: Uday Horne MD;  Location: [facility];  Service:    • CYSTOSCOPY, URETEROSCOPY, RETROGRADE PYELOGRAM, STENT INSERTION Left 01/17/2018    Procedure: CYSTOSCOPY LEFT URETEROSCOPY RETROGRADE PYELOGRAM HOLMIUM LASER STENT INSERTION;  Surgeon: Uday Horne MD;  Location: [facility];  Service:    • CYSTOSCOPY, URETEROSCOPY, RETROGRADE PYELOGRAM, STENT INSERTION Left 07/25/2018    Procedure: CYSTOSCOPY URETEROSCOPY RETROGRADE PYELOGRAM HOLMIUM LASER STENT INSERTION;  Surgeon: Uday Horne MD;  Location: [facility];  Service: Urology   • EYE SURGERY      eye lids lifted   • HIP SURGERY     • INJECTION OF MEDICATION  11/11/2013    Kenalog   • INJECTION OF MEDICATION  01/16/2014    Toradol   • JOINT REPLACEMENT Right 2012    total hip replacement   • SKIN GRAFT      on his foot; removed skin from hip   • TONSILLECTOMY     • TRANSESOPHAGEAL ECHOCARDIOGRAM (ОЛЬГА)   07/21/2014    with color flow-Normal LV systolic function with Ef of 60-65%/ Grad1 diastolic dysfunction of the left ventricular myocardium. No evidence of pericardial effusion     PT Assessment (last 12 hours)     PT Evaluation and Treatment     Row Name 02/01/23 1316          Physical Therapy Time and Intention    Subjective Information no complaints  -EM     Document Type therapy note (daily note)  -EM     Mode of Treatment co-treatment;physical therapy;occupational therapy  -EM     Patient Effort adequate  -EM     Row Name 02/01/23 1316          Pain    Pretreatment Pain Rating 10/10  -EM     Posttreatment Pain Rating 10/10  -EM     Pain Location lower  -EM     Pain Location - back  -EM     Pain Intervention(s) Medication (See MAR);Repositioned  -EM     Row Name 02/01/23 1316          Cognition    Affect/Mental Status (Cognition) low arousal/lethargic  -EM     Orientation Status (Cognition) oriented x 3  -EM     Follows Commands (Cognition) follows one-step commands  -EM     Row Name 02/01/23 1316          Bed Mobility    Scooting/Bridging [place] (Bed Mobility) dependent (less than 25% patient effort);2 person assist  -EM     Supine-Sit [place] (Bed Mobility) maximum assist (25% patient effort);2 person assist  -EM     Sit-Supine [place] (Bed Mobility) dependent (less than 25% patient effort);2 person assist  -EM     Comment, (Bed Mobility) Pt sat EOB with Mod/Min Ax1 using handrail  -EM     Row Name 02/01/23 1316          Hip (Therapeutic Exercise)    Hip AAROM (Therapeutic Exercise) bilateral;flexion;aBduction;aDduction;supine;2 sets;10 repetitions  -     Row Name 02/01/23 1316          Knee (Therapeutic Exercise)    Knee AAROM (Therapeutic Exercise) bilateral;flexion;extension;supine;2 sets;10 repetitions  SAQ  -EM     Row Name 02/01/23 1316          Ankle (Therapeutic Exercise)    Ankle (Therapeutic Exercise) AAROM (active assistive range of motion)  -EM     Ankle AROM (Therapeutic  Exercise) bilateral;dorsiflexion;plantarflexion;supine;2 sets;10 repetitions  -EM     Row Name 02/01/23 1316          Vital Signs    Pre Systolic BP Rehab 150  -EM     Pre Treatment Diastolic BP 85  -EM     Intra Systolic BP Rehab 160  -EM     Intra Treatment Diastolic BP 92  -EM     Post Systolic BP Rehab 148  -EM     Post Treatment Diastolic BP 82  -EM     Pretreatment Heart Rate (beats/min) 93  -EM     Intratreatment Heart Rate (beats/min) 100  -EM     Posttreatment Heart Rate (beats/min) 94  -EM     Pre SpO2 (%) 95  -EM     O2 Delivery Pre Treatment supplemental O2  -EM     Intra SpO2 (%) 95  -EM     O2 Delivery Intra Treatment supplemental O2  -EM     Post SpO2 (%) 96  -EM     O2 Delivery Post Treatment supplemental O2  -EM     Pre Patient Position Supine  -EM     Intra Patient Position Sitting  -EM     Post Patient Position Supine  -EM     Row Name 02/01/23 1316          Positioning and Restraints    Pre-Treatment Position in bed  -EM     Post Treatment Position bed  -EM     In Bed supine;call light within reach;encouraged to call for assist;exit alarm on;with OT;heels elevated;SCD pump applied  -EM     Row Name 02/01/23 1316          Bed Mobility Goal 1 (PT)    Activity/Assistive Device (Bed Mobility Goal 1, PT) rolling to left;rolling to right  -EM     [place] Level/Cues Needed (Bed Mobility Goal 1, PT) minimum assist (75% or more patient effort)  -EM     Time Frame (Bed Mobility Goal 1, PT) by discharge  -EM     Progress/Outcomes (Bed Mobility Goal 1, PT) goal not met  -EM     Row Name 02/01/23 1316          Transfer Goal 1 (PT)    Activity/Assistive Device (Transfer Goal 1, PT) sit-to-stand/stand-to-sit;bed-to-chair/chair-to-bed  -EM     [place] Level/Cues Needed (Transfer Goal 1, PT) minimum assist (75% or more patient effort)  -EM     Time Frame (Transfer Goal 1, PT) by discharge  -EM     Strategies/Barriers (Transfers Goal 1, PT) complex medical patient  -EM     Progress/Outcome (Transfer Goal  1, PT) goal not met  -EM     Row Name 02/01/23 1316          Gait Training Goal 1 (PT)    Activity/Assistive Device (Gait Training Goal 1, PT) gait (walking locomotion);assistive device use;walker, rolling  -EM     [place] Level (Gait Training Goal 1, PT) minimum assist (75% or more patient effort)  -EM     Distance (Gait Training Goal 1, PT) 5' x 2 with supplemental O2  -EM     Time Frame (Gait Training Goal 1, PT) by discharge  -EM     Strategies/Barriers (Gait Training Goal 1, PT) dyspnea  -EM     Progress/Outcome (Gait Training Goal 1, PT) goal not met  -EM           User Key  (r) = Recorded By, (t) = Taken By, (c) = Cosigned By    Initials Name Provider Type    EM Junito Paz, PTA Physical Therapist Assistant                Physical Therapy Education     Title: PT OT SLP Therapies (In Progress)     Topic: Physical Therapy (Not Started)     Point: Mobility training (Not Started)     Learner Progress:  Not documented in this visit.          Point: Home exercise program (Not Started)     Learner Progress:  Not documented in this visit.          Point: Body mechanics (Not Started)     Learner Progress:  Not documented in this visit.          Point: Precautions (Not Started)     Learner Progress:  Not documented in this visit.                          PT Recommendation and Plan  Anticipated Discharge Disposition (PT): skilled nursing facility  Plan of Care Reviewed With: patient  Progress: improving  Outcome Evaluation: Pt yohannes tx well with improved participation this date. Pt performed PT/OT Cotx. Pt t/f sup-sit with Max Ax2, sit-sup with Dep x2. Pt sat EOB for 12' with Min/Mod Ax1. Pt req dep Ax2 to scoot to HOB. Pt performed B LE therex AAROM x20 each. No goals met this tx.       Time Calculation:    PT Charges     Row Name 02/01/23 1416             Time Calculation    Start Time 1316  -EM      Stop Time 1340  -EM      Time Calculation (min) 24 min  -EM         Time Calculation- PT    Total Timed Code  Minutes- PT 24 minute(s)  -EM            User Key  (r) = Recorded By, (t) = Taken By, (c) = Cosigned By    Initials Name Provider Type    EM Junito Paz PTA Physical Therapist Assistant              Therapy Charges for Today     Code Description Service Date Service Provider Modifiers Qty    30252581670  PT THER PROC EA 15 MIN 2/1/2023 Junito Paz, NOAH GP, CQ 1    [id number] HC PT THERAPEUTIC ACT EA 15 MIN 2/1/2023 Junito Paz, NOAH GP, CQ 1          PT G-Codes  Outcome Measure Options: AM-PAC 6 Clicks Daily Activity (OT)  AM-PAC 6 Clicks Score (PT): 7  AM-PAC 6 Clicks Score (OT): 8    Junito Paz PTA  2/1/2023

## 2023-02-01 NOTE — PLAN OF CARE
Goal Outcome Evaluation:  Plan of Care Reviewed With: patient            Pt extubated to 3 l nc 1045 this am. Pt is a/o x4. Pt remains NPO except for tube feedings. Awaiting swallow eval.. pt/ot consulted today.

## 2023-02-01 NOTE — THERAPY RE-EVALUATION
[facility] - Speech Language Pathology   Swallow Re-Evaluation [facility]     Patient Name: Aman Sanchez  : 1950  MRN: [id number]  Today's Date: 2023               Admit Date: 2023     Pt seen for skilled ST services this date to re-assess swallow function s/p extubation.  Pt was d/c'd on  prior to intubation on regular solids/thin liquids and wife reported no hx of dysphagia.  Pt did present as very weak and fatigued easily.  Pt consumed ice chips w/good hyolaryngeal elevation via palpation.  Pt consumed thin liquids via spoon, cup rim, and straw w/no overt s/s of aspiration.  Pt did initally present w/poor labial seal around spoon and cup, but demonstrated adequate seal w/straw.  Pt consumed limited puree solids w/no oral deficits.  Pt consumed mech soft solids x1 w/increased oral prep d/t prolonged mastication likely d/t weakness.  SLP recommends advancing pt's diet to puree/thin liquids; RN made aware.  SLP educated pt, pt's wife and sister on A&P of the swallow, how aspiration can occur, and the r/o aspiration and aspiration PNE using animation from ipad, and discussed use of straw and how the flow and amount of liquids changes; both verbalized understanding.  SLP to f/u next date for diet safety and toleration and trials of mech soft as appropriate.    Visit Dx:     ICD-10-CM ICD-9-CM   1. Acute on chronic respiratory failure with hypoxia (HCC)  J96.21 518.84     799.02   2. Lung mass  R91.8 786.6   3. Pneumonia of right lung due to infectious organism, unspecified part of lung  J18.9 483.8   4. Impaired functional mobility and activity tolerance  Z74.09 V49.89   5. Impaired mobility and ADLs  Z74.09 V49.89    Z78.9    6. Oral phase dysphagia  R13.11 787.21     Patient Active Problem List   Diagnosis   • Essential hypertension   • Chronic coronary artery disease   • Mixed hyperlipidemia   • Tobacco abuse counseling   • Dyspnea on exertion   • Acute retention of urine   • Anemia   •  Osteoarthritis of hip   • Hypersomnia   • Hypotension   • Obstructive sleep apnea syndrome   • Diastolic dysfunction   • Morbid obesity due to excess calories (HCC)   • Tobacco use disorder   • Calculus of ureter   • Preoperative cardiovascular examination   • Enlarged prostate with urinary obstruction   • Mass of right lung   • Chronic respiratory failure with hypoxia (HCC)   • Pleural effusion   • Acute on chronic respiratory failure with hypoxia (HCC)   • Sepsis due to pneumonia (HCC)   • Loculated pleural effusion     Past Medical History:   Diagnosis Date   • Acute bronchitis    • Anemia    • Breast lump    • Cervical radiculopathy    • COPD (chronic obstructive pulmonary disease) (HCC)    • Coronary atherosclerosis     2 stents, followed by Dr. Hargrove   • Depressive disorder    • Disease of thyroid gland    • Dysuria    • Essential hypertension    • Flank pain    • Hematochezia    • Hematuria syndrome    • Hyperlipidemia    • Hypoglycemia    • Kidney stone    • Kidney stones    • Lung cancer (HCC)    • Mass of neck    • MI (myocardial infarction) (HCC)    • Neck pain     sprain   • Osteoarthritis    • PONV (postoperative nausea and vomiting)    • Sleep apnea     using c-pap   • Sleep apnea    • Type 2 diabetes mellitus (HCC)      Past Surgical History:   Procedure Laterality Date   • BACK SURGERY     • CARDIAC CATHETERIZATION N/A 08/23/2017    Procedure: Right Heart Cath;  Surgeon: Mariposa Zamorano MD;  Location: [facility] INVASIVE LOCATION;  Service:    • CATARACT EXTRACTION WITH INTRAOCULAR LENS IMPLANT Bilateral    • CHOLECYSTECTOMY     • CYSTOSCOPY  08/29/2014    Left ESWL, cysto and stent removal   • CYSTOSCOPY W/ LITHOLAPAXY / EHL  08/15/2014    Left ESWL   • CYSTOSCOPY, RETROGRADE PYELOGRAM AND STENT INSERTION  12/29/2022   • CYSTOSCOPY, URETEROSCOPY, RETROGRADE PYELOGRAM, STENT INSERTION Left 06/30/2017    Procedure: CYSTOSCOPY, LEFT URETEROSCOPY, RETROGRADE PYELOGRAM, HOLMIUM LASER, STENT INSERTION;   Surgeon: Uday Horne MD;  Location: [facility];  Service:    • CYSTOSCOPY, URETEROSCOPY, RETROGRADE PYELOGRAM, STENT INSERTION Left 09/06/2017    Procedure: CYSTOSCOPY, LEFT URETEROSCOPY, RETROGRADE PYELOGRAM, HOLMIUM LASER, STENT INSERTION;  Surgeon: Uday Horne MD;  Location: [facility];  Service:    • CYSTOSCOPY, URETEROSCOPY, RETROGRADE PYELOGRAM, STENT INSERTION Left 01/17/2018    Procedure: CYSTOSCOPY LEFT URETEROSCOPY RETROGRADE PYELOGRAM HOLMIUM LASER STENT INSERTION;  Surgeon: Uday Horne MD;  Location: [facility];  Service:    • CYSTOSCOPY, URETEROSCOPY, RETROGRADE PYELOGRAM, STENT INSERTION Left 07/25/2018    Procedure: CYSTOSCOPY URETEROSCOPY RETROGRADE PYELOGRAM HOLMIUM LASER STENT INSERTION;  Surgeon: Uday Horne MD;  Location: [facility];  Service: Urology   • EYE SURGERY      eye lids lifted   • HIP SURGERY     • INJECTION OF MEDICATION  11/11/2013    Kenalog   • INJECTION OF MEDICATION  01/16/2014    Toradol   • JOINT REPLACEMENT Right 2012    total hip replacement   • SKIN GRAFT      on his foot; removed skin from hip   • TONSILLECTOMY     • TRANSESOPHAGEAL ECHOCARDIOGRAM (ОЛЬГА)  07/21/2014    with color flow-Normal LV systolic function with Ef of 60-65%/ Grad1 diastolic dysfunction of the left ventricular myocardium. No evidence of pericardial effusion       SLP Recommendation and Plan  SLP Swallowing Diagnosis: swallow WFL/no suspected pharyngeal impairment (02/01/23 1030)  SLP Diet Recommendation: puree, thin liquids (02/01/23 1030)  Recommended Precautions and Strategies: upright posture during/after eating, small bites of food and sips of liquid, fatigue precautions, assist with feeding, general aspiration precautions (02/01/23 1030)  SLP Rec. for Method of Medication Administration: as tolerated (02/01/23 1030)           Swallow Criteria for Skilled Therapeutic Interventions Met: not appropriate (02/01/23 1030)     Rehab Potential/Prognosis, Swallowing: good, to achieve stated  therapy goals (02/01/23 1030)  Therapy Frequency (Swallow): 3 days per week, 5 days per week (02/01/23 1030)  Predicted Duration Therapy Intervention (Days): until discharge (02/01/23 1030)                                        Plan of Care Reviewed With: patient, spouse, sibling  Outcome Evaluation: Pt seen for skilled  services this date to re-assess swallow function s/p extubation.  Pt was d/c'd on 1/27 prior to intubation on regular solids/thin liquids and wife reported no hx of dysphagia.  Pt did present as very weak and fatigued easily.  Pt consumed ice chips w/good hyolaryngeal elevation via palpation.  Pt consumed thin liquids via spoon, cup rim, and straw w/no overt s/s of aspiration.  Pt did initally present w/poor labial seal around spoon and cup, but demonstrated adequate seal w/straw.  Pt consumed limited puree solids w/no oral deficits.  Pt consumed mech soft solids x1 w/increased oral prep d/t prolonged mastication likely d/t weakness.  SLP recommends advancing pt's diet to puree/thin liquids; RN made aware.  SLP educated pt, pt's wife and sister on A&P of the swallow, how aspiration can occur, and the r/o aspiration and aspiration PNE using animation from ipad, and discussed use of straw and how the flow and amount of liquids changes; both verbalized understanding.  SLP to f/u next date for diet safety and toleration and trials of mech soft as appropriate.      SWALLOW EVALUATION (last 72 hours)     SLP Adult Swallow Evaluation     Row Name 02/01/23 1030                   Rehab Evaluation    Document Type re-evaluation  -CK        Total Evaluation Minutes, SLP 59  -CK        Subjective Information complains of;weakness;fatigue  -CK        Patient Observations alert;cooperative;agree to therapy  -CK        Patient/Family/Caregiver Comments/Observations wife and sister at bedside  -CK        Patient Effort adequate  -CK           General Information    Patient Profile Reviewed yes  -CK         Current Method of Nutrition NPO;nasogastric feedings  -CK        Precautions/Limitations, Vision WFL;for purposes of eval  -CK        Precautions/Limitations, Hearing WFL;for purposes of eval  -CK        Barriers to Rehab medically complex  -CK           Pain Scale: Numbers Pre/Post-Treatment    Pretreatment Pain Rating 0/10 - no pain  -CK        Posttreatment Pain Rating 0/10 - no pain  -CK           Oral Motor Structure and Function    Dentition Assessment natural, present and adequate  -CK        Secretion Management WNL/WFL  -CK        Mucosal Quality moist, healthy  -CK        Gag Response WFL  -CK        Volitional Swallow WFL  -CK        Volitional Cough reduced respiratory support  -CK           General Eating/Swallowing Observations    Respiratory Support Currently in Use nasal cannula  -CK        O2 Liters 4L  -CK        Eating/Swallowing Skills self-fed  -CK        Positioning During Eating upright 90 degree;upright in bed  -CK        Utensils Used spoon;straw  -CK        Consistencies Trialed thin liquids;regular textures;pureed  -CK        Pre SpO2 (%) 91  -CK        Post SpO2 (%) 93  -CK           Clinical Swallow Eval    Oral Prep Phase impaired  -CK        Oral Transit WFL  -CK        Oral Residue WFL  -CK        Pharyngeal Phase no overt signs/symptoms of pharyngeal impairment  -CK        Esophageal Phase unremarkable  -CK        Clinical Swallow Evaluation Summary Pt seen for skilled ST services this date to re-assess swallow function s/p extubation.  Pt was d/c'd on 1/27 prior to intubation on regular solids/thin liquids and wife reported no hx of dysphagia.  Pt did present as very weak and fatigued easily.  Pt consumed ice chips w/good hyolaryngeal elevation via palpation.  Pt consumed thin liquids via spoon, cup rim, and straw w/no overt s/s of aspiration.  Pt did initally present w/poor labial seal around spoon and cup, but demonstrated adequate seal w/straw.  Pt consumed limited puree solids  w/no oral deficits.  Pt consumed [facility] soft solids x1 w/increased oral prep d/t prolonged mastication likely d/t weakness.  SLP recommends advancing pt's diet to puree/thin liquids; RN made aware.  SLP educated pt, pt's wife and sister on A&P of the swallow, how aspiration can occur, and the r/o aspiration and aspiration PNE using animation from ipad, and discussed use of straw and how the flow and amount of liquids changes; both verbalized understanding.  SLP to f/u next date for diet safety and toleration and trials of [facility] soft as appropriate.  -CK           Oral Prep Concerns    Oral Prep Concerns prolonged mastication;incomplete or weak lip closure around spoon;increased prep time  -CK        Prolonged Mastication mechanical soft  -CK        Incomplete or Weak Lip Closure Around Spoon thin;mechanical soft  -CK        Increased Prep Time mechanical soft  -CK           SLP Evaluation Clinical Impression    SLP Swallowing Diagnosis swallow WFL/no suspected pharyngeal impairment  -CK        Functional Impact no impact on function  -CK        Rehab Potential/Prognosis, Swallowing good, to achieve stated therapy goals  -CK        Swallow Criteria for Skilled Therapeutic Interventions Met not appropriate  -CK           SLP Treatment Clinical Impressions    Care Plan Review evaluation/treatment results reviewed;care plan/treatment goals reviewed;risks/benefits reviewed;current/potential barriers reviewed;patient/other agree to care plan  -CK        Care Plan Review, Other Participant(s) spouse;sibling  -CK           Recommendations    Therapy Frequency (Swallow) 3 days per week;5 days per week  -CK        Predicted Duration Therapy Intervention (Days) until discharge  -CK        SLP Diet Recommendation puree;thin liquids  -CK        Recommended Precautions and Strategies upright posture during/after eating;small bites of food and sips of liquid;fatigue precautions;assist with feeding;general aspiration precautions  -CK         Oral Care Recommendations Oral Care BID/PRN  -CK        SLP Rec. for Method of Medication Administration as tolerated  -CK           Swallow Goals (SLP)    Swallow LTGs Patient will demonstrate functional swallow for  -CK        Swallow STGs diet tolerance goal selection (SLP)  -CK        Diet Tolerance Goal Selection (SLP) Patient will tolerate trials of  -CK           (LTG) Patient will demonstrate functional swallow for    Diet Texture (Demonstrate functional swallow) regular textures  -CK        Liquid viscosity (Demonstrate functional swallow) thin liquids  -CK        [place] (Demonstrate functional swallow) independently (over 90% accuracy)  -CK        Time Frame (Demonstrate functional swallow) by discharge  -CK        Barriers (Demonstrate functional swallow) weakness; fatigue  -CK        Progress/Outcomes (Demonstrate functional swallow) new goal  -CK           (STG) Patient will tolerate trials of    Consistencies Trialed (Tolerate trials) regular textures;soft to chew (chopped) textures;soft to chew (ground) textures;pureed textures;thin liquids  -CK        Desired Outcome (Tolerate trials) without signs/symptoms of aspiration;with adequate oral prep/transit/clearance  -CK        [place] (Tolerate trials) independently (over 90% accuracy)  -CK        Time Frame (Tolerate trials) by discharge  -CK        Progress/Outcomes (Tolerate trials) new goal  -CK              User Key  (r) = Recorded By, (t) = Taken By, (c) = Cosigned By    Initials Name Effective Dates    CK Alaina Thorne MS CCC-SLP 06/16/21 -                 EDUCATION  The patient has been educated in the following areas:   Dysphagia (Swallowing Impairment) Modified Diet Instruction.        SLP GOALS     Row Name 02/01/23 1030             (LTG) Patient will demonstrate functional swallow for    Diet Texture (Demonstrate functional swallow) regular textures  -CK      Liquid viscosity (Demonstrate functional swallow) thin  liquids  -CK      [place] (Demonstrate functional swallow) independently (over 90% accuracy)  -CK      Time Frame (Demonstrate functional swallow) by discharge  -CK      Barriers (Demonstrate functional swallow) weakness; fatigue  -CK      Progress/Outcomes (Demonstrate functional swallow) new goal  -CK         (STG) Patient will tolerate trials of    Consistencies Trialed (Tolerate trials) regular textures;soft to chew (chopped) textures;soft to chew (ground) textures;pureed textures;thin liquids  -CK      Desired Outcome (Tolerate trials) without signs/symptoms of aspiration;with adequate oral prep/transit/clearance  -CK      [place] (Tolerate trials) independently (over 90% accuracy)  -CK      Time Frame (Tolerate trials) by discharge  -CK      Progress/Outcomes (Tolerate trials) new goal  -CK            User Key  (r) = Recorded By, (t) = Taken By, (c) = Cosigned By    Initials Name Provider Type    Alaina Truong MS CCC-SLP Speech and Language Pathologist                   Time Calculation:    Time Calculation- SLP     Row Name 02/01/23 1129             Time Calculation- SLP    SLP Start Time 1030  -CK      SLP Stop Time 1129  -CK      SLP Time Calculation (min) 59 min  -CK      Total Timed Code Minutes- SLP 59 minute(s)  -CK      SLP Received On 02/01/23  -CK      SLP Goal Re-Cert Due Date 02/15/23  -CK         Untimed Charges    SLP Eval/Re-eval  ST Eval Oral Pharyng Swallow - [id number]  -CK      81204-[id number] Eval Oral Pharyng Swallow Minutes 59  -CK         Total Minutes    Untimed Charges Total Minutes 59  -CK       Total Minutes 59  -CK            User Key  (r) = Recorded By, (t) = Taken By, (c) = Cosigned By    Initials Name Provider Type    Alaina Truong MS CCC-SLP Speech and Language Pathologist                Therapy Charges for Today     Code Description Service Date Service Provider Modifiers Qty    [id number] HC ST TREATMENT SWALLOW 4 2/1/2023 Alaina Thorne MS CCC-SLP GN 1                Alaina Thorne, MS CCC-SLP  2/1/2023

## 2023-02-01 NOTE — PLAN OF CARE
Goal Outcome Evaluation:  Plan of Care Reviewed With: patient, spouse, sibling           Outcome Evaluation: Pt seen for skilled ST services this date to re-assess swallow function s/p extubation.  Pt was d/c'd on 1/27 prior to intubation on regular solids/thin liquids and wife reported no hx of dysphagia.  Pt did present as very weak and fatigued easily.  Pt consumed ice chips w/good hyolaryngeal elevation via palpation.  Pt consumed thin liquids via spoon, cup rim, and straw w/no overt s/s of aspiration.  Pt did initally present w/poor labial seal around spoon and cup, but demonstrated adequate seal w/straw.  Pt consumed limited puree solids w/no oral deficits.  Pt consumed mech soft solids x1 w/increased oral prep d/t prolonged mastication likely d/t weakness.  SLP recommends advancing pt's diet to puree/thin liquids; RN made aware.  SLP educated pt, pt's wife and sister on A&P of the swallow, how aspiration can occur, and the r/o aspiration and aspiration PNE using animation from ipad, and discussed use of straw and how the flow and amount of liquids changes; both verbalized understanding.  SLP to f/u next date for diet safety and toleration and trials of mech soft as appropriate.

## 2023-02-01 NOTE — PLAN OF CARE
Goal Outcome Evaluation:      V/S stable. Adequate urine output, but has bloody urine; MD aware, Heparin D/C'd. Follow up with morning labs.

## 2023-02-01 NOTE — PROGRESS NOTES
[facility] Medicine Services  INPATIENT PROGRESS NOTE    Length of Stay: 6  Date of Admission: 1/26/2023  Primary Care Physician: Emile Wynn MD    Subjective   Chief Complaint: No new complaints.    HPI: Patient is seen for follow-up today 2/1/2023.    72-year-old male with COPD, metastatic lung cancer, chronic hypoxic respiratory failure on 2 L nasal cannula at baseline, ALLEN on CPAP, CAD with previous stents, hypertension, diabetes, history of obstructive uropathy (with previous nephrostomy and ureteral stent) who was admitted for acute on chronic respiratory failure with hypoxia secondary to multifocal consolidation/moderate loculated right pleural effusion/COPD exacerbation.    He was extubated yesterday 1/31/2023, doing better, less deconditioned, less short of air and on supplemental oxygen of 4 L nasal prongs with saturation in the low 90s.      Review of Systems   Constitutional: Positive for activity change and fatigue. Negative for appetite change, chills, diaphoresis and fever.   HENT: Negative for trouble swallowing and voice change.    Eyes: Negative for photophobia and visual disturbance.   Respiratory: Positive for shortness of breath. Negative for cough, choking, chest tightness, wheezing and stridor.    Cardiovascular: Negative for chest pain, palpitations and leg swelling.   Gastrointestinal: Negative for abdominal distention, abdominal pain, blood in stool, constipation, diarrhea, nausea and vomiting.   Endocrine: Negative for cold intolerance, heat intolerance, polydipsia, polyphagia and polyuria.   Genitourinary: Negative for decreased urine volume, difficulty urinating, dysuria, enuresis, flank pain, frequency, hematuria and urgency.   Musculoskeletal: Negative for arthralgias, gait problem, myalgias, neck pain and neck stiffness.   Skin: Negative for pallor, rash and wound.   Neurological: Negative for dizziness, tremors, seizures, syncope,  facial asymmetry, speech difficulty, weakness, light-headedness, numbness and headaches.   Hematological: Does not bruise/bleed easily.   Psychiatric/Behavioral: Negative for agitation, behavioral problems and confusion.       Objective    Temp:  [97.6 °F (36.4 °C)-98.1 °F (36.7 °C)] 98 °F (36.7 °C)  Heart Rate:  [] 90  Resp:  [16-22] 16  BP: (128-164)/(70-87) 145/78    AM-PAC 6 Clicks Score (PT): 7 (02/01/23 0800)           Physical Exam  Vitals and nursing note reviewed.   Constitutional:       General: He is not in acute distress.     Appearance: He is well-developed. He is obese. He is ill-appearing. He is not diaphoretic.      Comments: He is alert and could follow simple commands.   HENT:      Head: Normocephalic and atraumatic.   Eyes:      General: No scleral icterus.        Right eye: No discharge.         Left eye: No discharge.   Neck:      Thyroid: No thyromegaly.      Vascular: No carotid bruit or JVD.   Cardiovascular:      Rate and Rhythm: Normal rate and regular rhythm.      Heart sounds: Normal heart sounds. No murmur heard.    No friction rub. No gallop.   Pulmonary:      Effort: Pulmonary effort is normal. No respiratory distress.      Breath sounds: No stridor. Examination of the right-middle field reveals decreased breath sounds. Examination of the right-lower field reveals decreased breath sounds. Decreased breath sounds and rhonchi present. No wheezing or rales.   Chest:      Chest wall: No tenderness.      Comments: Right-sided chest tube is in place.  Abdominal:      General: Bowel sounds are normal. There is no distension.      Palpations: Abdomen is soft. There is no mass.      Tenderness: There is no abdominal tenderness. There is no right CVA tenderness, left CVA tenderness, guarding or rebound.      Hernia: No hernia is present.   Musculoskeletal:         General: No swelling, tenderness or deformity.      Cervical back: Neck supple.      Right lower leg: No edema.      Left lower  leg: No edema.   Skin:     General: Skin is warm and dry.      Coloration: Skin is not jaundiced or pale.      Findings: No bruising, erythema, lesion or rash.   Neurological:      General: No focal deficit present.      Mental Status: He is alert and oriented to person, place, and time.      Cranial Nerves: No cranial nerve deficit.      Sensory: No sensory deficit.      Motor: No weakness or abnormal muscle tone.      Coordination: Coordination normal.      Gait: Gait normal.      Deep Tendon Reflexes: Reflexes normal.      Comments: .   Psychiatric:         Mood and Affect: Mood normal.         Behavior: Behavior normal.         Thought Content: Thought content normal.         Judgment: Judgment normal.           Medication Review:    Current Facility-Administered Medications:   •  cefepime (MAXIPIME) 2 g/100 mL 0.9% NS (mbp), 2 g, Intravenous, Q12H, Fitz Mcpherson MD, Last Rate: 0 mL/hr at 01/27/23 0135, 2 g at 01/31/23 2358  •  dextrose (D50W) (25 g/50 mL) IV injection 25 g, 25 g, Intravenous, Q15 Min PRN, Fitz Mcpherson MD  •  dextrose (GLUTOSE) oral gel 15 g, 15 g, Oral, Q15 Min PRN, Fitz Mcpherson MD  •  doxycycline (MONODOX) capsule 100 mg, 100 mg, Oral, Q12H, Fitz Mcpherson MD, 100 mg at 02/01/23 0821  •  FLUoxetine (PROzac) capsule 20 mg, 20 mg, Oral, Daily, Fitz Mcpherson MD, 20 mg at 02/01/23 0821  •  glucagon (human recombinant) (GLUCAGEN DIAGNOSTIC) injection 1 mg, 1 mg, Intramuscular, Q15 Min PRN, Fitz Mcpherson MD  •  guaiFENesin (MUCINEX) 12 hr tablet 600 mg, 600 mg, Oral, Q12H, Fitz Mcpherson MD, 600 mg at 01/31/23 0847  •  Insulin Aspart (novoLOG) injection 0-9 Units, 0-9 Units, Subcutaneous, TID AC, Fitz Mcpherson MD, 2 Units at 01/31/23 0849  •  ipratropium-albuterol (DUO-NEB) nebulizer solution 3 mL, 3 mL, Nebulization, 4x Daily - RT, MaxwelluPierre MD, 3 mL at 02/01/23 0711  •  levothyroxine (SYNTHROID, LEVOTHROID) tablet 50 mcg, 50 mcg, Oral, Q  AM, Fitz Mcpherson MD, 50 mcg at 02/01/23 0510  •  morphine injection 2 mg, 2 mg, Intravenous, Q4H PRN, Pierre Mehta MD, 2 mg at 02/01/23 0107  •  nicotine (NICODERM CQ) 21 MG/24HR patch 1 patch, 1 patch, Transdermal, Q24H, Fitz Mcpherson MD, 1 patch at 02/01/23 0822  •  ondansetron (ZOFRAN) tablet 4 mg, 4 mg, Oral, Q6H PRN **OR** ondansetron (ZOFRAN) injection 4 mg, 4 mg, Intravenous, Q6H PRN, Fitz Mcpherson MD, 4 mg at 01/26/23 2107  •  pantoprazole (PROTONIX) injection 40 mg, 40 mg, Intravenous, Q AM, Ramiro Jones MD, 40 mg at 02/01/23 0510  •  predniSONE (DELTASONE) tablet 40 mg, 40 mg, Oral, Daily With Breakfast, Pierre Mehta MD, 40 mg at 02/01/23 0821  •  sodium chloride 0.9 % flush 10 mL, 10 mL, Intravenous, Q12H, Fitz Mcpherson MD, 10 mL at 02/01/23 0830  •  sodium chloride 0.9 % flush 10 mL, 10 mL, Intravenous, PRN, Fitz Mcpherson MD  •  sodium chloride 0.9 % flush 10 mL, 10 mL, Intravenous, Q12H, Fitz Mcpherson MD, 10 mL at 02/01/23 0822  •  sodium chloride 0.9 % flush 10 mL, 10 mL, Intravenous, Q12H, Fitz Mcpherson MD, 10 mL at 02/01/23 0822  •  sodium chloride 0.9 % flush 10 mL, 10 mL, Intravenous, PRN, Fitz Mcpherson MD  •  sodium chloride 0.9 % infusion 40 mL, 40 mL, Intravenous, PRN, Fitz Mcpherson MD  •  tamsulosin (FLOMAX) 24 hr capsule 0.8 mg, 0.8 mg, Oral, Daily, Fitz Mcpherson MD, 0.8 mg at 01/31/23 0847  •  traMADol (ULTRAM) tablet 50 mg, 50 mg, Oral, Q6H PRN, Fitz Mcpherson MD, 50 mg at 02/01/23 0256  •  traZODone (DESYREL) tablet 150 mg, 150 mg, Oral, Nightly, Fitz Mcpherson MD, 150 mg at 01/31/23 2040    Results Review:  I have reviewed the labs, radiology results, and diagnostic studies.    Laboratory Data:   Results from last 7 days   Lab Units 02/01/23  0659 01/31/23  0538 01/30/23  0442 01/29/23  0451   SODIUM mmol/L 135* 136 137 135*   POTASSIUM mmol/L 4.6 5.3* 5.0 5.1   CHLORIDE mmol/L 100 102 99 101   CO2  mmol/L 29.0 26.0 28.0 28.0   BUN mg/dL 58* 54* 40* 31*   CREATININE mg/dL 1.61* 1.71* 1.73* 1.62*   GLUCOSE mg/dL 135* 173* 106* 147*   CALCIUM mg/dL 9.0 8.6 9.1 9.2   BILIRUBIN mg/dL  --  0.4 0.3 0.3   ALK PHOS U/L  --  96 98 102   ALT (SGPT) U/L  --  44* 47* 45*   AST (SGOT) U/L  --  40 40 38   ANION GAP mmol/L 6.0 8.0 10.0 6.0     Estimated Creatinine Clearance: 48.7 mL/min (A) (by C-G formula based on SCr of 1.61 mg/dL (H)).          Results from last 7 days   Lab Units 02/01/23  0412 01/31/23  0538 01/30/23  0442 01/29/23  0337 01/28/23  0542   WBC 10*3/mm3 9.62 8.81 11.40* 11.50* 11.46*   HEMOGLOBIN g/dL 13.0 12.2* 12.8* 11.7* 12.5*   HEMATOCRIT % 40.5 38.3 41.1 35.5* 39.9   PLATELETS 10*3/mm3 188 192 161 251 202     Results from last 7 days   Lab Units 02/01/23  0412 01/27/23  0400   INR  1.23* 1.17       Culture Data:   No results found for: BLOODCX  No results found for: URINECX  No results found for: RESPCX  No results found for: WOUNDCX  No results found for: STOOLCX  No components found for: BODYFLD    Radiology Data:   Imaging Results (Last 24 Hours)     Procedure Component Value Units Date/Time    XR Chest 1 View [[id number]] Collected: 01/31/23 1114     Updated: 01/31/23 1220    Narrative:      EXAM: Chest, 1 AP view  CLINICAL INDICATION: Respiratory failure    COMPARISON: Chest radiographs 1/29/2023    FINDINGS:    The supporting tubes and lines are unchanged.    There are stable right greater than left interstitial opacities  redemonstrated. There is stable right hilar prominence.    There is no pleural effusion or large pneumothorax.  The cardiomediastinal silhouette is unchanged.    There is no acute or aggressive osseous lesion.      Impression:      CONCLUSION: Stable appearance of chest as above.    Electronically signed by:  Tyler Aervalo DO  1/31/2023 12:17 PM  CST Workstation: [id number]          ABG:  Results from last 7 days   Lab Units 01/31/23  0742   PH, ARTERIAL pH units 7.420   PO2 ART  mm Hg 74.2*   PCO2, ARTERIAL mm Hg 44.2   HCO3 ART mmol/L 28.6*       I have reviewed the patient's current medications.     Assessment/Plan     Hospital Problem List:  Principal Problem:    Acute on chronic respiratory failure with hypoxia (HCC)  Active Problems:    Mass of right lung    Sepsis due to pneumonia (HCC)    Loculated pleural effusion    Acute on chronic respiratory failure with hypoxia/sepsis (secondary to a combination of multifocal consolidation, COPD exacerbation right-sided pleural effusion): Patient is status postplacement of right-sided chest tube and was extubated yesterday 1/31/2023.  Continue noninvasive respiratory therapy, bronchodilators, steroids and antimicrobial therapy.  Blood and pleural fluid cultures showed no growth.  Input by CT surgery is appreciated.    Metastatic lung cancer: Patient is to follow-up with his primary oncologist postdischarge.    Chronic kidney disease: Patient's baseline creatinine is between 1.2-1.7.  Creatinine is at baseline.  Continue to monitor and consult nephrologist if the need arises.  Mild hyperkalemia is clinically insignificant and has resolved.      Nutrition: Consult SLP for swallowing assessment prior to start of oral intake.    Deconditioning: Consult PT and OT.      Continue GI and DVT prophylaxis.    Transfer to [facility].    Medical Decision Making  Number and Complexity of problems: 4 with high complexities.      Conditions and Status: Hemodynamically stable.             MDM Data  External documents reviewed: Not applicable.     Decision rules/scores evaluated (example [id number], [place], etc): Not applicable.     Discussed with: Patient, his wife and nursing staff.     Treatment Plan: As detailed above.    Care Planning  Shared decision making: Discussed with patient and his wife.  Code status and discussions: Full code.    Disposition  Social Determinants of Health that impact treatment or disposition: None.    I expect the patient to be  discharged to home in 3-5 days.      I confirmed that the patient's Advance Care Plan is present, code status is documented, or surrogate decision maker is listed in the patient's medical record.     I have utilized all available immediate resources to obtain, update, or review the patient's current medications      Discharge Planning: In progress.    Pierre Mehta MD   02/01/23   11:04 CST

## 2023-02-01 NOTE — PLAN OF CARE
Goal Outcome Evaluation:  Plan of Care Reviewed With: patient        Progress: improving  Outcome Evaluation: Pt yohannes tx well with improved participation this date. Pt performed PT/OT Cotx. Pt t/f sup-sit with Max Ax2, sit-sup with Dep x2. Pt sat EOB for 12' with Min/Mod Ax1. Pt req dep Ax2 to scoot to HOB. Pt performed B LE therex AAROM x20 each. No goals met this tx.

## 2023-02-01 NOTE — PLAN OF CARE
Goal Outcome Evaluation:  Plan of Care Reviewed With: patient           Outcome Evaluation: OT treatment completed as co-treatment with PTA. Pt supine and agreeable to therapy. Pt required max A x 2 assist sup>sit. Dependent sit>sup. Dependent to scoot up in bed. Pt able to tolerate sitting EOB ~12 minutes with mod A to [place] at times to maintain sitting balance. Pt demonstrated heavy posterior lean. Max A to wash face sitting up in bed. Pt required max A with [place] to complete feeding seated EOB. Pt participated in BUE AAROM in all planes and joints x 1 set x 5 reps sitting up in bed. Cont OT POC.

## 2023-02-02 NOTE — PLAN OF CARE
Goal Outcome Evaluation:  Plan of Care Reviewed With: patient, spouse, sibling, family        Progress: improving  Outcome Evaluation: Pt seen for skilled ST services this date to address oral dysphagia.  Pt was repositioned to upright in bed by CNA and SLP.  Pt was able to self feed this date.  Pt presented w/laboured breathing and SLP encouraged slow rate to which pt followed.  Pt requested cheerios, to which SLP supplied.  Pt demonstrated increased oral prep time d/t prolonged mastication 2/2 weakness; however, mastication was efficient.  Pt was able to clear oral cavity and demonstrated timely AP transit.  SLP recommends advancing pt's diet to mechanical soft solids w/ground meats and POC to trial chopped meats.  Pt and family in agreement.  Informational board updated w/diet and dietary informed of modification.

## 2023-02-02 NOTE — PLAN OF CARE
Goal Outcome Evaluation:         OT tx on this date.  Pt was mod A for bed mobility.  Pt sat EOB ~ 20 minutes while performing grooming activities.  Pt easily out of breath but 02 stays in low to mid 90's.

## 2023-02-02 NOTE — THERAPY TREATMENT NOTE
Acute Care - Speech Language Pathology   Swallow Treatment Note [facility]     Patient Name: Aman Sanchez  : 1950  MRN: [id number]  Today's Date: 2023               Admit Date: 2023     Pt seen for skilled ST services this date to address oral dysphagia.  Pt was repositioned to upright in bed by CNA and SLP.  Pt was able to self feed this date.  Pt presented w/laboured breathing and SLP encouraged slow rate to which pt followed.  Pt requested cheerios, to which SLP supplied.  Pt demonstrated increased oral prep time d/t prolonged mastication 2/2 weakness; however, mastication was efficient.  Pt was able to clear oral cavity and demonstrated timely AP transit.  SLP recommends advancing pt's diet to mechanical soft solids w/ground meats and POC to trial chopped meats.  Pt and family in agreement.  Informational board updated w/diet and dietary informed of modification.    Visit Dx:     ICD-10-CM ICD-9-CM   1. Acute on chronic respiratory failure with hypoxia (HCC)  J96.21 518.84     799.02   2. Lung mass  R91.8 786.6   3. Pneumonia of right lung due to infectious organism, unspecified part of lung  J18.9 483.8   4. Impaired functional mobility and activity tolerance  Z74.09 V49.89   5. Impaired mobility and ADLs  Z74.09 V49.89    Z78.9    6. Oral phase dysphagia  R13.11 787.21     Patient Active Problem List   Diagnosis   • Essential hypertension   • Chronic coronary artery disease   • Mixed hyperlipidemia   • Tobacco abuse counseling   • Dyspnea on exertion   • Acute retention of urine   • Anemia   • Osteoarthritis of hip   • Hypersomnia   • Hypotension   • Obstructive sleep apnea syndrome   • Diastolic dysfunction   • Morbid obesity due to excess calories ([facility])   • Tobacco use disorder   • Calculus of ureter   • Preoperative cardiovascular examination   • Enlarged prostate with urinary obstruction   • Mass of right lung   • Chronic respiratory failure with hypoxia ([facility])   • Pleural  effusion   • Acute on chronic respiratory failure with hypoxia (HCC)   • Sepsis due to pneumonia (HCC)   • Loculated pleural effusion     Past Medical History:   Diagnosis Date   • Acute bronchitis    • Anemia    • Breast lump    • Cervical radiculopathy    • COPD (chronic obstructive pulmonary disease) (HCC)    • Coronary atherosclerosis     2 stents, followed by Dr. Hargrove   • Depressive disorder    • Disease of thyroid gland    • Dysuria    • Essential hypertension    • Flank pain    • Hematochezia    • Hematuria syndrome    • Hyperlipidemia    • Hypoglycemia    • Kidney stone    • Kidney stones    • Lung cancer (HCC)    • Mass of neck    • MI (myocardial infarction) (HCC)    • Neck pain     sprain   • Osteoarthritis    • PONV (postoperative nausea and vomiting)    • Sleep apnea     using c-pap   • Sleep apnea    • Type 2 diabetes mellitus (HCC)      Past Surgical History:   Procedure Laterality Date   • BACK SURGERY     • CARDIAC CATHETERIZATION N/A 08/23/2017    Procedure: Right Heart Cath;  Surgeon: Mariposa Zamorano MD;  Location: [facility] INVASIVE LOCATION;  Service:    • CATARACT EXTRACTION WITH INTRAOCULAR LENS IMPLANT Bilateral    • CHOLECYSTECTOMY     • CYSTOSCOPY  08/29/2014    Left ESWL, cysto and stent removal   • CYSTOSCOPY W/ LITHOLAPAXY / EHL  08/15/2014    Left ESWL   • CYSTOSCOPY, RETROGRADE PYELOGRAM AND STENT INSERTION  12/29/2022   • CYSTOSCOPY, URETEROSCOPY, RETROGRADE PYELOGRAM, STENT INSERTION Left 06/30/2017    Procedure: CYSTOSCOPY, LEFT URETEROSCOPY, RETROGRADE PYELOGRAM, HOLMIUM LASER, STENT INSERTION;  Surgeon: Uday Horne MD;  Location: [facility];  Service:    • CYSTOSCOPY, URETEROSCOPY, RETROGRADE PYELOGRAM, STENT INSERTION Left 09/06/2017    Procedure: CYSTOSCOPY, LEFT URETEROSCOPY, RETROGRADE PYELOGRAM, HOLMIUM LASER, STENT INSERTION;  Surgeon: Uday Horne MD;  Location: [facility] OR;  Service:    • CYSTOSCOPY, URETEROSCOPY, RETROGRADE PYELOGRAM, STENT INSERTION Left  01/17/2018    Procedure: CYSTOSCOPY LEFT URETEROSCOPY RETROGRADE PYELOGRAM HOLMIUM LASER STENT INSERTION;  Surgeon: Uday Horne MD;  Location: [facility];  Service:    • CYSTOSCOPY, URETEROSCOPY, RETROGRADE PYELOGRAM, STENT INSERTION Left 07/25/2018    Procedure: CYSTOSCOPY URETEROSCOPY RETROGRADE PYELOGRAM HOLMIUM LASER STENT INSERTION;  Surgeon: Uday Horne MD;  Location: [facility];  Service: Urology   • EYE SURGERY      eye lids lifted   • HIP SURGERY     • INJECTION OF MEDICATION  11/11/2013    Kenalog   • INJECTION OF MEDICATION  01/16/2014    Toradol   • JOINT REPLACEMENT Right 2012    total hip replacement   • SKIN GRAFT      on his foot; removed skin from hip   • TONSILLECTOMY     • TRANSESOPHAGEAL ECHOCARDIOGRAM (ОЛЬГА)  07/21/2014    with color flow-Normal LV systolic function with Ef of 60-65%/ Grad1 diastolic dysfunction of the left ventricular myocardium. No evidence of pericardial effusion       SLP Recommendation and Plan  SLP Swallowing Diagnosis: swallow WFL/no suspected pharyngeal impairment (02/02/23 0906)  SLP Diet Recommendation: thin liquids, soft to chew textures, ground (02/02/23 0906)  Recommended Precautions and Strategies: upright posture during/after eating, small bites of food and sips of liquid, fatigue precautions, assist with feeding, general aspiration precautions (02/02/23 0906)  SLP Rec. for Method of Medication Administration: as tolerated (02/02/23 0906)           Swallow Criteria for Skilled Therapeutic Interventions Met: not appropriate (02/02/23 0906)     Rehab Potential/Prognosis, Swallowing: good, to achieve stated therapy goals (02/02/23 0906)  Therapy Frequency (Swallow): 3 days per week, 5 days per week (02/02/23 0906)  Predicted Duration Therapy Intervention (Days): until discharge (02/02/23 0906)        Daily Summary of Progress (SLP): progress toward functional goals as expected (02/02/23 0906)               Treatment Assessment (SLP): improved, oral dysphagia  (02/02/23 0906)  Treatment Assessment Comments (SLP): Pt seen for skilled ST services this date to address oral dysphagia.  Pt was repositioned to upright in bed by CNA and SLP.  Pt was able to self feed this date.  Pt presented w/laboured breathing and SLP encouraged slow rate to which pt followed.  Pt requested cheerios, to which SLP supplied.  Pt demonstrated increased oral prep time d/t prolonged mastication 2/2 weakness; however, mastication was efficient.  Pt was able to clear oral cavity and demonstrated timely AP transit.  SLP recommends advancing pt's diet to mechanical soft solids w/ground meats and POC to trial chopped meats.  Pt and family in agreement.  Informational board updated w/diet and dietary informed of modification. (02/02/23 0906)  Plan for Continued Treatment (SLP): continue treatment per plan of care (02/02/23 0906)         Plan of Care Reviewed With: patient, spouse, sibling, family  Progress: improving  Outcome Evaluation: Pt seen for skilled ST services this date to address oral dysphagia.  Pt was repositioned to upright in bed by CNA and SLP.  Pt was able to self feed this date.  Pt presented w/laboured breathing and SLP encouraged slow rate to which pt followed.  Pt requested cheerios, to which SLP supplied.  Pt demonstrated increased oral prep time d/t prolonged mastication 2/2 weakness; however, mastication was efficient.  Pt was able to clear oral cavity and demonstrated timely AP transit.  SLP recommends advancing pt's diet to mechanical soft solids w/ground meats and POC to trial chopped meats.  Pt and family in agreement.  Informational board updated w/diet and dietary informed of modification.      SWALLOW EVALUATION (last 72 hours)     SLP Adult Swallow Evaluation     Row Name 02/02/23 0906 02/01/23 1030                Rehab Evaluation    Document Type therapy note (daily note)  -CK re-evaluation  -CK       Total Evaluation Minutes, SLP -- 59  -CK       Subjective Information no  complaints  -CK complains of;weakness;fatigue  -CK       Patient Observations alert;cooperative;agree to therapy  -CK alert;cooperative;agree to therapy  -CK       Patient/Family/Caregiver Comments/Observations Wife, sister, and brother in law at bedside  -CK wife and sister at bedside  -CK       Patient Effort good  -CK adequate  -CK          General Information    Patient Profile Reviewed yes  -CK yes  -CK       Current Method of Nutrition pureed;thin liquids  -CK NPO;nasogastric feedings  -CK       Precautions/Limitations, Vision WFL;for purposes of eval  -CK WFL;for purposes of eval  -CK       Precautions/Limitations, Hearing WFL;for purposes of eval  -CK WFL;for purposes of eval  -CK       Barriers to Rehab medically complex  -CK medically complex  -CK          Pain Scale: Numbers Pre/Post-Treatment    Pretreatment Pain Rating 0/10 - no pain  -CK 0/10 - no pain  -CK       Posttreatment Pain Rating 0/10 - no pain  -CK 0/10 - no pain  -CK          Oral Motor Structure and Function    Dentition Assessment natural, present and adequate  -CK natural, present and adequate  -CK       Secretion Management WNL/WFL  -CK WNL/WFL  -CK       Mucosal Quality moist, healthy  -CK moist, healthy  -CK       Gag Response WFL  -CK WFL  -CK       Volitional Swallow WFL  -CK WFL  -CK       Volitional Cough reduced respiratory support  -CK reduced respiratory support  -CK          General Eating/Swallowing Observations    Respiratory Support Currently in Use nasal cannula  -CK nasal cannula  -CK       O2 Liters 4L  -CK 4L  -CK       Eating/Swallowing Skills self-fed  -CK self-fed  -CK       Positioning During Eating upright 90 degree;upright in bed  -CK upright 90 degree;upright in bed  -CK       Utensils Used spoon;straw  -CK spoon;straw  -CK       Consistencies Trialed thin liquids;mechanical ground textures;mixed consistency  -CK thin liquids;regular textures;pureed  -CK       Pre SpO2 (%) -- 91  -CK       Post SpO2 (%) -- 93  -CK           Clinical Swallow Eval    Oral Prep Phase impaired  -CK impaired  -CK       Oral Transit WFL  -CK WFL  -CK       Oral Residue WFL  -CK WFL  -CK       Pharyngeal Phase no overt signs/symptoms of pharyngeal impairment  -CK no overt signs/symptoms of pharyngeal impairment  -CK       Esophageal Phase unremarkable  -CK unremarkable  -CK       Clinical Swallow Evaluation Summary -- Pt seen for skilled ST services this date to re-assess swallow function s/p extubation.  Pt was d/c'd on 1/27 prior to intubation on regular solids/thin liquids and wife reported no hx of dysphagia.  Pt did present as very weak and fatigued easily.  Pt consumed ice chips w/good hyolaryngeal elevation via palpation.  Pt consumed thin liquids via spoon, cup rim, and straw w/no overt s/s of aspiration.  Pt did initally present w/poor labial seal around spoon and cup, but demonstrated adequate seal w/straw.  Pt consumed limited puree solids w/no oral deficits.  Pt consumed mech soft solids x1 w/increased oral prep d/t prolonged mastication likely d/t weakness.  SLP recommends advancing pt's diet to puree/thin liquids; RN made aware.  SLP educated pt, pt's wife and sister on A&P of the swallow, how aspiration can occur, and the r/o aspiration and aspiration PNE using animation from ipad, and discussed use of straw and how the flow and amount of liquids changes; both verbalized understanding.  SLP to f/u next date for diet safety and toleration and trials of mech soft as appropriate.  -CK          Oral Prep Concerns    Oral Prep Concerns prolonged mastication;increased prep time  -CK prolonged mastication;incomplete or weak lip closure around spoon;increased prep time  -CK       Prolonged Mastication mechanical soft  -CK mechanical soft  -CK       Incomplete or Weak Lip Closure Around Spoon -- thin;mechanical soft  -CK       Increased Prep Time mechanical soft  -CK mechanical soft  -CK          Swallowing Quality of Life Assessment     Education and counseling provided Safest diet options;Compensatory strategy recommendations and rationale  -CK --          SLP Evaluation Clinical Impression    SLP Swallowing Diagnosis swallow WFL/no suspected pharyngeal impairment  -CK swallow WFL/no suspected pharyngeal impairment  -CK       Functional Impact no impact on function  -CK no impact on function  -CK       Rehab Potential/Prognosis, Swallowing good, to achieve stated therapy goals  -CK good, to achieve stated therapy goals  -CK       Swallow Criteria for Skilled Therapeutic Interventions Met not appropriate  -CK not appropriate  -CK          SLP Treatment Clinical Impressions    Treatment Assessment (SLP) improved;oral dysphagia  -CK --       Treatment Assessment Comments (SLP) Pt seen for skilled ST services this date to address oral dysphagia.  Pt was repositioned to upright in bed by CNA and SLP.  Pt was able to self feed this date.  Pt presented w/laboured breathing and SLP encouraged slow rate to which pt followed.  Pt requested cheerios, to which SLP supplied.  Pt demonstrated increased oral prep time d/t prolonged mastication 2/2 weakness; however, mastication was efficient.  Pt was able to clear oral cavity and demonstrated timely AP transit.  SLP recommends advancing pt's diet to mechanical soft solids w/ground meats and POC to trial chopped meats.  Pt and family in agreement.  Informational board updated w/diet and dietary informed of modification.  -CK --       Daily Summary of Progress (SLP) progress toward functional goals as expected  -CK --       Barriers to Overall Progress (SLP) Lethargy;Fatigue  -CK --       Plan for Continued Treatment (SLP) continue treatment per plan of care  -CK --       Care Plan Review evaluation/treatment results reviewed  -CK evaluation/treatment results reviewed;care plan/treatment goals reviewed;risks/benefits reviewed;current/potential barriers reviewed;patient/other agree to care plan  -CK       Care Plan  Review, Other Participant(s) spouse;sibling;family  -CK spouse;sibling  -CK          Recommendations    Therapy Frequency (Swallow) 3 days per week;5 days per week  -CK 3 days per week;5 days per week  -CK       Predicted Duration Therapy Intervention (Days) until discharge  -CK until discharge  -CK       SLP Diet Recommendation thin liquids;soft to chew textures;ground  -CK puree;thin liquids  -CK       Recommended Precautions and Strategies upright posture during/after eating;small bites of food and sips of liquid;fatigue precautions;assist with feeding;general aspiration precautions  -CK upright posture during/after eating;small bites of food and sips of liquid;fatigue precautions;assist with feeding;general aspiration precautions  -CK       Oral Care Recommendations Oral Care BID/PRN  -CK Oral Care BID/PRN  -CK       SLP Rec. for Method of Medication Administration as tolerated  -CK as tolerated  -CK          Swallow Goals (SLP)    Swallow LTGs Patient will demonstrate functional swallow for  -CK Patient will demonstrate functional swallow for  -CK       Swallow STGs diet tolerance goal selection (SLP)  -CK diet tolerance goal selection (SLP)  -CK       Diet Tolerance Goal Selection (SLP) Patient will tolerate trials of  -CK Patient will tolerate trials of  -CK          (LTG) Patient will demonstrate functional swallow for    Diet Texture (Demonstrate functional swallow) soft to chew (chopped) textures  -CK regular textures  -CK       Liquid viscosity (Demonstrate functional swallow) thin liquids  -CK thin liquids  -CK       [place] (Demonstrate functional swallow) independently (over 90% accuracy)  -CK independently (over 90% accuracy)  -CK       Time Frame (Demonstrate functional swallow) by discharge  -CK by discharge  -CK       Barriers (Demonstrate functional swallow) weakness; fatigue  -CK weakness; fatigue  -CK       Progress/Outcomes (Demonstrate functional swallow) good progress toward goal;goal  ongoing;goal revised this date  -CK new goal  -CK       Comment (Demonstrate functional swallow) Goal revised to chopped meats per wife stating that is baseline d/t poor dentition  -CK --          (STG) Patient will tolerate trials of    Consistencies Trialed (Tolerate trials) regular textures;soft to chew (chopped) textures;soft to chew (ground) textures;pureed textures;thin liquids  -CK regular textures;soft to chew (chopped) textures;soft to chew (ground) textures;pureed textures;thin liquids  -CK       Desired Outcome (Tolerate trials) without signs/symptoms of aspiration;with adequate oral prep/transit/clearance  -CK without signs/symptoms of aspiration;with adequate oral prep/transit/clearance  -CK       [place] (Tolerate trials) independently (over 90% accuracy)  -CK independently (over 90% accuracy)  -CK       Time Frame (Tolerate trials) by discharge  -CK by discharge  -CK       Progress/Outcomes (Tolerate trials) goal partially met;good progress toward goal;goal ongoing  -CK new goal  -CK             User Key  (r) = Recorded By, (t) = Taken By, (c) = Cosigned By    Initials Name Effective Dates    CK Alaina Thorne MS CCC-SLP 06/16/21 -                 EDUCATION  The patient has been educated in the following areas:   Dysphagia (Swallowing Impairment) Modified Diet Instruction.        SLP GOALS     Row Name 02/02/23 0906 02/01/23 1030          (LTG) Patient will demonstrate functional swallow for    Diet Texture (Demonstrate functional swallow) soft to chew (chopped) textures  -CK regular textures  -CK     Liquid viscosity (Demonstrate functional swallow) thin liquids  -CK thin liquids  -CK     [place] (Demonstrate functional swallow) independently (over 90% accuracy)  -CK independently (over 90% accuracy)  -CK     Time Frame (Demonstrate functional swallow) by discharge  -CK by discharge  -CK     Barriers (Demonstrate functional swallow) weakness; fatigue  -CK weakness; fatigue  -CK      Progress/Outcomes (Demonstrate functional swallow) good progress toward goal;goal ongoing;goal revised this date  -CK new goal  -CK     Comment (Demonstrate functional swallow) Goal revised to chopped meats per wife stating that is baseline d/t poor dentition  -CK --        (STG) Patient will tolerate trials of    Consistencies Trialed (Tolerate trials) regular textures;soft to chew (chopped) textures;soft to chew (ground) textures;pureed textures;thin liquids  -CK regular textures;soft to chew (chopped) textures;soft to chew (ground) textures;pureed textures;thin liquids  -CK     Desired Outcome (Tolerate trials) without signs/symptoms of aspiration;with adequate oral prep/transit/clearance  -CK without signs/symptoms of aspiration;with adequate oral prep/transit/clearance  -CK     [place] (Tolerate trials) independently (over 90% accuracy)  -CK independently (over 90% accuracy)  -CK     Time Frame (Tolerate trials) by discharge  -CK by discharge  -CK     Progress/Outcomes (Tolerate trials) goal partially met;good progress toward goal;goal ongoing  -CK new goal  -CK           User Key  (r) = Recorded By, (t) = Taken By, (c) = Cosigned By    Initials Name Provider Type    Alaina Truong MS CCC-SLP Speech and Language Pathologist                   Time Calculation:    Time Calculation- SLP     Row Name 02/02/23 0936             Time Calculation- SLP    SLP Start Time 0906  -CK      SLP Stop Time 0936  -CK      SLP Time Calculation (min) 30 min  -CK      Total Timed Code Minutes- SLP 30 minute(s)  -CK      SLP Received On 02/02/23  -CK      SLP Goal Re-Cert Due Date 02/15/23  -CK         Untimed Charges    80324-ED Treatment Swallow Minutes 30  -CK         Total Minutes    Untimed Charges Total Minutes 30  -CK       Total Minutes 30  -CK            User Key  (r) = Recorded By, (t) = Taken By, (c) = Cosigned By    Initials Name Provider Type    Alaina Truong MS CCC-SLP Speech and Language  Pathologist                Therapy Charges for Today     Code Description Service Date Service Provider Modifiers Qty    31509249518 HC ST TREATMENT SWALLOW 4 2/1/2023 Alaina Thorne, MS CCC-SLP GN 1    [id number] HC ST TREATMENT SWALLOW 2 2/2/2023 Alaina Thorne, MS CCC-SLP GN 1               Alaina Thorne, MS DOLL-SLP  2/2/2023

## 2023-02-02 NOTE — THERAPY TREATMENT NOTE
Acute Care - Occupational Therapy Treatment Note  [facility]     Patient Name: Aman Sanchez  : 1950  MRN: [id number]  Today's Date: 2023             Admit Date: 2023       ICD-10-CM ICD-9-CM   1. Acute on chronic respiratory failure with hypoxia (HCC)  J96.21 518.84     799.02   2. Lung mass  R91.8 786.6   3. Pneumonia of right lung due to infectious organism, unspecified part of lung  J18.9 483.8   4. Impaired functional mobility and activity tolerance  Z74.09 V49.89   5. Impaired mobility and ADLs  Z74.09 V49.89    Z78.9    6. Oral phase dysphagia  R13.11 787.21     Patient Active Problem List   Diagnosis   • Essential hypertension   • Chronic coronary artery disease   • Mixed hyperlipidemia   • Tobacco abuse counseling   • Dyspnea on exertion   • Acute retention of urine   • Anemia   • Osteoarthritis of hip   • Hypersomnia   • Hypotension   • Obstructive sleep apnea syndrome   • Diastolic dysfunction   • Morbid obesity due to excess calories (HCC)   • Tobacco use disorder   • Calculus of ureter   • Preoperative cardiovascular examination   • Enlarged prostate with urinary obstruction   • Mass of right lung   • Chronic respiratory failure with hypoxia (HCC)   • Pleural effusion   • Acute on chronic respiratory failure with hypoxia (HCC)   • Sepsis due to pneumonia (HCC)   • Loculated pleural effusion     Past Medical History:   Diagnosis Date   • Acute bronchitis    • Anemia    • Breast lump    • Cervical radiculopathy    • COPD (chronic obstructive pulmonary disease) (HCC)    • Coronary atherosclerosis     2 stents, followed by Dr. Hargrove   • Depressive disorder    • Disease of thyroid gland    • Dysuria    • Essential hypertension    • Flank pain    • Hematochezia    • Hematuria syndrome    • Hyperlipidemia    • Hypoglycemia    • Kidney stone    • Kidney stones    • Lung cancer (HCC)    • Mass of neck    • MI (myocardial infarction) (HCC)    • Neck pain     sprain   •  Osteoarthritis    • PONV (postoperative nausea and vomiting)    • Sleep apnea     using c-pap   • Sleep apnea    • Type 2 diabetes mellitus (HCC)      Past Surgical History:   Procedure Laterality Date   • BACK SURGERY     • CARDIAC CATHETERIZATION N/A 08/23/2017    Procedure: Right Heart Cath;  Surgeon: Mariposa Zamorano MD;  Location: [facility] INVASIVE LOCATION;  Service:    • CATARACT EXTRACTION WITH INTRAOCULAR LENS IMPLANT Bilateral    • CHOLECYSTECTOMY     • CYSTOSCOPY  08/29/2014    Left ESWL, cysto and stent removal   • CYSTOSCOPY W/ LITHOLAPAXY / EHL  08/15/2014    Left ESWL   • CYSTOSCOPY, RETROGRADE PYELOGRAM AND STENT INSERTION  12/29/2022   • CYSTOSCOPY, URETEROSCOPY, RETROGRADE PYELOGRAM, STENT INSERTION Left 06/30/2017    Procedure: CYSTOSCOPY, LEFT URETEROSCOPY, RETROGRADE PYELOGRAM, HOLMIUM LASER, STENT INSERTION;  Surgeon: Uday Horne MD;  Location: [facility];  Service:    • CYSTOSCOPY, URETEROSCOPY, RETROGRADE PYELOGRAM, STENT INSERTION Left 09/06/2017    Procedure: CYSTOSCOPY, LEFT URETEROSCOPY, RETROGRADE PYELOGRAM, HOLMIUM LASER, STENT INSERTION;  Surgeon: Uday Horne MD;  Location: [facility];  Service:    • CYSTOSCOPY, URETEROSCOPY, RETROGRADE PYELOGRAM, STENT INSERTION Left 01/17/2018    Procedure: CYSTOSCOPY LEFT URETEROSCOPY RETROGRADE PYELOGRAM HOLMIUM LASER STENT INSERTION;  Surgeon: Uday Horne MD;  Location: [facility];  Service:    • CYSTOSCOPY, URETEROSCOPY, RETROGRADE PYELOGRAM, STENT INSERTION Left 07/25/2018    Procedure: CYSTOSCOPY URETEROSCOPY RETROGRADE PYELOGRAM HOLMIUM LASER STENT INSERTION;  Surgeon: Uday Horne MD;  Location: [facility];  Service: Urology   • EYE SURGERY      eye lids lifted   • HIP SURGERY     • INJECTION OF MEDICATION  11/11/2013    Kenalog   • INJECTION OF MEDICATION  01/16/2014    Toradol   • JOINT REPLACEMENT Right 2012    total hip replacement   • SKIN GRAFT      on his foot; removed skin from hip   • TONSILLECTOMY     • TRANSESOPHAGEAL  ECHOCARDIOGRAM (ОЛЬГА)  07/21/2014    with color flow-Normal LV systolic function with Ef of 60-65%/ Grad1 diastolic dysfunction of the left ventricular myocardium. No evidence of pericardial effusion         OT ASSESSMENT FLOWSHEET (last 12 hours)     OT Evaluation and Treatment     Row Name 02/02/23 1035                   OT Time and Intention    Subjective Information complains of;pain  -RB        Document Type therapy note (daily note)  -RB        Mode of Treatment occupational therapy  -RB        Patient Effort adequate  -RB           General Information    Patient Profile Reviewed yes  -RB        Existing Precautions/Restrictions fall  Chest tube  -RB           Pain Assessment    Pretreatment Pain Rating 10/10  -RB        Posttreatment Pain Rating 2/10  -RB        Pain Location lower  -RB        Pain Location - back  -RB        Pain Intervention(s) Medication (See MAR)  -RB           Cognition    Orientation Status (Cognition) oriented x 4  -RB           Grooming Assessment/Training    [place] Level (Grooming) hair care, combing/brushing;oral care regimen;minimum assist (75% patient effort)  -RB        Assistive Devices (Grooming) --  -RB        Position (Grooming) supine;sitting up in bed  -RB           Self-Feeding Assessment/Training    [place] Level (Feeding) liquids to mouth;scoop food and bring to mouth;maximum assist (25% patient effort)  -RB        Assistive Devices (Feeding) hand over hand  -RB        Position (Self-Feeding) edge of bed sitting  -RB           Bed Mobility    Bed Mobility supine-sit;sit-supine;scooting/bridging  -RB        Scooting/Bridging [place] (Bed Mobility) maximum assist (25% patient effort)  -RB        Supine-Sit [place] (Bed Mobility) moderate assist (50% patient effort)  -RB        Sit-Supine [place] (Bed Mobility) moderate assist (50% patient effort)  -RB        Assistive Device (Bed Mobility) bed rails;head of bed elevated;draw sheet  -RB            Bed-Chair Transfer    Bed-Chair [place] (Transfers) not tested  -RB           Sit-Stand Transfer    Sit-Stand [place] (Transfers) not tested  -RB           Safety Issues, Functional Mobility    Impairments Affecting Function (Mobility) endurance/activity tolerance;range of motion (ROM);strength;shortness of breath;balance;pain;postural/trunk control  -RB           Vital Signs    Pre Systolic BP Rehab 142  -RB        Pre Treatment Diastolic BP 82  -RB        Post Systolic BP Rehab 141  -RB        Post Treatment Diastolic BP 87  -RB        Pretreatment Heart Rate (beats/min) 109  -RB        Posttreatment Heart Rate (beats/min) 114  -RB        Pre SpO2 (%) 95  -RB        O2 Delivery Pre Treatment supplemental O2  4 L  -RB        Intra SpO2 (%) 90  -RB        O2 Delivery Intra Treatment supplemental O2  -RB        Post SpO2 (%) 95  -RB        O2 Delivery Post Treatment supplemental O2  -RB        Pre Patient Position Supine  -RB        Intra Patient Position Sitting  -RB        Post Patient Position Supine  -RB           Positioning and Restraints    Pre-Treatment Position in bed  -RB        Post Treatment Position bed  -RB        In Bed supine;call light within reach;encouraged to call for assist;with family/caregiver  -RB           Therapy Assessment/Plan (OT)    Rehab Potential (OT) good, to achieve stated therapy goals  -RB        Criteria for Skilled Therapeutic Interventions Met (OT) yes;skilled treatment is necessary  -RB        Therapy Frequency (OT) other (see comments)  5-7 d/wk  -RB           Therapy Plan Review/Discharge Plan (OT)    Anticipated Discharge Disposition (OT) skilled nursing facility;inpatient rehabilitation facility;LT (long-term [facility])  -RB           Transfer Goal 1 (OT)    Activity/Assistive Device (Transfer Goal 1, OT) toilet  -RB        [place] Level/Cues Needed (Transfer Goal 1, OT) minimum assist (75% or more patient effort)  -RB        Time Frame (Transfer Goal  1, OT) long term goal (LTG);by discharge  -RB        Progress/Outcome (Transfer Goal 1, OT) goal not met  -RB           Bathing Goal 1 (OT)    Activity/Device (Bathing Goal 1, OT) lower body bathing  -RB        [place] Level/Cues Needed (Bathing Goal 1, OT) minimum assist (75% or more patient effort)  -RB        Time Frame (Bathing Goal 1, OT) long term goal (LTG);by discharge  -RB        Progress/Outcomes (Bathing Goal 1, OT) goal not met  -RB           Dressing Goal 1 (OT)    Activity/Device (Dressing Goal 1, OT) lower body dressing  -RB        [place]/Cues Needed (Dressing Goal 1, OT) minimum assist (75% or more patient effort)  -RB        Time Frame (Dressing Goal 1, OT) long term goal (LTG);by discharge  -RB        Progress/Outcome (Dressing Goal 1, OT) goal not met  -RB           Toileting Goal 1 (OT)    Activity/Device (Toileting Goal 1, OT) toileting skills, all  -RB        [place] Level/Cues Needed (Toileting Goal 1, OT) minimum assist (75% or more patient effort)  -RB        Time Frame (Toileting Goal 1, OT) long term goal (LTG);by discharge  -RB        Progress/Outcome (Toileting Goal 1, OT) goal not met  -RB           Grooming Goal 1 (OT)    Activity/Device (Grooming Goal 1, OT) grooming skills, all  -RB        [place] (Grooming Goal 1, OT) set-up required  -RB        Time Frame (Grooming Goal 1, OT) long term goal (LTG);by discharge  -RB        Progress/Outcome (Grooming Goal 1, OT) goal not met  -RB              User Key  (r) = Recorded By, (t) = Taken By, (c) = Cosigned By    Initials Name Effective Dates    RB Richie Davis, OT 06/16/21 -                  Occupational Therapy Education     Title: PT OT SLP Therapies (In Progress)     Topic: Occupational Therapy (In Progress)     Point: ADL training (Done)     Description:   Instruct learner(s) on proper safety adaptation and remediation techniques during self care or transfers.   Instruct in proper use of assistive devices.               Learning Progress Summary           Patient Acceptance, E,TB, VU by SA at 2/1/2023 1352    Acceptance, E,TB, VU by  at 1/31/2023 1447    Comment: POC, role of OT, transfer training                   Point: Home exercise program (Not Started)     Description:   Instruct learner(s) on appropriate technique for monitoring, assisting and/or progressing therapeutic exercises/activities.              Learner Progress:  Not documented in this visit.          Point: Precautions (Done)     Description:   Instruct learner(s) on prescribed precautions during self-care and functional transfers.              Learning Progress Summary           Patient Acceptance, E, VU by RB at 2/2/2023 1135    Comment: Edu pt on no OOB without assist.    Acceptance, E,TB, VU by SA at 2/1/2023 1352                   Point: Body mechanics (Done)     Description:   Instruct learner(s) on proper positioning and spine alignment during self-care, functional mobility activities and/or exercises.              Learning Progress Summary           Patient Acceptance, E,TB, VU by  at 2/1/2023 1352                               User Key     Initials Effective Dates Name Provider Type Discipline    RB 06/16/21 -  Richie Davis, OT Occupational Therapist OT     06/14/21 -  Brittaney Gilmore OT Occupational Therapist OT     08/11/22 -  Bernice Rivers OT Occupational Therapist OT                  OT Recommendation and Plan  Therapy Frequency (OT): other (see comments) (5-7 d/wk)        Outcome Measures     Row Name 02/02/23 1035             How much help from another is currently needed...    Putting on and taking off regular lower body clothing? 1  -RB      Bathing (including washing, rinsing, and drying) 1  -RB      Toileting (which includes using toilet bed pan or urinal) 1  -RB      Putting on and taking off regular upper body clothing 2  -RB      Taking care of personal grooming (such as brushing teeth) 3  -RB      Eating meals 2  -RB       AM-PAC 6 Clicks Score (OT) 10  -RB            User Key  (r) = Recorded By, (t) = Taken By, (c) = Cosigned By    Initials Name Provider Type    Richie Dodd OT Occupational Therapist                Time Calculation:    Time Calculation- OT     Row Name 02/02/23 1138             Time Calculation- OT    OT Start Time 1035  -RB      OT Stop Time 1126  -RB      OT Time Calculation (min) 51 min  -RB      OT Received On 02/02/23  -RB            User Key  (r) = Recorded By, (t) = Taken By, (c) = Cosigned By    Initials Name Provider Type    Richie Dodd OT Occupational Therapist              Therapy Charges for Today     Code Description Service Date Service Provider Modifiers Qty    37443281679 HC OT SELF CARE/MGMT/TRAIN EA 15 MIN 2/2/2023 Richie Davis OT GO 2    17771028635 HC OT THERAPEUTIC ACT EA 15 MIN 2/2/2023 Richie Davis OT GO 1               Richie Davis OT  2/2/2023

## 2023-02-02 NOTE — SIGNIFICANT NOTE
02/02/23 1320   OTHER   Discipline physical therapy assistant   Rehab Time/Intention   Session Not Performed patient/family declined treatment  (family states pt just got positioned comfortably and wants to rest, defers PT tx)

## 2023-02-02 NOTE — PROGRESS NOTES
Adult Nutrition  Assessment    Patient Name:  Aman Sanchez  YOB: 1950  MRN: [id number]  Admit Date:  1/26/2023    Assessment Date:  2/2/2023    Comments:  RD follow up. Pt now extubated and tolerating oral diet. SLP advanced diet to mechanical soft this AM. No known food allergies. Family at bedside states pt was not eating well before admit. Pt states #, 11/14/2022 220# # for 13# wt loss in 3 months. Wt loss likely due to poor intake pta and undernutrition x4 days while ventilated. Appetite is improving and intake is fair at 50% x2 and 25% x1 meals. Dietetic intern interviewed for preferences and encouraged supplements along with PO intake to meet increased estimated needs. Will provide chocolate boost GC and vanilla ice cream TID. RDN staff will monitor PO intake and weight through hospital course.    Labs: Na 135, glu 282, BUN 58, cr 1.61, ALT 44, alb 3.0    Edema: +1 left and right leg, foot, ankle       Estimated/Assessed Needs - Anthropometrics     Row Name 02/02/23 0600          Anthropometrics    Weight 94.1 kg (207 lb 7.3 oz)                       Electronically signed by:  Ruby Acosta  02/02/23 10:57 CST

## 2023-02-02 NOTE — PROGRESS NOTES
[facility]  INPATIENT PROGRESS NOTE    Length of Stay: 7  Date of Admission: 1/26/2023  Primary Care Physician: Emile Wynn MD    Subjective   Chief Complaint: No new complaints.    HPI: Patient is seen for follow-up today 2/2/2023.    72-year-old male with COPD, metastatic lung cancer, chronic hypoxic respiratory failure (on 4 L nasal cannula at baseline), ALLEN on CPAP, CAD with previous stents, hypertension, diabetes, history of obstructive uropathy (with previous nephrostomy and ureteral stent) who was admitted for acute on chronic respiratory failure with hypoxia secondary to multifocal consolidation/moderate loculated right pleural effusion/COPD exacerbation.    He was extubated on 1/31/2023, doing better, less deconditioned, less short of air and on supplemental oxygen of 4 L nasal prongs with saturation in the low 90s.      Review of Systems   Constitutional: Positive for activity change and fatigue. Negative for appetite change, chills, diaphoresis and fever.   HENT: Negative for trouble swallowing and voice change.    Eyes: Negative for photophobia and visual disturbance.   Respiratory: Positive for shortness of breath. Negative for cough, choking, chest tightness, wheezing and stridor.    Cardiovascular: Negative for chest pain, palpitations and leg swelling.   Gastrointestinal: Negative for abdominal distention, abdominal pain, blood in stool, constipation, diarrhea, nausea and vomiting.   Endocrine: Negative for cold intolerance, heat intolerance, polydipsia, polyphagia and polyuria.   Genitourinary: Negative for decreased urine volume, difficulty urinating, dysuria, enuresis, flank pain, frequency, hematuria and urgency.   Musculoskeletal: Negative for arthralgias, gait problem, myalgias, neck pain and neck stiffness.   Skin: Negative for pallor, rash and wound.   Neurological: Negative for dizziness, tremors, seizures, syncope, facial  asymmetry, speech difficulty, weakness, light-headedness, numbness and headaches.   Hematological: Does not bruise/bleed easily.   Psychiatric/Behavioral: Negative for agitation, behavioral problems and confusion.       Objective    Temp:  [97.3 °F (36.3 °C)-97.6 °F (36.4 °C)] 97.5 °F (36.4 °C)  Heart Rate:  [] 118  Resp:  [16-24] 18  BP: (133-163)/(63-96) 142/82    AM-PAC 6 Clicks Score (PT): 10 (02/02/23 0944)           Physical Exam  Vitals and nursing note reviewed.   Constitutional:       General: He is not in acute distress.     Appearance: He is well-developed. He is obese. He is ill-appearing. He is not diaphoretic.   HENT:      Head: Normocephalic and atraumatic.   Eyes:      General: No scleral icterus.        Right eye: No discharge.         Left eye: No discharge.   Neck:      Thyroid: No thyromegaly.      Vascular: No carotid bruit or JVD.   Cardiovascular:      Rate and Rhythm: Regular rhythm. Tachycardia present.      Heart sounds: Normal heart sounds. No murmur heard.    No friction rub. No gallop.   Pulmonary:      Effort: Pulmonary effort is normal. No respiratory distress.      Breath sounds: No stridor. Examination of the right-middle field reveals decreased breath sounds. Examination of the right-lower field reveals decreased breath sounds. Decreased breath sounds and rhonchi present. No wheezing or rales.   Chest:      Chest wall: No tenderness.      Comments: Right-sided chest tube is in place.  Abdominal:      General: Bowel sounds are normal. There is no distension.      Palpations: Abdomen is soft. There is no mass.      Tenderness: There is no abdominal tenderness. There is no right CVA tenderness, left CVA tenderness, guarding or rebound.      Hernia: No hernia is present.   Musculoskeletal:         General: No swelling, tenderness or deformity.      Cervical back: Neck supple.      Right lower leg: No edema.      Left lower leg: No edema.   Skin:     General: Skin is warm and dry.       Coloration: Skin is not jaundiced or pale.      Findings: No bruising, erythema, lesion or rash.   Neurological:      General: No focal deficit present.      Mental Status: He is alert and oriented to person, place, and time.      Cranial Nerves: No cranial nerve deficit.      Sensory: No sensory deficit.      Motor: No weakness or abnormal muscle tone.      Coordination: Coordination normal.      Gait: Gait normal.      Deep Tendon Reflexes: Reflexes normal.      Comments: .   Psychiatric:         Mood and Affect: Mood normal.         Behavior: Behavior normal.         Thought Content: Thought content normal.         Judgment: Judgment normal.           Medication Review:    Current Facility-Administered Medications:   •  cefepime (MAXIPIME) 2 g/100 mL 0.9% NS (mbp), 2 g, Intravenous, Q12H, Pierre Mehta MD, Last Rate: 0 mL/hr at 01/27/23 0135, 2 g at 02/02/23 0200  •  dextrose (D50W) (25 g/50 mL) IV injection 25 g, 25 g, Intravenous, Q15 Min PRN, Pierre Mehta MD  •  dextrose (GLUTOSE) oral gel 15 g, 15 g, Oral, Q15 Min PRN, Pierre Mehta MD  •  doxycycline (MONODOX) capsule 100 mg, 100 mg, Oral, Q12H, Pierre Mehta MD, 100 mg at 02/02/23 0944  •  FLUoxetine (PROzac) capsule 20 mg, 20 mg, Oral, Daily, Pierre Mehta MD, 20 mg at 02/02/23 0944  •  glucagon (human recombinant) (GLUCAGEN DIAGNOSTIC) injection 1 mg, 1 mg, Intramuscular, Q15 Min PRN, Pierre Mehta MD  •  guaiFENesin (MUCINEX) 12 hr tablet 600 mg, 600 mg, Oral, Q12H, Pierre Mehta MD, 600 mg at 02/02/23 0944  •  Insulin Aspart (novoLOG) injection 0-9 Units, 0-9 Units, Subcutaneous, TID AC, Pierre Mehta MD, 2 Units at 01/31/23 0849  •  ipratropium-albuterol (DUO-NEB) nebulizer solution 3 mL, 3 mL, Nebulization, 4x Daily - RT, Pierre Mehta MD, 3 mL at 02/02/23 0727  •  levothyroxine (SYNTHROID, LEVOTHROID) tablet 50 mcg, 50 mcg, Oral, Q AM, [facility], 50 mcg at 02/02/23 0625  •   morphine injection 2 mg, 2 mg, Intravenous, Q4H PRN, Pierre Mehta MD, 2 mg at 02/02/23 1051  •  nicotine (NICODERM CQ) 21 MG/24HR patch 1 patch, 1 patch, Transdermal, Q24H, Pierre Mehta MD, 1 patch at 02/02/23 0949  •  ondansetron (ZOFRAN) tablet 4 mg, 4 mg, Oral, Q6H PRN **OR** ondansetron (ZOFRAN) injection 4 mg, 4 mg, Intravenous, Q6H PRN, Pierre Mehta MD, 4 mg at 01/26/23 2107  •  pantoprazole (PROTONIX) injection 40 mg, 40 mg, Intravenous, Q AM, Pierre Mehta MD, 40 mg at 02/02/23 0625  •  predniSONE (DELTASONE) tablet 40 mg, 40 mg, Oral, Daily With Breakfast, Pierre Mehta MD, 40 mg at 02/02/23 0944  •  sodium chloride 0.9 % flush 10 mL, 10 mL, Intravenous, Q12H, Pierre Mehta MD, 10 mL at 02/02/23 0945  •  sodium chloride 0.9 % flush 10 mL, 10 mL, Intravenous, PRN, Pierre Mehta MD  •  sodium chloride 0.9 % flush 10 mL, 10 mL, Intravenous, Q12H, Pierre Mehta MD, 10 mL at 02/02/23 0945  •  sodium chloride 0.9 % flush 10 mL, 10 mL, Intravenous, Q12H, Pierre Mehta MD, 10 mL at 02/02/23 0945  •  sodium chloride 0.9 % flush 10 mL, 10 mL, Intravenous, PRN, Pierre Mehta MD  •  sodium chloride 0.9 % infusion 40 mL, 40 mL, Intravenous, PRN, Pierre Mehta MD  •  tamsulosin (FLOMAX) 24 hr capsule 0.8 mg, 0.8 mg, Oral, Daily, Pierre Mehta MD, 0.8 mg at 02/02/23 0944  •  traMADol (ULTRAM) tablet 50 mg, 50 mg, Oral, Q6H PRN, Pierre Mehta MD, 50 mg at 02/01/23 1210  •  traZODone (DESYREL) tablet 150 mg, 150 mg, Oral, Nightly, Pierre Mehta MD, 150 mg at 02/01/23 2038    Results Review:  I have reviewed the labs, radiology results, and diagnostic studies.    Laboratory Data:   Results from last 7 days   Lab Units 02/01/23  0659 01/31/23  0538 01/30/23  0442 01/29/23  0451   SODIUM mmol/L 135* 136 137 135*   POTASSIUM mmol/L 4.6 5.3* 5.0 5.1   CHLORIDE mmol/L 100 102 99 101   CO2 mmol/L 29.0 26.0 28.0 28.0   BUN mg/dL 58* 54* 40* 31*    CREATININE mg/dL 1.61* 1.71* 1.73* 1.62*   GLUCOSE mg/dL 135* 173* 106* 147*   CALCIUM mg/dL 9.0 8.6 9.1 9.2   BILIRUBIN mg/dL  --  0.4 0.3 0.3   ALK PHOS U/L  --  96 98 102   ALT (SGPT) U/L  --  44* 47* 45*   AST (SGOT) U/L  --  40 40 38   ANION GAP mmol/L 6.0 8.0 10.0 6.0     Estimated Creatinine Clearance: 48.4 mL/min (A) (by C-G formula based on SCr of 1.61 mg/dL (H)).          Results from last 7 days   Lab Units 02/01/23  0412 01/31/23  0538 01/30/23  0442 01/29/23  0337 01/28/23  0542   WBC 10*3/mm3 9.62 8.81 11.40* 11.50* 11.46*   HEMOGLOBIN g/dL 13.0 12.2* 12.8* 11.7* 12.5*   HEMATOCRIT % 40.5 38.3 41.1 35.5* 39.9   PLATELETS 10*3/mm3 188 192 161 251 202     Results from last 7 days   Lab Units 02/01/23  0412 01/27/23  0400   INR  1.23* 1.17       Culture Data:   No results found for: BLOODCX  No results found for: URINECX  No results found for: RESPCX  No results found for: WOUNDCX  No results found for: STOOLCX  No components found for: BODYFLD    Radiology Data:   Imaging Results (Last 24 Hours)     ** No results found for the last 24 hours. **          ABG:  Results from last 7 days   Lab Units 01/31/23  0742   PH, ARTERIAL pH units 7.420   PO2 ART mm Hg 74.2*   PCO2, ARTERIAL mm Hg 44.2   HCO3 ART mmol/L 28.6*       I have reviewed the patient's current medications.     Assessment/Plan     Hospital Problem List:  Principal Problem:    Acute on chronic respiratory failure with hypoxia (HCC)  Active Problems:    Mass of right lung    Sepsis due to pneumonia (HCC)    Loculated pleural effusion    Acute on chronic respiratory failure with hypoxia/sepsis (secondary to a combination of multifocal consolidation, COPD exacerbation right-sided pleural effusion): Patient is status postplacement of right-sided chest tube and was extubated on 1/31/2023.  Continue noninvasive respiratory therapy, bronchodilators, steroids and antimicrobial therapy.  Blood and pleural fluid cultures showed no growth.  Input by CT  surgery is appreciated.    Metastatic lung cancer: Patient is to follow-up with his primary oncologist postdischarge.    Chronic kidney disease: Patient's baseline creatinine is between 1.2-1.7.  Creatinine is at baseline.  Continue to monitor and consult nephrologist if the need arises.      Nutrition: Continue diet as recommended by SLP.      Deconditioning: Continue PT and OT.      Continue GI and DVT prophylaxis.    Medical Decision Making  Number and Complexity of problems: 4 with high complexities.      Conditions and Status: Hemodynamically stable.             MDM Data  External documents reviewed: Not applicable.     Decision rules/scores evaluated (example [id number], Wells, etc): Not applicable.     Discussed with: Patient, his wife and nursing staff.     Treatment Plan: As detailed above.    Care Planning  Shared decision making: Discussed with patient and his wife.  Code status and discussions: Full code.    Disposition  Social Determinants of Health that impact treatment or disposition: None.    I expect the patient to be discharged to home in 3-5 days.      I confirmed that the patient's Advance Care Plan is present, code status is documented, or surrogate decision maker is listed in the patient's medical record.     I have utilized all available immediate resources to obtain, update, or review the patient's current medications      Discharge Planning: In progress.    Pierre Mehta MD   02/02/23   11:05 CST

## 2023-02-02 NOTE — PLAN OF CARE
Goal Outcome Evaluation:  Plan of Care Reviewed With: (P) patient, family        Progress: (P) improving     RD follow up. Pt now extubated and tolerating oral diet. SLP advanced diet to mechanical soft this AM. Appetite is improving and intake is fair at 50% x2 and 25% x1 meals. Will provide chocolate boost GC and vanilla ice cream TID. RDN staff will monitor PO intake and weight through hospital course.

## 2023-02-03 NOTE — PROGRESS NOTES
"CTVS DAILY NOTE     LOS: 8 days   POD# 5 CT    Patient Care Team:  Emile Wynn MD as PCP - General (Family Medicine)  Asia Guaman APRN as Nurse Practitioner (Nurse Practitioner)  Gustabo Rincon MD as Consulting Physician (Hematology and Oncology)    Chief Complaint: pleural effusion, respiratory failure    Subjective:  Symptoms:  Improved.    Diet:  Poor intake.    Activity level: Impaired due to weakness.    Pain:  He reports no pain.        Vital Signs  Temp:  [96.9 °F (36.1 °C)-97.5 °F (36.4 °C)] 96.9 °F (36.1 °C)  Heart Rate:  [] 108  Resp:  [18-20] 20  BP: (132-157)/(75-91) 133/79  Body mass index is 29.33 kg/m².    Intake/Output Summary (Last 24 hours) at 2/3/2023 1119  Last data filed at 2/3/2023 1000  Gross per 24 hour   Intake 960 ml   Output 1570 ml   Net -610 ml     I/O this shift:  In: 240 [P.O.:240]  Out: 250 [Urine:250]    Wt Readings from Last 3 Encounters:   02/03/23 90.1 kg (198 lb 10.2 oz)   01/17/23 92.5 kg (204 lb)   01/16/23 92.6 kg (204 lb 1.6 oz)       Objective:  General Appearance:  In no acute distress, comfortable and ill-appearing.    Vital signs: (most recent): Blood pressure 133/79, pulse 108, temperature 96.9 °F (36.1 °C), temperature source Temporal, resp. rate 20, height 175.3 cm (69\"), weight 90.1 kg (198 lb 10.2 oz), SpO2 92 %.  Vital signs are normal.    Output: Producing urine.    Lungs:  Normal effort and normal respiratory rate.  ((R) CT serous drainage  No air leak)  Heart: Tachycardia.    Abdomen: Abdomen is soft.    Neurological: Patient is alert.            Results Review:      WBC No results found for: WBCS   HGB Hemoglobin   Date/Time Value Ref Range Status   02/03/2023 0529 13.8 13.0 - 17.7 g/dL Final   02/01/2023 0412 13.0 13.0 - 17.7 g/dL Final      HCT Hematocrit   Date/Time Value Ref Range Status   02/03/2023 0529 43.2 37.5 - 51.0 % Final   02/01/2023 0412 40.5 37.5 - 51.0 % Final      Platlets No results found for: LABPLAT "     PT/INR:    Protime   Date/Time Value Ref Range Status   02/01/2023 0412 15.4 (H) 11.1 - 15.3 Seconds Final   /  INR   Date/Time Value Ref Range Status   02/01/2023 0412 1.23 (H) 0.80 - 1.20 Final       Sodium Sodium   Date/Time Value Ref Range Status   02/03/2023 0529 136 136 - 145 mmol/L Final   02/01/2023 0659 135 (L) 136 - 145 mmol/L Final      Potassium Potassium   Date/Time Value Ref Range Status   02/03/2023 0529 4.8 3.5 - 5.2 mmol/L Final   02/01/2023 0659 4.6 3.5 - 5.2 mmol/L Final      Chloride Chloride   Date/Time Value Ref Range Status   02/03/2023 0529 100 98 - 107 mmol/L Final   02/01/2023 0659 100 98 - 107 mmol/L Final      Bicarbonate No results found for: PLASMABICARB   BUN BUN   Date/Time Value Ref Range Status   02/03/2023 0529 50 (H) 8 - 23 mg/dL Final   02/01/2023 0659 58 (H) 8 - 23 mg/dL Final      Creatinine Creatinine   Date/Time Value Ref Range Status   02/03/2023 0529 1.44 (H) 0.76 - 1.27 mg/dL Final   02/01/2023 0659 1.61 (H) 0.76 - 1.27 mg/dL Final      Calcium Calcium   Date/Time Value Ref Range Status   02/03/2023 0529 9.1 8.6 - 10.5 mg/dL Final   02/01/2023 0659 9.0 8.6 - 10.5 mg/dL Final      Magnesium No results found for: MG     Imaging Results (Last 72 Hours)     Procedure Component Value Units Date/Time    XR Chest 1 View [[id number]] Resulted: 02/03/23 0958     Updated: 02/03/23 1006    XR Chest 1 View [[id number]] Collected: 01/31/23 1114     Updated: 01/31/23 1220    Narrative:      EXAM: Chest, 1 AP view  CLINICAL INDICATION: Respiratory failure    COMPARISON: Chest radiographs 1/29/2023    FINDINGS:    The supporting tubes and lines are unchanged.    There are stable right greater than left interstitial opacities  redemonstrated. There is stable right hilar prominence.    There is no pleural effusion or large pneumothorax.  The cardiomediastinal silhouette is unchanged.    There is no acute or aggressive osseous lesion.      Impression:      CONCLUSION: Stable appearance of  chest as above.    Electronically signed by:  Tyler Arevalo DO  1/31/2023 12:17 PM  CST Workstation: [id number]            FLUoxetine, 20 mg, Oral, Daily  guaiFENesin, 600 mg, Oral, Q12H  Insulin Aspart, 0-9 Units, Subcutaneous, TID AC  ipratropium-albuterol, 3 mL, Nebulization, 4x Daily - RT  levothyroxine, 50 mcg, Oral, Q AM  nicotine, 1 patch, Transdermal, Q24H  pantoprazole, 40 mg, Intravenous, Q AM  polyethylene glycol, 17 g, Oral, Daily  predniSONE, 40 mg, Oral, Daily With Breakfast  senna-docusate sodium, 1 tablet, Oral, Nightly  sodium chloride, 10 mL, Intravenous, Q12H  sodium chloride, 10 mL, Intravenous, Q12H  sodium chloride, 10 mL, Intravenous, Q12H  tamsulosin, 0.8 mg, Oral, Daily  traZODone, 150 mg, Oral, Nightly             Patient Active Problem List   Diagnosis Code   • Essential hypertension I10   • Chronic coronary artery disease I25.10   • Mixed hyperlipidemia E78.2   • Tobacco abuse counseling Z71.6   • Dyspnea on exertion R06.09   • Acute retention of urine R33.8   • Anemia D64.9   • Osteoarthritis of hip M16.9   • Hypersomnia G47.10   • Hypotension I95.9   • Obstructive sleep apnea syndrome G47.33   • Diastolic dysfunction I51.89   • Morbid obesity due to excess calories ([facility]) E66.01   • Tobacco use disorder F17.200   • Calculus of ureter N20.1   • Preoperative cardiovascular examination Z01.810   • Enlarged prostate with urinary obstruction N40.1, N13.8   • Mass of right lung R91.8   • Chronic respiratory failure with hypoxia (HCC) J96.11   • Pleural effusion J90   • Acute on chronic respiratory failure with hypoxia ([facility]) J96.21   • Sepsis due to pneumonia ([facility]) J18.9, A41.9   • Loculated pleural effusion J90         Assessment:    Condition: In stable condition.  Improving.   (RIGHT Malignant pleural effusion-draining 300cc daily  RIGHT hilar mass- Small Cell CA  ).     Plan:   (DC CT Today-CXR Monday  Pleurx Catheter placement  Tentative Schedule Tues/Wed  ).           This document has been  electronically signed by BRIAN Alaniz on February 3, 2023 11:19 CST     BRIAN Alaniz

## 2023-02-03 NOTE — THERAPY DISCHARGE NOTE
Acute Care - Speech Language Pathology   Swallow Progress Note/Discharge [facility]     Patient Name: Aman Sanchez  : 1950  MRN: [id number]  Today's Date: 2/3/2023               Admit Date: 2023    Visit Dx:    ICD-10-CM ICD-9-CM   1. Acute on chronic respiratory failure with hypoxia (HCC)  J96.21 518.84     799.02   2. Lung mass  R91.8 786.6   3. Pneumonia of right lung due to infectious organism, unspecified part of lung  J18.9 483.8   4. Impaired functional mobility and activity tolerance  Z74.09 V49.89   5. Impaired mobility and ADLs  Z74.09 V49.89    Z78.9    6. Oral phase dysphagia  R13.11 787.21     Patient Active Problem List   Diagnosis   • Essential hypertension   • Chronic coronary artery disease   • Mixed hyperlipidemia   • Tobacco abuse counseling   • Dyspnea on exertion   • Acute retention of urine   • Anemia   • Osteoarthritis of hip   • Hypersomnia   • Hypotension   • Obstructive sleep apnea syndrome   • Diastolic dysfunction   • Morbid obesity due to excess calories (HCC)   • Tobacco use disorder   • Calculus of ureter   • Preoperative cardiovascular examination   • Enlarged prostate with urinary obstruction   • Mass of right lung   • Chronic respiratory failure with hypoxia (HCC)   • Pleural effusion   • Acute on chronic respiratory failure with hypoxia (HCC)   • Sepsis due to pneumonia (HCC)   • Loculated pleural effusion     Past Medical History:   Diagnosis Date   • Acute bronchitis    • Anemia    • Breast lump    • Cervical radiculopathy    • COPD (chronic obstructive pulmonary disease) (HCC)    • Coronary atherosclerosis     2 stents, followed by Dr. Hargrove   • Depressive disorder    • Disease of thyroid gland    • Dysuria    • Essential hypertension    • Flank pain    • Hematochezia    • Hematuria syndrome    • Hyperlipidemia    • Hypoglycemia    • Kidney stone    • Kidney stones    • Lung cancer (HCC)    • Mass of neck    • MI (myocardial infarction) (HCC)    •  Neck pain     sprain   • Osteoarthritis    • PONV (postoperative nausea and vomiting)    • Sleep apnea     using c-pap   • Sleep apnea    • Type 2 diabetes mellitus (HCC)      Past Surgical History:   Procedure Laterality Date   • BACK SURGERY     • CARDIAC CATHETERIZATION N/A 08/23/2017    Procedure: Right Heart Cath;  Surgeon: Mariposa Zamorano MD;  Location: [facility] INVASIVE LOCATION;  Service:    • CATARACT EXTRACTION WITH INTRAOCULAR LENS IMPLANT Bilateral    • CHOLECYSTECTOMY     • CYSTOSCOPY  08/29/2014    Left ESWL, cysto and stent removal   • CYSTOSCOPY W/ LITHOLAPAXY / EHL  08/15/2014    Left ESWL   • CYSTOSCOPY, RETROGRADE PYELOGRAM AND STENT INSERTION  12/29/2022   • CYSTOSCOPY, URETEROSCOPY, RETROGRADE PYELOGRAM, STENT INSERTION Left 06/30/2017    Procedure: CYSTOSCOPY, LEFT URETEROSCOPY, RETROGRADE PYELOGRAM, HOLMIUM LASER, STENT INSERTION;  Surgeon: Uday Horne MD;  Location: [facility];  Service:    • CYSTOSCOPY, URETEROSCOPY, RETROGRADE PYELOGRAM, STENT INSERTION Left 09/06/2017    Procedure: CYSTOSCOPY, LEFT URETEROSCOPY, RETROGRADE PYELOGRAM, HOLMIUM LASER, STENT INSERTION;  Surgeon: Uday Horne MD;  Location: [facility];  Service:    • CYSTOSCOPY, URETEROSCOPY, RETROGRADE PYELOGRAM, STENT INSERTION Left 01/17/2018    Procedure: CYSTOSCOPY LEFT URETEROSCOPY RETROGRADE PYELOGRAM HOLMIUM LASER STENT INSERTION;  Surgeon: Uday Horne MD;  Location: [facility];  Service:    • CYSTOSCOPY, URETEROSCOPY, RETROGRADE PYELOGRAM, STENT INSERTION Left 07/25/2018    Procedure: CYSTOSCOPY URETEROSCOPY RETROGRADE PYELOGRAM HOLMIUM LASER STENT INSERTION;  Surgeon: Uday Horne MD;  Location: [facility];  Service: Urology   • EYE SURGERY      eye lids lifted   • HIP SURGERY     • INJECTION OF MEDICATION  11/11/2013    Kenalog   • INJECTION OF MEDICATION  01/16/2014    Toradol   • JOINT REPLACEMENT Right 2012    total hip replacement   • SKIN GRAFT      on his foot; removed skin from hip   •  "TONSILLECTOMY     • TRANSESOPHAGEAL ECHOCARDIOGRAM (ОЛЬГА)  07/21/2014    with color flow-Normal LV systolic function with Ef of 60-65%/ Grad1 diastolic dysfunction of the left ventricular myocardium. No evidence of pericardial effusion       SLP Recommendation and Plan     SLP Diet Recommendation: thin liquids, soft to chew textures, chopped (02/03/23 1139)                    Therapy Frequency (Swallow): 3 days per week, 5 days per week (02/03/23 1139)  Predicted Duration Therapy Intervention (Days): until discharge (02/03/23 1139)     Daily Summary of Progress (SLP): progress toward functional goals as expected (02/03/23 1139)                       Treatment Assessment (SLP): improved, oral dysphagia, pharyngeal dysphagia (02/03/23 1139)  Treatment Assessment Comments (SLP): ST: pt seen for dysphagia tx.  pt demonstrated independence in safe swallow strategies with solids and liquids even checking his o2 multiple times during PO to ensure above 90 (it stayed  above 95%).  overall intake is poor and education provided for increasing caloric intake with \"easier to eat foods\" and suppliments.  Pt swallow function is in tact.  His risk of aspiration is d/t air hunger and frequent use of forced air (Cpap).   ST to d/c skilled services at this time with goals met. (02/03/23 1139)  Plan for Continued Treatment (SLP): treatment no longer indicated as all goals met (02/03/23 1139)    Plan of Care Reviewed With: patient (02/03/23 1305)  Progress: improving (02/03/23 1305)  Outcome Evaluation: ST: pt seen for dysphagia tx.  pt demonstrated independence in safe swallow strategies with solids and liquids even checking his o2 multiple times during PO to ensure above 90 (it stayed  above 95%).  overall intake is poor and education provided for increasing caloric intake with \"easier to eat foods\" and suppliments.  Pt swallow function is in tact.  His risk of aspiration is d/t air hunger and frequent use of forced air (Cpap).   ST " to d/c skilled services at this time with goals met. (02/03/23 1305)    SWALLOW EVALUATION (last 72 hours)     SLP Adult Swallow Evaluation     Row Name 02/03/23 1139 02/02/23 0906 02/01/23 1030             Rehab Evaluation    Document Type discharge treatment  -EC therapy note (daily note)  -CK re-evaluation  -CK      Total Evaluation Minutes, SLP -- -- 59  -CK      Subjective Information no complaints  -EC no complaints  -CK complains of;weakness;fatigue  -CK      Patient Observations alert;cooperative;agree to therapy  -EC alert;cooperative;agree to therapy  -CK alert;cooperative;agree to therapy  -CK      Patient/Family/Caregiver Comments/Observations wife and sister  -EC Wife, sister, and brother in law at bedside  -CK wife and sister at bedside  -CK      Patient Effort good  -EC good  -CK adequate  -CK         General Information    Patient Profile Reviewed yes  -EC yes  -CK yes  -CK      Current Method of Nutrition thin liquids;mechanical ground textures  -EC pureed;thin liquids  -CK NPO;nasogastric feedings  -CK      Precautions/Limitations, Vision WFL;for purposes of eval  -EC WFL;for purposes of eval  -CK WFL;for purposes of eval  -CK      Precautions/Limitations, Hearing WFL;for purposes of eval  -EC WFL;for purposes of eval  -CK WFL;for purposes of eval  -CK      Barriers to Rehab medically complex  -EC medically complex  -CK medically complex  -CK         Pain Scale: Numbers Pre/Post-Treatment    Pretreatment Pain Rating 0/10 - no pain  -EC 0/10 - no pain  -CK 0/10 - no pain  -CK      Posttreatment Pain Rating 0/10 - no pain  -EC 0/10 - no pain  -CK 0/10 - no pain  -CK         Oral Motor Structure and Function    Dentition Assessment natural, present and adequate  -EC natural, present and adequate  -CK natural, present and adequate  -CK      Secretion Management WNL/WFL  -EC WNL/WFL  -CK WNL/WFL  -CK      Mucosal Quality moist, healthy  -EC moist, healthy  -CK moist, healthy  -CK      Gag Response WFL   -EC WFL  -CK WFL  -CK      Volitional Swallow WFL  -EC WFL  -CK WFL  -CK      Volitional Cough reduced respiratory support  -EC reduced respiratory support  -CK reduced respiratory support  -CK         General Eating/Swallowing Observations    Respiratory Support Currently in Use nasal cannula  -EC nasal cannula  -CK nasal cannula  -CK      O2 Liters 4L  -EC 4L  -CK 4L  -CK      Eating/Swallowing Skills self-fed  -EC self-fed  -CK self-fed  -CK      Positioning During Eating upright 90 degree;upright in bed  -EC upright 90 degree;upright in bed  -CK upright 90 degree;upright in bed  -CK      Utensils Used spoon;straw  -EC spoon;straw  -CK spoon;straw  -CK      Consistencies Trialed thin liquids;mechanical ground textures;mixed consistency  -EC thin liquids;mechanical ground textures;mixed consistency  -CK thin liquids;regular textures;pureed  -CK      Pre SpO2 (%) 94  -EC -- 91  -CK      Post SpO2 (%) 97  -EC -- 93  -CK         Clinical Swallow Eval    Oral Prep Phase -- impaired  -CK impaired  -CK      Oral Transit WFL  -EC WFL  -CK WFL  -CK      Oral Residue WFL  -EC WFL  -CK WFL  -CK      Pharyngeal Phase no overt signs/symptoms of pharyngeal impairment  -EC no overt signs/symptoms of pharyngeal impairment  -CK no overt signs/symptoms of pharyngeal impairment  -CK      Esophageal Phase unremarkable  -EC unremarkable  -CK unremarkable  -CK      Clinical Swallow Evaluation Summary Pt tolerates mech soft solids, puree solids, and thin liquids via straw.  pt given trials of chopped meats.  He demonstrated independent use of compensatory strategies of small bolus, complete chewing, one bite/sip at a time, slow rate, breathe in between bites.  overall, pt taking in small amounts of PO.   education for easy to eat foods with more nutrtition in order to make each bite/sip count for more nutrtitional value.  -EC -- Pt seen for skilled ST services this date to re-assess swallow function s/p extubation.  Pt was d/c'd on  1/27 prior to intubation on regular solids/thin liquids and wife reported no hx of dysphagia.  Pt did present as very weak and fatigued easily.  Pt consumed ice chips w/good hyolaryngeal elevation via palpation.  Pt consumed thin liquids via spoon, cup rim, and straw w/no overt s/s of aspiration.  Pt did initally present w/poor labial seal around spoon and cup, but demonstrated adequate seal w/straw.  Pt consumed limited puree solids w/no oral deficits.  Pt consumed mech soft solids x1 w/increased oral prep d/t prolonged mastication likely d/t weakness.  SLP recommends advancing pt's diet to puree/thin liquids; RN made aware.  SLP educated pt, pt's wife and sister on A&P of the swallow, how aspiration can occur, and the r/o aspiration and aspiration PNE using animation from ipad, and discussed use of straw and how the flow and amount of liquids changes; both verbalized understanding.  SLP to f/u next date for diet safety and toleration and trials of [facility] soft as appropriate.  -CK         Oral Prep Concerns    Oral Prep Concerns -- prolonged mastication;increased prep time  -CK prolonged mastication;incomplete or weak lip closure around spoon;increased prep time  -CK      Prolonged Mastication -- mechanical soft  -CK mechanical soft  -CK      Incomplete or Weak Lip Closure Around Spoon -- -- thin;mechanical soft  -CK      Increased Prep Time -- mechanical soft  -CK mechanical soft  -CK         Swallowing Quality of Life Assessment    Education and counseling provided -- Safest diet options;Compensatory strategy recommendations and rationale  -CK --         SLP Evaluation Clinical Impression    SLP Swallowing Diagnosis -- swallow WFL/no suspected pharyngeal impairment  -CK swallow WFL/no suspected pharyngeal impairment  -CK      Functional Impact -- no impact on function  -CK no impact on function  -CK      Rehab Potential/Prognosis, Swallowing -- good, to achieve stated therapy goals  -CK good, to achieve stated  "therapy goals  -CK      Swallow Criteria for Skilled Therapeutic Interventions Met -- not appropriate  -CK not appropriate  -CK         SLP Treatment Clinical Impressions    Treatment Assessment (SLP) improved;oral dysphagia;pharyngeal dysphagia  -EC improved;oral dysphagia  -CK --      Treatment Assessment Comments (SLP) ST: pt seen for dysphagia tx.  pt demonstrated independence in safe swallow strategies with solids and liquids even checking his o2 multiple times during PO to ensure above 90 (it stayed  above 95%).  overall intake is poor and education provided for increasing caloric intake with \"easier to eat foods\" and suppliments.  Pt swallow function is in tact.  His risk of aspiration is d/t air hunger and frequent use of forced air (Cpap).   ST to d/c skilled services at this time with goals met.  -EC Pt seen for skilled ST services this date to address oral dysphagia.  Pt was repositioned to upright in bed by CNA and SLP.  Pt was able to self feed this date.  Pt presented w/laboured breathing and SLP encouraged slow rate to which pt followed.  Pt requested cheerios, to which SLP supplied.  Pt demonstrated increased oral prep time d/t prolonged mastication 2/2 weakness; however, mastication was efficient.  Pt was able to clear oral cavity and demonstrated timely AP transit.  SLP recommends advancing pt's diet to mechanical soft solids w/ground meats and POC to trial chopped meats.  Pt and family in agreement.  Informational board updated w/diet and dietary informed of modification.  -CK --      Daily Summary of Progress (SLP) progress toward functional goals as expected  -EC progress toward functional goals as expected  -CK --      Barriers to Overall Progress (SLP) Medically complex  -EC Lethargy;Fatigue  -CK --      Plan for Continued Treatment (SLP) treatment no longer indicated as all goals met  -EC continue treatment per plan of care  -CK --      Care Plan Review evaluation/treatment results " reviewed;care plan/treatment goals reviewed;risks/benefits reviewed;current/potential barriers reviewed;patient/other agree to care plan  -EC evaluation/treatment results reviewed  -CK evaluation/treatment results reviewed;care plan/treatment goals reviewed;risks/benefits reviewed;current/potential barriers reviewed;patient/other agree to care plan  -CK      Care Plan Review, Other Participant(s) spouse;sibling  -EC spouse;sibling;family  -CK spouse;sibling  -CK         Recommendations    Therapy Frequency (Swallow) 3 days per week;5 days per week  -EC 3 days per week;5 days per week  -CK 3 days per week;5 days per week  -CK      Predicted Duration Therapy Intervention (Days) until discharge  -EC until discharge  -CK until discharge  -CK      SLP Diet Recommendation thin liquids;soft to chew textures;chopped  -EC thin liquids;soft to chew textures;ground  -CK puree;thin liquids  -CK      Recommended Precautions and Strategies upright posture during/after eating;small bites of food and sips of liquid;fatigue precautions;assist with feeding;general aspiration precautions  -EC upright posture during/after eating;small bites of food and sips of liquid;fatigue precautions;assist with feeding;general aspiration precautions  -CK upright posture during/after eating;small bites of food and sips of liquid;fatigue precautions;assist with feeding;general aspiration precautions  -CK      Oral Care Recommendations Oral Care BID/PRN  -EC Oral Care BID/PRN  -CK Oral Care BID/PRN  -CK      SLP Rec. for Method of Medication Administration as tolerated  -EC as tolerated  -CK as tolerated  -CK         Swallow Goals (SLP)    Swallow LTGs Patient will demonstrate functional swallow for  -EC Patient will demonstrate functional swallow for  -CK Patient will demonstrate functional swallow for  -CK      Swallow STGs diet tolerance goal selection (SLP)  -EC diet tolerance goal selection (SLP)  -CK diet tolerance goal selection (SLP)  -CK       Diet Tolerance Goal Selection (SLP) Patient will tolerate trials of  -EC Patient will tolerate trials of  -CK Patient will tolerate trials of  -CK         (LTG) Patient will demonstrate functional swallow for    Diet Texture (Demonstrate functional swallow) soft to chew (chopped) textures  -EC soft to chew (chopped) textures  -CK regular textures  -CK      Liquid viscosity (Demonstrate functional swallow) thin liquids  -EC thin liquids  -CK thin liquids  -CK      [place] (Demonstrate functional swallow) independently (over 90% accuracy)  -EC independently (over 90% accuracy)  -CK independently (over 90% accuracy)  -CK      Time Frame (Demonstrate functional swallow) by discharge  -EC by discharge  -CK by discharge  -CK      Barriers (Demonstrate functional swallow) weakness; fatigue  -EC weakness; fatigue  -CK weakness; fatigue  -CK      Progress/Outcomes (Demonstrate functional swallow) good progress toward goal;goal met  -EC good progress toward goal;goal ongoing;goal revised this date  -CK new goal  -CK      Comment (Demonstrate functional swallow) advance  diet to chopped meats  -EC Goal revised to chopped meats per wife stating that is baseline d/t poor dentition  -CK --         (STG) Patient will tolerate trials of    Consistencies Trialed (Tolerate trials) soft to chew (chopped) textures;thin liquids  -EC regular textures;soft to chew (chopped) textures;soft to chew (ground) textures;pureed textures;thin liquids  -CK regular textures;soft to chew (chopped) textures;soft to chew (ground) textures;pureed textures;thin liquids  -CK      Desired Outcome (Tolerate trials) without signs/symptoms of aspiration;with adequate oral prep/transit/clearance  -EC without signs/symptoms of aspiration;with adequate oral prep/transit/clearance  -CK without signs/symptoms of aspiration;with adequate oral prep/transit/clearance  -CK      [place] (Tolerate trials) independently (over 90% accuracy)  -EC independently  (over 90% accuracy)  -CK independently (over 90% accuracy)  -CK      Time Frame (Tolerate trials) by discharge  -EC by discharge  -CK by discharge  -CK      Progress/Outcomes (Tolerate trials) good progress toward goal;goal met  -EC goal partially met;good progress toward goal;goal ongoing  -CK new goal  -CK            User Key  (r) = Recorded By, (t) = Taken By, (c) = Cosigned By    Initials Name Effective Dates    EC Samina aPrk, CCC-SLP 06/16/21 -     CK Alaina Thorne, MS CCC-SLP 06/16/21 -                 EDUCATION  The patient has been educated in the following areas:   Dysphagia (Swallowing Impairment) Modified Diet Instruction energy conservation and s/s of aspiration.         SLP GOALS     Row Name 02/03/23 1139 02/02/23 0906 02/01/23 1030       (LTG) Patient will demonstrate functional swallow for    Diet Texture (Demonstrate functional swallow) soft to chew (chopped) textures  -EC soft to chew (chopped) textures  -CK regular textures  -CK    Liquid viscosity (Demonstrate functional swallow) thin liquids  -EC thin liquids  -CK thin liquids  -CK    [place] (Demonstrate functional swallow) independently (over 90% accuracy)  -EC independently (over 90% accuracy)  -CK independently (over 90% accuracy)  -CK    Time Frame (Demonstrate functional swallow) by discharge  -EC by discharge  -CK by discharge  -CK    Barriers (Demonstrate functional swallow) weakness; fatigue  -EC weakness; fatigue  -CK weakness; fatigue  -CK    Progress/Outcomes (Demonstrate functional swallow) good progress toward goal;goal met  -EC good progress toward goal;goal ongoing;goal revised this date  -CK new goal  -CK    Comment (Demonstrate functional swallow) advance  diet to chopped meats  -EC Goal revised to chopped meats per wife stating that is baseline d/t poor dentition  -CK --       (STG) Patient will tolerate trials of    Consistencies Trialed (Tolerate trials) soft to chew (chopped) textures;thin liquids  -EC  regular textures;soft to chew (chopped) textures;soft to chew (ground) textures;pureed textures;thin liquids  -CK regular textures;soft to chew (chopped) textures;soft to chew (ground) textures;pureed textures;thin liquids  -CK    Desired Outcome (Tolerate trials) without signs/symptoms of aspiration;with adequate oral prep/transit/clearance  -EC without signs/symptoms of aspiration;with adequate oral prep/transit/clearance  -CK without signs/symptoms of aspiration;with adequate oral prep/transit/clearance  -CK    [facility] (Tolerate trials) independently (over 90% accuracy)  -EC independently (over 90% accuracy)  -CK independently (over 90% accuracy)  -CK    Time Frame (Tolerate trials) by discharge  -EC by discharge  -CK by discharge  -CK    Progress/Outcomes (Tolerate trials) good progress toward goal;goal met  -EC goal partially met;good progress toward goal;goal ongoing  -CK new goal  -CK          User Key  (r) = Recorded By, (t) = Taken By, (c) = Cosigned By    Initials Name Provider Type    Samina Smith CCC-SLP Speech and Language Pathologist    CK Alaina Thorne, MS CCC-SLP Speech and Language Pathologist                     Time Calculation:    Time Calculation- SLP     Row Name 02/03/23 1308             Time Calculation- SLP    SLP Start Time 1139  -EC      SLP Stop Time 1202  -EC      SLP Time Calculation (min) 23 min  -EC      Total Timed Code Minutes- SLP 23 minute(s)  -EC      SLP Received On 02/03/23  -EC      SLP Goal Re-Cert Due Date 02/15/23  -EC         Untimed Charges    61232-IU Treatment Swallow Minutes 23  -EC         Total Minutes    Untimed Charges Total Minutes 23  -EC       Total Minutes 23  -EC            User Key  (r) = Recorded By, (t) = Taken By, (c) = Cosigned By    Initials Name Provider Type    Samina Smith CCC-SLP Speech and Language Pathologist                Therapy Charges for Today     Code Description Service Date Service Provider Modifiers Qty     [id number] [facility] TREATMENT SWALLOW 2 2/3/2023 Samina Park, SHARMILA-SLP GN 1                    ELADIO Jimenez  2/3/2023

## 2023-02-03 NOTE — PLAN OF CARE
Goal Outcome Evaluation:  Plan of Care Reviewed With: patient, spouse           Outcome Evaluation: pt t/fers sup to sit w/ CGA, sits EOB w/ SBA/CGA, pt w/ posterior lean w/ fatigue and exertion, pt performs B LE seated ther ex x 10 reps while EOB, able to scoot self to HOB while sitting EOB, pt requires Min x 1 for sit to sup

## 2023-02-03 NOTE — PROGRESS NOTES
Discharge Planning Assessment  [facility]     Patient Name: Aman Sanchez  MRN: [id number]  Today's Date: 2/3/2023    Admit Date: 1/26/2023    Plan: Pneumonia - Inpatient  MATHEUS Jonas RNCM   Discharge Needs Assessment     Row Name 02/03/23 1538       Living Environment    People in Home spouse    Current Living Arrangements home    Primary Care Provided by self    Provides Primary Care For no one    Family Caregiver if Needed spouse    Quality of Family Relationships helpful    Able to Return to Prior Arrangements yes       Resource/Environmental Concerns    Resource/Environmental Concerns none    Transportation Concerns none       Food Insecurity    Within the past 12 months, you worried that your food would run out before you got the money to buy more. Never true    Within the past 12 months, the food you bought just didn't last and you didn't have money to get more. Never true       Transition Planning    Patient/Family Anticipates Transition to home with help/services;home with family    Patient/Family Anticipated Services at Transition home health care    Transportation Anticipated family or friend will provide       Discharge Needs Assessment    Equipment Currently Used at Home cpap;oxygen    Concerns to be Addressed denies needs/concerns at this time    Anticipated Changes Related to Illness inability to care for self    Equipment Needed After Discharge none    Discharge Facility/Level of Care Needs home with home health    Patient's Choice of Community Agency(s) used [facility]    Discharge Coordination/Progress pt states was independent prior to dc.   no issues with rx or transporation.    has o2 from [employer] and cpap from [employer].  used [facility] for hh in the past.  will be seen at [facility] in the [facility].               Discharge Plan     Row Name 02/03/23 1512       Plan    Plan Comments per pm huddle with dr finch.  pt cont to have chest tubes.    has been advanced to [facility] soft diet.     hr 108  rr 20  925 on 4 lites.    per dr finch, pt may need a pleurax cath.  will dc once CT clears.  tandyc rn              Continued Care and Services - Admitted Since 1/26/2023    Coordination has not been started for this encounter.       Expected Discharge Date and Time     Expected Discharge Date Expected Discharge Time    Feb 6, 2023          Demographic Summary     Row Name 02/03/23 1537       General Information    Admission Type inpatient    Arrived From home    Required Notices Provided Important Message from Medicare    Referral Source other (see comments)  social determinate    Reason for Consult other (see comments)  mandated    Preferred Language English    General Information Comments confirmed face sheet and pharmacy               Functional Status    No documentation.                Psychosocial    No documentation.                Abuse/Neglect    No documentation.                Legal    No documentation.                Substance Abuse    No documentation.                Patient Forms    No documentation.                   Luh Eugene RN

## 2023-02-03 NOTE — THERAPY TREATMENT NOTE
Patient Name: Aman Sanchez  : 1950    MRN: [id number]                              Today's Date: 2/3/2023       Admit Date: 2023    Visit Dx:     ICD-10-CM ICD-9-CM   1. Acute on chronic respiratory failure with hypoxia (HCC)  J96.21 518.84     799.02   2. Lung mass  R91.8 786.6   3. Pneumonia of right lung due to infectious organism, unspecified part of lung  J18.9 483.8   4. Impaired functional mobility and activity tolerance  Z74.09 V49.89   5. Impaired mobility and ADLs  Z74.09 V49.89    Z78.9    6. Oral phase dysphagia  R13.11 787.21     Patient Active Problem List   Diagnosis   • Essential hypertension   • Chronic coronary artery disease   • Mixed hyperlipidemia   • Tobacco abuse counseling   • Dyspnea on exertion   • Acute retention of urine   • Anemia   • Osteoarthritis of hip   • Hypersomnia   • Hypotension   • Obstructive sleep apnea syndrome   • Diastolic dysfunction   • Morbid obesity due to excess calories (HCC)   • Tobacco use disorder   • Calculus of ureter   • Preoperative cardiovascular examination   • Enlarged prostate with urinary obstruction   • Mass of right lung   • Chronic respiratory failure with hypoxia (HCC)   • Pleural effusion   • Acute on chronic respiratory failure with hypoxia (HCC)   • Sepsis due to pneumonia (HCC)   • Loculated pleural effusion     Past Medical History:   Diagnosis Date   • Acute bronchitis    • Anemia    • Breast lump    • Cervical radiculopathy    • COPD (chronic obstructive pulmonary disease) (HCC)    • Coronary atherosclerosis     2 stents, followed by Dr. Hargrove   • Depressive disorder    • Disease of thyroid gland    • Dysuria    • Essential hypertension    • Flank pain    • Hematochezia    • Hematuria syndrome    • Hyperlipidemia    • Hypoglycemia    • Kidney stone    • Kidney stones    • Lung cancer (HCC)    • Mass of neck    • MI (myocardial infarction) (HCC)    • Neck pain     sprain   • Osteoarthritis    • PONV (postoperative nausea  and vomiting)    • Sleep apnea     using c-pap   • Sleep apnea    • Type 2 diabetes mellitus (HCC)      Past Surgical History:   Procedure Laterality Date   • BACK SURGERY     • CARDIAC CATHETERIZATION N/A 08/23/2017    Procedure: Right Heart Cath;  Surgeon: Mariposa Zamorano MD;  Location: [facility] INVASIVE LOCATION;  Service:    • CATARACT EXTRACTION WITH INTRAOCULAR LENS IMPLANT Bilateral    • CHOLECYSTECTOMY     • CYSTOSCOPY  08/29/2014    Left ESWL, cysto and stent removal   • CYSTOSCOPY W/ LITHOLAPAXY / EHL  08/15/2014    Left ESWL   • CYSTOSCOPY, RETROGRADE PYELOGRAM AND STENT INSERTION  12/29/2022   • CYSTOSCOPY, URETEROSCOPY, RETROGRADE PYELOGRAM, STENT INSERTION Left 06/30/2017    Procedure: CYSTOSCOPY, LEFT URETEROSCOPY, RETROGRADE PYELOGRAM, HOLMIUM LASER, STENT INSERTION;  Surgeon: Uday Horne MD;  Location: [facility];  Service:    • CYSTOSCOPY, URETEROSCOPY, RETROGRADE PYELOGRAM, STENT INSERTION Left 09/06/2017    Procedure: CYSTOSCOPY, LEFT URETEROSCOPY, RETROGRADE PYELOGRAM, HOLMIUM LASER, STENT INSERTION;  Surgeon: Uday Horne MD;  Location: [facility];  Service:    • CYSTOSCOPY, URETEROSCOPY, RETROGRADE PYELOGRAM, STENT INSERTION Left 01/17/2018    Procedure: CYSTOSCOPY LEFT URETEROSCOPY RETROGRADE PYELOGRAM HOLMIUM LASER STENT INSERTION;  Surgeon: Uday Horne MD;  Location: [facility];  Service:    • CYSTOSCOPY, URETEROSCOPY, RETROGRADE PYELOGRAM, STENT INSERTION Left 07/25/2018    Procedure: CYSTOSCOPY URETEROSCOPY RETROGRADE PYELOGRAM HOLMIUM LASER STENT INSERTION;  Surgeon: Uday Horne MD;  Location: [facility];  Service: Urology   • EYE SURGERY      eye lids lifted   • HIP SURGERY     • INJECTION OF MEDICATION  11/11/2013    Kenalog   • INJECTION OF MEDICATION  01/16/2014    Toradol   • JOINT REPLACEMENT Right 2012    total hip replacement   • SKIN GRAFT      on his foot; removed skin from hip   • TONSILLECTOMY     • TRANSESOPHAGEAL ECHOCARDIOGRAM (ОЛЬГА)  07/21/2014    with color  flow-Normal LV systolic function with Ef of 60-65%/ Grad1 diastolic dysfunction of the left ventricular myocardium. No evidence of pericardial effusion      General Information     Row Name 02/03/23 1423          OT Time and Intention    Document Type therapy note (daily note)  -     Mode of Treatment individual therapy;occupational therapy  -[facility] Name 02/03/23 1423          General Information    Patient Profile Reviewed yes  -     Existing Precautions/Restrictions fall  -[facility] Name 02/03/23 1423          Cognition    Orientation Status (Cognition) oriented x 3;disoriented to;place  -[facility] Name 02/03/23 1423          Safety Issues, Functional Mobility    Impairments Affecting Function (Mobility) endurance/activity tolerance;strength;shortness of breath;balance;pain;postural/trunk control  -           User Key  (r) = Recorded By, (t) = Taken By, (c) = Cosigned By    Initials Name Provider Type    Inez Morse, HERMILA Occupational Therapist                 Mobility/ADL's     Row Name 02/03/23 1423          Bed Mobility    Bed Mobility supine-sit;sit-supine;scooting/bridging  -     Scooting/Bridging [place] (Bed Mobility) contact guard;verbal cues  -     Supine-Sit [place] (Bed Mobility) contact guard;1 person assist;verbal cues  -     Sit-Supine [place] (Bed Mobility) verbal cues  -     Assistive Device (Bed Mobility) bed rails;head of bed elevated  -[facility] Name 02/03/23 1423          Transfers    Transfers toilet transfer;sit-stand transfer;stand-sit transfer  -[facility] Name 02/03/23 1423          Sit-Stand Transfer    Sit-Stand [place] (Transfers) moderate assist (50% patient effort)  -[facility] Name 02/03/23 1423          Stand-Sit Transfer    Stand-Sit [place] (Transfers) moderate assist (50% patient effort)  -[facility] Name 02/03/23 1423          Toilet Transfer    Type (Toilet Transfer) stand pivot/stand step  -     [place] Level (Toilet  Transfer) maximum assist (25% patient effort)  -     Assistive Device (Toilet Transfer) commode, bedside with drop arms  -     Row Name 02/03/23 1423          Activities of Daily Living    BADL Assessment/Intervention grooming;toileting  -     Row Name 02/03/23 1423          Grooming Assessment/Training    [place] Level (Grooming) oral care regimen;standby assist  -     Position (Grooming) edge of bed sitting  -     Row Name 02/03/23 1423          Toileting Assessment/Training    [place] Level (Toileting) toileting skills;standby assist  -     Position (Toileting) supported sitting  -           User Key  (r) = Recorded By, (t) = Taken By, (c) = Cosigned By    Initials Name Provider Type    Inez Morse OT Occupational Therapist               Obj/Interventions    No documentation.                Goals/Plan     Row Name 02/03/23 1423          Transfer Goal 1 (OT)    Activity/Assistive Device (Transfer Goal 1, OT) toilet  -     [place] Level/Cues Needed (Transfer Goal 1, OT) minimum assist (75% or more patient effort)  -     Time Frame (Transfer Goal 1, OT) long term goal (LTG);by discharge  -     Progress/Outcome (Transfer Goal 1, OT) goal not met  -     Row Name 02/03/23 1423          Bathing Goal 1 (OT)    Activity/Device (Bathing Goal 1, OT) lower body bathing  -     [place] Level/Cues Needed (Bathing Goal 1, OT) minimum assist (75% or more patient effort)  -     Time Frame (Bathing Goal 1, OT) long term goal (LTG);by discharge  -     Progress/Outcomes (Bathing Goal 1, OT) goal not met  -     Row Name 02/03/23 1423          Dressing Goal 1 (OT)    Activity/Device (Dressing Goal 1, OT) lower body dressing  -     [place]/Cues Needed (Dressing Goal 1, OT) minimum assist (75% or more patient effort)  -     Time Frame (Dressing Goal 1, OT) long term goal (LTG);by discharge  -     Progress/Outcome (Dressing Goal 1, OT) goal not met  -     Row Name  02/03/23 1423          Toileting Goal 1 (OT)    Activity/Device (Toileting Goal 1, OT) toileting skills, all  -     [place] Level/Cues Needed (Toileting Goal 1, OT) minimum assist (75% or more patient effort);independent  -     Time Frame (Toileting Goal 1, OT) long term goal (LTG);by discharge  -     Progress/Outcome (Toileting Goal 1, OT) goal revised this date;goal met  -     Row Name 02/03/23 1423          Grooming Goal 1 (OT)    Activity/Device (Grooming Goal 1, OT) grooming skills, all  -     [place] (Grooming Goal 1, OT) set-up required  -     Time Frame (Grooming Goal 1, OT) long term goal (LTG);by discharge  -     Progress/Outcome (Grooming Goal 1, OT) goal not met  -           User Key  (r) = Recorded By, (t) = Taken By, (c) = Cosigned By    Initials Name Provider Type     Inez Young, OT Occupational Therapist               Clinical Impression     Row Name 02/03/23 1423          Pain Assessment    Pretreatment Pain Rating 10/10  -     Pain Location lower  -     Pain Location - back  -     Pain Intervention(s) Repositioned;Ambulation/increased activity;Distraction  -     Row Name 02/03/23 1423          Plan of Care Review    Plan of Care Reviewed With patient;spouse  -     Progress improving  -     Outcome Evaluation OT tx completed. Pt fowlers in bed, pleasant, and agreeable to the session. Pt is CGA for sup<>sit with the HOB completed elevated and bed rails. Pt completed oral care routine while sitting EOB while supported R elbow on bedside table with SBA. Pt completed sit to stand with mod A. Pt completed stand pivot t/f to C with max A. Pt completed toileting skills with SBA. Pt CGA with VCs for sit<>sup and for scooting up in bed. Pt is making good progress to his goals, 1 goal met and updated this date. Pt will require continued OT services at d/c.  -     Row Name 02/03/23 1423          Therapy Assessment/Plan (OT)    Rehab Potential (OT) good, to  achieve stated therapy goals  -     Therapy Frequency (OT) other (see comments)  5-7 d/wk  -     Row Name 02/03/23 1423          Therapy Plan Review/Discharge Plan (OT)    Anticipated Discharge Disposition (OT) skilled nursing facility;inpatient rehabilitation facility;LT[facility] ([facility]);home with 24/7 care;home with home health  -     Row Name 02/03/23 1423          Vital Signs    Post Systolic BP Rehab 142  -MC     Post Treatment Diastolic BP 81  -MC     Pre SpO2 (%) 94  -MC     O2 Delivery Pre Treatment nasal cannula  4.5 L  -MC     Intra SpO2 (%) 95  -MC     O2 Delivery Intra Treatment nasal cannula  -MC     Post SpO2 (%) 91  -MC     O2 Delivery Post Treatment nasal cannula  -MC     Pre Patient Position Sitting  -     Intra Patient Position Sitting  -MC     Post Patient Position Supine  -     Row Name 02/03/23 1423          Positioning and Restraints    Pre-Treatment Position in bed  -MC     Post Treatment Position bed  -MC     In Bed fowlers;notified nsg;call light within reach;encouraged to call for assist;exit alarm on;with family/caregiver  -           User Key  (r) = Recorded By, (t) = Taken By, (c) = Cosigned By    Initials Name Provider Type    Inez Morse, OT Occupational Therapist               Outcome Measures     Row Name 02/03/23 1423          How much help from another is currently needed...    Putting on and taking off regular lower body clothing? 2  -MC     Bathing (including washing, rinsing, and drying) 2  -MC     Toileting (which includes using toilet bed pan or urinal) 3  -MC     Putting on and taking off regular upper body clothing 3  -MC     Taking care of personal grooming (such as brushing teeth) 3  -MC     Eating meals 3  -MC     AM-PAC 6 Clicks Score (OT) 16  -MC     Row Name 02/03/23 0937          How much help from another person do you currently need...    Turning from your back to your side while in flat bed without using bedrails? 3  -CC     Moving  from lying on back to sitting on the side of a flat bed without bedrails? 3  -CC     Moving to and from a bed to a chair (including a wheelchair)? 2  -CC     Standing up from a chair using your arms (e.g., wheelchair, bedside chair)? 2  -CC     Climbing 3-5 steps with a railing? 2  -CC     To walk in hospital room? 2  -CC     AM-PAC 6 Clicks Score (PT) 14  -CC     Highest level of mobility 4 --> Transferred to chair/commode  -     Row Name 02/03/23 1423          Functional Assessment    Outcome Measure Options AM-PAC 6 Clicks Daily Activity (OT)  -           User Key  (r) = Recorded By, (t) = Taken By, (c) = Cosigned By    Initials Name Provider Type    Susan Claire RN Registered Nurse    Inez Morse OT Occupational Therapist                Occupational Therapy Education     Title: PT OT SLP Therapies (In Progress)     Topic: Occupational Therapy (In Progress)     Point: ADL training (Done)     Description:   Instruct learner(s) on proper safety adaptation and remediation techniques during self care or transfers.   Instruct in proper use of assistive devices.              Learning Progress Summary           Patient Acceptance, E,TB, VU by  at 2/3/2023 1549    Comment: OT role, POC, t/f training    Acceptance, E,TB, VU by  at 2/1/2023 1352    Acceptance, E,TB, VU by  at 1/31/2023 1447    Comment: POC, role of OT, transfer training                   Point: Home exercise program (Not Started)     Description:   Instruct learner(s) on appropriate technique for monitoring, assisting and/or progressing therapeutic exercises/activities.              Learner Progress:  Not documented in this visit.          Point: Precautions (Done)     Description:   Instruct learner(s) on prescribed precautions during self-care and functional transfers.              Learning Progress Summary           Patient Acceptance, E,TB, VU by  at 2/3/2023 1549    Comment: OT role, POC, t/f training    Acceptance, E, VU  by  at 2/2/2023 1135    Comment: [facility] pt on no OOB without assist.    Acceptance, E,TB, VU by  at 2/1/2023 1352                   Point: Body mechanics (Done)     Description:   Instruct learner(s) on proper positioning and spine alignment during self-care, functional mobility activities and/or exercises.              Learning Progress Summary           Patient Acceptance, E,TB, VU by  at 2/3/2023 1549    Comment: OT role, POC, t/f training    Acceptance, E,TB, VU by  at 2/1/2023 1352                               User Key     Initials Effective Dates Name Provider Type Discipline    RB 06/16/21 -  Richie Davis, OT Occupational Therapist OT     06/14/21 -  Brittaney Gilmore OT Occupational Therapist OT     10/19/22 -  Inez Young OT Occupational Therapist OT     08/11/22 -  Bernice Rivers OT Occupational Therapist OT              OT Recommendation and Plan  Therapy Frequency (OT): other (see comments) (5-7 d/wk)  Plan of Care Review  Plan of Care Reviewed With: patient, spouse  Progress: improving  Outcome Evaluation: OT tx completed. Pt fowlers in bed, pleasant, and agreeable to the session. Pt is CGA for sup<>sit with the HOB completed elevated and bed rails. Pt completed oral care routine while sitting EOB while supported R elbow on bedside table with SBA. Pt completed sit to stand with mod A. Pt completed stand pivot t/f to BSC with max A. Pt completed toileting skills with SBA. Pt CGA with VCs for sit<>sup and for scooting up in bed. Pt is making good progress to his goals, 1 goal met and updated this date. Pt will require continued OT services at d/c.     Time Calculation:    Time Calculation- OT     Row Name 02/03/23 1423             Time Calculation- OT    OT Start Time 1423  -      OT Stop Time 1516  -      OT Time Calculation (min) 53 min  -      Total Timed Code Minutes- OT 53 minute(s)  -      OT Received On 02/03/23  -         Timed Charges    99808 - OT Therapeutic  Activity Minutes 23  -MC      50902 - OT Self Care/Mgmt Minutes 30  -MC         Total Minutes    Timed Charges Total Minutes 53  -MC       Total Minutes 53  -MC            User Key  (r) = Recorded By, (t) = Taken By, (c) = Cosigned By    Initials Name Provider Type    Inez Morse OT Occupational Therapist              Therapy Charges for Today     Code Description Service Date Service Provider Modifiers Qty    34960347426  OT THERAPEUTIC ACT EA 15 MIN 2/3/2023 Inez Young OT GO 2    [id number]  OT SELF CARE/MGMT/TRAIN EA 15 MIN 2/3/2023 Inez Young OT GO 2               Inez Young OT  2/3/2023

## 2023-02-03 NOTE — PLAN OF CARE
Goal Outcome Evaluation:  Plan of Care Reviewed With: patient, spouse        Progress: improving  Outcome Evaluation: OT tx completed. Pt fowlers in bed, pleasant, and agreeable to the session. Pt is CGA for sup<>sit with the HOB completed elevated and bed rails. Pt completed oral care routine while sitting EOB while supported R elbow on bedside table with SBA. Pt completed sit to stand with mod A. Pt completed stand pivot t/f to BSC with max A. Pt completed toileting skills with SBA. Pt CGA with VCs for sit<>sup and for scooting up in bed. Pt is making good progress to his goals, 1 goal met and updated this date. Pt will require continued OT services at d/c.

## 2023-02-03 NOTE — PLAN OF CARE
"Goal Outcome Evaluation:  Plan of Care Reviewed With: patient        Progress: improving  Outcome Evaluation: ST: pt seen for dysphagia tx.  pt demonstrated independence in safe swallow strategies with solids and liquids even checking his o2 multiple times during PO to ensure above 90 (it stayed  above 95%).  overall intake is poor and education provided for increasing caloric intake with \"easier to eat foods\" and suppliments.  Pt swallow function is in tact.  His risk of aspiration is d/t air hunger and frequent use of forced air (Cpap).   ST to d/c skilled services at this time with goals met.  "

## 2023-02-03 NOTE — PROGRESS NOTES
[facility] Medicine Services  INPATIENT PROGRESS NOTE    Length of Stay: 8  Date of Admission: 1/26/2023  Primary Care Physician: Emile Wynn MD    Subjective   Chief Complaint: No new complaints.    HPI: Patient is seen for follow-up today 2/3/2023.    72-year-old male with COPD, metastatic lung cancer, chronic hypoxic respiratory failure (on 4 L nasal cannula at baseline), ALLEN on CPAP, CAD with previous stents, hypertension, diabetes, history of obstructive uropathy (with previous nephrostomy and ureteral stent) who was admitted for acute on chronic respiratory failure with hypoxia secondary to multifocal consolidation/moderate loculated right pleural effusion/COPD exacerbation.    He was extubated on 1/31/2023, doing better, less deconditioned, less short of air and on his baseline supplemental oxygen of 4 L nasal prongs with saturation in the low 90s.      Review of Systems   Constitutional: Positive for activity change and fatigue. Negative for appetite change, chills, diaphoresis and fever.   HENT: Negative for trouble swallowing and voice change.    Eyes: Negative for photophobia and visual disturbance.   Respiratory: Positive for shortness of breath. Negative for cough, choking, chest tightness, wheezing and stridor.    Cardiovascular: Negative for chest pain, palpitations and leg swelling.   Gastrointestinal: Negative for abdominal distention, abdominal pain, blood in stool, constipation, diarrhea, nausea and vomiting.   Endocrine: Negative for cold intolerance, heat intolerance, polydipsia, polyphagia and polyuria.   Genitourinary: Negative for decreased urine volume, difficulty urinating, dysuria, enuresis, flank pain, frequency, hematuria and urgency.   Musculoskeletal: Negative for arthralgias, gait problem, myalgias, neck pain and neck stiffness.   Skin: Negative for pallor, rash and wound.   Neurological: Negative for dizziness, tremors, seizures,  syncope, facial asymmetry, speech difficulty, weakness, light-headedness, numbness and headaches.   Hematological: Does not bruise/bleed easily.   Psychiatric/Behavioral: Negative for agitation, behavioral problems and confusion.       Objective    Temp:  [96.9 °F (36.1 °C)-97.5 °F (36.4 °C)] 96.9 °F (36.1 °C)  Heart Rate:  [] 108  Resp:  [18-20] 20  BP: (132-157)/(75-91) 133/79    AM-PAC 6 Clicks Score (PT): 10 (02/02/23 0996)           Physical Exam  Vitals and nursing note reviewed.   Constitutional:       General: He is not in acute distress.     Appearance: He is well-developed. He is obese. He is ill-appearing. He is not diaphoretic.   HENT:      Head: Normocephalic and atraumatic.   Eyes:      General: No scleral icterus.        Right eye: No discharge.         Left eye: No discharge.   Neck:      Thyroid: No thyromegaly.      Vascular: No carotid bruit or JVD.   Cardiovascular:      Rate and Rhythm: Regular rhythm. Tachycardia present.      Heart sounds: Normal heart sounds. No murmur heard.    No friction rub. No gallop.   Pulmonary:      Effort: Pulmonary effort is normal. No respiratory distress.      Breath sounds: No stridor. Examination of the right-middle field reveals decreased breath sounds. Examination of the right-lower field reveals decreased breath sounds. Decreased breath sounds and rhonchi present. No wheezing or rales.   Chest:      Chest wall: No tenderness.      Comments: Right-sided chest tube is in place.  Abdominal:      General: Bowel sounds are normal. There is no distension.      Palpations: Abdomen is soft. There is no mass.      Tenderness: There is no abdominal tenderness. There is no right CVA tenderness, left CVA tenderness, guarding or rebound.      Hernia: No hernia is present.   Musculoskeletal:         General: No swelling, tenderness or deformity.      Cervical back: Neck supple.      Right lower leg: No edema.      Left lower leg: No edema.   Skin:     General: Skin  is warm and dry.      Coloration: Skin is not jaundiced or pale.      Findings: No bruising, erythema, lesion or rash.   Neurological:      General: No focal deficit present.      Mental Status: He is alert and oriented to person, place, and time.      Cranial Nerves: No cranial nerve deficit.      Sensory: No sensory deficit.      Motor: No weakness or abnormal muscle tone.      Coordination: Coordination normal.      Gait: Gait normal.      Deep Tendon Reflexes: Reflexes normal.      Comments: .   Psychiatric:         Mood and Affect: Mood normal.         Behavior: Behavior normal.         Thought Content: Thought content normal.         Judgment: Judgment normal.           Medication Review:    Current Facility-Administered Medications:   •  dextrose (D50W) (25 g/50 mL) IV injection 25 g, 25 g, Intravenous, Q15 Min PRN, Pierre Mehta MD  •  dextrose (GLUTOSE) oral gel 15 g, 15 g, Oral, Q15 Min PRN, Pierre Mehta MD  •  FLUoxetine (PROzac) capsule 20 mg, 20 mg, Oral, Daily, Pierre Mehta MD, 20 mg at 02/03/23 0903  •  glucagon (human recombinant) (GLUCAGEN DIAGNOSTIC) injection 1 mg, 1 mg, Intramuscular, Q15 Min PRN, Pierre Mehta MD  •  guaiFENesin (MUCINEX) 12 hr tablet 600 mg, 600 mg, Oral, Q12H, Pierre Mehta MD, 600 mg at 02/03/23 0903  •  Insulin Aspart (novoLOG) injection 0-9 Units, 0-9 Units, Subcutaneous, TID AC, Pierre Mehta MD, 2 Units at 02/02/23 1716  •  ipratropium-albuterol (DUO-NEB) nebulizer solution 3 mL, 3 mL, Nebulization, 4x Daily - RT, Pierre Mehta MD, 3 mL at 02/03/23 0655  •  levothyroxine (SYNTHROID, LEVOTHROID) tablet 50 mcg, 50 mcg, Oral, Q AM, Pierre Mehta MD, 50 mcg at 02/03/23 0602  •  morphine injection 2 mg, 2 mg, Intravenous, Q4H PRN, Pierre Mehta MD, 2 mg at 02/03/23 0923  •  nicotine (NICODERM CQ) 21 MG/24HR patch 1 patch, 1 patch, Transdermal, Q24H, Pierre Mehta MD, 1 patch at 02/03/23 0904  •  ondansetron (ZOFRAN)  tablet 4 mg, 4 mg, Oral, Q6H PRN **OR** ondansetron (ZOFRAN) injection 4 mg, 4 mg, Intravenous, Q6H PRN, Pierre Mehta MD, 4 mg at 02/03/23 0923  •  pantoprazole (PROTONIX) injection 40 mg, 40 mg, Intravenous, Q AM, Pierre Mehta MD, 40 mg at 02/03/23 0602  •  polyethylene glycol (MIRALAX) packet 17 g, 17 g, Oral, Daily, Pierre Mehta MD, 17 g at 02/03/23 0903  •  predniSONE (DELTASONE) tablet 40 mg, 40 mg, Oral, Daily With Breakfast, Pierre Mehta MD, 40 mg at 02/03/23 0903  •  sennosides-docusate (PERICOLACE) 8.6-50 MG per tablet 1 tablet, 1 tablet, Oral, Nightly, Pierre Mehta MD  •  sodium chloride 0.9 % flush 10 mL, 10 mL, Intravenous, Q12H, Pierre Mehta MD, 10 mL at 02/03/23 0909  •  sodium chloride 0.9 % flush 10 mL, 10 mL, Intravenous, PRN, Pierre Mehta MD  •  sodium chloride 0.9 % flush 10 mL, 10 mL, Intravenous, Q12H, Pierre Mehta MD, 10 mL at 02/03/23 0909  •  sodium chloride 0.9 % flush 10 mL, 10 mL, Intravenous, Q12H, Pierre Mehta MD, 10 mL at 02/03/23 0904  •  sodium chloride 0.9 % flush 10 mL, 10 mL, Intravenous, PRN, Pierre Mehta MD  •  sodium chloride 0.9 % infusion 40 mL, 40 mL, Intravenous, PRN, Pierre Mehta MD  •  tamsulosin (FLOMAX) 24 hr capsule 0.8 mg, 0.8 mg, Oral, Daily, Pierre Mehta MD, 0.8 mg at 02/03/23 0903  •  traMADol (ULTRAM) tablet 50 mg, 50 mg, Oral, Q6H PRN, Pierre Mehta MD, 50 mg at 02/03/23 0628  •  traZODone (DESYREL) tablet 150 mg, 150 mg, Oral, Nightly, Pierre Mehta MD, 150 mg at 02/02/23 8113    Results Review:  I have reviewed the labs, radiology results, and diagnostic studies.    Laboratory Data:   Results from last 7 days   Lab Units 02/03/23  0529 02/01/23  0659 01/31/23  0538 01/30/23  0442 01/29/23  0451   SODIUM mmol/L 136 135* 136 137 135*   POTASSIUM mmol/L 4.8 4.6 5.3* 5.0 5.1   CHLORIDE mmol/L 100 100 102 99 101   CO2 mmol/L 25.0 29.0 26.0 28.0 28.0   BUN mg/dL 50* 58* 54*  40* 31*   CREATININE mg/dL 1.44* 1.61* 1.71* 1.73* 1.62*   GLUCOSE mg/dL 125* 135* 173* 106* 147*   CALCIUM mg/dL 9.1 9.0 8.6 9.1 9.2   BILIRUBIN mg/dL  --   --  0.4 0.3 0.3   ALK PHOS U/L  --   --  96 98 102   ALT (SGPT) U/L  --   --  44* 47* 45*   AST (SGOT) U/L  --   --  40 40 38   ANION GAP mmol/L 11.0 6.0 8.0 10.0 6.0     Estimated Creatinine Clearance: 53 mL/min (A) (by C-G formula based on SCr of 1.44 mg/dL (H)).          Results from last 7 days   Lab Units 02/03/23  0529 02/01/23  0412 01/31/23  0538 01/30/23  0442 01/29/23  0337   WBC 10*3/mm3 13.89* 9.62 8.81 11.40* 11.50*   HEMOGLOBIN g/dL 13.8 13.0 12.2* 12.8* 11.7*   HEMATOCRIT % 43.2 40.5 38.3 41.1 35.5*   PLATELETS 10*3/mm3 163 188 192 161 251     Results from last 7 days   Lab Units 02/01/23  0412   INR  1.23*       Culture Data:   No results found for: BLOODCX  No results found for: URINECX  No results found for: RESPCX  No results found for: WOUNDCX  No results found for: STOOLCX  No components found for: BODYFLD    Radiology Data:   Imaging Results (Last 24 Hours)     Procedure Component Value Units Date/Time    XR Chest 1 View [[id number]] Resulted: 02/03/23 0958     Updated: 02/03/23 1006          ABG:  Results from last 7 days   Lab Units 01/31/23  0742   PH, ARTERIAL pH units 7.420   PO2 ART mm Hg 74.2*   PCO2, ARTERIAL mm Hg 44.2   HCO3 ART mmol/L 28.6*       I have reviewed the patient's current medications.     Assessment/Plan     Hospital Problem List:  Principal Problem:    Acute on chronic respiratory failure with hypoxia (HCC)  Active Problems:    Mass of right lung    Sepsis due to pneumonia (HCC)    Loculated pleural effusion    Acute on chronic respiratory failure with hypoxia/sepsis (secondary to a combination of multifocal consolidation, COPD exacerbation right-sided pleural effusion): Patient is status postplacement of right-sided chest tube secondary to malignant pleural effusion and was extubated on 1/31/2023.  He has completed  a course of antimicrobial therapy.  Continue noninvasive respiratory therapy, bronchodilators and steroids.  Blood and pleural fluid cultures showed no growth.  Pleural fluid cytology is positive for malignancy.  Result of follow-up chest x-ray done this morning is pending.  Input by CT surgery is appreciated.    Metastatic lung cancer: Patient is to follow-up with his primary oncologist postdischarge.    Chronic kidney disease: Patient's baseline creatinine is between 1.2-1.7.  Creatinine is at baseline.  Continue to monitor and consult nephrologist if the need arises.    Nutrition: Continue diet as recommended by SLP.      Deconditioning: Continue PT and OT.      Continue GI and DVT prophylaxis.    Medical Decision Making  Number and Complexity of problems: 4 with high complexities.      Conditions and Status: Hemodynamically stable.             MDM Data  External documents reviewed: Not applicable.     Decision rules/scores evaluated (example [id number], [place], etc): Not applicable.     Discussed with: Patient, his wife and nursing staff.     Treatment Plan: As detailed above.    Care Planning  Shared decision making: Discussed with patient and his wife.  Code status and discussions: Full code.    Disposition  Social Determinants of Health that impact treatment or disposition: None.    I expect the patient to be discharged to home in 3-5 days.      I confirmed that the patient's Advance Care Plan is present, code status is documented, or surrogate decision maker is listed in the patient's medical record.     I have utilized all available immediate resources to obtain, update, or review the patient's current medications      Discharge Planning: In progress.    Pierre Mehta MD   02/03/23   10:42 CST

## 2023-02-03 NOTE — THERAPY TREATMENT NOTE
Acute Care - Physical Therapy Treatment Note  [facility]     Patient Name: Aman Sanchez  : 1950  MRN: [id number]  Today's Date: 2/3/2023      Visit Dx:     ICD-10-CM ICD-9-CM   1. Acute on chronic respiratory failure with hypoxia (HCC)  J96.21 518.84     799.02   2. Lung mass  R91.8 786.6   3. Pneumonia of right lung due to infectious organism, unspecified part of lung  J18.9 483.8   4. Impaired functional mobility and activity tolerance  Z74.09 V49.89   5. Impaired mobility and ADLs  Z74.09 V49.89    Z78.9    6. Oral phase dysphagia  R13.11 787.21     Patient Active Problem List   Diagnosis   • Essential hypertension   • Chronic coronary artery disease   • Mixed hyperlipidemia   • Tobacco abuse counseling   • Dyspnea on exertion   • Acute retention of urine   • Anemia   • Osteoarthritis of hip   • Hypersomnia   • Hypotension   • Obstructive sleep apnea syndrome   • Diastolic dysfunction   • Morbid obesity due to excess calories (HCC)   • Tobacco use disorder   • Calculus of ureter   • Preoperative cardiovascular examination   • Enlarged prostate with urinary obstruction   • Mass of right lung   • Chronic respiratory failure with hypoxia (HCC)   • Pleural effusion   • Acute on chronic respiratory failure with hypoxia (HCC)   • Sepsis due to pneumonia (HCC)   • Loculated pleural effusion     Past Medical History:   Diagnosis Date   • Acute bronchitis    • Anemia    • Breast lump    • Cervical radiculopathy    • COPD (chronic obstructive pulmonary disease) (HCC)    • Coronary atherosclerosis     2 stents, followed by Dr. Hargrove   • Depressive disorder    • Disease of thyroid gland    • Dysuria    • Essential hypertension    • Flank pain    • Hematochezia    • Hematuria syndrome    • Hyperlipidemia    • Hypoglycemia    • Kidney stone    • Kidney stones    • Lung cancer (HCC)    • Mass of neck    • MI (myocardial infarction) (HCC)    • Neck pain     sprain   • Osteoarthritis    • PONV  (postoperative nausea and vomiting)    • Sleep apnea     using c-pap   • Sleep apnea    • Type 2 diabetes mellitus (HCC)      Past Surgical History:   Procedure Laterality Date   • BACK SURGERY     • CARDIAC CATHETERIZATION N/A 08/23/2017    Procedure: Right Heart Cath;  Surgeon: Mariposa Zamorano MD;  Location: [facility] INVASIVE LOCATION;  Service:    • CATARACT EXTRACTION WITH INTRAOCULAR LENS IMPLANT Bilateral    • CHOLECYSTECTOMY     • CYSTOSCOPY  08/29/2014    Left ESWL, cysto and stent removal   • CYSTOSCOPY W/ LITHOLAPAXY / EHL  08/15/2014    Left ESWL   • CYSTOSCOPY, RETROGRADE PYELOGRAM AND STENT INSERTION  12/29/2022   • CYSTOSCOPY, URETEROSCOPY, RETROGRADE PYELOGRAM, STENT INSERTION Left 06/30/2017    Procedure: CYSTOSCOPY, LEFT URETEROSCOPY, RETROGRADE PYELOGRAM, HOLMIUM LASER, STENT INSERTION;  Surgeon: Uday Horne MD;  Location: [facility];  Service:    • CYSTOSCOPY, URETEROSCOPY, RETROGRADE PYELOGRAM, STENT INSERTION Left 09/06/2017    Procedure: CYSTOSCOPY, LEFT URETEROSCOPY, RETROGRADE PYELOGRAM, HOLMIUM LASER, STENT INSERTION;  Surgeon: Uday Horne MD;  Location: [facility];  Service:    • CYSTOSCOPY, URETEROSCOPY, RETROGRADE PYELOGRAM, STENT INSERTION Left 01/17/2018    Procedure: CYSTOSCOPY LEFT URETEROSCOPY RETROGRADE PYELOGRAM HOLMIUM LASER STENT INSERTION;  Surgeon: Uday Horne MD;  Location: [facility];  Service:    • CYSTOSCOPY, URETEROSCOPY, RETROGRADE PYELOGRAM, STENT INSERTION Left 07/25/2018    Procedure: CYSTOSCOPY URETEROSCOPY RETROGRADE PYELOGRAM HOLMIUM LASER STENT INSERTION;  Surgeon: Uday Horne MD;  Location: [facility];  Service: Urology   • EYE SURGERY      eye lids lifted   • HIP SURGERY     • INJECTION OF MEDICATION  11/11/2013    Kenalog   • INJECTION OF MEDICATION  01/16/2014    Toradol   • JOINT REPLACEMENT Right 2012    total hip replacement   • SKIN GRAFT      on his foot; removed skin from hip   • TONSILLECTOMY     • TRANSESOPHAGEAL ECHOCARDIOGRAM (ОЛЬГА)   07/21/2014    with color flow-Normal LV systolic function with Ef of 60-65%/ Grad1 diastolic dysfunction of the left ventricular myocardium. No evidence of pericardial effusion     PT Assessment (last 12 hours)     PT Evaluation and Treatment     Row Name 02/03/23 0842          Physical Therapy Time and Intention    Subjective Information complains of;pain  -JW     Document Type therapy note (daily note)  -     Mode of Treatment physical therapy;individual therapy  -     Patient Effort good  -     Row Name 02/03/23 0842          General Information    Patient Profile Reviewed yes  -JW     Existing Precautions/Restrictions fall;other (see comments)  CT  -JW     Row Name 02/03/23 0842          Pain    Pretreatment Pain Rating 5/10  -JW     Posttreatment Pain Rating 7/10  -     Pain Location lower  -     Pain Location - back  -     Pain Intervention(s) Medication (See MAR);Nursing Notified;Repositioned  -     Row Name 02/03/23 0842          Cognition    Affect/Mental Status (Cognition) WNL  -     Orientation Status (Cognition) oriented x 3  -JW     Follows Commands (Cognition) follows one-step commands  -     Row Name 02/03/23 0842          Bed Mobility    Supine-Sit [place] (Bed Mobility) contact guard;1 person assist  -     Sit-Supine [place] (Bed Mobility) minimum assist (75% patient effort);1 person assist  -     Assistive Device (Bed Mobility) bed rails;head of bed elevated  -     Comment, (Bed Mobility) scooting up towards HOB Ind while sitting EOB  -     Row Name 02/03/23 0842          Motor Skills    Comments, Therapeutic Exercise B LE seated ther ex:  AP, hip flexion, LAQ, add pillow squeeze x 10 reps  -     Row Name 02/03/23 0842          Plan of Care Review    Plan of Care Reviewed With patient;spouse  -     Outcome Evaluation pt t/fers sup to sit w/ CGA, sits EOB w/ SBA/CGA, pt w/ posterior lean w/ fatigue and exertion, pt performs B LE seated ther ex x 10 reps while  EOB, able to scoot self to HOB while sitting EOB, pt requires Min x 1 for sit to sup  -     Row Name 02/03/23 0842          Vital Signs    Pre Systolic BP Rehab 132  -JW     Pre Treatment Diastolic BP 87  -JW     Pretreatment Heart Rate (beats/min) 114  -JW     Intratreatment Heart Rate (beats/min) 134  -JW     Posttreatment Heart Rate (beats/min) 121  -JW     Pre SpO2 (%) 93  -JW     O2 Delivery Pre Treatment supplemental O2  -JW     Intra SpO2 (%) --  88-93  -JW     O2 Delivery Intra Treatment supplemental O2  -JW     Post SpO2 (%) 91  -JW     O2 Delivery Post Treatment supplemental O2  -JW     Pre Patient Position Supine  -JW     Intra Patient Position Sitting  -JW     Post Patient Position Supine  -JW     Row Name 02/03/23 0842          Positioning and Restraints    Pre-Treatment Position in bed  -JW     Post Treatment Position bed  -JW     In Bed fowlers;call light within reach;encouraged to call for assist;exit alarm on;with family/caregiver;with nsg  -     Row Name 02/03/23 0842          Bed Mobility Goal 1 (PT)    Activity/Assistive Device (Bed Mobility Goal 1, PT) rolling to left;rolling to right  -     [place] Level/Cues Needed (Bed Mobility Goal 1, PT) minimum assist (75% or more patient effort)  -     Time Frame (Bed Mobility Goal 1, PT) by discharge  -JW     Progress/Outcomes (Bed Mobility Goal 1, PT) goal not met  -     Row Name 02/03/23 0842          Transfer Goal 1 (PT)    Activity/Assistive Device (Transfer Goal 1, PT) sit-to-stand/stand-to-sit;bed-to-chair/chair-to-bed  -JW     [place] Level/Cues Needed (Transfer Goal 1, PT) minimum assist (75% or more patient effort)  -     Time Frame (Transfer Goal 1, PT) by discharge  -JW     Strategies/Barriers (Transfers Goal 1, PT) complex medical patient  -JW     Progress/Outcome (Transfer Goal 1, PT) goal not met  -     Row Name 02/03/23 0842          Gait Training Goal 1 (PT)    Activity/Assistive Device (Gait Training Goal 1, PT)  gait (walking locomotion);assistive device use;walker, rolling  -JW     [place] Level (Gait Training Goal 1, PT) minimum assist (75% or more patient effort)  -JW     Distance (Gait Training Goal 1, PT) 5' x 2 with supplemental O2  -JW     Time Frame (Gait Training Goal 1, PT) by discharge  -JW     Strategies/Barriers (Gait Training Goal 1, PT) dyspnea  -JW     Progress/Outcome (Gait Training Goal 1, PT) goal not met  -JW           User Key  (r) = Recorded By, (t) = Taken By, (c) = Cosigned By    Initials Name Provider Type    Nora Frank, PTA Physical Therapist Assistant                Physical Therapy Education     Title: PT OT SLP Therapies (In Progress)     Topic: Physical Therapy (Not Started)     Point: Mobility training (Not Started)     Learner Progress:  Not documented in this visit.          Point: Home exercise program (Not Started)     Learner Progress:  Not documented in this visit.          Point: Body mechanics (Not Started)     Learner Progress:  Not documented in this visit.          Point: Precautions (Not Started)     Learner Progress:  Not documented in this visit.                          PT Recommendation and Plan  Anticipated Discharge Disposition (PT): skilled nursing facility  Plan of Care Reviewed With: patient, spouse  Outcome Evaluation: pt t/fers sup to sit w/ CGA, sits EOB w/ SBA/CGA, pt w/ posterior lean w/ fatigue and exertion, pt performs B LE seated ther ex x 10 reps while EOB, able to scoot self to HOB while sitting EOB, pt requires Min x 1 for sit to sup   Outcome Measures     Row Name 02/02/23 1035             How much help from another is currently needed...    Putting on and taking off regular lower body clothing? 1  -RB      Bathing (including washing, rinsing, and drying) 1  -RB      Toileting (which includes using toilet bed pan or urinal) 1  -RB      Putting on and taking off regular upper body clothing 2  -RB      Taking care of personal grooming (such as  brushing teeth) 3  -RB      Eating meals 2  -RB      AM-PAC 6 Clicks Score (OT) 10  -RB            User Key  (r) = Recorded By, (t) = Taken By, (c) = Cosigned By    Initials Name Provider Type    RB Richie Davis, OT Occupational Therapist                 Time Calculation:    PT Charges     Row Name 02/03/23 0842             Time Calculation    Start Time 0842  -      Stop Time 0920  -      Time Calculation (min) 38 min  -      PT Received On 02/03/23  -         Time Calculation- PT    Total Timed Code Minutes- PT 38 minute(s)  -            User Key  (r) = Recorded By, (t) = Taken By, (c) = Cosigned By    Initials Name Provider Type    Nora Frank PTA Physical Therapist Assistant              Therapy Charges for Today     Code Description Service Date Service Provider Modifiers Qty    54523392313 HC PT THER SUPP EA 15 MIN 2/2/2023 Nora Leon, PTA GP 1    60024548438 HC PT THERAPEUTIC ACT EA 15 MIN 2/3/2023 Nora Leon, PTA GP 2    94270073161 HC PT THER PROC EA 15 MIN 2/3/2023 Nora Leon, PTA GP 1          PT G-Codes  Outcome Measure Options: AM-PAC 6 Clicks Daily Activity (OT)  AM-PAC 6 Clicks Score (PT): 14  AM-PAC 6 Clicks Score (OT): 10    Nora Leon PTA  2/3/2023

## 2023-02-04 NOTE — PROGRESS NOTES
[facility] Medicine Services  INPATIENT PROGRESS NOTE    Length of Stay: 9  Date of Admission: 1/26/2023  Primary Care Physician: Emile Wynn MD    Subjective   Chief Complaint: No new complaints.    HPI: Patient is seen for follow-up today 2/4/2023.    72-year-old male with COPD, metastatic lung cancer, chronic hypoxic respiratory failure (on 4 L nasal cannula at baseline), ALLEN on CPAP, CAD with previous stents, hypertension, diabetes, history of obstructive uropathy (with previous nephrostomy and ureteral stent) who was admitted for acute on chronic respiratory failure with hypoxia secondary to multifocal consolidation/moderate loculated right pleural effusion/COPD exacerbation.    He was extubated on 1/31/2023, doing better, less deconditioned, less short of air and on his baseline supplemental oxygen of 4.5 L nasal prongs with saturation in the low 90s.  Right-sided chest tube was removed yesterday.    Review of Systems   Constitutional: Positive for activity change and fatigue. Negative for appetite change, chills, diaphoresis and fever.   HENT: Negative for trouble swallowing and voice change.    Eyes: Negative for photophobia and visual disturbance.   Respiratory: Positive for shortness of breath. Negative for cough, choking, chest tightness, wheezing and stridor.    Cardiovascular: Negative for chest pain, palpitations and leg swelling.   Gastrointestinal: Negative for abdominal distention, abdominal pain, blood in stool, constipation, diarrhea, nausea and vomiting.   Endocrine: Negative for cold intolerance, heat intolerance, polydipsia, polyphagia and polyuria.   Genitourinary: Negative for decreased urine volume, difficulty urinating, dysuria, enuresis, flank pain, frequency, hematuria and urgency.   Musculoskeletal: Negative for arthralgias, gait problem, myalgias, neck pain and neck stiffness.   Skin: Negative for pallor, rash and wound.   Neurological:  Negative for dizziness, tremors, seizures, syncope, facial asymmetry, speech difficulty, weakness, light-headedness, numbness and headaches.   Hematological: Does not bruise/bleed easily.   Psychiatric/Behavioral: Negative for agitation, behavioral problems and confusion.       Objective    Temp:  [96.8 °F (36 °C)-97.7 °F (36.5 °C)] 97.7 °F (36.5 °C)  Heart Rate:  [] 124  Resp:  [18-22] 20  BP: (139-147)/(73-84) 140/77    AM-PAC 6 Clicks Score (PT): 14 (02/03/23 0930)           Physical Exam  Vitals and nursing note reviewed.   Constitutional:       General: He is not in acute distress.     Appearance: He is well-developed. He is obese. He is ill-appearing. He is not diaphoretic.   HENT:      Head: Normocephalic and atraumatic.   Eyes:      General: No scleral icterus.        Right eye: No discharge.         Left eye: No discharge.   Neck:      Thyroid: No thyromegaly.      Vascular: No carotid bruit or JVD.   Cardiovascular:      Rate and Rhythm: Regular rhythm. Tachycardia present.      Heart sounds: Normal heart sounds. No murmur heard.    No friction rub. No gallop.   Pulmonary:      Effort: Pulmonary effort is normal. No respiratory distress.      Breath sounds: No stridor. Examination of the right-middle field reveals decreased breath sounds. Examination of the right-lower field reveals decreased breath sounds. Decreased breath sounds and rhonchi present. No wheezing or rales.   Chest:      Chest wall: No tenderness.   Abdominal:      General: Bowel sounds are normal. There is no distension.      Palpations: Abdomen is soft. There is no mass.      Tenderness: There is no abdominal tenderness. There is no right CVA tenderness, left CVA tenderness, guarding or rebound.      Hernia: No hernia is present.   Musculoskeletal:         General: No swelling, tenderness or deformity.      Cervical back: Neck supple.      Right lower leg: No edema.      Left lower leg: No edema.   Skin:     General: Skin is warm  and dry.      Coloration: Skin is not jaundiced or pale.      Findings: No bruising, erythema, lesion or rash.   Neurological:      General: No focal deficit present.      Mental Status: He is alert and oriented to person, place, and time.      Cranial Nerves: No cranial nerve deficit.      Sensory: No sensory deficit.      Motor: No weakness or abnormal muscle tone.      Coordination: Coordination normal.      Gait: Gait normal.      Deep Tendon Reflexes: Reflexes normal.      Comments: .   Psychiatric:         Mood and Affect: Mood normal.         Behavior: Behavior normal.         Thought Content: Thought content normal.         Judgment: Judgment normal.           Medication Review:    Current Facility-Administered Medications:   •  dextrose (D50W) (25 g/50 mL) IV injection 25 g, 25 g, Intravenous, Q15 Min PRN, Pierre Mehta MD  •  dextrose (GLUTOSE) oral gel 15 g, 15 g, Oral, Q15 Min PRN, Pierre Mehta MD  •  FLUoxetine (PROzac) capsule 20 mg, 20 mg, Oral, Daily, Pierre Mheta MD, 20 mg at 02/04/23 0741  •  glucagon (human recombinant) (GLUCAGEN DIAGNOSTIC) injection 1 mg, 1 mg, Intramuscular, Q15 Min PRN, Pierre Mehta MD  •  guaiFENesin (MUCINEX) 12 hr tablet 600 mg, 600 mg, Oral, Q12H, Pierre Mehta MD, 600 mg at 02/04/23 0741  •  Insulin Aspart (novoLOG) injection 0-9 Units, 0-9 Units, Subcutaneous, TID AC, Pierre Mehta MD, 4 Units at 02/03/23 1316  •  ipratropium-albuterol (DUO-NEB) nebulizer solution 3 mL, 3 mL, Nebulization, 4x Daily - RT, Pierre Mehta MD, 3 mL at 02/04/23 0753  •  levothyroxine (SYNTHROID, LEVOTHROID) tablet 50 mcg, 50 mcg, Oral, Q AM, Pierre Mehta MD, 50 mcg at 02/04/23 0603  •  morphine injection 2 mg, 2 mg, Intravenous, Q4H PRN, Pierre Mehta MD, 2 mg at 02/03/23 1936  •  nicotine (NICODERM CQ) 21 MG/24HR patch 1 patch, 1 patch, Transdermal, Q24H, Pierre Mehta MD, 1 patch at 02/04/23 0773  •  ondansetron (ZOFRAN) tablet  4 mg, 4 mg, Oral, Q6H PRN **OR** ondansetron (ZOFRAN) injection 4 mg, 4 mg, Intravenous, Q6H PRN, Pierre Mehta MD, 4 mg at 02/03/23 0923  •  pantoprazole (PROTONIX) EC tablet 40 mg, 40 mg, Oral, Q AM, Pierre Mehta MD, 40 mg at 02/04/23 0603  •  polyethylene glycol (MIRALAX) packet 17 g, 17 g, Oral, Daily, Pierre Mehta MD, 17 g at 02/04/23 0741  •  predniSONE (DELTASONE) tablet 40 mg, 40 mg, Oral, Daily With Breakfast, Pierre Mehta MD, 40 mg at 02/04/23 0742  •  sennosides-docusate (PERICOLACE) 8.6-50 MG per tablet 1 tablet, 1 tablet, Oral, Nightly, Pierre Mehta MD, 1 tablet at 02/03/23 2157  •  sodium chloride 0.9 % flush 10 mL, 10 mL, Intravenous, Q12H, Pierre Mehta MD, 10 mL at 02/04/23 0800  •  sodium chloride 0.9 % flush 10 mL, 10 mL, Intravenous, PRN, Pierre Mehta MD  •  sodium chloride 0.9 % flush 10 mL, 10 mL, Intravenous, Q12H, Pierre Mehta MD, 10 mL at 02/04/23 0744  •  sodium chloride 0.9 % flush 10 mL, 10 mL, Intravenous, Q12H, Pierre Mehta MD, 10 mL at 02/04/23 0744  •  sodium chloride 0.9 % flush 10 mL, 10 mL, Intravenous, PRN, Pierre Mehta MD  •  sodium chloride 0.9 % infusion 40 mL, 40 mL, Intravenous, PRN, Pierre Mehta MD  •  tamsulosin (FLOMAX) 24 hr capsule 0.8 mg, 0.8 mg, Oral, Daily, Pierre Mehta MD, 0.8 mg at 02/04/23 0742  •  traMADol (ULTRAM) tablet 50 mg, 50 mg, Oral, Q6H PRN, Pierre Mehta MD, 50 mg at 02/04/23 0815  •  traZODone (DESYREL) tablet 150 mg, 150 mg, Oral, Nightly, Pierre Mehta MD, 150 mg at 02/03/23 1552    Results Review:  I have reviewed the labs, radiology results, and diagnostic studies.    Laboratory Data:   Results from last 7 days   Lab Units 02/04/23  0555 02/03/23  0529 02/01/23  0659 01/31/23  0538 01/30/23  0442 01/29/23  0451   SODIUM mmol/L 136 136 135* 136 137 135*   POTASSIUM mmol/L 5.1 4.8 4.6 5.3* 5.0 5.1   CHLORIDE mmol/L 98 100 100 102 99 101   CO2 mmol/L 25.0 25.0  29.0 26.0 28.0 28.0   BUN mg/dL 45* 50* 58* 54* 40* 31*   CREATININE mg/dL 1.45* 1.44* 1.61* 1.71* 1.73* 1.62*   GLUCOSE mg/dL 113* 125* 135* 173* 106* 147*   CALCIUM mg/dL 9.5 9.1 9.0 8.6 9.1 9.2   BILIRUBIN mg/dL  --   --   --  0.4 0.3 0.3   ALK PHOS U/L  --   --   --  96 98 102   ALT (SGPT) U/L  --   --   --  44* 47* 45*   AST (SGOT) U/L  --   --   --  40 40 38   ANION GAP mmol/L 13.0 11.0 6.0 8.0 10.0 6.0     Estimated Creatinine Clearance: 52.6 mL/min (A) (by C-G formula based on SCr of 1.45 mg/dL (H)).          Results from last 7 days   Lab Units 02/04/23  0555 02/03/23  0529 02/01/23  0412 01/31/23  0538 01/30/23  0442   WBC 10*3/mm3 18.81* 13.89* 9.62 8.81 11.40*   HEMOGLOBIN g/dL 14.9 13.8 13.0 12.2* 12.8*   HEMATOCRIT % 47.5 43.2 40.5 38.3 41.1   PLATELETS 10*3/mm3 154 163 188 192 161     Results from last 7 days   Lab Units 02/01/23  0412   INR  1.23*       Culture Data:   No results found for: BLOODCX  No results found for: URINECX  No results found for: RESPCX  No results found for: WOUNDCX  No results found for: STOOLCX  No components found for: BODYFLD    Radiology Data:   Imaging Results (Last 24 Hours)     Procedure Component Value Units Date/Time    XR Chest 1 View [[id number]] Collected: 02/03/23 0958     Updated: 02/03/23 1126    Narrative:        PORTABLE CHEST    HISTORY: Follow-up right effusion. Acute and chronic respiratory  failure with hypoxia. Pneumonia.    Portable AP semierect upright film of the chest was obtained at  10:04 AM.  COMPARISON: January 31, 2023    FINDINGS:   EKG leads.  Right thoracotomy catheter remains in place.  No pleural effusion.  No pneumothorax.  Chronic obstructive pulmonary disease.  Slight improvement in the right perihilar infiltrate.  Stable minimal cardiomegaly.  Old granulomatous disease.  The pulmonary vasculature is not increased.  No acute osseous abnormality.  Degenerative changes are present in the thoracic spine.      Impression:       CONCLUSION:  Right thoracotomy catheter remains in place.  No pleural effusion.  No pneumothorax.  Chronic obstructive pulmonary disease.  Slight improvement in the right perihilar infiltrate.  Stable minimal cardiomegaly.    [id number]    Electronically signed by:  Ermias Paz MD  2/3/2023 11:24 AM CST  Workstation: [id number]          ABG:  Results from last 7 days   Lab Units 01/31/23  0742   PH, ARTERIAL pH units 7.420   PO2 ART mm Hg 74.2*   PCO2, ARTERIAL mm Hg 44.2   HCO3 ART mmol/L 28.6*       I have reviewed the patient's current medications.     Assessment/Plan     Hospital Problem List:  Principal Problem:    Acute on chronic respiratory failure with hypoxia (HCC)  Active Problems:    Mass of right lung    Sepsis due to pneumonia (HCC)    Loculated pleural effusion    Acute on chronic respiratory failure with hypoxia/sepsis (secondary to a combination of multifocal consolidation, COPD exacerbation right-sided pleural effusion): Patient is status postplacement of right-sided chest tube secondary to malignant pleural effusion and chest tube was removed on 2/3/2023.  He has completed a course of antimicrobial therapy.  Continue noninvasive respiratory therapy, bronchodilators and steroids.  Blood and pleural fluid cultures showed no growth.  Pleural fluid cytology is positive for malignancy.  Result of follow-up chest x-ray done 2/3/2023 have been reviewed.  Patient has been recommended repeat chest x-ray in 2 days followed by placement of a Pleurx catheter. Input by CT surgery is appreciated.  Reactive leukocytosis will be monitored.    Metastatic lung cancer: Patient is to follow-up with his primary oncologist postdischarge.    Chronic kidney disease: Patient's baseline creatinine is between 1.2-1.7.  Creatinine is at baseline.  Continue to monitor and consult nephrologist if the need arises.    Diabetes mellitus: Stable.  Continue Accu-Cheks and sliding scale insulin.    Deconditioning: Continue PT and OT.       Continue GI and DVT prophylaxis.    Medical Decision Making  Number and Complexity of problems: 4 with high complexities.      Conditions and Status: Hemodynamically stable.             MDM Data  External documents reviewed: Not applicable.     Decision rules/scores evaluated (example [id number], Wells, etc): Not applicable.     Discussed with: Patient, his wife and nursing staff.     Treatment Plan: As detailed above.    Care Planning  Shared decision making: Discussed with patient and his wife.  Code status and discussions: Full code.    Disposition  Social Determinants of Health that impact treatment or disposition: None.    I expect the patient to be discharged to home in 3-5 days.      I confirmed that the patient's Advance Care Plan is present, code status is documented, or surrogate decision maker is listed in the patient's medical record.     I have utilized all available immediate resources to obtain, update, or review the patient's current medications      Discharge Planning: In progress.    Pierre Mehta MD   02/04/23   11:01 CST

## 2023-02-04 NOTE — SIGNIFICANT NOTE
02/04/23 1535   OTHER   Discipline physical therapy assistant   Rehab Time/Intention   Session Not Performed other (see comments)  (Pt. with elevated HR this pm)

## 2023-02-04 NOTE — THERAPY TREATMENT NOTE
Acute Care - Occupational Therapy Treatment Note  [facility]     Patient Name: Aman Sanchez  : 1950  MRN: [id number]  Today's Date: 2023             Admit Date: 2023       ICD-10-CM ICD-9-CM   1. Acute on chronic respiratory failure with hypoxia (HCC)  J96.21 518.84     799.02   2. Lung mass  R91.8 786.6   3. Pneumonia of right lung due to infectious organism, unspecified part of lung  J18.9 483.8   4. Impaired functional mobility and activity tolerance  Z74.09 V49.89   5. Impaired mobility and ADLs  Z74.09 V49.89    Z78.9    6. Oral phase dysphagia  R13.11 787.21     Patient Active Problem List   Diagnosis   • Essential hypertension   • Chronic coronary artery disease   • Mixed hyperlipidemia   • Tobacco abuse counseling   • Dyspnea on exertion   • Acute retention of urine   • Anemia   • Osteoarthritis of hip   • Hypersomnia   • Hypotension   • Obstructive sleep apnea syndrome   • Diastolic dysfunction   • Morbid obesity due to excess calories (HCC)   • Tobacco use disorder   • Calculus of ureter   • Preoperative cardiovascular examination   • Enlarged prostate with urinary obstruction   • Mass of right lung   • Chronic respiratory failure with hypoxia (HCC)   • Pleural effusion   • Acute on chronic respiratory failure with hypoxia (HCC)   • Sepsis due to pneumonia (HCC)   • Loculated pleural effusion     Past Medical History:   Diagnosis Date   • Acute bronchitis    • Anemia    • Breast lump    • Cervical radiculopathy    • COPD (chronic obstructive pulmonary disease) (HCC)    • Coronary atherosclerosis     2 stents, followed by Dr. Hargrove   • Depressive disorder    • Disease of thyroid gland    • Dysuria    • Essential hypertension    • Flank pain    • Hematochezia    • Hematuria syndrome    • Hyperlipidemia    • Hypoglycemia    • Kidney stone    • Kidney stones    • Lung cancer (HCC)    • Mass of neck    • MI (myocardial infarction) (HCC)    • Neck pain     sprain   •  Osteoarthritis    • PONV (postoperative nausea and vomiting)    • Sleep apnea     using c-pap   • Sleep apnea    • Type 2 diabetes mellitus (HCC)      Past Surgical History:   Procedure Laterality Date   • BACK SURGERY     • CARDIAC CATHETERIZATION N/A 08/23/2017    Procedure: Right Heart Cath;  Surgeon: Mariposa Zamorano MD;  Location: [facility] INVASIVE LOCATION;  Service:    • CATARACT EXTRACTION WITH INTRAOCULAR LENS IMPLANT Bilateral    • CHOLECYSTECTOMY     • CYSTOSCOPY  08/29/2014    Left ESWL, cysto and stent removal   • CYSTOSCOPY W/ LITHOLAPAXY / EHL  08/15/2014    Left ESWL   • CYSTOSCOPY, RETROGRADE PYELOGRAM AND STENT INSERTION  12/29/2022   • CYSTOSCOPY, URETEROSCOPY, RETROGRADE PYELOGRAM, STENT INSERTION Left 06/30/2017    Procedure: CYSTOSCOPY, LEFT URETEROSCOPY, RETROGRADE PYELOGRAM, HOLMIUM LASER, STENT INSERTION;  Surgeon: Uday Horne MD;  Location: [facility];  Service:    • CYSTOSCOPY, URETEROSCOPY, RETROGRADE PYELOGRAM, STENT INSERTION Left 09/06/2017    Procedure: CYSTOSCOPY, LEFT URETEROSCOPY, RETROGRADE PYELOGRAM, HOLMIUM LASER, STENT INSERTION;  Surgeon: Uday Horne MD;  Location: [facility];  Service:    • CYSTOSCOPY, URETEROSCOPY, RETROGRADE PYELOGRAM, STENT INSERTION Left 01/17/2018    Procedure: CYSTOSCOPY LEFT URETEROSCOPY RETROGRADE PYELOGRAM HOLMIUM LASER STENT INSERTION;  Surgeon: Uday Horne MD;  Location: [facility];  Service:    • CYSTOSCOPY, URETEROSCOPY, RETROGRADE PYELOGRAM, STENT INSERTION Left 07/25/2018    Procedure: CYSTOSCOPY URETEROSCOPY RETROGRADE PYELOGRAM HOLMIUM LASER STENT INSERTION;  Surgeon: Uday Horne MD;  Location: [facility];  Service: Urology   • EYE SURGERY      eye lids lifted   • HIP SURGERY     • INJECTION OF MEDICATION  11/11/2013    Kenalog   • INJECTION OF MEDICATION  01/16/2014    Toradol   • JOINT REPLACEMENT Right 2012    total hip replacement   • SKIN GRAFT      on his foot; removed skin from hip   • TONSILLECTOMY     • TRANSESOPHAGEAL  ECHOCARDIOGRAM (ОЛЬГА)  07/21/2014    with color flow-Normal LV systolic function with Ef of 60-65%/ Grad1 diastolic dysfunction of the left ventricular myocardium. No evidence of pericardial effusion         OT ASSESSMENT FLOWSHEET (last 12 hours)     OT Evaluation and Treatment     Row Name 02/04/23 1102                   OT Time and Intention    Subjective Information complains of;fatigue;weakness  -        Document Type therapy note (daily note)  -        Mode of Treatment individual therapy;occupational therapy  -        Patient Effort good  -KH           General Information    Patient Profile Reviewed yes  -KH        Risks Reviewed patient:;spouse/S.O.:  -KH        Benefits Reviewed patient:;spouse/S.O.:  -KH           Pain Assessment    Pretreatment Pain Rating 0/10 - no pain  -KH        Posttreatment Pain Rating 0/10 - no pain  -           Cognition    Affect/Mental Status (Cognition) WFL  -           Motor Skills    Therapeutic Exercise shoulder;elbow/forearm  -           Shoulder (Therapeutic Exercise)    Shoulder (Therapeutic Exercise) strengthening exercise  -        Shoulder Strengthening (Therapeutic Exercise) bilateral;flexion;scapular stabilization;supine;1 lb free weight;10 repetitions  -           Elbow/Forearm (Therapeutic Exercise)    Elbow/Forearm (Therapeutic Exercise) strengthening exercise  -        Elbow/Forearm Strengthening (Therapeutic Exercise) bilateral;flexion;extension;supination;pronation;supine;1 lb free weight;15 repititions  -           Plan of Care Review    Plan of Care Reviewed With patient  -           Vital Signs    Intra Systolic BP Rehab 125  -KH        Intra Treatment Diastolic BP 69  -KH        Pretreatment Heart Rate (beats/min) 118  RN/PCA notified of elevated HR  -KH        Intratreatment Heart Rate (beats/min) 119  -KH        Posttreatment Heart Rate (beats/min) 122  -KH        Pre SpO2 (%) 91  -KH        O2 Delivery Pre Treatment supplemental O2   -KH        Post SpO2 (%) 92  -KH        O2 Delivery Post Treatment supplemental O2  -KH        Pre Patient Position Supine  -KH        Intra Patient Position Supine  -KH        Post Patient Position Supine  -KH           Positioning and Restraints    Pre-Treatment Position in bed  -KH        Post Treatment Position bed  -KH        In Bed notified nsg;fowlers;call light within reach;encouraged to call for assist;exit alarm on  -KH           Therapy Assessment/Plan (OT)    Rehab Potential (OT) good, to achieve stated therapy goals  -        Criteria for Skilled Therapeutic Interventions Met (OT) yes;skilled treatment is necessary  -        Therapy Frequency (OT) other (see comments)  5-7x/wk  -           Therapy Plan Review/Discharge Plan (OT)    Anticipated Discharge Disposition (OT) skilled nursing facility  -              User Key  (r) = Recorded By, (t) = Taken By, (c) = Cosigned By    Initials Name Effective Dates    Michelle Mckeon, OT 06/16/21 -                  Occupational Therapy Education     Title: PT OT SLP Therapies (In Progress)     Topic: Occupational Therapy (In Progress)     Point: ADL training (Done)     Description:   Instruct learner(s) on proper safety adaptation and remediation techniques during self care or transfers.   Instruct in proper use of assistive devices.              Learning Progress Summary           Patient Acceptance, E,TB, VU by MC at 2/3/2023 1549    Comment: OT role, POC, t/f training    Acceptance, E,TB, VU by SA at 2/1/2023 1352    Acceptance, E,TB, VU by SJ at 1/31/2023 1447    Comment: POC, role of OT, transfer training                   Point: Home exercise program (Not Started)     Description:   Instruct learner(s) on appropriate technique for monitoring, assisting and/or progressing therapeutic exercises/activities.              Learner Progress:  Not documented in this visit.          Point: Precautions (Done)     Description:   Instruct learner(s) on  prescribed precautions during self-care and functional transfers.              Learning Progress Summary           Patient Acceptance, E,TB, VU by  at 2/3/2023 1549    Comment: OT role, POC, t/f training    Acceptance, E, VU by  at 2/2/2023 1135    Comment: Edu pt on no OOB without assist.    Acceptance, E,TB, VU by  at 2/1/2023 1352                   Point: Body mechanics (Done)     Description:   Instruct learner(s) on proper positioning and spine alignment during self-care, functional mobility activities and/or exercises.              Learning Progress Summary           Patient Acceptance, E,TB, VU by  at 2/3/2023 1549    Comment: OT role, POC, t/f training    Acceptance, E,TB, VU by  at 2/1/2023 1352                               User Key     Initials Effective Dates Name Provider Type Discipline     06/16/21 -  Richie Davis, OT Occupational Therapist OT     06/14/21 -  Brittaney Gilmore OT Occupational Therapist OT     10/19/22 -  Inez Young OT Occupational Therapist OT     08/11/22 -  Bernice Rivers OT Occupational Therapist OT                  OT Recommendation and Plan  Therapy Frequency (OT): other (see comments) (5-7x/wk)  Plan of Care Review  Plan of Care Reviewed With: patient  Outcome Evaluation: RN okay'd OT tx this date. Pt in the bed upon arrival and agreeable to in bed act only as reports just returned to bed from using BR and very fatigued. Noted HR elevated as well and O2 dropping with in bed ex. RN/PCA notified and aware. Pt completed BUE resistive ex to improve UE strength and act tolerance required for ADLs/IADLs. Fatiguing quickly and cues for proper technique. Continue OT POC. Rec SNF at d/c.  Plan of Care Reviewed With: patient  Outcome Evaluation: RN okay'd OT tx this date. Pt in the bed upon arrival and agreeable to in bed act only as reports just returned to bed from using BR and very fatigued. Noted HR elevated as well and O2 dropping with in bed ex. RN/PCA  notified and aware. Pt completed BUE resistive ex to improve UE strength and act tolerance required for ADLs/IADLs. Fatiguing quickly and cues for proper technique. Continue OT POC. Rec SNF at d/c.     Outcome Measures     Row Name 02/04/23 1102 02/02/23 1035          How much help from another is currently needed...    Putting on and taking off regular lower body clothing? 2  -KH 1  -RB     Bathing (including washing, rinsing, and drying) 2  -KH 1  -RB     Toileting (which includes using toilet bed pan or urinal) 2  -KH 1  -RB     Putting on and taking off regular upper body clothing 3  -KH 2  -RB     Taking care of personal grooming (such as brushing teeth) 3  -KH 3  -RB     Eating meals 3  -KH 2  -RB     AM-PAC 6 Clicks Score (OT) 15  -KH 10  -RB        Functional Assessment    Outcome Measure Options AM-PAC 6 Clicks Daily Activity (OT)  -KH --           User Key  (r) = Recorded By, (t) = Taken By, (c) = Cosigned By    Initials Name Provider Type    RB Richie Davis OT Occupational Therapist    Michelle Mckeon OT Occupational Therapist                Time Calculation:    Time Calculation- OT     Row Name 02/04/23 1145             Time Calculation- OT    OT Start Time 1102  -KH      OT Stop Time 1128  -KH      OT Time Calculation (min) 26 min  -KH      OT Received On 02/04/23  -KH         Timed Charges    72207 - OT Therapeutic Exercise Minutes 26  -KH         Total Minutes    Timed Charges Total Minutes 26  -KH       Total Minutes 26  -KH            User Key  (r) = Recorded By, (t) = Taken By, (c) = Cosigned By    Initials Name Provider Type    Michelle Mckeon OT Occupational Therapist              Therapy Charges for Today     Code Description Service Date Service Provider Modifiers Qty    61386657806 HC OT THER PROC EA 15 MIN 2/4/2023 Michelle Reyes OT GO 2               Michelle Reyes OT  2/4/2023

## 2023-02-04 NOTE — PLAN OF CARE
Goal Outcome Evaluation:           Progress: no change  Outcome Evaluation: Pt rested throughout the night; spouse at bedside; VSS

## 2023-02-04 NOTE — PLAN OF CARE
Problem: Adult Inpatient Plan of Care  Goal: Plan of Care Review  Flowsheets  Taken 2/4/2023 1146  Plan of Care Reviewed With: patient  Outcome Evaluation: RN okay'sary OT tx this date. Pt in the bed upon arrival and agreeable to in bed act only as reports just returned to bed from using BR and very fatigued. Noted HR elevated as well and O2 dropping with in bed ex. RN/PCA notified and aware. Pt completed BUE resistive ex to improve UE strength and act tolerance required for ADLs/IADLs. Fatiguing quickly and cues for proper technique. Continue OT POC. Rec SNF at d/c.

## 2023-02-05 NOTE — PLAN OF CARE
Goal Outcome Evaluation:           Progress: no change  Outcome Evaluation: No change overnight; pt assisted to BSC; HR in 80's overnight, VSS

## 2023-02-05 NOTE — PROGRESS NOTES
[facility]  INPATIENT PROGRESS NOTE    Length of Stay: 10  Date of Admission: 1/26/2023  Primary Care Physician: Emile Wynn MD    Subjective   Chief Complaint: No new complaints.    HPI: Patient is seen for follow-up today 2/5/2023.    72-year-old male with COPD, metastatic lung cancer, chronic hypoxic respiratory failure (on 4 L nasal cannula at baseline), ALLEN on CPAP, CAD with previous stents, hypertension, diabetes, history of obstructive uropathy (with previous nephrostomy and ureteral stent) who was admitted for acute on chronic respiratory failure with hypoxia secondary to multifocal consolidation/moderate loculated right pleural effusion/COPD exacerbation.    He was extubated on 1/31/2023, doing better, less deconditioned, less short of air but his oxygen requirement has increased to 6 L nasal prongs with saturation in the low 90s. Right-sided chest tube was removed on 2/3/2023.  .    Review of Systems   Constitutional: Positive for activity change and fatigue. Negative for appetite change, chills, diaphoresis and fever.   HENT: Negative for trouble swallowing and voice change.    Eyes: Negative for photophobia and visual disturbance.   Respiratory: Positive for shortness of breath. Negative for cough, choking, chest tightness, wheezing and stridor.    Cardiovascular: Negative for chest pain, palpitations and leg swelling.   Gastrointestinal: Negative for abdominal distention, abdominal pain, blood in stool, constipation, diarrhea, nausea and vomiting.   Endocrine: Negative for cold intolerance, heat intolerance, polydipsia, polyphagia and polyuria.   Genitourinary: Negative for decreased urine volume, difficulty urinating, dysuria, enuresis, flank pain, frequency, hematuria and urgency.   Musculoskeletal: Negative for arthralgias, gait problem, myalgias, neck pain and neck stiffness.   Skin: Negative for pallor, rash and wound.    Neurological: Negative for dizziness, tremors, seizures, syncope, facial asymmetry, speech difficulty, weakness, light-headedness, numbness and headaches.   Hematological: Does not bruise/bleed easily.   Psychiatric/Behavioral: Negative for agitation, behavioral problems and confusion.       Objective    Temp:  [96.2 °F (35.7 °C)-97.6 °F (36.4 °C)] 97.6 °F (36.4 °C)  Heart Rate:  [] 92  Resp:  [18-20] 18  BP: (109-127)/(58-72) 109/58    AM-PAC 6 Clicks Score (PT): 16 (02/04/23 0900)           Physical Exam  Vitals and nursing note reviewed.   Constitutional:       General: He is not in acute distress.     Appearance: He is well-developed. He is obese. He is ill-appearing. He is not diaphoretic.   HENT:      Head: Normocephalic and atraumatic.   Eyes:      General: No scleral icterus.        Right eye: No discharge.         Left eye: No discharge.   Neck:      Thyroid: No thyromegaly.      Vascular: No carotid bruit or JVD.   Cardiovascular:      Rate and Rhythm: Normal rate and regular rhythm.      Heart sounds: Normal heart sounds. No murmur heard.    No friction rub. No gallop.   Pulmonary:      Effort: Pulmonary effort is normal. No respiratory distress.      Breath sounds: No stridor. Examination of the right-lower field reveals decreased breath sounds. Decreased breath sounds and rhonchi present. No wheezing or rales.   Chest:      Chest wall: No tenderness.   Abdominal:      General: Bowel sounds are normal. There is no distension.      Palpations: Abdomen is soft. There is no mass.      Tenderness: There is no abdominal tenderness. There is no right CVA tenderness, left CVA tenderness, guarding or rebound.      Hernia: No hernia is present.   Musculoskeletal:         General: No swelling, tenderness or deformity.      Cervical back: Neck supple.      Right lower leg: No edema.      Left lower leg: No edema.   Skin:     General: Skin is warm and dry.      Coloration: Skin is not jaundiced or pale.       Findings: No bruising, erythema, lesion or rash.   Neurological:      General: No focal deficit present.      Mental Status: He is alert and oriented to person, place, and time.      Cranial Nerves: No cranial nerve deficit.      Sensory: No sensory deficit.      Motor: No weakness or abnormal muscle tone.      Coordination: Coordination normal.      Gait: Gait normal.      Deep Tendon Reflexes: Reflexes normal.      Comments: .   Psychiatric:         Mood and Affect: Mood normal.         Behavior: Behavior normal.         Thought Content: Thought content normal.         Judgment: Judgment normal.           Medication Review:    Current Facility-Administered Medications:   •  dextrose (D50W) (25 g/50 mL) IV injection 25 g, 25 g, Intravenous, Q15 Min PRN, Pierre Mehta MD  •  dextrose (GLUTOSE) oral gel 15 g, 15 g, Oral, Q15 Min PRN, Pierre Mehta MD  •  FLUoxetine (PROzac) capsule 20 mg, 20 mg, Oral, Daily, Pierre Mehta MD, 20 mg at 02/05/23 0856  •  furosemide (LASIX) injection 20 mg, 20 mg, Intravenous, Q12H, Pierre Mehta MD, 20 mg at 02/05/23 0425  •  glucagon (human recombinant) (GLUCAGEN DIAGNOSTIC) injection 1 mg, 1 mg, Intramuscular, Q15 Min PRN, Pierre Mehta MD  •  guaiFENesin (MUCINEX) 12 hr tablet 600 mg, 600 mg, Oral, Q12H, Pierre Mehta MD, 600 mg at 02/05/23 0857  •  Insulin Aspart (novoLOG) injection 0-9 Units, 0-9 Units, Subcutaneous, TID AC, Pierre Mehta MD, 4 Units at 02/04/23 1712  •  ipratropium-albuterol (DUO-NEB) nebulizer solution 3 mL, 3 mL, Nebulization, 4x Daily - RT, Pierre Mehta MD, 3 mL at 02/05/23 0800  •  levothyroxine (SYNTHROID, LEVOTHROID) tablet 50 mcg, 50 mcg, Oral, Q AM, Pierre Mehta MD, 50 mcg at 02/05/23 0539  •  metoprolol tartrate (LOPRESSOR) tablet 25 mg, 25 mg, Oral, Q12H, Pierre Mehta MD, 25 mg at 02/05/23 0856  •  morphine injection 2 mg, 2 mg, Intravenous, Q4H PRN, Pierre Mehta MD, 2 mg at 02/05/23  0918  •  nicotine (NICODERM CQ) 21 MG/24HR patch 1 patch, 1 patch, Transdermal, Q24H, Pierre Mehta MD, 1 patch at 02/05/23 0856  •  ondansetron (ZOFRAN) tablet 4 mg, 4 mg, Oral, Q6H PRN **OR** ondansetron (ZOFRAN) injection 4 mg, 4 mg, Intravenous, Q6H PRN, Pierre Mehta MD, 4 mg at 02/03/23 0923  •  pantoprazole (PROTONIX) EC tablet 40 mg, 40 mg, Oral, Q AM, Pierre Mehta MD, 40 mg at 02/05/23 0539  •  polyethylene glycol (MIRALAX) packet 17 g, 17 g, Oral, Daily, Pierre Mehta MD, 17 g at 02/05/23 0856  •  sennosides-docusate (PERICOLACE) 8.6-50 MG per tablet 1 tablet, 1 tablet, Oral, Nightly, Pierre Mehta MD, 1 tablet at 02/04/23 2147  •  sodium chloride 0.9 % flush 10 mL, 10 mL, Intravenous, Q12H, Pierre Mehta MD, 10 mL at 02/05/23 0857  •  sodium chloride 0.9 % flush 10 mL, 10 mL, Intravenous, PRN, Pierre Mehta MD  •  sodium chloride 0.9 % flush 10 mL, 10 mL, Intravenous, Q12H, Pierre Mehta MD, 10 mL at 02/05/23 0857  •  sodium chloride 0.9 % flush 10 mL, 10 mL, Intravenous, Q12H, Pierre Mehta MD, 10 mL at 02/05/23 0857  •  sodium chloride 0.9 % flush 10 mL, 10 mL, Intravenous, PRN, Pierre Mehta MD  •  sodium chloride 0.9 % infusion 40 mL, 40 mL, Intravenous, PRN, Pierre Mehta MD  •  tamsulosin (FLOMAX) 24 hr capsule 0.8 mg, 0.8 mg, Oral, Daily, Pierre Mehta MD, 0.8 mg at 02/05/23 0856  •  traMADol (ULTRAM) tablet 50 mg, 50 mg, Oral, Q6H PRN, Pierre Mehta MD, 50 mg at 02/04/23 0815  •  traZODone (DESYREL) tablet 150 mg, 150 mg, Oral, Nightly, Pierre Mehta MD, 150 mg at 02/04/23 9329    Results Review:  I have reviewed the labs, radiology results, and diagnostic studies.    Laboratory Data:   Results from last 7 days   Lab Units 02/05/23  0636 02/04/23  0555 02/03/23  0529 02/01/23  0659 01/31/23  0538 01/30/23  0442   SODIUM mmol/L 134* 136 136   < > 136 137   POTASSIUM mmol/L 4.3 5.1 4.8   < > 5.3* 5.0   CHLORIDE  mmol/L 96* 98 100   < > 102 99   CO2 mmol/L 25.0 25.0 25.0   < > 26.0 28.0   BUN mg/dL 51* 45* 50*   < > 54* 40*   CREATININE mg/dL 1.72* 1.45* 1.44*   < > 1.71* 1.73*   GLUCOSE mg/dL 140* 113* 125*   < > 173* 106*   CALCIUM mg/dL 9.2 9.5 9.1   < > 8.6 9.1   BILIRUBIN mg/dL  --   --   --   --  0.4 0.3   ALK PHOS U/L  --   --   --   --  96 98   ALT (SGPT) U/L  --   --   --   --  44* 47*   AST (SGOT) U/L  --   --   --   --  40 40   ANION GAP mmol/L 13.0 13.0 11.0   < > 8.0 10.0    < > = values in this interval not displayed.     Estimated Creatinine Clearance: 43.9 mL/min (A) (by C-G formula based on SCr of 1.72 mg/dL (H)).          Results from last 7 days   Lab Units 02/05/23  0636 02/04/23  0555 02/03/23  0529 02/01/23  0412 01/31/23  0538   WBC 10*3/mm3 15.79* 18.81* 13.89* 9.62 8.81   HEMOGLOBIN g/dL 13.7 14.9 13.8 13.0 12.2*   HEMATOCRIT % 43.9 47.5 43.2 40.5 38.3   PLATELETS 10*3/mm3 144 154 163 188 192     Results from last 7 days   Lab Units 02/01/23  0412   INR  1.23*       Culture Data:   No results found for: BLOODCX  No results found for: URINECX  No results found for: RESPCX  No results found for: WOUNDCX  No results found for: STOOLCX  No components found for: BODYFLD    Radiology Data:   Imaging Results (Last 24 Hours)     ** No results found for the last 24 hours. **          ABG:  Results from last 7 days   Lab Units 01/31/23  0742   PH, ARTERIAL pH units 7.420   PO2 ART mm Hg 74.2*   PCO2, ARTERIAL mm Hg 44.2   HCO3 ART mmol/L 28.6*       I have reviewed the patient's current medications.     Assessment/Plan     Hospital Problem List:  Principal Problem:    Acute on chronic respiratory failure with hypoxia (HCC)  Active Problems:    Mass of right lung    Sepsis due to pneumonia (HCC)    Loculated pleural effusion    Acute on chronic respiratory failure with hypoxia/sepsis (secondary to a combination of multifocal consolidation, COPD exacerbation right-sided pleural effusion): Patient is status  postplacement of right-sided chest tube secondary to malignant pleural effusion and chest tube was removed on 2/3/2023.  He has completed a course of antimicrobial therapy.  Continue noninvasive respiratory therapy, bronchodilators and steroids.  Blood and pleural fluid cultures showed no growth.  Pleural fluid cytology is positive for malignancy.  Result of follow-up chest x-ray done 2/3/2023 have been reviewed.  Patient has been recommended repeat chest x-ray in am 2/6/2023 followed by placement of a Pleurx catheter. Input by CT surgery is appreciated.  Reactive leukocytosis is improving and will be monitored.    Metastatic lung cancer: Patient is to follow-up with his primary oncologist postdischarge.    Chronic kidney disease: Patient's baseline creatinine is between 1.2-1.7.  Creatinine is at baseline.  Continue to monitor and consult nephrologist if the need arises.    Diabetes mellitus: Stable.  Continue Accu-Cheks and sliding scale insulin.    Obstructive sleep apnea: Patient has not been very compliant with his nightly CPAP.  He has been advised on the need to be compliant.    Deconditioning: Continue PT and OT.      Continue GI and DVT prophylaxis.    Medical Decision Making  Number and Complexity of problems: 4 with high complexities.      Conditions and Status: Hemodynamically stable.             MDM Data  External documents reviewed: Not applicable.     Decision rules/scores evaluated (example [id number], Wells, etc): Not applicable.     Discussed with: Patient, his wife and nursing staff.     Treatment Plan: As detailed above.    Care Planning  Shared decision making: Discussed with patient and his wife.  Code status and discussions: Full code.    Disposition  Social Determinants of Health that impact treatment or disposition: None.    I expect the patient to be discharged to home in 3-5 days.      I confirmed that the patient's Advance Care Plan is present, code status is documented, or surrogate  decision maker is listed in the patient's medical record.     I have utilized all available immediate resources to obtain, update, or review the patient's current medications      Discharge Planning: In progress.    Pierre Mehta MD   02/05/23   10:22 CST

## 2023-02-05 NOTE — PLAN OF CARE
Patient on 6LNC, encourage bipap at night. Repeating chest xray tomorrow. Goal Outcome Evaluation:

## 2023-02-06 NOTE — PLAN OF CARE
Goal Outcome Evaluation:  Plan of Care Reviewed With: patient           Outcome Evaluation: sup-sit cga,sit-sup min of 1,sit-stand-sit min of 2;pt amb 2' forward/backward with rw and min of 2-sats dropped to 90% and required ~3' to recover to 92%;fatigued after gt and declined ex;min soa and dizziness with rx-resolved once back in bed

## 2023-02-06 NOTE — PROGRESS NOTES
[facility] Medicine Services  INPATIENT PROGRESS NOTE    Length of Stay: 11  Date of Admission: 1/26/2023  Primary Care Physician: Emile Wynn MD    Subjective   Chief Complaint: Constipation/shortness of air.      HPI: Patient is seen for follow-up today 2/6/2023.    72-year-old male with COPD, metastatic lung cancer, chronic hypoxic respiratory failure (on 4 L nasal cannula at baseline), ALLEN on CPAP, CAD with previous stents, hypertension, diabetes, history of obstructive uropathy (with previous nephrostomy and ureteral stent) who was admitted for acute on chronic respiratory failure with hypoxia secondary to multifocal consolidation/moderate loculated right pleural effusion/COPD exacerbation.    He was extubated on 1/31/2023, less deconditioned, remains short of air and  his oxygen requirement is still at 6 L nasal prongs with saturation in the low 90s. Right-sided chest tube was removed on 2/3/2023 and patient reports nausea and lack of bowel movement over the past several days despite ongoing bowel regimen.  He is however passing flatus and tolerating his diet.    Review of Systems   Constitutional: Positive for activity change and fatigue. Negative for appetite change, chills, diaphoresis and fever.   HENT: Negative for trouble swallowing and voice change.    Eyes: Negative for photophobia and visual disturbance.   Respiratory: Positive for shortness of breath. Negative for cough, choking, chest tightness, wheezing and stridor.    Cardiovascular: Negative for chest pain, palpitations and leg swelling.   Gastrointestinal: Positive for constipation and nausea. Negative for abdominal distention, abdominal pain, blood in stool, diarrhea and vomiting.   Endocrine: Negative for cold intolerance, heat intolerance, polydipsia, polyphagia and polyuria.   Genitourinary: Negative for decreased urine volume, difficulty urinating, dysuria, enuresis, flank pain, frequency,  hematuria and urgency.   Musculoskeletal: Negative for arthralgias, gait problem, myalgias, neck pain and neck stiffness.   Skin: Negative for pallor, rash and wound.   Neurological: Negative for dizziness, tremors, seizures, syncope, facial asymmetry, speech difficulty, weakness, light-headedness, numbness and headaches.   Hematological: Does not bruise/bleed easily.   Psychiatric/Behavioral: Negative for agitation, behavioral problems and confusion.       Objective    Temp:  [97.3 °F (36.3 °C)-97.5 °F (36.4 °C)] 97.3 °F (36.3 °C)  Heart Rate:  [] 90  Resp:  [18] 18  BP: (115-135)/(65-84) 129/69    AM-PAC 6 Clicks Score (PT): 17 (02/05/23 0900)           Physical Exam  Vitals and nursing note reviewed.   Constitutional:       General: He is not in acute distress.     Appearance: He is well-developed. He is obese. He is ill-appearing. He is not diaphoretic.   HENT:      Head: Normocephalic and atraumatic.   Eyes:      General: No scleral icterus.        Right eye: No discharge.         Left eye: No discharge.   Neck:      Thyroid: No thyromegaly.      Vascular: No carotid bruit or JVD.   Cardiovascular:      Rate and Rhythm: Normal rate and regular rhythm.      Heart sounds: Normal heart sounds. No murmur heard.    No friction rub. No gallop.   Pulmonary:      Effort: Pulmonary effort is normal. No respiratory distress.      Breath sounds: No stridor. Examination of the right-lower field reveals decreased breath sounds. Decreased breath sounds and rhonchi present. No wheezing or rales.   Chest:      Chest wall: No tenderness.   Abdominal:      General: Bowel sounds are normal. There is no distension.      Palpations: Abdomen is soft. There is no mass.      Tenderness: There is no abdominal tenderness. There is no right CVA tenderness, left CVA tenderness, guarding or rebound.      Hernia: No hernia is present.   Musculoskeletal:         General: No swelling, tenderness or deformity.      Cervical back: Neck  supple.      Right lower leg: No edema.      Left lower leg: No edema.   Skin:     General: Skin is warm and dry.      Coloration: Skin is not jaundiced or pale.      Findings: No bruising, erythema, lesion or rash.   Neurological:      General: No focal deficit present.      Mental Status: He is alert and oriented to person, place, and time.      Cranial Nerves: No cranial nerve deficit.      Sensory: No sensory deficit.      Motor: No weakness or abnormal muscle tone.      Coordination: Coordination normal.      Gait: Gait normal.      Deep Tendon Reflexes: Reflexes normal.      Comments: .   Psychiatric:         Mood and Affect: Mood normal.         Behavior: Behavior normal.         Thought Content: Thought content normal.         Judgment: Judgment normal.           Medication Review:    Current Facility-Administered Medications:   •  dextrose (D50W) (25 g/50 mL) IV injection 25 g, 25 g, Intravenous, Q15 Min PRN, Pierre Mehta MD  •  dextrose (GLUTOSE) oral gel 15 g, 15 g, Oral, Q15 Min PRN, Pierre Mehta MD  •  FLUoxetine (PROzac) capsule 20 mg, 20 mg, Oral, Daily, Pierre Mehta MD, 20 mg at 02/06/23 0952  •  furosemide (LASIX) injection 20 mg, 20 mg, Intravenous, Q12H, Pierre Mehta MD, 20 mg at 02/06/23 0505  •  glucagon (human recombinant) (GLUCAGEN DIAGNOSTIC) injection 1 mg, 1 mg, Intramuscular, Q15 Min PRN, Pierre Mehta MD  •  guaiFENesin (MUCINEX) 12 hr tablet 600 mg, 600 mg, Oral, Q12H, Pierre Mehta MD, 600 mg at 02/06/23 0952  •  Insulin Aspart (novoLOG) injection 0-9 Units, 0-9 Units, Subcutaneous, TID AC, Pierre Mehta MD, 2 Units at 02/06/23 1005  •  ipratropium-albuterol (DUO-NEB) nebulizer solution 3 mL, 3 mL, Nebulization, 4x Daily - RT, Pierre Mehta MD, 3 mL at 02/06/23 0721  •  lactulose (CHRONULAC) 10 GM/15ML solution 20 g, 20 g, Oral, BID, Pierre Mehta MD, 20 g at 02/06/23 1002  •  levothyroxine (SYNTHROID, LEVOTHROID) tablet 50 mcg,  50 mcg, Oral, Q AM, Pierre Mehta MD, 50 mcg at 02/06/23 0505  •  metoprolol tartrate (LOPRESSOR) tablet 25 mg, 25 mg, Oral, Q12H, Pierre Mehta MD, 25 mg at 02/06/23 0952  •  morphine injection 2 mg, 2 mg, Intravenous, Q4H PRN, Pierre Mehta MD, 2 mg at 02/06/23 1003  •  nicotine (NICODERM CQ) 21 MG/24HR patch 1 patch, 1 patch, Transdermal, Q24H, Pierre Mehta MD, 1 patch at 02/06/23 0951  •  ondansetron (ZOFRAN) tablet 4 mg, 4 mg, Oral, Q6H PRN **OR** ondansetron (ZOFRAN) injection 4 mg, 4 mg, Intravenous, Q6H PRN, Pierre Mehta MD, 4 mg at 02/05/23 1806  •  pantoprazole (PROTONIX) EC tablet 40 mg, 40 mg, Oral, Q AM, Pierre Mehta MD, 40 mg at 02/06/23 0506  •  polyethylene glycol (MIRALAX) packet 17 g, 17 g, Oral, Daily, Pierre Mehta MD, 17 g at 02/06/23 0952  •  sennosides-docusate (PERICOLACE) 8.6-50 MG per tablet 1 tablet, 1 tablet, Oral, Nightly, Pierre Mehta MD, 1 tablet at 02/05/23 2054  •  sodium chloride 0.9 % flush 10 mL, 10 mL, Intravenous, Q12H, Pierre Mehta MD, 10 mL at 02/05/23 0857  •  sodium chloride 0.9 % flush 10 mL, 10 mL, Intravenous, PRN, Pierre Mehta MD  •  sodium chloride 0.9 % flush 10 mL, 10 mL, Intravenous, Q12H, Pierre Mehta MD, 10 mL at 02/06/23 1009  •  sodium chloride 0.9 % flush 10 mL, 10 mL, Intravenous, Q12H, Pierre Mehta MD, 10 mL at 02/06/23 0953  •  sodium chloride 0.9 % flush 10 mL, 10 mL, Intravenous, PRN, Pierre Mehta MD  •  sodium chloride 0.9 % infusion 40 mL, 40 mL, Intravenous, PRN, Pierre Mehta MD  •  tamsulosin (FLOMAX) 24 hr capsule 0.8 mg, 0.8 mg, Oral, Daily, Pierre Mehta MD, 0.8 mg at 02/06/23 0952  •  traMADol (ULTRAM) tablet 50 mg, 50 mg, Oral, Q6H PRN, Pierre Mehta MD, 50 mg at 02/05/23 2112  •  traZODone (DESYREL) tablet 150 mg, 150 mg, Oral, Nightly, Pierre Mehta MD, 150 mg at 02/05/23 2055    Results Review:  I have reviewed the labs, radiology  results, and diagnostic studies.    Laboratory Data:   Results from last 7 days   Lab Units 02/05/23  0636 02/04/23  0555 02/03/23  0529 02/01/23 0659 01/31/23  0538   SODIUM mmol/L 134* 136 136   < > 136   POTASSIUM mmol/L 4.3 5.1 4.8   < > 5.3*   CHLORIDE mmol/L 96* 98 100   < > 102   CO2 mmol/L 25.0 25.0 25.0   < > 26.0   BUN mg/dL 51* 45* 50*   < > 54*   CREATININE mg/dL 1.72* 1.45* 1.44*   < > 1.71*   GLUCOSE mg/dL 140* 113* 125*   < > 173*   CALCIUM mg/dL 9.2 9.5 9.1   < > 8.6   BILIRUBIN mg/dL  --   --   --   --  0.4   ALK PHOS U/L  --   --   --   --  96   ALT (SGPT) U/L  --   --   --   --  44*   AST (SGOT) U/L  --   --   --   --  40   ANION GAP mmol/L 13.0 13.0 11.0   < > 8.0    < > = values in this interval not displayed.     Estimated Creatinine Clearance: 44 mL/min (A) (by C-G formula based on SCr of 1.72 mg/dL (H)).          Results from last 7 days   Lab Units 02/05/23  0636 02/04/23  0555 02/03/23  0529 02/01/23 0412 01/31/23  0538   WBC 10*3/mm3 15.79* 18.81* 13.89* 9.62 8.81   HEMOGLOBIN g/dL 13.7 14.9 13.8 13.0 12.2*   HEMATOCRIT % 43.9 47.5 43.2 40.5 38.3   PLATELETS 10*3/mm3 144 154 163 188 192     Results from last 7 days   Lab Units 02/01/23 0412   INR  1.23*       Culture Data:   No results found for: BLOODCX  No results found for: URINECX  No results found for: RESPCX  No results found for: WOUNDCX  No results found for: STOOLCX  No components found for: BODYFLD    Radiology Data:   Imaging Results (Last 24 Hours)     Procedure Component Value Units Date/Time    XR Chest 2 View [[id number]] Collected: 02/06/23 0630     Updated: 02/06/23 1003    Narrative:      EXAM: XR CHEST 2 VIEWS     HISTORY: Pleural Effusion follow up, J96.21 Acute and chronic  respiratory failure with hypoxia R91.8 Other nonspecific abnormal  finding of lung field J18.9 Pneumonia, unspecified organism  Z74.09 Other reduced mobility Z74.09 Other reduced mobility Z78.9  Other specified health status R13.11 Dysphagia, oral  phase.    COMPARISON: February 3, 2023     FINDINGS:    Heart: Normal size.     Mediastinum: Prominent right hilar contour . Mediastinal  adenopathy    Lungs: Right chest tube has been removed. No visualized  pneumothorax. Small-to-moderate right effusion. Extensive right  perihilar opacities are unchanged. Increased interstitial  markings.    Bones: Osseous demineralization.    Abdomen: Visualized upper abdomen is unremarkable.      Impression:        Interval removal of the right chest tube. No visualized  pneumothorax.    Small-to-moderate right effusion    Mediastinal adenopathy    Right hilar opacity may represent pneumonia or malignancy.     Diffuse right perihilar opacities may represent postobstructive  pneumonitis. Continued follow-up recommended.    Electronically signed by:  Macho Simmons DO  2/6/2023 10:01 AM  CST Workstation: [id number]    XR Abdomen KUB [[id number]] Resulted: 02/06/23 0940     Updated: 02/06/23 0942          ABG:  Results from last 7 days   Lab Units 01/31/23  0742   PH, ARTERIAL pH units 7.420   PO2 ART mm Hg 74.2*   PCO2, ARTERIAL mm Hg 44.2   HCO3 ART mmol/L 28.6*       I have reviewed the patient's current medications.     Assessment/Plan     Hospital Problem List:  Principal Problem:    Acute on chronic respiratory failure with hypoxia (HCC)  Active Problems:    Mass of right lung    Sepsis due to pneumonia (HCC)    Loculated pleural effusion    Acute on chronic respiratory failure with hypoxia/sepsis (secondary to a combination of multifocal consolidation, COPD exacerbation right-sided pleural effusion): Patient is status postplacement of right-sided chest tube secondary to malignant pleural effusion and chest tube was removed on 2/3/2023.  He has completed a course of antimicrobial therapy and steroids.  Continue noninvasive respiratory therapy and bronchodilators.  Blood and pleural fluid cultures showed no growth.  Pleural fluid cytology is positive for malignancy and result  of follow-up chest x-ray done today 2/6/2023 have been reviewed.  Patient may require placement of right-sided Pleurx catheter.  Input by CT surgery is appreciated.  Reactive leukocytosis is improving and will be monitored.    Metastatic lung cancer: Patient is to follow-up with his primary oncologist postdischarge.    Chronic kidney disease: Patient's baseline creatinine is between 1.2-1.7.  Creatinine is at baseline.  Continue to monitor and consult nephrologist if the need arises.    Diabetes mellitus: Stable.  Continue Accu-Cheks and sliding scale insulin.    Constipation: Continue bowel regimen and check KUB.    Obstructive sleep apnea: Continue nightly CPAP.      Deconditioning: Continue PT and OT.      Continue GI and DVT prophylaxis.    Medical Decision Making  Number and Complexity of problems: 4 with high complexities.      Conditions and Status: Hemodynamically stable.             MDM Data  External documents reviewed: Not applicable.     Decision rules/scores evaluated (example [id number], Wells, etc): Not applicable.     Discussed with: Patient, his wife and nursing staff.     Treatment Plan: As detailed above.    Care Planning  Shared decision making: Discussed with patient and his wife.  Code status and discussions: Full code.    Disposition  Social Determinants of Health that impact treatment or disposition: None.    I expect the patient to be discharged to home in 3-5 days.      I confirmed that the patient's Advance Care Plan is present, code status is documented, or surrogate decision maker is listed in the patient's medical record.     I have utilized all available immediate resources to obtain, update, or review the patient's current medications      Discharge Planning: In progress.    Pierre Mehta MD   02/06/23   10:31 CST

## 2023-02-06 NOTE — PROGRESS NOTES
Adult Nutrition  Assessment/PES    Patient Name:  Aman Sanchez  YOB: 1950  MRN: [id number]  Admit Date:  1/26/2023    Assessment Date:  2/6/2023    Comments:  Follow up. Pt continued on mechanical soft diet per SLP. Pt appeared very tired and seemed to have trouble answering questions due to increased effort to breath. Pt wt continue to trend down, 1/27 207#, #.  Wife states pt has not been eating the best the past few days and does not like the sugar free ice cream. Offered to switch the ice cream to magic cup to see if he would like that better and to encourage increased calorie intake. Discussed other high-calorie, high-protein items to encourage intake. Pt agrees to milk TID, boost GC, and magic cup. Will update supplement order. Pt may benefit from an appetite stimulant if his intake/appetite does not improve. RDN staff to follow hospital course.    Reason for Assessment     Row Name 02/06/23 1157          Reason for Assessment    Reason For Assessment follow-up protocol (P)      Diagnosis infection/sepsis;pulmonary disease (P)                 Nutrition/Diet History     Row Name 02/06/23 1158          Nutrition/Diet History    Typical Intake (Food/Fluid/EN/PN) Wife at bedside. She states he is not eating very well. Pt states he does not have a very big appetite. (P)      Food Preferences Does not like the sugar free ice cream (P)                 Labs/Tests/Procedures/Meds     Row Name 02/06/23 1159          Labs/Procedures/Meds    Lab Results Reviewed reviewed, pertinent (P)      Lab Results Comments Na 134, BUN 51, Cr 1.72, ALT 44, alb 3.0 (P)         Medications    Pertinent Medications Reviewed reviewed, pertinent (P)      Pertinent Medications Comments SSI, lasix, lactulose (P)                 Physical Findings     Row Name 02/06/23 1201          Physical Findings    Overall Physical Appearance Pt appears to be very tired and has difficulty talking/breathing (P)                  Estimated/Assessed Needs - Anthropometrics     Row Name 02/06/23 1202 02/06/23 0552       Anthropometrics    Weight -- 88.4 kg (194 lb 12.8 oz)    Weight for Calculation 72.6 kg (160 lb) (P)  --       Estimated/Assessed Needs    Additional Documentation KCAL/KG (Group);Protein Requirements (Group);Fluid Requirements (Group) (P)  --       KCAL/KG    KCAL/KG 30 Kcal/Kg (kcal) (P)  --    30 Kcal/Kg (kcal) 2177.28 (P)  --       Protein Requirements    Weight Used For Protein Calculations 72.6 kg (160 lb) (P)  --    Est Protein Requirement Amount (gms/kg) 1.0 gm protein (P)  --    Estimated Protein Requirements (gms/day) 72.58 (P)  --       Fluid Requirements    Fluid Requirements (mL/day) 2000 (P)  --    RDA Method (mL) 2000 (P)  --               Nutrition Prescription Ordered     Row Name 02/06/23 1203          Nutrition Prescription PO    Current PO Diet Soft Texture (P)      Texture Chopped (P)      Supplement Boost Glucose Control (Glucerna Shake);Ice Cream (P)      Supplement Frequency 3 times a day (P)      Common Modifiers Consistent Carbohydrate (P)                 Evaluation of Received Nutrient/Fluid Intake     Row Name 02/06/23 1204          PO Evaluation    Number of Meals 15 (P)      % PO Intake 55 (P)                    Problem/Interventions:   Problem 1     Row Name 02/06/23 1205          Nutrition Diagnoses Problem 1    Problem 1 Inadequate Nutrient Intake (P)      Etiology (related to) Factors Affecting Nutrition (P)      Pulmonary/Critical Care COPD (P)      Appetite Poor (P)      Oral Swallowing Difficulty (P)      Signs/Symptoms (evidenced by) Report of Mnimal PO Intake;PO Intake;Unintended Weight Change (P)      Percent (%) intake recorded 55 % (P)      Over number of meals 15 (P)      Unintended Weight Change Loss (P)      Number of Pounds Lost 7 (P)      Weight loss time period 10 (P)                 Problem 2     Row Name 02/06/23 1209          Nutrition Diagnoses Problem 2    Problem 2 Increased  Nutrient Needs (P)      Macronutrient Kcal;Protein (P)      Etiology (related to) Medical Diagnosis (P)      Infectious Disease Sepsis (P)      Oncology Cancer w/mets;Lung cancer (P)      Pulmonary/Critical Care COPD (P)      Signs/Symptoms (evidenced by) NPO;Unintended Weight Change (P)      Unintended Weight Change Loss (P)                     Intervention Goal     Row Name 02/06/23 1210          Intervention Goal    General Maintain nutrition;Meet nutritional needs for age/condition (P)      PO Meet estimated needs;Increase intake (P)      Weight Maintain weight (P)                 Nutrition Intervention     Row Name 02/06/23 1210          Nutrition Intervention    RD/Tech Action Follow Tx progress;Care plan reviewd;Encourage intake;Recommend/ordered;Adjusted supplement (P)      Recommended/Ordered Supplement (P)                 Nutrition Prescription     Row Name 02/06/23 1211          Nutrition Prescription PO    PO Prescription Begin/change supplement (P)      Supplement Milk;Magic Cup (P)      Supplement Frequency 2 times a day (P)                 Education/Evaluation     Row Name 02/06/23 1211          Education    Education Provided education regarding;Education topics (P)      Provided education regarding Diet rationale (P)      Education Topics Basic nutrition;Protein;Weight management - maintain (P)         Monitor/Evaluation    Monitor Per protocol;PO intake;Supplement intake;Weight;Skin status;Symptoms (P)                  Electronically signed by:  Ruby Acosta  02/06/23 12:12 CST

## 2023-02-06 NOTE — PLAN OF CARE
"Goal Outcome Evaluation:           Progress: no change  Outcome Evaluation: Pt slept on C-pap throughout the night; up to BSC with assistance X1, states he \"feels bad\" this AM, denies any new problems; VSS  "

## 2023-02-06 NOTE — THERAPY TREATMENT NOTE
Patient Name: Aman Sanchez  : 1950    MRN: [id number]                              Today's Date: 2023       Admit Date: 2023    Visit Dx:     ICD-10-CM ICD-9-CM   1. Acute on chronic respiratory failure with hypoxia (HCC)  J96.21 518.84     799.02   2. Lung mass  R91.8 786.6   3. Pneumonia of right lung due to infectious organism, unspecified part of lung  J18.9 483.8   4. Impaired functional mobility and activity tolerance  Z74.09 V49.89   5. Impaired mobility and ADLs  Z74.09 V49.89    Z78.9    6. Oral phase dysphagia  R13.11 787.21     Patient Active Problem List   Diagnosis   • Essential hypertension   • Chronic coronary artery disease   • Mixed hyperlipidemia   • Tobacco abuse counseling   • Dyspnea on exertion   • Acute retention of urine   • Anemia   • Osteoarthritis of hip   • Hypersomnia   • Hypotension   • Obstructive sleep apnea syndrome   • Diastolic dysfunction   • Morbid obesity due to excess calories (HCC)   • Tobacco use disorder   • Calculus of ureter   • Preoperative cardiovascular examination   • Enlarged prostate with urinary obstruction   • Mass of right lung   • Chronic respiratory failure with hypoxia (HCC)   • Pleural effusion   • Acute on chronic respiratory failure with hypoxia (HCC)   • Sepsis due to pneumonia (HCC)   • Loculated pleural effusion     Past Medical History:   Diagnosis Date   • Acute bronchitis    • Anemia    • Breast lump    • Cervical radiculopathy    • COPD (chronic obstructive pulmonary disease) (HCC)    • Coronary atherosclerosis     2 stents, followed by Dr. Hargrove   • Depressive disorder    • Disease of thyroid gland    • Dysuria    • Essential hypertension    • Flank pain    • Hematochezia    • Hematuria syndrome    • Hyperlipidemia    • Hypoglycemia    • Kidney stone    • Kidney stones    • Lung cancer (HCC)    • Mass of neck    • MI (myocardial infarction) (HCC)    • Neck pain     sprain   • Osteoarthritis    • PONV (postoperative nausea  and vomiting)    • Sleep apnea     using c-pap   • Sleep apnea    • Type 2 diabetes mellitus (HCC)      Past Surgical History:   Procedure Laterality Date   • BACK SURGERY     • CARDIAC CATHETERIZATION N/A 08/23/2017    Procedure: Right Heart Cath;  Surgeon: Maripoas Zamorano MD;  Location: [facility] INVASIVE LOCATION;  Service:    • CATARACT EXTRACTION WITH INTRAOCULAR LENS IMPLANT Bilateral    • CHOLECYSTECTOMY     • CYSTOSCOPY  08/29/2014    Left ESWL, cysto and stent removal   • CYSTOSCOPY W/ LITHOLAPAXY / EHL  08/15/2014    Left ESWL   • CYSTOSCOPY, RETROGRADE PYELOGRAM AND STENT INSERTION  12/29/2022   • CYSTOSCOPY, URETEROSCOPY, RETROGRADE PYELOGRAM, STENT INSERTION Left 06/30/2017    Procedure: CYSTOSCOPY, LEFT URETEROSCOPY, RETROGRADE PYELOGRAM, HOLMIUM LASER, STENT INSERTION;  Surgeon: Uday Horne MD;  Location: [facility];  Service:    • CYSTOSCOPY, URETEROSCOPY, RETROGRADE PYELOGRAM, STENT INSERTION Left 09/06/2017    Procedure: CYSTOSCOPY, LEFT URETEROSCOPY, RETROGRADE PYELOGRAM, HOLMIUM LASER, STENT INSERTION;  Surgeon: Uday Horne MD;  Location: [facility];  Service:    • CYSTOSCOPY, URETEROSCOPY, RETROGRADE PYELOGRAM, STENT INSERTION Left 01/17/2018    Procedure: CYSTOSCOPY LEFT URETEROSCOPY RETROGRADE PYELOGRAM HOLMIUM LASER STENT INSERTION;  Surgeon: Uday Horne MD;  Location: [facility];  Service:    • CYSTOSCOPY, URETEROSCOPY, RETROGRADE PYELOGRAM, STENT INSERTION Left 07/25/2018    Procedure: CYSTOSCOPY URETEROSCOPY RETROGRADE PYELOGRAM HOLMIUM LASER STENT INSERTION;  Surgeon: Uday Horne MD;  Location: [facility];  Service: Urology   • EYE SURGERY      eye lids lifted   • HIP SURGERY     • INJECTION OF MEDICATION  11/11/2013    Kenalog   • INJECTION OF MEDICATION  01/16/2014    Toradol   • JOINT REPLACEMENT Right 2012    total hip replacement   • SKIN GRAFT      on his foot; removed skin from hip   • TONSILLECTOMY     • TRANSESOPHAGEAL ECHOCARDIOGRAM (ОЛЬГА)  07/21/2014    with color  flow-Normal LV systolic function with Ef of 60-65%/ Grad1 diastolic dysfunction of the left ventricular myocardium. No evidence of pericardial effusion      General Information     Row Name 02/06/23 1450          OT Time and Intention    Document Type therapy note (daily note)  -LW     Mode of Treatment individual therapy;occupational therapy  -     Row Name 02/06/23 1450          General Information    Patient Profile Reviewed yes  -LW     Existing Precautions/Restrictions fall  -     Row Name 02/06/23 1450          Cognition    Orientation Status (Cognition) oriented x 3  -     Row Name 02/06/23 1450          Safety Issues, Functional Mobility    Impairments Affecting Function (Mobility) endurance/activity tolerance;postural/trunk control;pain;shortness of breath;strength  -           User Key  (r) = Recorded By, (t) = Taken By, (c) = Cosigned By    Initials Name Provider Type    Lizette Meehan COTA Occupational Therapist Assistant                 Mobility/ADL's     Row Name 02/06/23 1451          Bed Mobility    Bed Mobility rolling right;rolling left  -LW     Rolling Left [place] (Bed Mobility) moderate assist (50% patient effort)  -LW     Rolling Right [place] (Bed Mobility) moderate assist (50% patient effort)  -     Row Name 02/06/23 1451          Activities of Daily Living    BADL Assessment/Intervention bathing;upper body dressing;grooming  -     Row Name 02/06/23 1451          Grooming Assessment/Training    [place] Level (Grooming) grooming skills;hair care, combing/brushing;oral care regimen;shave face;wash face, hands;supervision  -     Position (Grooming) long sitting  -           User Key  (r) = Recorded By, (t) = Taken By, (c) = Cosigned By    Initials Name Provider Type    Lizette Meehan COTA Occupational Therapist Assistant               Obj/Interventions    No documentation.                Goals/Plan     Row Name 02/06/23 1045          Transfer Goal 1  (OT)    Activity/Assistive Device (Transfer Goal 1, OT) toilet  -LW     [place] Level/Cues Needed (Transfer Goal 1, OT) minimum assist (75% or more patient effort)  -LW     Time Frame (Transfer Goal 1, OT) long term goal (LTG);by discharge  -LW     Progress/Outcome (Transfer Goal 1, OT) goal not met  -     Row Name 02/06/23 1045          Bathing Goal 1 (OT)    Activity/Device (Bathing Goal 1, OT) lower body bathing  -LW     [place] Level/Cues Needed (Bathing Goal 1, OT) minimum assist (75% or more patient effort)  -LW     Time Frame (Bathing Goal 1, OT) long term goal (LTG);by discharge  -LW     Progress/Outcomes (Bathing Goal 1, OT) goal not met  -     Row Name 02/06/23 1045          Dressing Goal 1 (OT)    Activity/Device (Dressing Goal 1, OT) lower body dressing  -LW     [place]/Cues Needed (Dressing Goal 1, OT) minimum assist (75% or more patient effort)  -LW     Time Frame (Dressing Goal 1, OT) long term goal (LTG);by discharge  -LW     Progress/Outcome (Dressing Goal 1, OT) goal not met  -     Row Name 02/06/23 1045          Toileting Goal 1 (OT)    Activity/Device (Toileting Goal 1, OT) toileting skills, all  -LW     [place] Level/Cues Needed (Toileting Goal 1, OT) minimum assist (75% or more patient effort);independent  -LW     Time Frame (Toileting Goal 1, OT) long term goal (LTG);by discharge  -LW     Progress/Outcome (Toileting Goal 1, OT) goal revised this date;goal met  -     Row Name 02/06/23 1045          Grooming Goal 1 (OT)    Activity/Device (Grooming Goal 1, OT) grooming skills, all  -LW     [place] (Grooming Goal 1, OT) set-up required  -LW     Time Frame (Grooming Goal 1, OT) long term goal (LTG);by discharge  -LW     Progress/Outcome (Grooming Goal 1, OT) goal not met  -LW           User Key  (r) = Recorded By, (t) = Taken By, (c) = Cosigned By    Initials Name Provider Type    Lizette Meehan COTA Occupational Therapist Assistant               Clinical  Impression     Row Name 02/06/23 1457          Pain Assessment    Pretreatment Pain Rating 0/10 - no pain  -LW     Posttreatment Pain Rating 0/10 - no pain  -LW     Row Name 02/06/23 1457          Plan of Care Review    Plan of Care Reviewed With patient  -LW     Progress no change  -LW     Outcome Evaluation Pt supine in bed. Pt very lethargic, needing vc's to continue tasks. Pt completed shaving needing increased time for task. Grooming CGA. UB bathing and dressing Min A. Pt very pleasant and cooperative. All needs in reach. Pt could continue to benefit from OT services.  -LW     Row Name 02/06/23 1457          Positioning and Restraints    Pre-Treatment Position in bed  -LW     Post Treatment Position bed  -LW     In Bed fowlers;call light within reach;encouraged to call for assist;exit alarm on;with family/caregiver  -LW           User Key  (r) = Recorded By, (t) = Taken By, (c) = Cosigned By    Initials Name Provider Type    LW Lizette Flor COTA Occupational Therapist Assistant               Outcome Measures     Row Name 02/06/23 1500          How much help from another is currently needed...    Putting on and taking off regular lower body clothing? 2  -LW     Bathing (including washing, rinsing, and drying) 2  -LW     Toileting (which includes using toilet bed pan or urinal) 2  -LW     Putting on and taking off regular upper body clothing 3  -LW     Taking care of personal grooming (such as brushing teeth) 3  -LW     Eating meals 3  -LW     AM-PAC 6 Clicks Score (OT) 15  -LW     Row Name 02/06/23 1003          How much help from another person do you currently need...    Turning from your back to your side while in flat bed without using bedrails? 3  -LN     Moving from lying on back to sitting on the side of a flat bed without bedrails? 3  -LN     Moving to and from a bed to a chair (including a wheelchair)? 3  -LN     Standing up from a chair using your arms (e.g., wheelchair, bedside chair)? 3  -LN      Climbing 3-5 steps with a railing? 2  -LN     To walk in hospital room? 2  -LN     AM-PAC 6 Clicks Score (PT) 16  -LN     Highest level of mobility 5 --> Static standing  -LN           User Key  (r) = Recorded By, (t) = Taken By, (c) = Cosigned By    Initials Name Provider Type    Lizette Meehan COTA Occupational Therapist Assistant    Louis Tan, RN Registered Nurse                Occupational Therapy Education     Title: PT OT SLP Therapies (In Progress)     Topic: Occupational Therapy (Done)     Point: ADL training (Done)     Description:   Instruct learner(s) on proper safety adaptation and remediation techniques during self care or transfers.   Instruct in proper use of assistive devices.              Learning Progress Summary           Patient Acceptance, E,TB, VU by NESTOR at 2/6/2023 1500    Acceptance, E,TB, VU by ALLI at 2/3/2023 1549    Comment: OT role, POC, t/f training    Acceptance, E,TB, VU by  at 2/1/2023 1352    Acceptance, E,TB, VU by TANIA at 1/31/2023 1447    Comment: POC, role of OT, transfer training                   Point: Home exercise program (Done)     Description:   Instruct learner(s) on appropriate technique for monitoring, assisting and/or progressing therapeutic exercises/activities.              Learning Progress Summary           Patient Acceptance, E,TB, VU by NESTOR at 2/6/2023 1500                   Point: Precautions (Done)     Description:   Instruct learner(s) on prescribed precautions during self-care and functional transfers.              Learning Progress Summary           Patient Acceptance, E,TB, VU by NESTOR at 2/6/2023 1500    Acceptance, E,TB, VU by  at 2/3/2023 1549    Comment: OT role, POC, t/f training    Acceptance, E, VU by  at 2/2/2023 1135    Comment: Edu pt on no OOB without assist.    Acceptance, E,TB, VU by  at 2/1/2023 1352                   Point: Body mechanics (Done)     Description:   Instruct learner(s) on proper positioning and spine alignment  during self-care, functional mobility activities and/or exercises.              Learning Progress Summary           Patient Acceptance, E,TB, VU by  at 2/6/2023 1500    Acceptance, E,TB, VU by  at 2/3/2023 1549    Comment: OT role, POC, t/f training    Acceptance, E,TB, VU by  at 2/1/2023 1352                               User Key     Initials Effective Dates Name Provider Type Discipline    RB 06/16/21 -  Richie Davis, OT Occupational Therapist OT     06/16/21 -  Lizette Flor COTA Occupational Therapist Assistant OT     06/14/21 -  Brittaney Gilmore OT Occupational Therapist OT     10/19/22 -  Inez Young, OT Occupational Therapist OT     08/11/22 -  Bernice Rivers OT Occupational Therapist OT              OT Recommendation and Plan     Plan of Care Review  Plan of Care Reviewed With: patient  Progress: no change  Outcome Evaluation: Pt supine in bed. Pt very lethargic, needing vc's to continue tasks. Pt completed shaving needing increased time for task. Grooming CGA. UB bathing and dressing Min A. Pt very pleasant and cooperative. All needs in reach. Pt could continue to benefit from OT services.     Time Calculation:    Time Calculation- OT     Row Name 02/06/23 1501             Time Calculation- OT    OT Start Time 1045  -LW      OT Stop Time 1140  -LW      OT Time Calculation (min) 55 min  -LW      Total Timed Code Minutes- OT 55 minute(s)  -LW      OT Received On 02/06/23  -LW         Timed Charges    66806 - OT Self Care/Mgmt Minutes 55  -LW         Total Minutes    Timed Charges Total Minutes 55  -LW       Total Minutes 55  -LW            User Key  (r) = Recorded By, (t) = Taken By, (c) = Cosigned By    Initials Name Provider Type     Lizette Flor COTA Occupational Therapist Assistant              Therapy Charges for Today     Code Description Service Date Service Provider Modifiers Qty    [id number] HC OT SELF CARE/MGMT/TRAIN EA 15 MIN 2/6/2023 Lizette Flor COTA GO 4                Lizette Flor, BUSBY  2/6/2023

## 2023-02-06 NOTE — PLAN OF CARE
Goal Outcome Evaluation:  Plan of Care Reviewed With: (P) patient, spouse        Progress: (P) improving     Follow up. Pt wt continue to trend down, 1/27 207#, #.  Wife states pt has not been eating the best the past few days. Discussed other high-calorie, high-protein items to encourage intake. Pt agrees to milk TID, boost GC, and magic cup. D/C ice cream. Will update supplement order. Pt may benefit from an appetite stimulant if his intake/appetite does not improve. RDN staff to follow hospital course.

## 2023-02-06 NOTE — THERAPY TREATMENT NOTE
Acute Care - Physical Therapy Treatment Note  [facility]     Patient Name: Aman Sanchez  : 1950  MRN: [id number]  Today's Date: 2023      Visit Dx:     ICD-10-CM ICD-9-CM   1. Acute on chronic respiratory failure with hypoxia (HCC)  J96.21 518.84     799.02   2. Lung mass  R91.8 786.6   3. Pneumonia of right lung due to infectious organism, unspecified part of lung  J18.9 483.8   4. Impaired functional mobility and activity tolerance  Z74.09 V49.89   5. Impaired mobility and ADLs  Z74.09 V49.89    Z78.9    6. Oral phase dysphagia  R13.11 787.21     Patient Active Problem List   Diagnosis   • Essential hypertension   • Chronic coronary artery disease   • Mixed hyperlipidemia   • Tobacco abuse counseling   • Dyspnea on exertion   • Acute retention of urine   • Anemia   • Osteoarthritis of hip   • Hypersomnia   • Hypotension   • Obstructive sleep apnea syndrome   • Diastolic dysfunction   • Morbid obesity due to excess calories (HCC)   • Tobacco use disorder   • Calculus of ureter   • Preoperative cardiovascular examination   • Enlarged prostate with urinary obstruction   • Mass of right lung   • Chronic respiratory failure with hypoxia (HCC)   • Pleural effusion   • Acute on chronic respiratory failure with hypoxia (HCC)   • Sepsis due to pneumonia (HCC)   • Loculated pleural effusion     Past Medical History:   Diagnosis Date   • Acute bronchitis    • Anemia    • Breast lump    • Cervical radiculopathy    • COPD (chronic obstructive pulmonary disease) (HCC)    • Coronary atherosclerosis     2 stents, followed by Dr. Hargrove   • Depressive disorder    • Disease of thyroid gland    • Dysuria    • Essential hypertension    • Flank pain    • Hematochezia    • Hematuria syndrome    • Hyperlipidemia    • Hypoglycemia    • Kidney stone    • Kidney stones    • Lung cancer (HCC)    • Mass of neck    • MI (myocardial infarction) (HCC)    • Neck pain     sprain   • Osteoarthritis    • PONV  (postoperative nausea and vomiting)    • Sleep apnea     using c-pap   • Sleep apnea    • Type 2 diabetes mellitus (HCC)      Past Surgical History:   Procedure Laterality Date   • BACK SURGERY     • CARDIAC CATHETERIZATION N/A 08/23/2017    Procedure: Right Heart Cath;  Surgeon: Mariposa Zamorano MD;  Location: [facility] INVASIVE LOCATION;  Service:    • CATARACT EXTRACTION WITH INTRAOCULAR LENS IMPLANT Bilateral    • CHOLECYSTECTOMY     • CYSTOSCOPY  08/29/2014    Left ESWL, cysto and stent removal   • CYSTOSCOPY W/ LITHOLAPAXY / EHL  08/15/2014    Left ESWL   • CYSTOSCOPY, RETROGRADE PYELOGRAM AND STENT INSERTION  12/29/2022   • CYSTOSCOPY, URETEROSCOPY, RETROGRADE PYELOGRAM, STENT INSERTION Left 06/30/2017    Procedure: CYSTOSCOPY, LEFT URETEROSCOPY, RETROGRADE PYELOGRAM, HOLMIUM LASER, STENT INSERTION;  Surgeon: Uday Horne MD;  Location: [facility];  Service:    • CYSTOSCOPY, URETEROSCOPY, RETROGRADE PYELOGRAM, STENT INSERTION Left 09/06/2017    Procedure: CYSTOSCOPY, LEFT URETEROSCOPY, RETROGRADE PYELOGRAM, HOLMIUM LASER, STENT INSERTION;  Surgeon: Uday Horne MD;  Location: [facility];  Service:    • CYSTOSCOPY, URETEROSCOPY, RETROGRADE PYELOGRAM, STENT INSERTION Left 01/17/2018    Procedure: CYSTOSCOPY LEFT URETEROSCOPY RETROGRADE PYELOGRAM HOLMIUM LASER STENT INSERTION;  Surgeon: Uday Horne MD;  Location: [facility];  Service:    • CYSTOSCOPY, URETEROSCOPY, RETROGRADE PYELOGRAM, STENT INSERTION Left 07/25/2018    Procedure: CYSTOSCOPY URETEROSCOPY RETROGRADE PYELOGRAM HOLMIUM LASER STENT INSERTION;  Surgeon: Uday Horne MD;  Location: [facility];  Service: Urology   • EYE SURGERY      eye lids lifted   • HIP SURGERY     • INJECTION OF MEDICATION  11/11/2013    Kenalog   • INJECTION OF MEDICATION  01/16/2014    Toradol   • JOINT REPLACEMENT Right 2012    total hip replacement   • SKIN GRAFT      on his foot; removed skin from hip   • TONSILLECTOMY     • TRANSESOPHAGEAL ECHOCARDIOGRAM (ОЛЬГА)   07/21/2014    with color flow-Normal LV systolic function with Ef of 60-65%/ Grad1 diastolic dysfunction of the left ventricular myocardium. No evidence of pericardial effusion     PT Assessment (last 12 hours)     PT Evaluation and Treatment     Row Name 02/06/23 1327          Physical Therapy Time and Intention    Subjective Information complains of;dizziness;fatigue;weakness  -LN     Document Type therapy note (daily note)  -LN     Mode of Treatment physical therapy;individual therapy  -LN     Patient Effort good  -LN     Row Name 02/06/23 1327          General Information    Patient Profile Reviewed yes  -LN     Existing Precautions/Restrictions fall;other (see comments)  CT  -LN     Row Name 02/06/23 1327          Pain    Pretreatment Pain Rating 0/10 - no pain  -LN     Posttreatment Pain Rating 0/10 - no pain  -LN     Row Name 02/06/23 1327          Cognition    Affect/Mental Status (Cognition) WNL  -LN     Orientation Status (Cognition) oriented x 3  -LN     Follows Commands (Cognition) follows one-step commands  -LN     Row Name 02/06/23 1327          Bed Mobility    Supine-Sit [place] (Bed Mobility) contact guard;1 person assist  -LN     Sit-Supine [place] (Bed Mobility) minimum assist (75% patient effort);1 person assist  -LN     Assistive Device (Bed Mobility) bed rails;head of bed elevated  -LN     Row Name 02/06/23 1327          Sit-Stand Transfer    Sit-Stand [place] (Transfers) minimum assist (75% patient effort);2 person assist  -LN     Assistive Device (Sit-Stand Transfers) walker, front-wheeled  -LN     Row Name 02/06/23 1327          Stand-Sit Transfer    Stand-Sit [place] (Transfers) minimum assist (75% patient effort);2 person assist;verbal cues  -LN     Row Name 02/06/23 1327          Gait/Stairs (Locomotion)    [place] Level (Gait) minimum assist (75% patient effort);2 person assist  -LN     Assistive Device (Gait) walker, front-wheeled  -LN     Distance in Feet (Gait)  2' forward/backward  -LN     Deviations/Abnormal Patterns (Gait) stride length decreased;gait speed decreased  -LN     Row Name 02/06/23 1327          Plan of Care Review    Plan of Care Reviewed With patient  -LN     Outcome Evaluation sup-sit cga,sit-sup min of 1,sit-stand-sit min of 2;pt amb 2' forward/backward with rw and min of 2-sats dropped to 90% and required ~3' to recover to 92%;fatigued after gt and declined ex;min soa and dizziness with rx-resolved once back in bed  -LN     Row Name 02/06/23 1327          Vital Signs    Pre Systolic BP Rehab 114  -LN     Pre Treatment Diastolic BP 69  -LN     Post Systolic BP Rehab 115  -LN     Post Treatment Diastolic BP 75  -LN     Pretreatment Heart Rate (beats/min) 85  -LN     Intratreatment Heart Rate (beats/min) 92  -LN     Posttreatment Heart Rate (beats/min) 87  -LN     Pre SpO2 (%) 93  -LN     O2 Delivery Pre Treatment supplemental O2  -LN     Intra SpO2 (%) 90  -LN     Post SpO2 (%) 94  -LN     Pre Patient Position Sitting  -LN     Intra Patient Position Standing  -LN     Post Patient Position Sitting  -LN     Row Name 02/06/23 1327          Positioning and Restraints    In Bed fowlers;call light within reach;encouraged to call for assist;exit alarm on;with family/caregiver  -[facility] Name 02/06/23 1327          Bed Mobility Goal 1 (PT)    Activity/Assistive Device (Bed Mobility Goal 1, PT) rolling to left;rolling to right  -LN     [place] Level/Cues Needed (Bed Mobility Goal 1, PT) minimum assist (75% or more patient effort)  -LN     Time Frame (Bed Mobility Goal 1, PT) by discharge  -LN     Progress/Outcomes (Bed Mobility Goal 1, PT) goal not met  -     Row Name 02/06/23 1327          Transfer Goal 1 (PT)    Activity/Assistive Device (Transfer Goal 1, PT) sit-to-stand/stand-to-sit;bed-to-chair/chair-to-bed  -LN     [place] Level/Cues Needed (Transfer Goal 1, PT) minimum assist (75% or more patient effort)  -LN     Time Frame (Transfer Goal 1,  PT) by discharge  -LN     Strategies/Barriers (Transfers Goal 1, PT) complex medical patient  -LN     Progress/Outcome (Transfer Goal 1, PT) goal not met  -LN     Row Name 02/06/23 1327          Gait Training Goal 1 (PT)    Activity/Assistive Device (Gait Training Goal 1, PT) gait (walking locomotion);assistive device use;walker, rolling  -LN     [place] Level (Gait Training Goal 1, PT) minimum assist (75% or more patient effort)  -LN     Distance (Gait Training Goal 1, PT) 5' x 2 with supplemental O2  -LN     Time Frame (Gait Training Goal 1, PT) by discharge  -LN     Strategies/Barriers (Gait Training Goal 1, PT) dyspnea  -LN     Progress/Outcome (Gait Training Goal 1, PT) goal not met  -LN           User Key  (r) = Recorded By, (t) = Taken By, (c) = Cosigned By    Initials Name Provider Type    Brittni Sam PTA Physical Therapist Assistant                Physical Therapy Education     Title: PT OT SLP Therapies (In Progress)     Topic: Physical Therapy (Not Started)     Point: Mobility training (Not Started)     Learner Progress:  Not documented in this visit.          Point: Home exercise program (Not Started)     Learner Progress:  Not documented in this visit.          Point: Body mechanics (Not Started)     Learner Progress:  Not documented in this visit.          Point: Precautions (Not Started)     Learner Progress:  Not documented in this visit.                          PT Recommendation and Plan  Anticipated Discharge Disposition (PT): skilled nursing facility  Plan of Care Reviewed With: patient  Outcome Evaluation: sup-sit cga,sit-sup min of 1,sit-stand-sit min of 2;pt amb 2' forward/backward with rw and min of 2-sats dropped to 90% and required ~3' to recover to 92%;fatigued after gt and declined ex;min soa and dizziness with rx-resolved once back in bed   Outcome Measures     Row Name 02/06/23 1327 02/04/23 1102          How much help from another person do you currently need...    Turning  from your back to your side while in flat bed without using bedrails? 3  -LN --     Moving from lying on back to sitting on the side of a flat bed without bedrails? 3  -LN --     Moving to and from a bed to a chair (including a wheelchair)? 3  -LN --     Standing up from a chair using your arms (e.g., wheelchair, bedside chair)? 3  -LN --     Climbing 3-5 steps with a railing? 2  -LN --     To walk in hospital room? 2  -LN --     AM-PAC 6 Clicks Score (PT) 16  -LN --        How much help from another is currently needed...    Putting on and taking off regular lower body clothing? -- 2  -KH     Bathing (including washing, rinsing, and drying) -- 2  -KH     Toileting (which includes using toilet bed pan or urinal) -- 2  -KH     Putting on and taking off regular upper body clothing -- 3  -KH     Taking care of personal grooming (such as brushing teeth) -- 3  -KH     Eating meals -- 3  -KH     AM-PAC 6 Clicks Score (OT) -- 15  -KH        Functional Assessment    Outcome Measure Options AM-PAC 6 Clicks Daily Activity (OT)  -LN AM-PAC 6 Clicks Daily Activity (OT)  -KH           User Key  (r) = Recorded By, (t) = Taken By, (c) = Cosigned By    Initials Name Provider Type    Brittni Sam PTA Physical Therapist Assistant    Michelle Mckeon, OT Occupational Therapist                 Time Calculation:    PT Charges     Row Name 02/06/23 1531             Time Calculation    Start Time 1327  -LN      Stop Time 1356  -LN      Time Calculation (min) 29 min  -LN      PT Received On 02/06/23  -LN         Time Calculation- PT    Total Timed Code Minutes- PT 29 minute(s)  -LN            User Key  (r) = Recorded By, (t) = Taken By, (c) = Cosigned By    Initials Name Provider Type    Brittni Sam PTA Physical Therapist Assistant              Therapy Charges for Today     Code Description Service Date Service Provider Modifiers Qty    [id number]  PT THERAPEUTIC ACT EA 15 MIN 2/6/2023 Brittni Winchester PTA GP 2          PT  G-Codes  Outcome Measure Options: AM-PAC 6 Clicks Daily Activity (OT)  AM-PAC 6 Clicks Score (PT): 16  AM-PAC 6 Clicks Score (OT): 15    Brittni Winchester, PTA  2/6/2023

## 2023-02-06 NOTE — PLAN OF CARE
Problem: Adult Inpatient Plan of Care  Goal: Plan of Care Review  Outcome: Ongoing, Progressing  Flowsheets  Taken 2/6/2023 1500  Progress: no change  Plan of Care Reviewed With: patient  Taken 2/6/2023 1457  Progress: no change  Plan of Care Reviewed With: patient  Outcome Evaluation: Pt supine in bed. Pt very lethargic, needing vc's to continue tasks. Pt completed shaving needing increased time for task. Grooming CGA. UB bathing and dressing Min A. Pt very pleasant and cooperative. All needs in reach. Pt could continue to benefit from OT services.   Goal Outcome Evaluation:  Plan of Care Reviewed With: patient        Progress: no change  Outcome Evaluation: Pt supine in bed. Pt very lethargic, needing vc's to continue tasks. Pt completed shaving needing increased time for task. Grooming CGA. UB bathing and dressing Min A. Pt very pleasant and cooperative. All needs in reach. Pt could continue to benefit from OT services.

## 2023-02-06 NOTE — PROGRESS NOTES
"CTVS DAILY NOTE     LOS: 11 days   POD# 8 CT  Removed 2/3/23    Patient Care Team:  Emile Wynn MD as PCP - General (Family Medicine)  Asia Guaman APRN as Nurse Practitioner (Nurse Practitioner)  Gustabo Rincon MD as Consulting Physician (Hematology and Oncology)    Chief Complaint: pleural effusion, respiratory failure    Subjective:  Symptoms:  Improved.    Diet:  Poor intake.    Activity level: Impaired due to weakness.    Pain:  He reports no pain.        Vital Signs  Temp:  [97.3 °F (36.3 °C)-97.5 °F (36.4 °C)] 97.3 °F (36.3 °C)  Heart Rate:  [] 90  Resp:  [18] 18  BP: (115-135)/(65-84) 129/69  Body mass index is 28.77 kg/m².    Intake/Output Summary (Last 24 hours) at 2/6/2023 1041  Last data filed at 2/6/2023 0507  Gross per 24 hour   Intake 260 ml   Output 470 ml   Net -210 ml     No intake/output data recorded.    Wt Readings from Last 3 Encounters:   02/06/23 88.4 kg (194 lb 12.8 oz)   01/17/23 92.5 kg (204 lb)   01/16/23 92.6 kg (204 lb 1.6 oz)       Objective:  General Appearance:  In no acute distress, comfortable and ill-appearing.    Vital signs: (most recent): Blood pressure 129/69, pulse 90, temperature 97.3 °F (36.3 °C), temperature source Temporal, resp. rate 18, height 175.3 cm (69\"), weight 88.4 kg (194 lb 12.8 oz), SpO2 90 %.  Vital signs are normal.    Output: Producing urine.    Lungs:  Normal effort and normal respiratory rate.    Heart: Tachycardia.    Abdomen: Abdomen is soft.    Neurological: Patient is alert.            Results Review:      WBC No results found for: WBCS   HGB Hemoglobin   Date/Time Value Ref Range Status   02/05/2023 0636 13.7 13.0 - 17.7 g/dL Final   02/04/2023 0555 14.9 13.0 - 17.7 g/dL Final      HCT Hematocrit   Date/Time Value Ref Range Status   02/05/2023 0636 43.9 37.5 - 51.0 % Final   02/04/2023 0555 47.5 37.5 - 51.0 % Final      Platlets No results found for: LABPLAT     PT/INR:    No results found for: PROTIME/  No results " found for: INR    Sodium Sodium   Date/Time Value Ref Range Status   02/05/2023 0636 134 (L) 136 - 145 mmol/L Final   02/04/2023 0555 136 136 - 145 mmol/L Final      Potassium Potassium   Date/Time Value Ref Range Status   02/05/2023 0636 4.3 3.5 - 5.2 mmol/L Final   02/04/2023 0555 5.1 3.5 - 5.2 mmol/L Final     Comment:     Slight hemolysis detected by analyzer. Results may be affected.      Chloride Chloride   Date/Time Value Ref Range Status   02/05/2023 0636 96 (L) 98 - 107 mmol/L Final   02/04/2023 0555 98 98 - 107 mmol/L Final      Bicarbonate No results found for: PLASMABICARB   BUN BUN   Date/Time Value Ref Range Status   02/05/2023 0636 51 (H) 8 - 23 mg/dL Final   02/04/2023 0555 45 (H) 8 - 23 mg/dL Final      Creatinine Creatinine   Date/Time Value Ref Range Status   02/05/2023 0636 1.72 (H) 0.76 - 1.27 mg/dL Final   02/04/2023 0555 1.45 (H) 0.76 - 1.27 mg/dL Final      Calcium Calcium   Date/Time Value Ref Range Status   02/05/2023 0636 9.2 8.6 - 10.5 mg/dL Final   02/04/2023 0555 9.5 8.6 - 10.5 mg/dL Final      Magnesium No results found for: MG     Imaging Results (Last 72 Hours)     Procedure Component Value Units Date/Time    XR Chest 2 View [[id number]] Collected: 02/06/23 0630     Updated: 02/06/23 1003    Narrative:      EXAM: XR CHEST 2 VIEWS     HISTORY: Pleural Effusion follow up, J96.21 Acute and chronic  respiratory failure with hypoxia R91.8 Other nonspecific abnormal  finding of lung field J18.9 Pneumonia, unspecified organism  Z74.09 Other reduced mobility Z74.09 Other reduced mobility Z78.9  Other specified health status R13.11 Dysphagia, oral phase.    COMPARISON: February 3, 2023     FINDINGS:    Heart: Normal size.     Mediastinum: Prominent right hilar contour . Mediastinal  adenopathy    Lungs: Right chest tube has been removed. No visualized  pneumothorax. Small-to-moderate right effusion. Extensive right  perihilar opacities are unchanged. Increased interstitial  markings.    Bones:  Osseous demineralization.    Abdomen: Visualized upper abdomen is unremarkable.      Impression:        Interval removal of the right chest tube. No visualized  pneumothorax.    Small-to-moderate right effusion    Mediastinal adenopathy    Right hilar opacity may represent pneumonia or malignancy.     Diffuse right perihilar opacities may represent postobstructive  pneumonitis. Continued follow-up recommended.    Electronically signed by:  Macho Simmons DO  2/6/2023 10:01 AM  CST Workstation: [id number]    XR Abdomen KUB [[id number]] Resulted: 02/06/23 0940     Updated: 02/06/23 0942    XR Chest 1 View [[id number]] Collected: 02/03/23 0958     Updated: 02/03/23 1126    Narrative:        PORTABLE CHEST    HISTORY: Follow-up right effusion. Acute and chronic respiratory  failure with hypoxia. Pneumonia.    Portable AP semierect upright film of the chest was obtained at  10:04 AM.  COMPARISON: January 31, 2023    FINDINGS:   EKG leads.  Right thoracotomy catheter remains in place.  No pleural effusion.  No pneumothorax.  Chronic obstructive pulmonary disease.  Slight improvement in the right perihilar infiltrate.  Stable minimal cardiomegaly.  Old granulomatous disease.  The pulmonary vasculature is not increased.  No acute osseous abnormality.  Degenerative changes are present in the thoracic spine.      Impression:      CONCLUSION:  Right thoracotomy catheter remains in place.  No pleural effusion.  No pneumothorax.  Chronic obstructive pulmonary disease.  Slight improvement in the right perihilar infiltrate.  Stable minimal cardiomegaly.    [id number]    Electronically signed by:  Ermias Paz MD  2/3/2023 11:24 AM CST  Workstation: [id number]            FLUoxetine, 20 mg, Oral, Daily  furosemide, 20 mg, Intravenous, Q12H  guaiFENesin, 600 mg, Oral, Q12H  Insulin Aspart, 0-9 Units, Subcutaneous, TID AC  ipratropium-albuterol, 3 mL, Nebulization, 4x Daily - RT  lactulose, 20 g, Oral, BID  levothyroxine, 50 mcg, Oral, Q  AM  metoprolol tartrate, 25 mg, Oral, Q12H  nicotine, 1 patch, Transdermal, Q24H  pantoprazole, 40 mg, Oral, Q AM  polyethylene glycol, 17 g, Oral, Daily  senna-docusate sodium, 1 tablet, Oral, Nightly  sodium chloride, 10 mL, Intravenous, Q12H  sodium chloride, 10 mL, Intravenous, Q12H  sodium chloride, 10 mL, Intravenous, Q12H  tamsulosin, 0.8 mg, Oral, Daily  traZODone, 150 mg, Oral, Nightly             Patient Active Problem List   Diagnosis Code   • Essential hypertension I10   • Chronic coronary artery disease I25.10   • Mixed hyperlipidemia E78.2   • Tobacco abuse counseling Z71.6   • Dyspnea on exertion R06.09   • Acute retention of urine R33.8   • Anemia D64.9   • Osteoarthritis of hip M16.9   • Hypersomnia G47.10   • Hypotension I95.9   • Obstructive sleep apnea syndrome G47.33   • Diastolic dysfunction I51.89   • Morbid obesity due to excess calories ([facility]) E66.01   • Tobacco use disorder F17.200   • Calculus of ureter N20.1   • Preoperative cardiovascular examination Z01.810   • Enlarged prostate with urinary obstruction N40.1, N13.8   • Mass of right lung R91.8   • Chronic respiratory failure with hypoxia ([facility]) J96.11   • Pleural effusion J90   • Acute on chronic respiratory failure with hypoxia ([facility]) J96.21   • Sepsis due to pneumonia ([facility]) J18.9, A41.9   • Loculated pleural effusion J90         Assessment:    Condition: In stable condition.  Improving.   (RIGHT Malignant pleural effusion, minimal fluid re accumulation since chest tube removal   ).     Plan:   (Minimal reaccumulation of malignant effusion  Not enough fluid to place Pleurx catheter  We will follow-up with interval imaging repeat chest film on Thursday.    Further management and care as directed by primary team  ).         This document has been electronically signed by SURAJ Khan-BC @  On February 6, 2023 10:46 CST

## 2023-02-07 NOTE — CONSULTS
SUBJECTIVE:   2/7/2023    Name: Aman Sanchez  DOD: 1950    REASON FOR CONSULT: Constipation and nausea    Chief Complaint:     Chief Complaint   Patient presents with   • Respiratory Distress       Subjective     Patient is 72 y.o. male with past medical history of COPD, metastatic lung cancer, chronic hypoxic respiratory failure, hypertension, diabetes mellitus, obstructive uropathy, coronary artery disease s/p PTCA, obstructive sleep apnea, presents with worsening dyspnea.  He has persistent nausea for past several days initially associated with constipation and subsequently did not improve even after having multiple bowel movements and denied vomiting, diarrhea, rectal bleeding or weight loss.  He denied NSAID usage.  WBC 13.81 and hemoglobin 14.0.  KUB yesterday was consistent with fecal impaction.     ROS/HISTORY/ CURRENT MEDICATIONS/OBJECTIVE/VS/PE:   Review of Systems:   Review of Systems   Constitutional: Negative for chills, fatigue, fever and unexpected weight change.   HENT: Negative for congestion, ear discharge, hearing loss, nosebleeds and sore throat.    Eyes: Negative for pain, discharge and redness.   Respiratory: Positive for shortness of breath. Negative for cough, chest tightness and wheezing.    Cardiovascular: Negative for chest pain and palpitations.   Gastrointestinal: Positive for constipation and nausea. Negative for abdominal distention, abdominal pain, blood in stool, diarrhea and vomiting.   Endocrine: Negative for cold intolerance, polydipsia, polyphagia and polyuria.   Genitourinary: Negative for dysuria, flank pain, frequency, hematuria and urgency.   Musculoskeletal: Negative for arthralgias, back pain, joint swelling and myalgias.   Skin: Negative for color change, pallor and rash.   Neurological: Negative for tremors, seizures, syncope, weakness and headaches.   Hematological: Negative for adenopathy. Does not bruise/bleed easily.   Psychiatric/Behavioral: Negative for  behavioral problems, confusion, dysphoric mood, hallucinations and suicidal ideas. The patient is not nervous/anxious.        History:     Past Medical History:   Diagnosis Date   • Acute bronchitis    • Anemia    • Breast lump    • Cervical radiculopathy    • COPD (chronic obstructive pulmonary disease) (HCC)    • Coronary atherosclerosis     2 stents, followed by Dr. Hargrove   • Depressive disorder    • Disease of thyroid gland    • Dysuria    • Essential hypertension    • Flank pain    • Hematochezia    • Hematuria syndrome    • Hyperlipidemia    • Hypoglycemia    • Kidney stone    • Kidney stones    • Lung cancer (HCC)    • Mass of neck    • MI (myocardial infarction) (HCC)    • Neck pain     sprain   • Osteoarthritis    • PONV (postoperative nausea and vomiting)    • Sleep apnea     using c-pap   • Sleep apnea    • Type 2 diabetes mellitus (HCC)      Past Surgical History:   Procedure Laterality Date   • BACK SURGERY     • CARDIAC CATHETERIZATION N/A 08/23/2017    Procedure: Right Heart Cath;  Surgeon: Mariposa Zamorano MD;  Location: [facility] INVASIVE LOCATION;  Service:    • CATARACT EXTRACTION WITH INTRAOCULAR LENS IMPLANT Bilateral    • CHOLECYSTECTOMY     • CYSTOSCOPY  08/29/2014    Left ESWL, cysto and stent removal   • CYSTOSCOPY W/ LITHOLAPAXY / EHL  08/15/2014    Left ESWL   • CYSTOSCOPY, RETROGRADE PYELOGRAM AND STENT INSERTION  12/29/2022   • CYSTOSCOPY, URETEROSCOPY, RETROGRADE PYELOGRAM, STENT INSERTION Left 06/30/2017    Procedure: CYSTOSCOPY, LEFT URETEROSCOPY, RETROGRADE PYELOGRAM, HOLMIUM LASER, STENT INSERTION;  Surgeon: Uday Horne MD;  Location: [facility];  Service:    • CYSTOSCOPY, URETEROSCOPY, RETROGRADE PYELOGRAM, STENT INSERTION Left 09/06/2017    Procedure: CYSTOSCOPY, LEFT URETEROSCOPY, RETROGRADE PYELOGRAM, HOLMIUM LASER, STENT INSERTION;  Surgeon: Uday Horne MD;  Location: [facility];  Service:    • CYSTOSCOPY, URETEROSCOPY, RETROGRADE PYELOGRAM, STENT INSERTION Left  2018    Procedure: CYSTOSCOPY LEFT URETEROSCOPY RETROGRADE PYELOGRAM HOLMIUM LASER STENT INSERTION;  Surgeon: Uday Horne MD;  Location: [facility];  Service:    • CYSTOSCOPY, URETEROSCOPY, RETROGRADE PYELOGRAM, STENT INSERTION Left 2018    Procedure: CYSTOSCOPY URETEROSCOPY RETROGRADE PYELOGRAM HOLMIUM LASER STENT INSERTION;  Surgeon: Uday Horne MD;  Location: [facility];  Service: Urology   • EYE SURGERY      eye lids lifted   • HIP SURGERY     • INJECTION OF MEDICATION  2013    Kenalog   • INJECTION OF MEDICATION  2014    Toradol   • JOINT REPLACEMENT Right     total hip replacement   • SKIN GRAFT      on his foot; removed skin from hip   • TONSILLECTOMY     • TRANSESOPHAGEAL ECHOCARDIOGRAM (ОЛЬГА)  2014    with color flow-Normal LV systolic function with Ef of 60-65%/ Grad1 diastolic dysfunction of the left ventricular myocardium. No evidence of pericardial effusion     Family History   Problem Relation Age of Onset   • Lung cancer Mother    • Rectal cancer Sister      Social History     Tobacco Use   • Smoking status: Former     Packs/day: 1.00     Years: 61.00     Pack years: 61.00     Types: Cigarettes     Quit date: 2023     Years since quittin.0   • Smokeless tobacco: Never   Vaping Use   • Vaping Use: Never used   Substance Use Topics   • Alcohol use: Yes     Comment: occasionally   • Drug use: No     Medications Prior to Admission   Medication Sig Dispense Refill Last Dose   • albuterol (PROVENTIL HFA;VENTOLIN HFA) 108 (90 Base) MCG/ACT inhaler Inhale 2 puffs Every 4 (Four) Hours As Needed for Wheezing. 8 g 5 Past Week   • albuterol (PROVENTIL) (2.5 MG/3ML) 0.083% nebulizer solution Take 2.5 mg by nebulization Every 4 (Four) Hours As Needed for Wheezing. 180 vial 1 2023   • allopurinol (ZYLOPRIM) 300 MG tablet    2023   • aspirin 81 MG EC tablet Take 81 mg by mouth Daily. Last dose 18   • cetirizine (zyrTEC) 10 MG tablet Take 10 mg  by mouth Daily.   1/26/2023   • clopidogrel (PLAVIX) 75 MG tablet Take 1 tablet by mouth Daily. 90 tablet 1 1/26/2023   • finasteride (PROSCAR) 5 MG tablet Take 1 tablet by mouth Every Night. 90 tablet 1 1/26/2023   • FLUoxetine (PROzac) 20 MG capsule Take 20 mg by mouth Daily.   1/26/2023   • fluticasone (FLONASE) 50 MCG/ACT nasal spray 2 sprays into each nostril At Night As Needed for Rhinitis. 3 bottle 1 1/26/2023   • glimepiride (AMARYL) 2 MG tablet TAKE 1 TABLET BY MOUTH IN THE MORNING WITH FIRST MEAL OF THE DAY   1/26/2023   • glucose blood test strip One touch reli-on glucometer 360 each 1 1/26/2023   • guaiFENesin (MUCINEX) 600 MG 12 hr tablet Take 600 mg by mouth 2 (Two) Times a Day.   1/26/2023   • isosorbide mononitrate (IMDUR) 30 MG 24 hr tablet Take 1 tablet by mouth Daily. 90 tablet 1 1/26/2023   • levothyroxine (SYNTHROID) 50 MCG tablet Take 1 tablet by mouth Daily. 90 tablet 1 1/26/2023   • Mirabegron ER (MYRBETRIQ) 50 MG tablet sustained-release 24 hour 24 hr tablet Take 50 mg by mouth Daily.   1/26/2023   • nicotine (NICODERM CQ) 21 MG/24HR patch Place 1 patch on the skin as directed by provider Daily. 21 patch 0 1/26/2023   • nitroglycerin (NITROSTAT) 0.4 MG SL tablet Place 1 tablet under the tongue Every 5 (Five) Minutes As Needed for chest pain. 100 tablet 11 Past Month   • ONE TOUCH LANCETS misc Use as directed test 6 times daily 200 each 1 1/26/2023   • Potassium Citrate ER 15 MEQ (1620 MG) tablet controlled-release Take 2 tablets by mouth Daily.   1/25/2023   • tamsulosin (FLOMAX) 0.4 MG capsule 24 hr capsule Take 2 capsules by mouth Daily. 180 capsule 1 1/26/2023   • traMADol (ULTRAM) 50 MG tablet Take 50 mg by mouth Every 6 (Six) Hours As Needed. for pain   1/26/2023   • traZODone (DESYREL) 150 MG tablet Take 150 mg by mouth Every Night.   1/26/2023   • vitamin B-12 (CYANOCOBALAMIN) 1000 MCG tablet Take 1,000 mcg by mouth Daily.   1/26/2023     Allergies:  Patient has no known  allergies.    I have reviewed the patient's medical history, surgical history and family history in the available medical record system.     Current Medications:     Current Facility-Administered Medications   Medication Dose Route Frequency Provider Last Rate Last Admin   • cefepime (MAXIPIME) 2 g/100 mL 0.9% NS (mbp)  2 g Intravenous Q12H Pierre Mehta MD       • dextrose (D50W) (25 g/50 mL) IV injection 25 g  25 g Intravenous Q15 Min PRN Pierre Mehta MD       • dextrose (GLUTOSE) oral gel 15 g  15 g Oral Q15 Min PRN Pierre Mehta MD       • FLUoxetine (PROzac) capsule 20 mg  20 mg Oral Daily Pierre Mehta MD   20 mg at 02/07/23 0920   • furosemide (LASIX) injection 20 mg  20 mg Intravenous BID Pierre Mehta MD       • glucagon (human recombinant) (GLUCAGEN DIAGNOSTIC) injection 1 mg  1 mg Intramuscular Q15 Min PRN Pierre Mehta MD       • guaiFENesin (MUCINEX) 12 hr tablet 600 mg  600 mg Oral Q12H Pierre Mehta MD   600 mg at 02/07/23 0920   • Insulin Aspart (novoLOG) injection 0-9 Units  0-9 Units Subcutaneous TID AC Pierre Mehta MD   4 Units at 02/07/23 1035   • ipratropium-albuterol (DUO-NEB) nebulizer solution 3 mL  3 mL Nebulization 4x Daily - RT Pierre Mehta MD   3 mL at 02/07/23 1434   • levothyroxine (SYNTHROID, LEVOTHROID) tablet 50 mcg  50 mcg Oral Q AM Pierre Mehta MD   50 mcg at 02/07/23 0531   • metoclopramide (REGLAN) injection 10 mg  10 mg Intravenous Q8H Pierre Mehta MD   10 mg at 02/07/23 0926   • metoprolol tartrate (LOPRESSOR) tablet 25 mg  25 mg Oral Q12H Pierre Mehta MD   25 mg at 02/07/23 0921   • nicotine (NICODERM CQ) 21 MG/24HR patch 1 patch  1 patch Transdermal Q24H Pierre Mehta MD   1 patch at 02/07/23 0921   • ondansetron (ZOFRAN) tablet 4 mg  4 mg Oral Q6H PRN Pierre Mehta MD        Or   • ondansetron (ZOFRAN) injection 4 mg  4 mg Intravenous Q6H PRN Pierre Mehta MD   4 mg at 02/05/23  1806   • pantoprazole (PROTONIX) EC tablet 40 mg  40 mg Oral Q AM Pierre Mehta MD   40 mg at 02/07/23 0531   • Pharmacy to Dose Cefepime   Does not apply Continuous PRN Pierre Mehta MD       • polyethylene glycol (MIRALAX) packet 17 g  17 g Oral Daily Pierre Mehta MD   17 g at 02/07/23 0921   • sennosides-docusate (PERICOLACE) 8.6-50 MG per tablet 1 tablet  1 tablet Oral Nightly Pierre Mehta MD   1 tablet at 02/06/23 2007   • sodium chloride 0.9 % flush 10 mL  10 mL Intravenous Q12H Pierre Mehta MD   10 mL at 02/06/23 2008   • sodium chloride 0.9 % flush 10 mL  10 mL Intravenous PRN Pierre Mehta MD       • sodium chloride 0.9 % flush 10 mL  10 mL Intravenous Q12H Pierre Mehta MD   10 mL at 02/07/23 0926   • sodium chloride 0.9 % flush 10 mL  10 mL Intravenous Q12H Pierre Mehta MD   10 mL at 02/07/23 0926   • sodium chloride 0.9 % flush 10 mL  10 mL Intravenous PRN Pierre Mehta MD       • sodium chloride 0.9 % infusion 40 mL  40 mL Intravenous PRN Pierre Mehta MD       • tamsulosin (FLOMAX) 24 hr capsule 0.8 mg  0.8 mg Oral Daily Pierre Mehta MD   0.8 mg at 02/07/23 0920   • traMADol (ULTRAM) tablet 50 mg  50 mg Oral Q6H PRN Pierre Mehta MD   50 mg at 02/07/23 0923   • traZODone (DESYREL) tablet 150 mg  150 mg Oral Nightly Pierre Mehta MD   150 mg at 02/06/23 2007       Objective     Physical Exam:   Temp:  [97.7 °F (36.5 °C)-97.8 °F (36.6 °C)] 97.7 °F (36.5 °C)  Heart Rate:  [] 93  Resp:  [16-22] 20  BP: (114-146)/(70-83) 129/77    Physical Exam:  General Appearance:    Alert, cooperative, in no acute distress   Head:    Normocephalic, without obvious abnormality, atraumatic   Eyes:            Lids and lashes normal, conjunctivae and sclerae normal, no   icterus, no pallor, corneas clear, PERRLA   Ears:    Ears appear intact with no abnormalities noted   Throat:   No oral lesions, no thrush, oral mucosa moist   Neck:    No adenopathy, supple, trachea midline, no thyromegaly, no     carotid bruit, no JVD   Back:     No kyphosis present, no scoliosis present, no skin lesions,       erythema or scars, no tenderness to percussion or                   palpation,   range of motion normal   Lungs:     Clear to auscultation,respirations regular, even and                   unlabored    Heart:    Regular rhythm and normal rate, normal S1 and S2, no            murmur, no gallop, no rub, no click   Breast Exam:    Deferred   Abdomen:     Normal bowel sounds, no masses, no organomegaly, soft        nontender, nondistended, no guarding, no rebound                 tenderness   Genitalia:    Deferred   Extremities:   Moves all extremities well, no edema, no cyanosis, no              redness   Pulses:   Pulses palpable and equal bilaterally   Skin:   No bleeding, bruising or rash   Lymph nodes:   No palpable adenopathy   Neurologic:   Cranial nerves 2 - 12 grossly intact, sensation intact, DTR        present and equal bilaterally      Results Review:     Lab Results   Component Value Date    WBC 13.81 (H) 02/07/2023    WBC 15.79 (H) 02/05/2023    WBC 18.81 (H) 02/04/2023    HGB 14.0 02/07/2023    HGB 13.7 02/05/2023    HGB 14.9 02/04/2023    HCT 43.6 02/07/2023    HCT 43.9 02/05/2023    HCT 47.5 02/04/2023     (L) 02/07/2023     02/05/2023     02/04/2023             No results found for: LIPASE  Lab Results   Component Value Date    INR 1.23 (H) 02/01/2023    INR 1.17 01/27/2023    INR 0.97 08/23/2017         Radiology Review:  Imaging Results (Last 72 Hours)     Procedure Component Value Units Date/Time    XR Chest 1 View [[id number]] Collected: 02/07/23 0943     Updated: 02/07/23 1017    Narrative:        PROCEDURE: Single chest view portable    REASON FOR EXAM:SOA, J96.21 Acute and chronic respiratory failure  with hypoxia R91.8 Other nonspecific abnormal finding of lung  field J18.9 Pneumonia, unspecified organism Z74.09  Other reduced  mobility Z74.09 Other reduced mobility Z78.9 Other specified  health status R13.11 Dysphagia, oral phase    FINDINGS: Comparison exam dated February 6, 2023. Left PICC line  with tip seen within the region of the left axillary vein.  Cardiac size appears within normal limits. Worsening right upper  lobe perihilar moderate size groundglass opacity. Right lung base  moderate size interstitial groundglass opacity. Lungs are  otherwise clear.. Pleural spaces are normal. No acute osseous  abnormality.      Impression:      1.  Worsening right upper lobe perihilar moderate size  groundglass opacity. Right lung base moderate size interstitial  groundglass opacity. These opacities would be suspicious for  worsening pneumonia and/or atypical pulmonary edema.  2.  Left PICC line with tip seen within the region of the left  axillary vein.    Electronically signed by:  Brant Bacon MD  2/7/2023 10:15 AM CST  Workstation: [id number]    XR Abdomen KUB [[id number]] Collected: 02/06/23 0935     Updated: 02/06/23 1655    Narrative:      PROCEDURE: Single view abdomen/KUB x-ray    COMPARISON: CT abdomen and pelvis dated 3/3/2022    HISTORY: rule out fecal impaction, J96.21 Acute and chronic  respiratory failure with hypoxia R91.8 Other nonspecific abnormal  finding of lung field J18.9 Pneumonia, unspecified organism  Z74.09 Other reduced mobility Z74.09 Other reduced mobility Z78.9  Other specified health status R13.11 Dysphagia, oral phase    TECHNIQUE: Single frontal view of the abdomen and pelvis.     FINDINGS:  There are calcifications in the region of the left kidney.  Calcifications in the low pelvis over the right side of the pubic  symphysis are of uncertain significance, likely in the prostate.  There are bilateral ureteral stents. The left ureteral stent is  slightly uncoiled superiorly.  Stabilization rods and pedicle screws are noted bilaterally in  the lower lumbar spine.  There are linear opacities in the  region of the prostate.  There are surgical clips in the right upper abdomen.  There is fecal debris and bowel gas in nondilated colon.  Fecal impaction cannot be excluded on this exam. CT would be more  sensitive.  Nonobstructive small bowel gas pattern.      Impression:      There does appear to be stool throughout the colon and in the  rectum.  Fecal impaction cannot be excluded on this exam. CT would be more  sensitive.  There are bilateral ureteral stents. The left ureteral stent is  slightly uncoiled superiorly.  There are calcifications in the region of the left kidney.  Calcifications in the low pelvis over the right side of the pubic  symphysis are of uncertain significance, likely in the prostate.    Electronically signed by:  Tyler Dewitt MD  2/6/2023 4:53 PM CST  Workstation: [id number]    XR Chest 2 View [[id number]] Collected: 02/06/23 0630     Updated: 02/06/23 1003    Narrative:      EXAM: XR CHEST 2 VIEWS     HISTORY: Pleural Effusion follow up, J96.21 Acute and chronic  respiratory failure with hypoxia R91.8 Other nonspecific abnormal  finding of lung field J18.9 Pneumonia, unspecified organism  Z74.09 Other reduced mobility Z74.09 Other reduced mobility Z78.9  Other specified health status R13.11 Dysphagia, oral phase.    COMPARISON: February 3, 2023     FINDINGS:    Heart: Normal size.     Mediastinum: Prominent right hilar contour . Mediastinal  adenopathy    Lungs: Right chest tube has been removed. No visualized  pneumothorax. Small-to-moderate right effusion. Extensive right  perihilar opacities are unchanged. Increased interstitial  markings.    Bones: Osseous demineralization.    Abdomen: Visualized upper abdomen is unremarkable.      Impression:        Interval removal of the right chest tube. No visualized  pneumothorax.    Small-to-moderate right effusion    Mediastinal adenopathy    Right hilar opacity may represent pneumonia or malignancy.     Diffuse right perihilar opacities may  represent postobstructive  pneumonitis. Continued follow-up recommended.    Electronically signed by:  Macho Simmons DO  2/6/2023 10:01 AM  CST Workstation: [id number]          I reviewed the patient's new clinical results.    I reviewed the patient's new imaging results and agree with the interpretation.     ASSESSMENT/PLAN:   ASSESSMENT: 1.  Nausea, likely multifactorial.  Need to rule out gastritis, peptic ulcer disease, neoplasia and gastroparesis.  2.  Constipation  3.  Dyspnea, most likely due to chronic hypoxic respiratory failure  PLAN: 1.  Continue PPI, antiemetics and add Linzess  2.  EGD and colonoscopy post improvement of dyspnea  The risks, benefits, and alternatives of this procedure have been discussed with the patient or the responsible party. The patient understands and agrees to proceed.         Samara Kasper MD  02/07/23  16:55 CST

## 2023-02-07 NOTE — PLAN OF CARE
Goal Outcome Evaluation:  Plan of Care Reviewed With: patient           Outcome Evaluation: OT tx complete, supine in bed. Supine<>sit with CGA. Patient reports dizziness, BP supine 112/66, BP /71. Patient allowed 5 mins of EOB sitting for dizziness to resolve. Patient agreeable to standing trial. Sit to stand with min A. Patient diaphoretic, eyes closed, face pale. Per therapist discretion, patient returned supine, /50, patient reported severe dizziness in standing, which resolved in supine. Bed placed in chair position x 5 mins, patient to tolerate position well. Patient SPO2 90-95% on 4 lpm, however, unable to perform PLB well. Patient in bed, family at bedside, all needs in reach. No goals met, cont OT POC. Recommend rehab.

## 2023-02-07 NOTE — PLAN OF CARE
Goal Outcome Evaluation:  Plan of Care Reviewed With: patient, spouse           Outcome Evaluation: pt sleeping, easily awakens but difficulty staying alert during tx, pt t/fers sup<>sit w/ CGA/Min x 1, pt sits EOB w/ CGA and performs B LE seated ther ex, sit<>stand w/ Min x 1 w/ RW, sidesteps to HOB w/ Min x 1 and RW, pt w/ c/o's dizziness w/ EOB and standing

## 2023-02-07 NOTE — PLAN OF CARE
Goal Outcome Evaluation:  Plan of Care Reviewed With: patient           Outcome Evaluation: Patient has been in pain. medication provided. pt has been unsuccessful with bowel movement. will continue to monitor.

## 2023-02-07 NOTE — THERAPY TREATMENT NOTE
Acute Care - Physical Therapy Treatment Note  [facility]     Patient Name: Aman Sanchez  : 1950  MRN: [id number]  Today's Date: 2023      Visit Dx:     ICD-10-CM ICD-9-CM   1. Acute on chronic respiratory failure with hypoxia (HCC)  J96.21 518.84     799.02   2. Lung mass  R91.8 786.6   3. Pneumonia of right lung due to infectious organism, unspecified part of lung  J18.9 483.8   4. Impaired functional mobility and activity tolerance  Z74.09 V49.89   5. Impaired mobility and ADLs  Z74.09 V49.89    Z78.9    6. Oral phase dysphagia  R13.11 787.21     Patient Active Problem List   Diagnosis   • Essential hypertension   • Chronic coronary artery disease   • Mixed hyperlipidemia   • Tobacco abuse counseling   • Dyspnea on exertion   • Acute retention of urine   • Anemia   • Osteoarthritis of hip   • Hypersomnia   • Hypotension   • Obstructive sleep apnea syndrome   • Diastolic dysfunction   • Morbid obesity due to excess calories (HCC)   • Tobacco use disorder   • Calculus of ureter   • Preoperative cardiovascular examination   • Enlarged prostate with urinary obstruction   • Mass of right lung   • Chronic respiratory failure with hypoxia (HCC)   • Pleural effusion   • Acute on chronic respiratory failure with hypoxia (HCC)   • Sepsis due to pneumonia (HCC)   • Loculated pleural effusion     Past Medical History:   Diagnosis Date   • Acute bronchitis    • Anemia    • Breast lump    • Cervical radiculopathy    • COPD (chronic obstructive pulmonary disease) (HCC)    • Coronary atherosclerosis     2 stents, followed by Dr. Hargrove   • Depressive disorder    • Disease of thyroid gland    • Dysuria    • Essential hypertension    • Flank pain    • Hematochezia    • Hematuria syndrome    • Hyperlipidemia    • Hypoglycemia    • Kidney stone    • Kidney stones    • Lung cancer (HCC)    • Mass of neck    • MI (myocardial infarction) (HCC)    • Neck pain     sprain   • Osteoarthritis    • PONV  (postoperative nausea and vomiting)    • Sleep apnea     using c-pap   • Sleep apnea    • Type 2 diabetes mellitus (HCC)      Past Surgical History:   Procedure Laterality Date   • BACK SURGERY     • CARDIAC CATHETERIZATION N/A 08/23/2017    Procedure: Right Heart Cath;  Surgeon: Mariposa Zamorano MD;  Location: [facility] INVASIVE LOCATION;  Service:    • CATARACT EXTRACTION WITH INTRAOCULAR LENS IMPLANT Bilateral    • CHOLECYSTECTOMY     • CYSTOSCOPY  08/29/2014    Left ESWL, cysto and stent removal   • CYSTOSCOPY W/ LITHOLAPAXY / EHL  08/15/2014    Left ESWL   • CYSTOSCOPY, RETROGRADE PYELOGRAM AND STENT INSERTION  12/29/2022   • CYSTOSCOPY, URETEROSCOPY, RETROGRADE PYELOGRAM, STENT INSERTION Left 06/30/2017    Procedure: CYSTOSCOPY, LEFT URETEROSCOPY, RETROGRADE PYELOGRAM, HOLMIUM LASER, STENT INSERTION;  Surgeon: Uday Horne MD;  Location: [facility];  Service:    • CYSTOSCOPY, URETEROSCOPY, RETROGRADE PYELOGRAM, STENT INSERTION Left 09/06/2017    Procedure: CYSTOSCOPY, LEFT URETEROSCOPY, RETROGRADE PYELOGRAM, HOLMIUM LASER, STENT INSERTION;  Surgeon: Uday Horne MD;  Location: [facility];  Service:    • CYSTOSCOPY, URETEROSCOPY, RETROGRADE PYELOGRAM, STENT INSERTION Left 01/17/2018    Procedure: CYSTOSCOPY LEFT URETEROSCOPY RETROGRADE PYELOGRAM HOLMIUM LASER STENT INSERTION;  Surgeon: Uday Horne MD;  Location: [facility];  Service:    • CYSTOSCOPY, URETEROSCOPY, RETROGRADE PYELOGRAM, STENT INSERTION Left 07/25/2018    Procedure: CYSTOSCOPY URETEROSCOPY RETROGRADE PYELOGRAM HOLMIUM LASER STENT INSERTION;  Surgeon: Uday Horne MD;  Location: [facility];  Service: Urology   • EYE SURGERY      eye lids lifted   • HIP SURGERY     • INJECTION OF MEDICATION  11/11/2013    Kenalog   • INJECTION OF MEDICATION  01/16/2014    Toradol   • JOINT REPLACEMENT Right 2012    total hip replacement   • SKIN GRAFT      on his foot; removed skin from hip   • TONSILLECTOMY     • TRANSESOPHAGEAL ECHOCARDIOGRAM (ОЛЬГА)   07/21/2014    with color flow-Normal LV systolic function with Ef of 60-65%/ Grad1 diastolic dysfunction of the left ventricular myocardium. No evidence of pericardial effusion     PT Assessment (last 12 hours)     PT Evaluation and Treatment     Row Name 02/07/23 1500          Physical Therapy Time and Intention    Subjective Information complains of;fatigue  -     Document Type therapy note (daily note)  -     Mode of Treatment physical therapy;individual therapy  -     Patient Effort good  -     Comment pt sleeping, wife oks waking pt for PT tx  -     Row Name 02/07/23 1500          General Information    Patient Profile Reviewed yes  -     Existing Precautions/Restrictions fall  -     Row Name 02/07/23 1500          Pain    Pretreatment Pain Rating 0/10 - no pain  -     Posttreatment Pain Rating 0/10 - no pain  -     Row Name 02/07/23 1500          Cognition    Affect/Mental Status (Cognition) WNL  -     Orientation Status (Cognition) oriented x 3  -     Follows Commands (Cognition) follows one-step commands  -     Row Name 02/07/23 1500          Bed Mobility    Supine-Sit White (Bed Mobility) contact guard;minimum assist (75% patient effort);1 person assist  -     Sit-Supine White (Bed Mobility) minimum assist (75% patient effort);contact guard;1 person assist  -     Assistive Device (Bed Mobility) bed rails;head of bed elevated  -     Row Name 02/07/23 1500          Transfers    Transfers sit-stand transfer;stand-sit transfer  -     Row Name 02/07/23 1500          Sit-Stand Transfer    Sit-Stand White (Transfers) minimum assist (75% patient effort)  -     Assistive Device (Sit-Stand Transfers) walker, front-wheeled  -     Row Name 02/07/23 1500          Stand-Sit Transfer    Stand-Sit White (Transfers) minimum assist (75% patient effort);verbal cues;1 person assist  -     Assistive Device (Stand-Sit Transfers) walker, front-wheeled  -[facility]  Name 02/07/23 1500          Gait/Stairs (Locomotion)    [place] Level (Gait) minimum assist (75% patient effort);1 person assist  -     Assistive Device (Gait) walker, front-wheeled  -     Distance in Feet (Gait) 3 steps towards HOB  -     Deviations/Abnormal Patterns (Gait) stride length decreased;gait speed decreased  -     Comment, (Gait/Stairs) pt w/ dizziness while EOB and standing  -JW     Row Name 02/07/23 1500          Motor Skills    Comments, Therapeutic Exercise B LE seated ther ex: AP, LAQ, hip flexion  -     Row Name 02/07/23 1500          Plan of Care Review    Plan of Care Reviewed With patient;spouse  -     Outcome Evaluation pt sleeping, easily awakens but difficulty staying alert during tx, pt t/fers sup<>sit w/ CGA/Min x 1, pt sits EOB w/ CGA and performs B LE seated ther ex, sit<>stand w/ Min x 1 w/ RW, sidesteps to HOB w/ Min x 1 and RW, pt w/ c/o's dizziness w/ EOB and standing  -JW     Row Name 02/07/23 1500          Vital Signs    Pre Systolic BP Rehab 129  -JW     Pre Treatment Diastolic BP 77  -JW     Pretreatment Heart Rate (beats/min) 91  -JW     Intratreatment Heart Rate (beats/min) 99  -JW     Posttreatment Heart Rate (beats/min) 93  -JW     Pre SpO2 (%) 97  -JW     O2 Delivery Pre Treatment supplemental O2  -JW     Intra SpO2 (%) 96  -JW     O2 Delivery Intra Treatment supplemental O2  -JW     Post SpO2 (%) 97  -JW     O2 Delivery Post Treatment supplemental O2  -JW     Pre Patient Position Supine  -JW     Intra Patient Position Standing  -JW     Post Patient Position Supine  -JW     Row Name 02/07/23 1500          Positioning and Restraints    Pre-Treatment Position in bed  -JW     Post Treatment Position bed  -JW     In Bed fowlers;call light within reach;encouraged to call for assist;exit alarm on;with family/caregiver  -     Row Name 02/07/23 1500          Bed Mobility Goal 1 (PT)    Activity/Assistive Device (Bed Mobility Goal 1, PT) rolling to left;rolling to  right  -JW     [place] Level/Cues Needed (Bed Mobility Goal 1, PT) minimum assist (75% or more patient effort)  -JW     Time Frame (Bed Mobility Goal 1, PT) by discharge  -JW     Progress/Outcomes (Bed Mobility Goal 1, PT) goal met  -JW     Row Name 02/07/23 1500          Transfer Goal 1 (PT)    Activity/Assistive Device (Transfer Goal 1, PT) sit-to-stand/stand-to-sit;bed-to-chair/chair-to-bed  -JW     [place] Level/Cues Needed (Transfer Goal 1, PT) minimum assist (75% or more patient effort)  -JW     Time Frame (Transfer Goal 1, PT) by discharge  -JW     Strategies/Barriers (Transfers Goal 1, PT) complex medical patient  -JW     Progress/Outcome (Transfer Goal 1, PT) goal partially met  -JW     Row Name 02/07/23 1500          Gait Training Goal 1 (PT)    Activity/Assistive Device (Gait Training Goal 1, PT) gait (walking locomotion);assistive device use;walker, rolling  -JW     [place] Level (Gait Training Goal 1, PT) minimum assist (75% or more patient effort)  -JW     Distance (Gait Training Goal 1, PT) 5' x 2 with supplemental O2  -JW     Time Frame (Gait Training Goal 1, PT) by discharge  -JW     Strategies/Barriers (Gait Training Goal 1, PT) dyspnea  -JW     Progress/Outcome (Gait Training Goal 1, PT) goal not met  -JW           User Key  (r) = Recorded By, (t) = Taken By, (c) = Cosigned By    Initials Name Provider Type    Nora Frank, PTA Physical Therapist Assistant                Physical Therapy Education     Title: PT OT SLP Therapies (In Progress)     Topic: Physical Therapy (Not Started)     Point: Mobility training (Not Started)     Learner Progress:  Not documented in this visit.          Point: Home exercise program (Not Started)     Learner Progress:  Not documented in this visit.          Point: Body mechanics (Not Started)     Learner Progress:  Not documented in this visit.          Point: Precautions (Not Started)     Learner Progress:  Not documented in this visit.                           PT Recommendation and Plan  Anticipated Discharge Disposition (PT): skilled nursing facility  Plan of Care Reviewed With: patient, spouse  Outcome Evaluation: pt sleeping, easily awakens but difficulty staying alert during tx, pt t/fers sup<>sit w/ CGA/Min x 1, pt sits EOB w/ CGA and performs B LE seated ther ex, sit<>stand w/ Min x 1 w/ RW, sidesteps to HOB w/ Min x 1 and RW, pt w/ c/o's dizziness w/ EOB and standing   Outcome Measures     Row Name 02/07/23 1600 02/06/23 1327          How much help from another person do you currently need...    Turning from your back to your side while in flat bed without using bedrails? 3  -JW 3  -LN     Moving from lying on back to sitting on the side of a flat bed without bedrails? 3  -JW 3  -LN     Moving to and from a bed to a chair (including a wheelchair)? 3  -JW 3  -LN     Standing up from a chair using your arms (e.g., wheelchair, bedside chair)? 3  -JW 3  -LN     Climbing 3-5 steps with a railing? 2  -JW 2  -LN     To walk in hospital room? 2  -JW 2  -LN     AM-PAC 6 Clicks Score (PT) 16  - 16  -LN        Functional Assessment    Outcome Measure Options AM-PAC 6 Clicks Basic Mobility (PT)  -JW AM-PAC 6 Clicks Daily Activity (OT)  -           User Key  (r) = Recorded By, (t) = Taken By, (c) = Cosigned By    Initials Name Provider Type    Nora Frank PTA Physical Therapist Assistant    Brittni Sam PTA Physical Therapist Assistant                 Time Calculation:    PT Charges     Row Name 02/07/23 1500             Time Calculation    Start Time 1500  -      Stop Time 1523  -      Time Calculation (min) 23 min  -      PT Received On 02/07/23  -         Time Calculation- PT    Total Timed Code Minutes- PT 23 minute(s)  -            User Key  (r) = Recorded By, (t) = Taken By, (c) = Cosigned By    Initials Name Provider Type    Nora Frank, NOAH Physical Therapist Assistant              Therapy Charges  for Today     Code Description Service Date Service Provider Modifiers Qty    25318266520 HC PT THERAPEUTIC ACT EA 15 MIN 2/7/2023 Nora Leon, PTA GP 1    16136983547 HC PT THER PROC EA 15 MIN 2/7/2023 Nora Leon, PTA GP 1          PT G-Codes  Outcome Measure Options: AM-PAC 6 Clicks Basic Mobility (PT)  AM-PAC 6 Clicks Score (PT): 16  AM-PAC 6 Clicks Score (OT): 15    Nora Leon PTA  2/7/2023

## 2023-02-07 NOTE — THERAPY TREATMENT NOTE
Patient Name: Aman Sanchez  : 1950    MRN: [id number]                              Today's Date: 2023       Admit Date: 2023    Visit Dx:     ICD-10-CM ICD-9-CM   1. Acute on chronic respiratory failure with hypoxia (HCC)  J96.21 518.84     799.02   2. Lung mass  R91.8 786.6   3. Pneumonia of right lung due to infectious organism, unspecified part of lung  J18.9 483.8   4. Impaired functional mobility and activity tolerance  Z74.09 V49.89   5. Impaired mobility and ADLs  Z74.09 V49.89    Z78.9    6. Oral phase dysphagia  R13.11 787.21     Patient Active Problem List   Diagnosis   • Essential hypertension   • Chronic coronary artery disease   • Mixed hyperlipidemia   • Tobacco abuse counseling   • Dyspnea on exertion   • Acute retention of urine   • Anemia   • Osteoarthritis of hip   • Hypersomnia   • Hypotension   • Obstructive sleep apnea syndrome   • Diastolic dysfunction   • Morbid obesity due to excess calories (HCC)   • Tobacco use disorder   • Calculus of ureter   • Preoperative cardiovascular examination   • Enlarged prostate with urinary obstruction   • Mass of right lung   • Chronic respiratory failure with hypoxia (HCC)   • Pleural effusion   • Acute on chronic respiratory failure with hypoxia (HCC)   • Sepsis due to pneumonia (HCC)   • Loculated pleural effusion     Past Medical History:   Diagnosis Date   • Acute bronchitis    • Anemia    • Breast lump    • Cervical radiculopathy    • COPD (chronic obstructive pulmonary disease) (HCC)    • Coronary atherosclerosis     2 stents, followed by Dr. Hargrove   • Depressive disorder    • Disease of thyroid gland    • Dysuria    • Essential hypertension    • Flank pain    • Hematochezia    • Hematuria syndrome    • Hyperlipidemia    • Hypoglycemia    • Kidney stone    • Kidney stones    • Lung cancer (HCC)    • Mass of neck    • MI (myocardial infarction) (HCC)    • Neck pain     sprain   • Osteoarthritis    • PONV (postoperative nausea  and vomiting)    • Sleep apnea     using c-pap   • Sleep apnea    • Type 2 diabetes mellitus (HCC)      Past Surgical History:   Procedure Laterality Date   • BACK SURGERY     • CARDIAC CATHETERIZATION N/A 08/23/2017    Procedure: Right Heart Cath;  Surgeon: Mariposa Zamorano MD;  Location: [facility] INVASIVE LOCATION;  Service:    • CATARACT EXTRACTION WITH INTRAOCULAR LENS IMPLANT Bilateral    • CHOLECYSTECTOMY     • CYSTOSCOPY  08/29/2014    Left ESWL, cysto and stent removal   • CYSTOSCOPY W/ LITHOLAPAXY / EHL  08/15/2014    Left ESWL   • CYSTOSCOPY, RETROGRADE PYELOGRAM AND STENT INSERTION  12/29/2022   • CYSTOSCOPY, URETEROSCOPY, RETROGRADE PYELOGRAM, STENT INSERTION Left 06/30/2017    Procedure: CYSTOSCOPY, LEFT URETEROSCOPY, RETROGRADE PYELOGRAM, HOLMIUM LASER, STENT INSERTION;  Surgeon: Uday Horne MD;  Location: [facility];  Service:    • CYSTOSCOPY, URETEROSCOPY, RETROGRADE PYELOGRAM, STENT INSERTION Left 09/06/2017    Procedure: CYSTOSCOPY, LEFT URETEROSCOPY, RETROGRADE PYELOGRAM, HOLMIUM LASER, STENT INSERTION;  Surgeon: Uday Horne MD;  Location: [facility];  Service:    • CYSTOSCOPY, URETEROSCOPY, RETROGRADE PYELOGRAM, STENT INSERTION Left 01/17/2018    Procedure: CYSTOSCOPY LEFT URETEROSCOPY RETROGRADE PYELOGRAM HOLMIUM LASER STENT INSERTION;  Surgeon: Uday Horne MD;  Location: [facility];  Service:    • CYSTOSCOPY, URETEROSCOPY, RETROGRADE PYELOGRAM, STENT INSERTION Left 07/25/2018    Procedure: CYSTOSCOPY URETEROSCOPY RETROGRADE PYELOGRAM HOLMIUM LASER STENT INSERTION;  Surgeon: Uday Horne MD;  Location: [facility];  Service: Urology   • EYE SURGERY      eye lids lifted   • HIP SURGERY     • INJECTION OF MEDICATION  11/11/2013    Kenalog   • INJECTION OF MEDICATION  01/16/2014    Toradol   • JOINT REPLACEMENT Right 2012    total hip replacement   • SKIN GRAFT      on his foot; removed skin from hip   • TONSILLECTOMY     • TRANSESOPHAGEAL ECHOCARDIOGRAM (ОЛЬГА)  07/21/2014    with color  flow-Normal LV systolic function with Ef of 60-65%/ Grad1 diastolic dysfunction of the left ventricular myocardium. No evidence of pericardial effusion      General Information     Row Name 02/07/23 1615          OT Time and Intention    Document Type therapy note (daily note)  -     Mode of Treatment individual therapy;occupational therapy  -[facility] Name 02/07/23 1615          Cognition    Orientation Status (Cognition) oriented x 4;person;place;situation  -[facility] Name 02/07/23 1615          Safety Issues, Functional Mobility    Safety Issues Affecting Function (Mobility) safety precaution awareness  -     Impairments Affecting Function (Mobility) endurance/activity tolerance;postural/trunk control;pain;shortness of breath;strength  -           User Key  (r) = Recorded By, (t) = Taken By, (c) = Cosigned By    Initials Name Provider Type     Brittaney Gilmore OT Occupational Therapist                 Mobility/ADL's     [facility] Name 02/07/23 1615          Bed Mobility    Supine-Sit [place] (Bed Mobility) contact guard  -     Sit-Supine [place] (Bed Mobility) contact guard  -     Assistive Device (Bed Mobility) bed rails;head of bed elevated  -[facility] Name 02/07/23 1615          Transfers    Transfers sit-stand transfer  -[facility] Name 02/07/23 1615          Sit-Stand Transfer    Sit-Stand [place] (Transfers) minimum assist (75% patient effort)  -     Assistive Device (Sit-Stand Transfers) walker, front-wheeled  -[facility] Name 02/07/23 1615          Stand-Sit Transfer    Stand-Sit [place] (Transfers) minimum assist (75% patient effort)  -     Assistive Device (Stand-Sit Transfers) walker, front-wheeled  -           User Key  (r) = Recorded By, (t) = Taken By, (c) = Cosigned By    Initials Name Provider Type    Brittaney Patricia OT Occupational Therapist               Obj/Interventions    No documentation.                Goals/Plan    No documentation.                 Clinical Impression     Row Name 02/07/23 1615          Pain Assessment    Pretreatment Pain Rating 0/10 - no pain  -     Posttreatment Pain Rating 0/10 - no pain  -     Row Name 02/07/23 1615          Plan of Care Review    Plan of Care Reviewed With patient  -     Outcome Evaluation OT tx complete, supine in bed. Supine<>sit with CGA. Patient reports dizziness, BP supine 112/66, BP /71. Patient allowed 5 mins of EOB sitting for dizziness to resolve. Patient agreeable to standing trial. Sit to stand with min A. Patient diaphoretic, eyes closed, face pale. Per therapist discretion, patient returned supine, /50, patient reported severe dizziness in standing, which resolved in supine. Bed placed in chair position x 5 mins, patient to tolerate position well. Patient SPO2 90-95% on 4 lpm, however, unable to perform PLB well. Patient in bed, family at bedside, all needs in reach. No goals met, cont OT POC. Recommend rehab.  -     Row Name 02/07/23 1615          Therapy Plan Review/Discharge Plan (OT)    Anticipated Discharge Disposition (OT) skilled nursing facility;inpatient rehabilitation facility;[facility] ([facility])  -     Row Name 02/07/23 1615          Vital Signs    Pre Systolic BP Rehab 112  -SJ     Pre Treatment Diastolic BP 66  -SJ     Intra Systolic BP Rehab 104  -SJ     Intra Treatment Diastolic BP 71  -SJ     Post Systolic BP Rehab 117  -SJ     Post Treatment Diastolic BP 80  -SJ     Pretreatment Heart Rate (beats/min) 93  -SJ     Intratreatment Heart Rate (beats/min) 100  -SJ     Posttreatment Heart Rate (beats/min) 95  -SJ     Pre SpO2 (%) 93  -SJ     O2 Delivery Pre Treatment nasal cannula  4 lpm  -SJ     Intra SpO2 (%) 90  -SJ     O2 Delivery Intra Treatment nasal cannula  -     Post SpO2 (%) 95  -SJ     O2 Delivery Post Treatment nasal cannula  -SJ           User Key  (r) = Recorded By, (t) = Taken By, (c) = Cosigned By    Initials Name Provider Type    TANIA Gilmore  Brittaney DEL REAL OT Occupational Therapist               Outcome Measures     Row Name 02/07/23 1615          How much help from another is currently needed...    Putting on and taking off regular lower body clothing? 2  -SJ     Bathing (including washing, rinsing, and drying) 2  -SJ     Toileting (which includes using toilet bed pan or urinal) 2  -SJ     Putting on and taking off regular upper body clothing 3  -SJ     Taking care of personal grooming (such as brushing teeth) 3  -SJ     Eating meals 3  -SJ     AM-PAC 6 Clicks Score (OT) 15  -SJ     Row Name 02/07/23 1600 02/07/23 0720       How much help from another person do you currently need...    Turning from your back to your side while in flat bed without using bedrails? 3  -JW 3  -AE    Moving from lying on back to sitting on the side of a flat bed without bedrails? 3  -JW 3  -AE    Moving to and from a bed to a chair (including a wheelchair)? 3  -JW 3  -AE    Standing up from a chair using your arms (e.g., wheelchair, bedside chair)? 3  -JW 3  -AE    Climbing 3-5 steps with a railing? 2  -JW 2  -AE    To walk in hospital room? 2  -JW 2  -AE    AM-PAC 6 Clicks Score (PT) 16  -JW 16  -AE    Highest level of mobility 5 --> Static standing  -JW 5 --> Static standing  -AE    Row Name 02/07/23 1615 02/07/23 1600       Functional Assessment    Outcome Measure Options AM-PAC 6 Clicks Daily Activity (OT)  - AM-PAC 6 Clicks Basic Mobility (PT)  -          User Key  (r) = Recorded By, (t) = Taken By, (c) = Cosigned By    Initials Name Provider Type    Nora Frank PTA Physical Therapist Assistant    Sophie Tabares RN Registered Nurse    Brittaney Patricia OT Occupational Therapist                Occupational Therapy Education     Title: PT OT SLP Therapies (In Progress)     Topic: Occupational Therapy (Done)     Point: ADL training (Done)     Description:   Instruct learner(s) on proper safety adaptation and remediation techniques during self care or  transfers.   Instruct in proper use of assistive devices.              Learning Progress Summary           Patient Acceptance, E,TB, VU by LW at 2/6/2023 1500    Acceptance, E,TB, VU by  at 2/3/2023 1549    Comment: OT role, POC, t/f training    Acceptance, E,TB, VU by SA at 2/1/2023 1352    Acceptance, E,TB, VU by  at 1/31/2023 1447    Comment: POC, role of OT, transfer training                   Point: Home exercise program (Done)     Description:   Instruct learner(s) on appropriate technique for monitoring, assisting and/or progressing therapeutic exercises/activities.              Learning Progress Summary           Patient Acceptance, E,TB, VU by LW at 2/6/2023 1500                   Point: Precautions (Done)     Description:   Instruct learner(s) on prescribed precautions during self-care and functional transfers.              Learning Progress Summary           Patient Acceptance, E,TB, VU by LW at 2/6/2023 1500    Acceptance, E,TB, VU by  at 2/3/2023 1549    Comment: OT role, POC, t/f training    Acceptance, E, VU by RB at 2/2/2023 1135    Comment: Edu pt on no OOB without assist.    Acceptance, E,TB, VU by  at 2/1/2023 1352                   Point: Body mechanics (Done)     Description:   Instruct learner(s) on proper positioning and spine alignment during self-care, functional mobility activities and/or exercises.              Learning Progress Summary           Patient Acceptance, E,TB, VU by LW at 2/6/2023 1500    Acceptance, E,TB, VU by  at 2/3/2023 1549    Comment: OT role, POC, t/f training    Acceptance, E,TB, VU by  at 2/1/2023 1352                               User Key     Initials Effective Dates Name Provider Type Discipline    RB 06/16/21 -  Richie Davis OT Occupational Therapist OT    LW 06/16/21 -  Lizette Flor COTA Occupational Therapist Assistant OT     06/14/21 -  Brittaney Gilmore, OT Occupational Therapist OT     10/19/22 -  Inez Young OT Occupational  Therapist OT     08/11/22 -  Bernice Rivers OT Occupational Therapist OT              OT Recommendation and Plan  Planned Therapy Interventions (OT): activity tolerance training, adaptive equipment training, BADL retraining, edema control/reduction, cognitive/visual perception retraining, functional balance retraining, IADL retraining, manual therapy/joint mobilization, occupation/activity based interventions, neuromuscular control/coordination retraining, passive ROM/stretching, patient/caregiver education/training, transfer/mobility retraining, ROM/therapeutic exercise, strengthening exercise  Therapy Frequency (OT): other (see comments) (5-7 d/wk)  Plan of Care Review  Plan of Care Reviewed With: patient  Outcome Evaluation: OT tx complete, supine in bed. Supine<>sit with CGA. Patient reports dizziness, BP supine 112/66, BP /71. Patient allowed 5 mins of EOB sitting for dizziness to resolve. Patient agreeable to standing trial. Sit to stand with min A. Patient diaphoretic, eyes closed, face pale. Per therapist discretion, patient returned supine, /50, patient reported severe dizziness in standing, which resolved in supine. Bed placed in chair position x 5 mins, patient to tolerate position well. Patient SPO2 90-95% on 4 lpm, however, unable to perform PLB well. Patient in bed, family at bedside, all needs in reach. No goals met, cont OT POC. Recommend rehab.     Time Calculation:    Time Calculation- OT     Row Name 02/07/23 1646             Time Calculation- OT    OT Start Time 1615  -SJ      OT Stop Time 1642  -SJ      OT Time Calculation (min) 27 min  -SJ      Total Timed Code Minutes- OT 27 minute(s)  -      OT Received On 02/07/23  -SJ         Timed Charges    51805 - OT Therapeutic Activity Minutes 27  -SJ         Total Minutes    Timed Charges Total Minutes 27  -SJ       Total Minutes 27  -SJ            User Key  (r) = Recorded By, (t) = Taken By, (c) = Cosigned By    Initials Name  Provider Type     Brittaney Gilmore, HERMILA Occupational Therapist              Therapy Charges for Today     Code Description Service Date Service Provider Modifiers Qty    18842267481 HC OT THERAPEUTIC ACT EA 15 MIN 2/7/2023 Brittaney Gilmore OT GO 2               Brittaney Gilmore OT  2/7/2023

## 2023-02-07 NOTE — PLAN OF CARE
Goal Outcome Evaluation:              Outcome Evaluation: Pt has been in pain, Medicated. Pt refused Soap suds enema and reports he no longer feels constipated. Will continue to monitor.

## 2023-02-07 NOTE — PROGRESS NOTES
[facility]  INPATIENT PROGRESS NOTE    Length of Stay: 12  Date of Admission: 1/26/2023  Primary Care Physician: Emile Wynn MD    Subjective   Chief Complaint: Shortness of air/persistent nausea.      HPI: Patient is seen for follow-up today 2/7/2023.    72-year-old male with COPD, metastatic lung cancer, chronic hypoxic respiratory failure (on 4 L nasal cannula at baseline), ALLEN on CPAP, CAD with previous stents, hypertension, diabetes, history of obstructive uropathy (with previous nephrostomy and ureteral stent) who was admitted for acute on chronic respiratory failure with hypoxia secondary to multifocal consolidation/moderate loculated right pleural effusion/COPD exacerbation.    He was extubated on 1/31/2023, less deconditioned, remains short of air and  his oxygen requirement is down to 4L nasal prongs with saturation in the low 90s. Right-sided chest tube was removed on 2/3/2023.  He has had multiple bowel movements, continues to have persistent nausea with decreased oral intake dating back to prior to admission.     Review of Systems   Constitutional: Positive for activity change, appetite change and fatigue. Negative for chills, diaphoresis and fever.   HENT: Negative for trouble swallowing and voice change.    Eyes: Negative for photophobia and visual disturbance.   Respiratory: Positive for shortness of breath. Negative for cough, choking, chest tightness, wheezing and stridor.    Cardiovascular: Negative for chest pain, palpitations and leg swelling.   Gastrointestinal: Positive for constipation and nausea. Negative for abdominal distention, abdominal pain, blood in stool, diarrhea and vomiting.   Endocrine: Negative for cold intolerance, heat intolerance, polydipsia, polyphagia and polyuria.   Genitourinary: Negative for decreased urine volume, difficulty urinating, dysuria, enuresis, flank pain, frequency, hematuria and urgency.    Musculoskeletal: Negative for arthralgias, gait problem, myalgias, neck pain and neck stiffness.   Skin: Negative for pallor, rash and wound.   Neurological: Negative for dizziness, tremors, seizures, syncope, facial asymmetry, speech difficulty, weakness, light-headedness, numbness and headaches.   Hematological: Does not bruise/bleed easily.   Psychiatric/Behavioral: Negative for agitation, behavioral problems and confusion.       Objective    Temp:  [97.4 °F (36.3 °C)-97.8 °F (36.6 °C)] 97.8 °F (36.6 °C)  Heart Rate:  [] 84  Resp:  [16-20] 18  BP: (114-146)/(70-83) 121/79    AM-PAC 6 Clicks Score (PT): 16 (02/07/23 0720)           Physical Exam  Vitals and nursing note reviewed.   Constitutional:       General: He is not in acute distress.     Appearance: He is well-developed. He is obese. He is ill-appearing. He is not diaphoretic.   HENT:      Head: Normocephalic and atraumatic.   Eyes:      General: No scleral icterus.        Right eye: No discharge.         Left eye: No discharge.   Neck:      Thyroid: No thyromegaly.      Vascular: No carotid bruit or JVD.   Cardiovascular:      Rate and Rhythm: Normal rate and regular rhythm.      Heart sounds: Normal heart sounds. No murmur heard.    No friction rub. No gallop.   Pulmonary:      Effort: Pulmonary effort is normal. No respiratory distress.      Breath sounds: No stridor. Examination of the right-lower field reveals decreased breath sounds. Decreased breath sounds and rhonchi present. No wheezing or rales.   Chest:      Chest wall: No tenderness.   Abdominal:      General: Bowel sounds are normal. There is no distension.      Palpations: Abdomen is soft. There is no mass.      Tenderness: There is no abdominal tenderness. There is no right CVA tenderness, left CVA tenderness, guarding or rebound.      Hernia: No hernia is present.   Musculoskeletal:         General: No swelling, tenderness or deformity.      Cervical back: Neck supple.      Right  lower leg: No edema.      Left lower leg: No edema.   Skin:     General: Skin is warm and dry.      Coloration: Skin is not jaundiced or pale.      Findings: No bruising, erythema, lesion or rash.   Neurological:      General: No focal deficit present.      Mental Status: He is alert and oriented to person, place, and time.      Cranial Nerves: No cranial nerve deficit.      Sensory: No sensory deficit.      Motor: No weakness or abnormal muscle tone.      Coordination: Coordination normal.      Gait: Gait normal.      Deep Tendon Reflexes: Reflexes normal.      Comments: .   Psychiatric:         Mood and Affect: Mood normal.         Behavior: Behavior normal.         Thought Content: Thought content normal.         Judgment: Judgment normal.           Medication Review:    Current Facility-Administered Medications:   •  dextrose (D50W) (25 g/50 mL) IV injection 25 g, 25 g, Intravenous, Q15 Min PRN, Pierre Mehta MD  •  dextrose (GLUTOSE) oral gel 15 g, 15 g, Oral, Q15 Min PRN, Pierre Mehta MD  •  FLUoxetine (PROzac) capsule 20 mg, 20 mg, Oral, Daily, Pierre Mehta MD, 20 mg at 02/07/23 0920  •  furosemide (LASIX) injection 40 mg, 40 mg, Intravenous, BID, Pierre Mehta MD, 40 mg at 02/07/23 1036  •  glucagon (human recombinant) (GLUCAGEN DIAGNOSTIC) injection 1 mg, 1 mg, Intramuscular, Q15 Min PRN, Pierre Mehta MD  •  guaiFENesin (MUCINEX) 12 hr tablet 600 mg, 600 mg, Oral, Q12H, Pierre Mehta MD, 600 mg at 02/07/23 0920  •  Insulin Aspart (novoLOG) injection 0-9 Units, 0-9 Units, Subcutaneous, TID AC, Pierre Mehta MD, 4 Units at 02/07/23 1035  •  ipratropium-albuterol (DUO-NEB) nebulizer solution 3 mL, 3 mL, Nebulization, 4x Daily - RT, Pierre Mehta MD, 3 mL at 02/07/23 0624  •  levothyroxine (SYNTHROID, LEVOTHROID) tablet 50 mcg, 50 mcg, Oral, Q AM, Pierre Mehta MD, 50 mcg at 02/07/23 0531  •  metoclopramide (REGLAN) injection 10 mg, 10 mg, Intravenous,  Q8H, Pierre Mehta MD, 10 mg at 02/07/23 0926  •  metoprolol tartrate (LOPRESSOR) tablet 25 mg, 25 mg, Oral, Q12H, Pierre Mehta MD, 25 mg at 02/07/23 0921  •  nicotine (NICODERM CQ) 21 MG/24HR patch 1 patch, 1 patch, Transdermal, Q24H, Pierre Mehta MD, 1 patch at 02/07/23 0921  •  ondansetron (ZOFRAN) tablet 4 mg, 4 mg, Oral, Q6H PRN **OR** ondansetron (ZOFRAN) injection 4 mg, 4 mg, Intravenous, Q6H PRN, Pierre Mehta MD, 4 mg at 02/05/23 1806  •  pantoprazole (PROTONIX) EC tablet 40 mg, 40 mg, Oral, Q AM, Pierre Mehta MD, 40 mg at 02/07/23 0531  •  Pharmacy to Dose Cefepime, , Does not apply, Continuous PRN, Pierre Mehta MD  •  polyethylene glycol (MIRALAX) packet 17 g, 17 g, Oral, Daily, Pierre Mehta MD, 17 g at 02/07/23 0921  •  sennosides-docusate (PERICOLACE) 8.6-50 MG per tablet 1 tablet, 1 tablet, Oral, Nightly, Pierre Mehta MD, 1 tablet at 02/06/23 2007  •  sodium chloride 0.9 % flush 10 mL, 10 mL, Intravenous, Q12H, Pierre Mehta MD, 10 mL at 02/06/23 2008  •  sodium chloride 0.9 % flush 10 mL, 10 mL, Intravenous, PRN, Pierre Mehta MD  •  sodium chloride 0.9 % flush 10 mL, 10 mL, Intravenous, Q12H, Pierre Mehta MD, 10 mL at 02/07/23 0926  •  sodium chloride 0.9 % flush 10 mL, 10 mL, Intravenous, Q12H, Pierre Mehta MD, 10 mL at 02/07/23 0926  •  sodium chloride 0.9 % flush 10 mL, 10 mL, Intravenous, PRN, Pierre Mehta MD  •  sodium chloride 0.9 % infusion 40 mL, 40 mL, Intravenous, PRN, Pierre Mehta MD  •  tamsulosin (FLOMAX) 24 hr capsule 0.8 mg, 0.8 mg, Oral, Daily, Pirere Mehta MD, 0.8 mg at 02/07/23 0920  •  traMADol (ULTRAM) tablet 50 mg, 50 mg, Oral, Q6H PRN, Pierre Mehta MD, 50 mg at 02/07/23 0923  •  traZODone (DESYREL) tablet 150 mg, 150 mg, Oral, Nightly, Pierre Mehta MD, 150 mg at 02/06/23 2007    Results Review:  I have reviewed the labs, radiology results, and diagnostic  studies.    Laboratory Data:   Results from last 7 days   Lab Units 02/07/23  0605 02/05/23  0636 02/04/23  0555   SODIUM mmol/L 134* 134* 136   POTASSIUM mmol/L 3.8 4.3 5.1   CHLORIDE mmol/L 96* 96* 98   CO2 mmol/L 23.0 25.0 25.0   BUN mg/dL 53* 51* 45*   CREATININE mg/dL 1.76* 1.72* 1.45*   GLUCOSE mg/dL 182* 140* 113*   CALCIUM mg/dL 9.4 9.2 9.5   ANION GAP mmol/L 15.0 13.0 13.0     Estimated Creatinine Clearance: 42.9 mL/min (A) (by C-G formula based on SCr of 1.76 mg/dL (H)).          Results from last 7 days   Lab Units 02/07/23  0605 02/05/23  0636 02/04/23  0555 02/03/23  0529 02/01/23  0412   WBC 10*3/mm3 13.81* 15.79* 18.81* 13.89* 9.62   HEMOGLOBIN g/dL 14.0 13.7 14.9 13.8 13.0   HEMATOCRIT % 43.6 43.9 47.5 43.2 40.5   PLATELETS 10*3/mm3 114* 144 154 163 188     Results from last 7 days   Lab Units 02/01/23  0412   INR  1.23*       Culture Data:   No results found for: BLOODCX  No results found for: URINECX  No results found for: RESPCX  No results found for: WOUNDCX  No results found for: STOOLCX  No components found for: BODYFLD    Radiology Data:   Imaging Results (Last 24 Hours)     Procedure Component Value Units Date/Time    XR Chest 1 View [[id number]] Collected: 02/07/23 0943     Updated: 02/07/23 1017    Narrative:        PROCEDURE: Single chest view portable    REASON FOR EXAM:SOA, J96.21 Acute and chronic respiratory failure  with hypoxia R91.8 Other nonspecific abnormal finding of lung  field J18.9 Pneumonia, unspecified organism Z74.09 Other reduced  mobility Z74.09 Other reduced mobility Z78.9 Other specified  health status R13.11 Dysphagia, oral phase    FINDINGS: Comparison exam dated February 6, 2023. Left PICC line  with tip seen within the region of the left axillary vein.  Cardiac size appears within normal limits. Worsening right upper  lobe perihilar moderate size groundglass opacity. Right lung base  moderate size interstitial groundglass opacity. Lungs are  otherwise clear.. Pleural  spaces are normal. No acute osseous  abnormality.      Impression:      1.  Worsening right upper lobe perihilar moderate size  groundglass opacity. Right lung base moderate size interstitial  groundglass opacity. These opacities would be suspicious for  worsening pneumonia and/or atypical pulmonary edema.  2.  Left PICC line with tip seen within the region of the left  axillary vein.    Electronically signed by:  Brant Bacon MD  2/7/2023 10:15 AM CST  Workstation: [id number]    XR Abdomen KUB [[id number]] Collected: 02/06/23 0935     Updated: 02/06/23 1655    Narrative:      PROCEDURE: Single view abdomen/KUB x-ray    COMPARISON: CT abdomen and pelvis dated 3/3/2022    HISTORY: rule out fecal impaction, J96.21 Acute and chronic  respiratory failure with hypoxia R91.8 Other nonspecific abnormal  finding of lung field J18.9 Pneumonia, unspecified organism  Z74.09 Other reduced mobility Z74.09 Other reduced mobility Z78.9  Other specified health status R13.11 Dysphagia, oral phase    TECHNIQUE: Single frontal view of the abdomen and pelvis.     FINDINGS:  There are calcifications in the region of the left kidney.  Calcifications in the low pelvis over the right side of the pubic  symphysis are of uncertain significance, likely in the prostate.  There are bilateral ureteral stents. The left ureteral stent is  slightly uncoiled superiorly.  Stabilization rods and pedicle screws are noted bilaterally in  the lower lumbar spine.  There are linear opacities in the region of the prostate.  There are surgical clips in the right upper abdomen.  There is fecal debris and bowel gas in nondilated colon.  Fecal impaction cannot be excluded on this exam. CT would be more  sensitive.  Nonobstructive small bowel gas pattern.      Impression:      There does appear to be stool throughout the colon and in the  rectum.  Fecal impaction cannot be excluded on this exam. CT would be more  sensitive.  There are bilateral ureteral stents.  The left ureteral stent is  slightly uncoiled superiorly.  There are calcifications in the region of the left kidney.  Calcifications in the low pelvis over the right side of the pubic  symphysis are of uncertain significance, likely in the prostate.    Electronically signed by:  Tyler Dewitt MD  2/6/2023 4:53 PM CST  Workstation: [id number]          ABG:        I have reviewed the patient's current medications.     Assessment/Plan     Hospital Problem List:  Principal Problem:    Acute on chronic respiratory failure with hypoxia (HCC)  Active Problems:    Mass of right lung    Sepsis due to pneumonia (HCC)    Loculated pleural effusion    Acute on chronic respiratory failure with hypoxia/sepsis (secondary to a combination of multifocal consolidation, COPD exacerbation right-sided pleural effusion): Patient is status postplacement of right-sided chest tube secondary to malignant pleural effusion and chest tube was removed on 2/3/2023.  He has completed a course of antimicrobial therapy and steroids chest x-ray done this morning showed worsening right upper lobe perihilar moderate size groundglass opacity and right lung base moderate size interstitial groundglass opacity.  Begin IV cefepime, continue noninvasive respiratory therapy and bronchodilators.  Blood and pleural fluid cultures showed no growth.  Pleural fluid cytology is positive for malignancy.  Patient patient has been determined not to require  placement of right-sided Pleurx catheter by CT surgery.  Input by CT surgery is appreciated.  Reactive leukocytosis is improving and will be monitored.    Metastatic lung cancer: Patient is to follow-up with his primary oncologist postdischarge.    Chronic kidney disease: Patient's baseline creatinine is between 1.2-1.7.  Creatinine is at baseline.  Continue to monitor and consult nephrologist if the need arises.    Diabetes mellitus with possible diabetic induced gastroparesis (complicated by refractory  nausea): Glycemic control is adequate.  Begin Reglan and consult GI.  He may require endoscopy evaluation if the need arises.  Continue Accu-Cheks and sliding scale insulin.   Mild hyponatremia is clinically insignificant.    Constipation: Resolved.      Obstructive sleep apnea: Continue nightly CPAP.      Deconditioning: Continue PT and OT.      Continue GI and DVT prophylaxis.    Medical Decision Making  Number and Complexity of problems: 4 with high complexities.      Conditions and Status: Hemodynamically stable.             MDM Data  External documents reviewed: Not applicable.     Decision rules/scores evaluated (example [id number], Wells, etc): Not applicable.     Discussed with: Patient, his wife and nursing staff.     Treatment Plan: As detailed above.    Care Planning  Shared decision making: Discussed with patient and his wife.  Code status and discussions: Full code.    Disposition  Social Determinants of Health that impact treatment or disposition: None.    I expect the patient to be discharged to home in 3-5 days.      I confirmed that the patient's Advance Care Plan is present, code status is documented, or surrogate decision maker is listed in the patient's medical record.     I have utilized all available immediate resources to obtain, update, or review the patient's current medications      Discharge Planning: In progress.    Pierre Mehta MD   02/07/23   10:57 CST

## 2023-02-08 NOTE — PROGRESS NOTES
[facility] Medicine Services  INPATIENT PROGRESS NOTE    Length of Stay: 13  Date of Admission: 1/26/2023  Primary Care Physician: Emile Wynn MD    Subjective   Chief Complaint: Shortness of air/persistent nausea.      HPI: Patient is seen for follow-up today 2/7/2023.    72-year-old male with COPD, metastatic lung cancer, chronic hypoxic respiratory failure (on 4 L nasal cannula at baseline), ALLEN on CPAP, CAD with previous stents, hypertension, diabetes, history of obstructive uropathy (with previous nephrostomy and ureteral stent) who was admitted for acute on chronic respiratory failure with hypoxia secondary to multifocal consolidation/moderate loculated right pleural effusion/COPD exacerbation.    He was extubated on 1/31/2023, less deconditioned, remains short of air and  his oxygen requirement is down to 4L nasal prongs with saturation in the low 90s. Right-sided chest tube was removed on 2/3/2023.  He has had multiple bowel movements, continues to have persistent nausea with decreased oral intake dating back to prior to admission.     Review of Systems   Constitutional: Positive for activity change, appetite change and fatigue. Negative for chills, diaphoresis and fever.   HENT: Negative for trouble swallowing and voice change.    Eyes: Negative for photophobia and visual disturbance.   Respiratory: Positive for shortness of breath. Negative for cough, choking, chest tightness, wheezing and stridor.    Cardiovascular: Negative for chest pain, palpitations and leg swelling.   Gastrointestinal: Positive for constipation and nausea. Negative for abdominal distention, abdominal pain, blood in stool, diarrhea and vomiting.   Endocrine: Negative for cold intolerance, heat intolerance, polydipsia, polyphagia and polyuria.   Genitourinary: Negative for decreased urine volume, difficulty urinating, dysuria, enuresis, flank pain, frequency, hematuria and urgency.    Musculoskeletal: Negative for arthralgias, gait problem, myalgias, neck pain and neck stiffness.   Skin: Negative for pallor, rash and wound.   Neurological: Negative for dizziness, tremors, seizures, syncope, facial asymmetry, speech difficulty, weakness, light-headedness, numbness and headaches.   Hematological: Does not bruise/bleed easily.   Psychiatric/Behavioral: Negative for agitation, behavioral problems and confusion.       Objective    Temp:  [97.7 °F (36.5 °C)-98 °F (36.7 °C)] 97.8 °F (36.6 °C)  Heart Rate:  [] 92  Resp:  [16-22] 20  BP: (113-139)/(58-77) 139/77    AM-PAC 6 Clicks Score (PT): 16 (02/07/23 1600)         Physical Exam  Vitals and nursing note reviewed.   Constitutional:       General: He is not in acute distress.     Appearance: He is well-developed. He is obese. He is ill-appearing. He is not diaphoretic.   HENT:      Head: Normocephalic and atraumatic.   Eyes:      General: No scleral icterus.        Right eye: No discharge.         Left eye: No discharge.   Neck:      Thyroid: No thyromegaly.      Vascular: No carotid bruit or JVD.   Cardiovascular:      Rate and Rhythm: Normal rate and regular rhythm.      Heart sounds: Normal heart sounds. No murmur heard.    No friction rub. No gallop.   Pulmonary:      Effort: Pulmonary effort is normal. No respiratory distress.      Breath sounds: No stridor. Examination of the right-lower field reveals decreased breath sounds. Decreased breath sounds and rhonchi present. No wheezing or rales.   Chest:      Chest wall: No tenderness.   Abdominal:      General: Bowel sounds are normal. There is no distension.      Palpations: Abdomen is soft. There is no mass.      Tenderness: There is no abdominal tenderness. There is no right CVA tenderness, left CVA tenderness, guarding or rebound.      Hernia: No hernia is present.   Musculoskeletal:         General: No swelling, tenderness or deformity.      Cervical back: Neck supple.      Right lower  leg: No edema.      Left lower leg: No edema.   Skin:     General: Skin is warm and dry.      Coloration: Skin is not jaundiced or pale.      Findings: No bruising, erythema, lesion or rash.   Neurological:      General: No focal deficit present.      Mental Status: He is alert and oriented to person, place, and time.      Cranial Nerves: No cranial nerve deficit.      Sensory: No sensory deficit.      Motor: No weakness or abnormal muscle tone.      Coordination: Coordination normal.      Gait: Gait normal.      Deep Tendon Reflexes: Reflexes normal.      Comments: .   Psychiatric:         Mood and Affect: Mood normal.         Behavior: Behavior normal.         Thought Content: Thought content normal.         Judgment: Judgment normal.           Medication Review:    Current Facility-Administered Medications:   •  cefepime (MAXIPIME) 2 g/100 mL 0.9% NS (mbp), 2 g, Intravenous, Q12H, Pierre Mehta MD, 2 g at 02/08/23 0924  •  dextrose (D50W) (25 g/50 mL) IV injection 25 g, 25 g, Intravenous, Q15 Min PRN, Pierre Mehta MD  •  dextrose (GLUTOSE) oral gel 15 g, 15 g, Oral, Q15 Min PRN, Pierre Mehta MD  •  FLUoxetine (PROzac) capsule 20 mg, 20 mg, Oral, Daily, Pierre Mehta MD, 20 mg at 02/08/23 0925  •  furosemide (LASIX) injection 20 mg, 20 mg, Intravenous, BID, Pierre Mehta MD, 20 mg at 02/08/23 0926  •  glucagon (human recombinant) (GLUCAGEN DIAGNOSTIC) injection 1 mg, 1 mg, Intramuscular, Q15 Min PRN, Pierre Mehta MD  •  guaiFENesin (MUCINEX) 12 hr tablet 600 mg, 600 mg, Oral, Q12H, Pierre Mehta MD, 600 mg at 02/08/23 0925  •  Insulin Aspart (novoLOG) injection 0-9 Units, 0-9 Units, Subcutaneous, TID AC, Pierre Mehta MD, 4 Units at 02/08/23 1108  •  ipratropium-albuterol (DUO-NEB) nebulizer solution 3 mL, 3 mL, Nebulization, 4x Daily - RT, Pierre Mehta MD, 3 mL at 02/08/23 1113  •  levothyroxine (SYNTHROID, LEVOTHROID) tablet 50 mcg, 50 mcg, Oral, Q AM,  Pierre Mehta MD, 50 mcg at 02/08/23 0609  •  metoclopramide (REGLAN) injection 10 mg, 10 mg, Intravenous, Q8H, Pierre Mehta MD, 10 mg at 02/08/23 0925  •  metoprolol tartrate (LOPRESSOR) tablet 25 mg, 25 mg, Oral, Q12H, Pierre Mehta MD, 25 mg at 02/08/23 0925  •  nicotine (NICODERM CQ) 21 MG/24HR patch 1 patch, 1 patch, Transdermal, Q24H, Pierre Mehta MD, 1 patch at 02/08/23 1044  •  ondansetron (ZOFRAN) tablet 4 mg, 4 mg, Oral, Q6H PRN **OR** ondansetron (ZOFRAN) injection 4 mg, 4 mg, Intravenous, Q6H PRN, Pierre Mehta MD, 4 mg at 02/05/23 1806  •  pantoprazole (PROTONIX) EC tablet 40 mg, 40 mg, Oral, Q AM, Pierre Mehta MD, 40 mg at 02/08/23 0609  •  Pharmacy to Dose Cefepime, , Does not apply, Continuous PRN, Pierre Mehta MD  •  polyethylene glycol (MIRALAX) packet 17 g, 17 g, Oral, Daily, Pierre Mehta MD, 17 g at 02/08/23 0925  •  sennosides-docusate (PERICOLACE) 8.6-50 MG per tablet 1 tablet, 1 tablet, Oral, Nightly, Pierre Mehta MD, 1 tablet at 02/07/23 2058  •  sodium chloride 0.9 % flush 10 mL, 10 mL, Intravenous, Q12H, Pierre Mehta MD, 10 mL at 02/08/23 0117  •  sodium chloride 0.9 % flush 10 mL, 10 mL, Intravenous, PRN, Pierre Mehta MD  •  sodium chloride 0.9 % flush 10 mL, 10 mL, Intravenous, Q12H, Pierre Mehta MD, 10 mL at 02/08/23 0929  •  sodium chloride 0.9 % flush 10 mL, 10 mL, Intravenous, Q12H, Pierre Mehta MD, 10 mL at 02/08/23 0929  •  sodium chloride 0.9 % flush 10 mL, 10 mL, Intravenous, PRN, Pierre Mehta MD  •  sodium chloride 0.9 % infusion 40 mL, 40 mL, Intravenous, PRN, Pierre Mehta MD  •  tamsulosin (FLOMAX) 24 hr capsule 0.8 mg, 0.8 mg, Oral, Daily, Pierre Mehta MD, 0.8 mg at 02/08/23 0925  •  traMADol (ULTRAM) tablet 50 mg, 50 mg, Oral, Q6H PRN, Pierre Mehta MD, 50 mg at 02/08/23 1107  •  traZODone (DESYREL) tablet 150 mg, 150 mg, Oral, Nightly, Pierre Mehta MD,  150 mg at 02/07/23 2058    Results Review:  I have reviewed the labs, radiology results, and diagnostic studies.    Laboratory Data:   Results from last 7 days   Lab Units 02/08/23  0745 02/07/23  0605 02/05/23  0636   SODIUM mmol/L 132* 134* 134*   POTASSIUM mmol/L 4.0 3.8 4.3   CHLORIDE mmol/L 94* 96* 96*   CO2 mmol/L 22.0 23.0 25.0   BUN mg/dL 53* 53* 51*   CREATININE mg/dL 1.82* 1.76* 1.72*   GLUCOSE mg/dL 195* 182* 140*   CALCIUM mg/dL 9.3 9.4 9.2   ANION GAP mmol/L 16.0* 15.0 13.0     Estimated Creatinine Clearance: 40.9 mL/min (A) (by C-G formula based on SCr of 1.82 mg/dL (H)).          Results from last 7 days   Lab Units 02/08/23  0745 02/07/23  0605 02/05/23  0636 02/04/23  0555 02/03/23  0529   WBC 10*3/mm3 16.59* 13.81* 15.79* 18.81* 13.89*   HEMOGLOBIN g/dL 14.1 14.0 13.7 14.9 13.8   HEMATOCRIT % 42.9 43.6 43.9 47.5 43.2   PLATELETS 10*3/mm3 113* 114* 144 154 163           Culture Data:   No results found for: BLOODCX  No results found for: URINECX  No results found for: RESPCX  No results found for: WOUNDCX  No results found for: STOOLCX  No components found for: BODYFLD    Radiology Data:   Imaging Results (Last 24 Hours)     ** No results found for the last 24 hours. **          ABG:        I have reviewed the patient's current medications.     Assessment/Plan     Hospital Problem List:  Principal Problem:    Acute on chronic respiratory failure with hypoxia (HCC)  Active Problems:    Mass of right lung    Sepsis due to pneumonia (HCC)    Loculated pleural effusion    Acute on chronic respiratory failure with hypoxia/sepsis (secondary to a combination of multifocal consolidation, COPD exacerbation right-sided pleural effusion):   - Patient is status postplacement of right-sided chest tube secondary to malignant pleural effusion and chest tube was removed on 2/3/2023.  He has completed a course of antimicrobial therapy and steroids chest x-ray done this morning showed worsening right upper lobe  perihilar moderate size groundglass opacity and right lung base moderate size interstitial groundglass opacity.  Begin IV cefepime, continue noninvasive respiratory therapy and bronchodilators.  Blood and pleural fluid cultures showed no growth.  Pleural fluid cytology is positive for malignancy.  Patient has been determined not to require  placement of right-sided Pleurx catheter by CT surgery.  Input by CT surgery is appreciated.    -2/8/23 Case discussed with Dr. Lambert, pulmonary by Dr. Blunt.  Greatly appreciate Dr. Lambert help with this difficult case!  Patient needs correction/progression trend of effusion to be followed.  If effusion recurs, patient may require a pleural catheter by CT surgery.  If not, we will continue current management.    Metastatic lung cancer:   -Patient is to follow-up with his primary oncologist postdischarge.  -2/8/2023 spoke at length with oncology about patient, also spoke with patient and patient's wife about goals of care.  Patient and patient's wife now amendable to following up with oncologist at [facility].  Spoke with Dr. Valentine who has seen patient in the past, who will evaluate patient in next 24 hours.  Family also mention patient told by outpatient oncologist that MRI and PET scan needed for further staging.  Will defer further work-up to Dr. Valentine.    Reactive leukocytosis secondary to resolving respiratory failure and metastatic lung cancer:  - Treat as above acute respiratory failure section and metastatic lung cancer resection.  Continue to follow trends daily.  Reactive leukocytosis is slowly improving and will be monitored.    Chronic kidney disease:   - Patient's baseline creatinine is between 1.2-1.7.  Creatinine is at baseline.  Continue to monitor and consult nephrologist if the need arises.    Nephrolithiasis with bilateral stents:  - 2/8/2023 patient and patient's wife states that patient has history of  nephrolithiasis with stents placed in [place] by urologist Dr. Avalos.  Also states that stents were scheduled to come out today.  Case discussed with Dr. Horne, who will evaluate patient today for possible stent removal.      Diabetes mellitus with possible diabetic induced gastroparesis (complicated by refractory nausea): Glycemic control is adequate.  Begin Reglan and consult GI.  He may require endoscopy evaluation if the need arises.  Continue Accu-Cheks and sliding scale insulin.     Intractable nausea/vomiting possibly due to diabetic gastroparesis:  - As above and diabetic/gastroparesis section.  Gastroenterology still awaiting stabilization of patient's respiratory status before considering EGD/colonoscopy evaluation.      Mild hyponatremia is clinically insignificant.    Constipation: Resolved.      Obstructive sleep apnea: Continue nightly CPAP.      Deconditioning: Continue PT and OT.      Continue GI and DVT prophylaxis.    Medical Decision Making  Number and Complexity of problems: 5 with high complexities.      Conditions and Status: Hemodynamically stable.             MDM Data  External documents reviewed: Not applicable.     Decision rules/scores evaluated (example [id number], [place], etc): Not applicable.     Discussed with: Patient, his wife and nursing staff.     Treatment Plan: As detailed above.    Care Planning  Shared decision making: Discussed with patient and his wife.  Code status and discussions: Full code.    Disposition  Social Determinants of Health that impact treatment or disposition: None.    I expect the patient to be discharged to home in 2-4 days.      I confirmed that the patient's Advance Care Plan is present, code status is documented, or surrogate decision maker is listed in the patient's medical record.     I have utilized all available immediate resources to obtain, update, or review the patient's current medications      Discharge Planning: In progress.    Jorje DEL REAL  MD Jose Raul   02/08/23   12:03 CST    Addendum  -Family just finished speaking with oncology, now interested in hospice.  We will consult hospice to assist family with making hospice decisions.  Family is also considering changing patient to comfort care.  We will also start morphine 2 mg IV every 4 as needed pain/dyspnea.  Improved pain relief.  Patient and family now desires patient's CODE STATUS be changed to comfort care.  -

## 2023-02-08 NOTE — SIGNIFICANT NOTE
02/08/23 1003   OTHER   Discipline physical therapy assistant   Rehab Time/Intention   Session Not Performed patient/family declined, not feeling well

## 2023-02-08 NOTE — SIGNIFICANT NOTE
02/08/23 1431   OTHER   Discipline physical therapy assistant   Rehab Time/Intention   Session Not Performed other (see comments)  (Tx attempted this pm. Pt wanting to speak with MD [facility] life [facility] at this time.)

## 2023-02-08 NOTE — SIGNIFICANT NOTE
02/08/23 2600   OTHER   Discipline occupational therapy assistant   Rehab Time/Intention   Session Not Performed other (see comments);patient unavailable for treatment  ((2426) upon arrival; family was present at bedside w/ pt; stating family was ready to talk about end of life care. No OT tx. Will consult w/ OTR. Pt w/ all needs met and no s/s of distress when BUSBY left.)

## 2023-02-08 NOTE — CONSULTS
REASON FOR CONSULTATION:  Small cell lung cancer   Provide an opinion on any further workup or treatment                             REQUESTING PHYSICIAN:  Jorje Blunt MD    RECORDS OBTAINED:  Records of the patients history including those obtained from the referring provider were reviewed and summarized in detail.      History of Present Illness     This is a pleasant 72-year-old male who was seen in consultation at the request of Dr Blunt for evaluation of small cell lung cancer.  Patient has past medical history of advanced COPD, chronic hypoxic respiratory failure on supplemental oxygen who apparently had COVID infection last year however his shortness of breath progressively got worse and subsequently CT scan was performed in December 2022 which showed large mass involving right hilum with extensive mediastinal adenopathy.  There was also small liver lesion concerning for liver metastasis and right pleural effusion.  Patient was being worked up as outpatient for lung mass however was admitted to our hospital on January 26, 2023 with acute on chronic hypoxic respiratory failure and was eventually intubated.  He underwent chest tube placement and pleural fluid came back as small cell lung cancer.  I been asked to assist with evaluation and management of his small cell cancer.      Past Medical History:   Diagnosis Date   • Acute bronchitis    • Anemia    • Breast lump    • Cervical radiculopathy    • COPD (chronic obstructive pulmonary disease) (HCC)    • Coronary atherosclerosis     2 stents, followed by Dr. Hargrove   • Depressive disorder    • Disease of thyroid gland    • Dysuria    • Essential hypertension    • Flank pain    • Hematochezia    • Hematuria syndrome    • Hyperlipidemia    • Hypoglycemia    • Kidney stone    • Kidney stones    • Lung cancer (HCC)    • Mass of neck    • MI (myocardial infarction) (HCC)    • Neck pain     sprain   • Osteoarthritis    • PONV (postoperative nausea and  vomiting)    • Sleep apnea     using c-pap   • Sleep apnea    • Type 2 diabetes mellitus (HCC)         Past Surgical History:   Procedure Laterality Date   • BACK SURGERY     • CARDIAC CATHETERIZATION N/A 08/23/2017    Procedure: Right Heart Cath;  Surgeon: Mariposa Zamorano MD;  Location: [facility] INVASIVE LOCATION;  Service:    • CATARACT EXTRACTION WITH INTRAOCULAR LENS IMPLANT Bilateral    • CHOLECYSTECTOMY     • CYSTOSCOPY  08/29/2014    Left ESWL, cysto and stent removal   • CYSTOSCOPY W/ LITHOLAPAXY / EHL  08/15/2014    Left ESWL   • CYSTOSCOPY, RETROGRADE PYELOGRAM AND STENT INSERTION  12/29/2022   • CYSTOSCOPY, URETEROSCOPY, RETROGRADE PYELOGRAM, STENT INSERTION Left 06/30/2017    Procedure: CYSTOSCOPY, LEFT URETEROSCOPY, RETROGRADE PYELOGRAM, HOLMIUM LASER, STENT INSERTION;  Surgeon: Uday Horne MD;  Location: [facility];  Service:    • CYSTOSCOPY, URETEROSCOPY, RETROGRADE PYELOGRAM, STENT INSERTION Left 09/06/2017    Procedure: CYSTOSCOPY, LEFT URETEROSCOPY, RETROGRADE PYELOGRAM, HOLMIUM LASER, STENT INSERTION;  Surgeon: Uday Horne MD;  Location: [facility];  Service:    • CYSTOSCOPY, URETEROSCOPY, RETROGRADE PYELOGRAM, STENT INSERTION Left 01/17/2018    Procedure: CYSTOSCOPY LEFT URETEROSCOPY RETROGRADE PYELOGRAM HOLMIUM LASER STENT INSERTION;  Surgeon: Uday Horne MD;  Location: [facility];  Service:    • CYSTOSCOPY, URETEROSCOPY, RETROGRADE PYELOGRAM, STENT INSERTION Left 07/25/2018    Procedure: CYSTOSCOPY URETEROSCOPY RETROGRADE PYELOGRAM HOLMIUM LASER STENT INSERTION;  Surgeon: Uday Horne MD;  Location: [facility];  Service: Urology   • EYE SURGERY      eye lids lifted   • HIP SURGERY     • INJECTION OF MEDICATION  11/11/2013    Kenalog   • INJECTION OF MEDICATION  01/16/2014    Toradol   • JOINT REPLACEMENT Right 2012    total hip replacement   • SKIN GRAFT      on his foot; removed skin from hip   • TONSILLECTOMY     • TRANSESOPHAGEAL ECHOCARDIOGRAM (ОЛЬГА)  07/21/2014    with color  flow-Normal LV systolic function with Ef of 60-65%/ Grad1 diastolic dysfunction of the left ventricular myocardium. No evidence of pericardial effusion        No current facility-administered medications on file prior to encounter.     Current Outpatient Medications on File Prior to Encounter   Medication Sig Dispense Refill   • albuterol (PROVENTIL HFA;VENTOLIN HFA) 108 (90 Base) MCG/ACT inhaler Inhale 2 puffs Every 4 (Four) Hours As Needed for Wheezing. 8 g 5   • albuterol (PROVENTIL) (2.5 MG/3ML) 0.083% nebulizer solution Take 2.5 mg by nebulization Every 4 (Four) Hours As Needed for Wheezing. 180 vial 1   • allopurinol (ZYLOPRIM) 300 MG tablet      • aspirin 81 MG EC tablet Take 81 mg by mouth Daily. Last dose 1/8/18     • cetirizine (zyrTEC) 10 MG tablet Take 10 mg by mouth Daily.     • clopidogrel (PLAVIX) 75 MG tablet Take 1 tablet by mouth Daily. 90 tablet 1   • finasteride (PROSCAR) 5 MG tablet Take 1 tablet by mouth Every Night. 90 tablet 1   • FLUoxetine (PROzac) 20 MG capsule Take 20 mg by mouth Daily.     • fluticasone (FLONASE) 50 MCG/ACT nasal spray 2 sprays into each nostril At Night As Needed for Rhinitis. 3 bottle 1   • glimepiride (AMARYL) 2 MG tablet TAKE 1 TABLET BY MOUTH IN THE MORNING WITH FIRST MEAL OF THE DAY     • glucose blood test strip One touch reli-on glucometer 360 each 1   • guaiFENesin (MUCINEX) 600 MG 12 hr tablet Take 600 mg by mouth 2 (Two) Times a Day.     • isosorbide mononitrate (IMDUR) 30 MG 24 hr tablet Take 1 tablet by mouth Daily. 90 tablet 1   • levothyroxine (SYNTHROID) 50 MCG tablet Take 1 tablet by mouth Daily. 90 tablet 1   • Mirabegron ER (MYRBETRIQ) 50 MG tablet sustained-release 24 hour 24 hr tablet Take 50 mg by mouth Daily.     • nicotine (NICODERM CQ) 21 MG/24HR patch Place 1 patch on the skin as directed by provider Daily. 21 patch 0   • nitroglycerin (NITROSTAT) 0.4 MG SL tablet Place 1 tablet under the tongue Every 5 (Five) Minutes As Needed for chest pain.  100 tablet 11   • ONE TOUCH LANCETS misc Use as directed test 6 times daily 200 each 1   • Potassium Citrate ER 15 MEQ (1620 MG) tablet controlled-release Take 2 tablets by mouth Daily.     • tamsulosin (FLOMAX) 0.4 MG capsule 24 hr capsule Take 2 capsules by mouth Daily. 180 capsule 1   • traMADol (ULTRAM) 50 MG tablet Take 50 mg by mouth Every 6 (Six) Hours As Needed. for pain     • traZODone (DESYREL) 150 MG tablet Take 150 mg by mouth Every Night.     • vitamin B-12 (CYANOCOBALAMIN) 1000 MCG tablet Take 1,000 mcg by mouth Daily.          ALLERGIES:  No Known Allergies     Social History     Socioeconomic History   • Marital status:    Tobacco Use   • Smoking status: Former     Packs/day: 1.00     Years: 61.00     Pack years: 61.00     Types: Cigarettes     Quit date: 2023     Years since quittin.0   • Smokeless tobacco: Never   Vaping Use   • Vaping Use: Never used   Substance and Sexual Activity   • Alcohol use: Yes     Comment: occasionally   • Drug use: No   • Sexual activity: Defer        Family History   Problem Relation Age of Onset   • Lung cancer Mother    • Rectal cancer Sister         Review of Systems   Constitutional: Positive for activity change, appetite change and fatigue. Negative for fever.   HENT: Positive for congestion and voice change. Negative for mouth sores and sore throat.    Eyes: Negative for pain, discharge and visual disturbance.   Respiratory: Positive for cough, chest tightness, shortness of breath and wheezing.    Cardiovascular: Positive for leg swelling. Negative for chest pain and palpitations.   Gastrointestinal: Positive for nausea. Negative for abdominal pain, blood in stool, constipation and vomiting.   Endocrine: Negative for cold intolerance, heat intolerance and polydipsia.   Genitourinary: Negative for difficulty urinating, dysuria, frequency and hematuria.   Musculoskeletal: Positive for arthralgias, back pain and gait problem. Negative for joint  swelling.   Skin: Negative for color change, pallor and rash.   Allergic/Immunologic: Positive for immunocompromised state. Negative for environmental allergies and food allergies.   Neurological: Positive for weakness and numbness. Negative for dizziness and headaches.   Hematological: Negative for adenopathy. Bruises/bleeds easily.   Psychiatric/Behavioral: Negative for decreased concentration, dysphoric mood and hallucinations. The patient is nervous/anxious.         Objective     Vitals:    02/08/23 0743 02/08/23 1014 02/08/23 1112 02/08/23 1119   BP:  139/77     BP Location:  Right arm     Patient Position:  Lying     Pulse: 95 107 94 92   Resp:  18 20    Temp:       TempSrc:       SpO2:  91% 97%    Weight:       Height:         No flowsheet data found.    Physical Exam  Vitals and nursing note reviewed.   Constitutional:       General: He is in acute distress.      Appearance: He is ill-appearing.   HENT:      Mouth/Throat:      Mouth: Mucous membranes are moist.      Pharynx: No oropharyngeal exudate.   Eyes:      General: No scleral icterus.     Extraocular Movements: Extraocular movements intact.      Pupils: Pupils are equal, round, and reactive to light.   Cardiovascular:      Rate and Rhythm: Regular rhythm. Tachycardia present.      Heart sounds: No murmur heard.  Pulmonary:      Effort: Respiratory distress present.      Breath sounds: Wheezing present.   Abdominal:      General: There is no distension.      Palpations: Abdomen is soft. There is no mass.      Tenderness: There is no abdominal tenderness.   Musculoskeletal:      Cervical back: Normal range of motion. No rigidity.      Right lower leg: Edema present.      Left lower leg: Edema present.   Lymphadenopathy:      Cervical: No cervical adenopathy.   Skin:     General: Skin is dry.      Coloration: Skin is pale.      Findings: Bruising present.   Neurological:      General: No focal deficit present.      Mental Status: He is alert and oriented  to person, place, and time. Mental status is at baseline.   Psychiatric:      Comments: Anxious +            RECENT LABS:Independently reviewed and summarized  Hematology WBC   Date Value Ref Range Status   02/08/2023 16.59 (H) 3.40 - 10.80 10*3/mm3 Final     RBC   Date Value Ref Range Status   02/08/2023 5.31 4.14 - 5.80 10*6/mm3 Final     Hemoglobin   Date Value Ref Range Status   02/08/2023 14.1 13.0 - 17.7 g/dL Final     Hematocrit   Date Value Ref Range Status   02/08/2023 42.9 37.5 - 51.0 % Final     Platelets   Date Value Ref Range Status   02/08/2023 113 (L) 140 - 450 10*3/mm3 Final        LAB RESULTS:      Lab 02/08/23  0745 02/07/23  0605 02/05/23  0636 02/04/23  0555 02/03/23  0529   WBC 16.59* 13.81* 15.79* 18.81* 13.89*   HEMOGLOBIN 14.1 14.0 13.7 14.9 13.8   HEMATOCRIT 42.9 43.6 43.9 47.5 43.2   PLATELETS 113* 114* 144 154 163   NEUTROS ABS 14.43* 10.29* 11.43* 12.92* 9.72*   IMMATURE GRANS (ABS)  --  0.67* 0.83* 0.91*  --    LYMPHS ABS  --  1.67 2.21 3.51*  --    MONOS ABS  --  1.10* 1.23* 1.41*  --    EOS ABS  --  0.01 0.01 0.02 0.28   MCV 80.8 81.8 82.4 82.0 82.0         Lab 02/08/23  0745 02/07/23  0605 02/05/23  0636 02/04/23  0555 02/03/23  0529   SODIUM 132* 134* 134* 136 136   POTASSIUM 4.0 3.8 4.3 5.1 4.8   CHLORIDE 94* 96* 96* 98 100   CO2 22.0 23.0 25.0 25.0 25.0   ANION GAP 16.0* 15.0 13.0 13.0 11.0   BUN 53* 53* 51* 45* 50*   CREATININE 1.82* 1.76* 1.72* 1.45* 1.44*   EGFR 39.5* 41.1* 42.2* 51.8* 52.3*   GLUCOSE 195* 182* 140* 113* 125*   CALCIUM 9.3 9.4 9.2 9.5 9.1                         Brief Urine Lab Results  (Last result in the past 365 days)      Color   Clarity   Blood   Leuk Est   Nitrite   Protein   CREAT   Urine HCG        12/19/22 1359           6.8               Microbiology Results (last 10 days)     ** No results found for the last 240 hours. **            Pathology (result reviewed):   1/28/23   Pleural fluid, right   Small cell carcinoma         Diagnosis:   (1) Metastatic  small cell lung cancer   (2) Malignant pleural effusion   (3) Acute on chronic hypoxic respiratory failure   (4) Goals of care discussion     Assessment & Plan     This is a new diagnosis for me.    Patient with metastatic small cell lung cancer with pleural metastasis.  He had a pleural fluid drained which came back as small cell lung cancer.    I had a very lengthy discussion with patient and his wife about his diagnosis, prognosis and treatment options.  Discussed that small cell lung cancer is very aggressive incurable malignancy and goal of treatment is strictly palliative in nature.  The goal of the treatment is to improve quality of life.  This particular patient has been couple of weeks in the hospital.  He is needing assistance with day-to-day activities.  He is unable to walk even a short distance due to hypoxic respiratory failure.  His over intake uptake is minimal.  Discussed with patient/family that I am quite concerned that systemic chemotherapy at this point will likely deteriorate his overall quality of life without changing the eventual outcome.    Alternatively, I discussed option of hospice/comfort measures at length with the patient and family.  Discussed how hospice can help him achieve quality of life with metastatic incurable cancer.      Overall prognosis is grim.  Life expectancy in days to couple weeks at best.       Recommendations:   · Discussed diagnosis, prognosis and treatment options at length.   · De-escalate care due to poor prognosis.   · Recommend hospice/comfort measures.   · Morphine for pain/air hunger.   · Ativan/halodol for anxiety/agitation.    Discussed at length with patient/family.     Case discussed with Dr Blunt.     Also had a second meeting in the presence of multiple family members and all in agreement with comfort based care and hospice

## 2023-02-09 PROBLEM — Z51.5 END OF LIFE CARE: Status: ACTIVE | Noted: 2023-01-01

## 2023-02-09 NOTE — SIGNIFICANT NOTE
02/09/23 1040   OTHER   Discipline occupational therapist     D/w patient's spouse. Patient to be on comfort measures. No further OT warranted at this time per conversation with wife. OT to sign off. If new needs arise, wife notified to d/w RN or provider, and OT will gladly return. Wife appreciative.

## 2023-02-09 NOTE — PLAN OF CARE
Patient climbing out of bed, unable to verbilize needs. Will squeeze hand for yes and no questions. Easily coached back in bed. Did squeeze signee had to respond to questions about pain. Medicated. Unable to swallow medication. Family at bedside. Youngest daughter driving in from [place] at this time.   Goal Outcome Evaluation:

## 2023-02-09 NOTE — SIGNIFICANT NOTE
02/09/23 1149   OTHER   Discipline physical therapy assistant   Rehab Time/Intention   Session Not Performed other (see comments)  (pt going to comfort care)

## 2023-02-09 NOTE — PROGRESS NOTES
SUBJECTIVE:   2/8/2023  Chief Complaint:     Subjective      Patient has continued symptoms of dyspnea.  Has nausea and denied abdominal pain.  Transitioned to comfort care this afternoon.  Family on the bedside.    History:  Past Medical History:   Diagnosis Date   • Acute bronchitis    • Anemia    • Breast lump    • Cervical radiculopathy    • COPD (chronic obstructive pulmonary disease) (HCC)    • Coronary atherosclerosis     2 stents, followed by Dr. Hargrove   • Depressive disorder    • Disease of thyroid gland    • Dysuria    • Essential hypertension    • Flank pain    • Hematochezia    • Hematuria syndrome    • Hyperlipidemia    • Hypoglycemia    • Kidney stone    • Kidney stones    • Lung cancer (HCC)    • Mass of neck    • MI (myocardial infarction) (HCC)    • Neck pain     sprain   • Osteoarthritis    • PONV (postoperative nausea and vomiting)    • Sleep apnea     using c-pap   • Sleep apnea    • Type 2 diabetes mellitus (HCC)      Past Surgical History:   Procedure Laterality Date   • BACK SURGERY     • CARDIAC CATHETERIZATION N/A 08/23/2017    Procedure: Right Heart Cath;  Surgeon: Mariposa Zamorano MD;  Location: [facility] INVASIVE LOCATION;  Service:    • CATARACT EXTRACTION WITH INTRAOCULAR LENS IMPLANT Bilateral    • CHOLECYSTECTOMY     • CYSTOSCOPY  08/29/2014    Left ESWL, cysto and stent removal   • CYSTOSCOPY W/ LITHOLAPAXY / EHL  08/15/2014    Left ESWL   • CYSTOSCOPY, RETROGRADE PYELOGRAM AND STENT INSERTION  12/29/2022   • CYSTOSCOPY, URETEROSCOPY, RETROGRADE PYELOGRAM, STENT INSERTION Left 06/30/2017    Procedure: CYSTOSCOPY, LEFT URETEROSCOPY, RETROGRADE PYELOGRAM, HOLMIUM LASER, STENT INSERTION;  Surgeon: Uday Horne MD;  Location: [facility] OR;  Service:    • CYSTOSCOPY, URETEROSCOPY, RETROGRADE PYELOGRAM, STENT INSERTION Left 09/06/2017    Procedure: CYSTOSCOPY, LEFT URETEROSCOPY, RETROGRADE PYELOGRAM, HOLMIUM LASER, STENT INSERTION;  Surgeon: Uday Horne MD;  Location:   [facility] OR;  Service:    • CYSTOSCOPY, URETEROSCOPY, RETROGRADE PYELOGRAM, STENT INSERTION Left 2018    Procedure: CYSTOSCOPY LEFT URETEROSCOPY RETROGRADE PYELOGRAM HOLMIUM LASER STENT INSERTION;  Surgeon: Uday Horne MD;  Location: [facility] OR;  Service:    • CYSTOSCOPY, URETEROSCOPY, RETROGRADE PYELOGRAM, STENT INSERTION Left 2018    Procedure: CYSTOSCOPY URETEROSCOPY RETROGRADE PYELOGRAM HOLMIUM LASER STENT INSERTION;  Surgeon: Uday Horne MD;  Location: [facility];  Service: Urology   • EYE SURGERY      eye lids lifted   • HIP SURGERY     • INJECTION OF MEDICATION  2013    Kenalog   • INJECTION OF MEDICATION  2014    Toradol   • JOINT REPLACEMENT Right 2012    total hip replacement   • SKIN GRAFT      on his foot; removed skin from hip   • TONSILLECTOMY     • TRANSESOPHAGEAL ECHOCARDIOGRAM (ОЛЬГА)  2014    with color flow-Normal LV systolic function with Ef of 60-65%/ Grad1 diastolic dysfunction of the left ventricular myocardium. No evidence of pericardial effusion     Family History   Problem Relation Age of Onset   • Lung cancer Mother    • Rectal cancer Sister      Social History     Tobacco Use   • Smoking status: Former     Packs/day: 1.00     Years: 61.00     Pack years: 61.00     Types: Cigarettes     Quit date: 2023     Years since quittin.0   • Smokeless tobacco: Never   Vaping Use   • Vaping Use: Never used   Substance Use Topics   • Alcohol use: Yes     Comment: occasionally   • Drug use: No     Medications Prior to Admission   Medication Sig Dispense Refill Last Dose   • albuterol (PROVENTIL HFA;VENTOLIN HFA) 108 (90 Base) MCG/ACT inhaler Inhale 2 puffs Every 4 (Four) Hours As Needed for Wheezing. 8 g 5 Past Week   • albuterol (PROVENTIL) (2.5 MG/3ML) 0.083% nebulizer solution Take 2.5 mg by nebulization Every 4 (Four) Hours As Needed for Wheezing. 180 vial 1 2023   • allopurinol (ZYLOPRIM) 300 MG tablet    2023   • aspirin 81 MG EC tablet Take 81 mg  by mouth Daily. Last dose 1/8/18 1/26/2023   • cetirizine (zyrTEC) 10 MG tablet Take 10 mg by mouth Daily.   1/26/2023   • clopidogrel (PLAVIX) 75 MG tablet Take 1 tablet by mouth Daily. 90 tablet 1 1/26/2023   • finasteride (PROSCAR) 5 MG tablet Take 1 tablet by mouth Every Night. 90 tablet 1 1/26/2023   • FLUoxetine (PROzac) 20 MG capsule Take 20 mg by mouth Daily.   1/26/2023   • fluticasone (FLONASE) 50 MCG/ACT nasal spray 2 sprays into each nostril At Night As Needed for Rhinitis. 3 bottle 1 1/26/2023   • glimepiride (AMARYL) 2 MG tablet TAKE 1 TABLET BY MOUTH IN THE MORNING WITH FIRST MEAL OF THE DAY   1/26/2023   • glucose blood test strip One touch reli-on glucometer 360 each 1 1/26/2023   • guaiFENesin (MUCINEX) 600 MG 12 hr tablet Take 600 mg by mouth 2 (Two) Times a Day.   1/26/2023   • isosorbide mononitrate (IMDUR) 30 MG 24 hr tablet Take 1 tablet by mouth Daily. 90 tablet 1 1/26/2023   • levothyroxine (SYNTHROID) 50 MCG tablet Take 1 tablet by mouth Daily. 90 tablet 1 1/26/2023   • Mirabegron ER (MYRBETRIQ) 50 MG tablet sustained-release 24 hour 24 hr tablet Take 50 mg by mouth Daily.   1/26/2023   • nicotine (NICODERM CQ) 21 MG/24HR patch Place 1 patch on the skin as directed by provider Daily. 21 patch 0 1/26/2023   • nitroglycerin (NITROSTAT) 0.4 MG SL tablet Place 1 tablet under the tongue Every 5 (Five) Minutes As Needed for chest pain. 100 tablet 11 Past Month   • ONE TOUCH LANCETS misc Use as directed test 6 times daily 200 each 1 1/26/2023   • Potassium Citrate ER 15 MEQ (1620 MG) tablet controlled-release Take 2 tablets by mouth Daily.   1/25/2023   • tamsulosin (FLOMAX) 0.4 MG capsule 24 hr capsule Take 2 capsules by mouth Daily. 180 capsule 1 1/26/2023   • traMADol (ULTRAM) 50 MG tablet Take 50 mg by mouth Every 6 (Six) Hours As Needed. for pain   1/26/2023   • traZODone (DESYREL) 150 MG tablet Take 150 mg by mouth Every Night.   1/26/2023   • vitamin B-12 (CYANOCOBALAMIN) 1000 MCG tablet  Take 1,000 mcg by mouth Daily.   1/26/2023     Allergies:  Patient has no known allergies.     CURRENT MEDICATIONS/OBJECTIVE/VS/PE:     Current Medications:     Current Facility-Administered Medications   Medication Dose Route Frequency Provider Last Rate Last Admin   • acetaminophen (TYLENOL) tablet 650 mg  650 mg Oral Q4H PRN Jorje Blunt MD        Or   • acetaminophen (TYLENOL) 160 MG/5ML solution 650 mg  650 mg Oral Q4H PRN Jorje Blunt MD        Or   • acetaminophen (TYLENOL) suppository 650 mg  650 mg Rectal Q4H PRN Jorje Blunt MD       • bisacodyl (DULCOLAX) suppository 10 mg  10 mg Rectal Daily PRN Jorje Blunt MD       • carboxymethylcellulose (REFRESH PLUS) 0.5 % ophthalmic solution 1 drop  1 drop Both Eyes Q30 Min PRJorje Johnosn MD       • diphenhydrAMINE (BENADRYL) capsule 25 mg  25 mg Oral Q6H PRN Jorje Blunt MD        Or   • diphenhydrAMINE (BENADRYL) injection 25 mg  25 mg Intravenous Q6H PRN Jorje Blunt MD       • diphenoxylate-atropine (LOMOTIL) 2.5-0.025 MG per tablet 1 tablet  1 tablet Oral Q2H PRJorje Johnson MD       • FLUoxetine (PROzac) capsule 20 mg  20 mg Oral Daily Pierre Mehta MD   20 mg at 02/08/23 0925   • furosemide (LASIX) injection 20 mg  20 mg Intravenous Q6H PRN Jorje Blunt MD        Or   • furosemide (LASIX) injection 20 mg  20 mg Subcutaneous Q6H PRJorje Johnson MD       • glycopyrrolate PF (ROBINUL) injection 0.2 mg  0.2 mg Intravenous Q2H PRJorje Johnson MD        Or   • glycopyrrolate PF (ROBINUL) injection 0.2 mg  0.2 mg Subcutaneous Q2H PRN Jorje Blunt MD        Or   • glycopyrrolate PF (ROBINUL) injection 0.4 mg  0.4 mg Intravenous Q2H PRN Jorje Blunt MD        Or   • glycopyrrolate PF (ROBINUL) injection 0.4 mg  0.4 mg Subcutaneous Q2H PRJorje Johnson MD       • guaiFENesin (MUCINEX) 12 hr tablet 600 mg  600 mg Oral Q12H Pierre Mehta MD   600 mg at 02/08/23 2027   • ipratropium-albuterol (DUO-NEB) nebulizer solution 3 mL   3 mL Nebulization 4x Daily - RT EchenduPierre MD   3 mL at 02/08/23 1950   • Lidocaine Viscous HCl (XYLOCAINE) 2 % solution 5 mL  5 mL Mouth/Throat Q4H PRN Jorje Blunt MD       • LORazepam (ATIVAN) tablet 0.5 mg  0.5 mg Oral Q1H PRN Jorje Blunt MD        Or   • LORazepam (ATIVAN) injection 0.5 mg  0.5 mg Intravenous Q1H PRN Jorje Blunt MD        Or   • LORazepam (ATIVAN) injection 0.5 mg  0.5 mg Intramuscular Q1H PRN Jorje Blunt MD        Or   • LORazepam (ATIVAN) injection 0.5 mg  0.5 mg Subcutaneous Q1H PRN Jorje Blunt MD        Or   • LORazepam (ATIVAN) 2 MG/ML concentrated solution 0.5 mg  0.5 mg Oral Q1H PRN Jorje Blunt MD        Or   • LORazepam (ATIVAN) 2 MG/ML concentrated solution 0.5 mg  0.5 mg Sublingual Q1H PRJorje Johnson MD       • LORazepam (ATIVAN) tablet 1 mg  1 mg Oral Q1H PRJorje Johnson MD        Or   • LORazepam (ATIVAN) injection 1 mg  1 mg Intravenous Q1H PRJorje Johnson MD        Or   • LORazepam (ATIVAN) injection 1 mg  1 mg Intramuscular Q1H PRJorje Johnson MD        Or   • LORazepam (ATIVAN) injection 1 mg  1 mg Subcutaneous Q1H PRJorje Johnson MD        Or   • LORazepam (ATIVAN) 2 MG/ML concentrated solution 1 mg  1 mg Oral Q1H PRJorej Johnson MD        Or   • LORazepam (ATIVAN) 2 MG/ML concentrated solution 1 mg  1 mg Sublingual Q1H PRJorje Johnson MD       • LORazepam (ATIVAN) tablet 2 mg  2 mg Oral Q1H PRJorje Johnson MD        Or   • LORazepam (ATIVAN) injection 2 mg  2 mg Intravenous Q1H PRJorje Johnson MD        Or   • LORazepam (ATIVAN) injection 2 mg  2 mg Intramuscular Q1H PRN Jorje Blunt MD        Or   • LORazepam (ATIVAN) injection 2 mg  2 mg Subcutaneous Q1H PRN Jorje Blunt MD        Or   • LORazepam (ATIVAN) 2 MG/ML concentrated solution 2 mg  2 mg Oral Q1H PRN Jorje Blunt MD        Or   • LORazepam (ATIVAN) 2 MG/ML concentrated solution 2 mg  2 mg Sublingual Q1H PRN Jorje Blunt MD       • metoclopramide (REGLAN)  injection 10 mg  10 mg Intravenous Q8H Pierre Mehta MD   10 mg at 02/08/23 1644   • morphine injection 2 mg  2 mg Intravenous Q4H PRN Jorje Blunt MD   2 mg at 02/08/23 2134   • morphine injection 4 mg  4 mg Intravenous Q4H PRN Jorje Blunt MD   4 mg at 02/08/23 1558   • nicotine (NICODERM CQ) 21 MG/24HR patch 1 patch  1 patch Transdermal Q24H Pierre Mehta MD   1 patch at 02/08/23 1044   • ondansetron (ZOFRAN) tablet 4 mg  4 mg Oral Q6H PRN Pierre Mehta MD   4 mg at 02/08/23 2031    Or   • ondansetron (ZOFRAN) injection 4 mg  4 mg Intravenous Q6H PRN Pierre Mehta MD   4 mg at 02/05/23 1806   • pantoprazole (PROTONIX) EC tablet 40 mg  40 mg Oral Q AM Pierre Mehta MD   40 mg at 02/08/23 0609   • polyethylene glycol (MIRALAX) packet 17 g  17 g Oral Daily Pierre Mehta MD   17 g at 02/08/23 0925   • sennosides-docusate (PERICOLACE) 8.6-50 MG per tablet 1 tablet  1 tablet Oral Nightly Pierre Mehta MD   1 tablet at 02/08/23 2027   • sodium chloride 0.9 % flush 10 mL  10 mL Intravenous Q12H Pierre Mehta MD   10 mL at 02/08/23 2027   • sodium chloride 0.9 % flush 10 mL  10 mL Intravenous PRN Pierre Mehta MD       • sodium chloride 0.9 % flush 10 mL  10 mL Intravenous Q12H Pierre Mehta MD   10 mL at 02/08/23 2027   • sodium chloride 0.9 % flush 10 mL  10 mL Intravenous Q12H Pierre Mehta MD   10 mL at 02/08/23 2027   • sodium chloride 0.9 % flush 10 mL  10 mL Intravenous PRN Pierre Mehta MD       • sodium chloride 0.9 % infusion 40 mL  40 mL Intravenous PRN Pierre Mehta MD       • traMADol (ULTRAM) tablet 50 mg  50 mg Oral Q6H PRN Pierre Mehta MD   50 mg at 02/08/23 1107       Objective     Review of Systems:   Review of Systems   Constitutional: Negative for chills, fatigue, fever and unexpected weight change.   HENT: Negative for congestion, ear discharge, hearing loss, nosebleeds and sore throat.    Eyes: Negative for pain,  discharge and redness.   Respiratory: Positive for shortness of breath. Negative for cough, chest tightness and wheezing.    Cardiovascular: Negative for chest pain and palpitations.   Gastrointestinal: Positive for nausea. Negative for abdominal distention, abdominal pain, blood in stool, constipation, diarrhea and vomiting.   Endocrine: Negative for cold intolerance, polydipsia, polyphagia and polyuria.   Genitourinary: Negative for dysuria, flank pain, frequency, hematuria and urgency.   Musculoskeletal: Negative for arthralgias, back pain, joint swelling and myalgias.   Skin: Negative for color change, pallor and rash.   Neurological: Negative for tremors, seizures, syncope, weakness and headaches.   Hematological: Negative for adenopathy. Does not bruise/bleed easily.   Psychiatric/Behavioral: Negative for behavioral problems, confusion, dysphoric mood, hallucinations and suicidal ideas. The patient is not nervous/anxious.        Physical Exam:   Temp:  [97.3 °F (36.3 °C)-98 °F (36.7 °C)] 97.3 °F (36.3 °C)  Heart Rate:  [] 94  Resp:  [18-20] 20  BP: (104-139)/(65-77) 104/65     Physical Exam:  General Appearance:    Alert, cooperative, in no acute distress   Head:    Normocephalic, without obvious abnormality, atraumatic   Eyes:            Lids and lashes normal, conjunctivae and sclerae normal, no   icterus, no pallor, corneas clear, PERRLA   Ears:    Ears appear intact with no abnormalities noted   Throat:   No oral lesions, no thrush, oral mucosa moist   Neck:   No adenopathy, supple, trachea midline, no thyromegaly, no     carotid bruit, no JVD   Back:     No kyphosis present, no scoliosis present, no skin lesions,       erythema or scars, no tenderness to percussion or                   palpation,   range of motion normal   Lungs:     Clear to auscultation,respirations regular, even and                   unlabored    Heart:    Regular rhythm and normal rate, normal S1 and S2, no            murmur, no  gallop, no rub, no click   Breast Exam:    Deferred   Abdomen:     Normal bowel sounds, no masses, no organomegaly, soft        nontender, nondistended, no guarding, no rebound                 tenderness   Genitalia:    Deferred   Extremities:   Moves all extremities well, no edema, no cyanosis, no              redness   Pulses:   Pulses palpable and equal bilaterally   Skin:   No bleeding, bruising or rash   Lymph nodes:   No palpable adenopathy   Neurologic:   Cranial nerves 2 - 12 grossly intact, sensation intact, DTR        present and equal bilaterally      Results Review:     Lab Results (last 24 hours)     Procedure Component Value Units Date/Time    POC Glucose Once [[id number]]  (Abnormal) Collected: 02/08/23 1012    Specimen: Blood Updated: 02/08/23 1044     Glucose 238 mg/dL      Comment: Result Not ConfirmedOperator: [id number] KARINAPAULINA FREEMANErick ID: [id number]       Manual Differential [[id number]]  (Abnormal) Collected: 02/08/23 0745    Specimen: Blood Updated: 02/08/23 0926     Neutrophil % 83.0 %      Lymphocyte % 8.0 %      Monocyte % 5.0 %      Bands %  4.0 %      Neutrophils Absolute 14.43 10*3/mm3      Lymphocytes Absolute 1.33 10*3/mm3      Monocytes Absolute 0.83 10*3/mm3      Anisocytosis Slight/1+     WBC Morphology Normal     Platelet Estimate Decreased    Basic Metabolic Panel [[id number]]  (Abnormal) Collected: 02/08/23 0745    Specimen: Blood Updated: 02/08/23 0857     Glucose 195 mg/dL      BUN 53 mg/dL      Creatinine 1.82 mg/dL      Sodium 132 mmol/L      Potassium 4.0 mmol/L      Chloride 94 mmol/L      CO2 22.0 mmol/L      Calcium 9.3 mg/dL      BUN/Creatinine Ratio 29.1     Anion Gap 16.0 mmol/L      eGFR 39.5 mL/min/1.73     Narrative:      GFR Normal >60  Chronic Kidney Disease <60  Kidney Failure <15      CBC & Differential [[id number]]  (Abnormal) Collected: 02/08/23 0745    Specimen: Blood Updated: 02/08/23 0839    Narrative:      The following orders were created for panel  order CBC & Differential.  Procedure                               Abnormality         Status                     ---------                               -----------         ------                     CBC Auto Differential[[id number]]        Abnormal            Final result               Scan Slide[[id number]]                                                                    Please view results for these tests on the individual orders.    CBC Auto Differential [[id number]]  (Abnormal) Collected: 02/08/23 0745    Specimen: Blood Updated: 02/08/23 0839     WBC 16.59 10*3/mm3      RBC 5.31 10*6/mm3      Hemoglobin 14.1 g/dL      Hematocrit 42.9 %      MCV 80.8 fL      MCH 26.6 pg      MCHC 32.9 g/dL      RDW 19.1 %      RDW-SD 51.3 fl      MPV 11.0 fL      Platelets 113 10*3/mm3     POC Glucose Once [[id number]]  (Abnormal) Collected: 02/07/23 1925    Specimen: Blood Updated: 02/08/23 0214     Glucose 265 mg/dL      Comment: RN NotifiedOperator: [id number] CHARI YATESMeter ID: [id number]              I reviewed the patient's new clinical results.  I reviewed the patient's new imaging results and agree with the interpretation.     ASSESSMENT/PLAN:   ASSESSMENT: 1.  Nausea  2.  Lung cancer  3.  Constipation  4.  Dyspnea    PLAN: 1.  Continue current comfort care  2.  We will sign off please call with any questions thank you  The risks, benefits, and alternatives of this procedure have been discussed with the patient or the responsible party- the patient understands and agrees to proceed.         Samara Kasper MD  02/08/23  22:17 CST

## 2023-02-09 NOTE — DISCHARGE PLACEMENT REQUEST
"Aman Sanchez (72 y.o. Male)     Date of Birth   1950    Social Security Number       Address   [address] [place] [address]    Home Phone   [phone]    MRN   [id number]       [facility]    Marital Status                               Admission Date   1/26/23    Admission Type   Emergency    Admitting Provider   Fitz Mcpherson MD    Attending Provider   Fitz Mcpherson MD    Department, Room/Bed   [address], [facility]/1       Discharge Date       Discharge Disposition       Discharge Destination                               Attending Provider: Fitz Mcpherson MD    Allergies: No Known Allergies    Isolation: None   Infection: None   Code Status: No CPR    Ht: 175.3 cm (69\")   Wt: 85.1 kg (187 lb 9.6 oz)    Admission Cmt: None   Principal Problem: Acute on chronic respiratory failure with hypoxia (HCC) [J96.21]                 Active Insurance as of 1/26/2023     Primary Coverage     Payor Plan Insurance Group Employer/Plan Group    MEDICARE MEDICARE A & B      Payor Plan Address Payor Plan Phone Number Payor Plan Fax Number Effective Dates    [address] [phone]  11/1/2013 - None Entered    [facility]ly [facility] [id number]       Subscriber Name Subscriber Birth Date Member ID       AMAN SANCHEZ 1950 [id number]           Secondary Coverage     Payor Plan Insurance Group Employer/Plan Group    [employer] COMMERCIAL A[employer] HE[employer] AND LIFE  SUP        [id number]     Payor Plan Address Payor Plan Phone Number Payor Plan Fax Number Effective Dates    [address] [address]   6/1/2017 - None Entered    [facility] [id number]       Subscriber Name Subscriber Birth Date Member ID       AMAN SANCHEZ 1950 [id number]                 Emergency Contacts      (Rel.) Home Phone Work Phone Mobile Phone    Jesusita Sanchez (Spouse) [phone] -- [phone]            Insurance Information                MEDICARE/MEDICARE A & B Phone: " [phone]    Subscriber: Aman Sanchez Subscriber#: [id number]    Group#: -- Precert#: --        [employer] SUP        Phone: --    Subscriber: Aman Sanchez Subscriber#: [id number]    Group#: [id number] Precert#: --             History & Physical      Fitz Mcpherson MD at 01/26/23 1850              [facility]  HISTORY AND PHYSICAL      Date of Admission: 1/26/2023  Primary Care Physician: Emile Wynn MD    Subjective     Chief Complaint:    Shortness of breath    History of Present Illness  72-year-old male with COPD, metastatic lung cancer, chronic hypoxic respiratory failure on 2 L nasal cannula at baseline, ALLEN on CPAP, CAD with previous stents, hypertension, diabetes, history of obstructive uropathy with previous nephrostomy and ureteral stent, presented to the ED via EMS in respiratory distress.  Developed severe worsening shortness of breath with cough and wheezing despite outpatient treatment for COPD exacerbation several days ago when he saw a pulmonologist.  He has also been seen outpatient by hematology/oncology.  Previously scheduled for bronchoscopy and biopsy, but yet to have this done.      In the ED, he was also found to have sinus tachycardia with leukocytosis and evidence of pneumonia with multifocal consolidations, as well as moderate loculated right pleural effusion on CT chest, but no evidence of PE.  Noted mediastinotomy adenopathy and right axillary adenopathy.  Evidence of liver metastasis on previous imaging.    Due to respiratory distress with increased work of breathing, patient required initiation of BiPAP for respiratory support.          Review of Systems   Unable to complete comprehensive ROS due to respiratory distress      Past Medical History:   Past Medical History:   Diagnosis Date   • Acute bronchitis    • Anemia    • Breast lump    • Cervical radiculopathy    • COPD  (chronic obstructive pulmonary disease) (HCC)    • Coronary atherosclerosis     2 stents, followed by Dr. Hargrove   • Depressive disorder    • Disease of thyroid gland    • Dysuria    • Essential hypertension    • Flank pain    • Hematochezia    • Hematuria syndrome    • Hyperlipidemia    • Hypoglycemia    • Kidney stone    • Kidney stones    • Lung cancer (HCC)    • Mass of neck    • MI (myocardial infarction) (HCC)    • Neck pain     sprain   • Osteoarthritis    • PONV (postoperative nausea and vomiting)    • Sleep apnea     using c-pap   • Sleep apnea    • Type 2 diabetes mellitus (HCC)      Past Surgical History:  Past Surgical History:   Procedure Laterality Date   • BACK SURGERY     • CARDIAC CATHETERIZATION N/A 08/23/2017    Procedure: Right Heart Cath;  Surgeon: Mariposa Zamorano MD;  Location: [facility] CATH INVASIVE LOCATION;  Service:    • CATARACT EXTRACTION WITH INTRAOCULAR LENS IMPLANT Bilateral    • CHOLECYSTECTOMY     • CYSTOSCOPY  08/29/2014    Left ESWL, cysto and stent removal   • CYSTOSCOPY W/ LITHOLAPAXY / EHL  08/15/2014    Left ESWL   • CYSTOSCOPY, RETROGRADE PYELOGRAM AND STENT INSERTION  12/29/2022   • CYSTOSCOPY, URETEROSCOPY, RETROGRADE PYELOGRAM, STENT INSERTION Left 06/30/2017    Procedure: CYSTOSCOPY, LEFT URETEROSCOPY, RETROGRADE PYELOGRAM, HOLMIUM LASER, STENT INSERTION;  Surgeon: Uday Horne MD;  Location: [facility];  Service:    • CYSTOSCOPY, URETEROSCOPY, RETROGRADE PYELOGRAM, STENT INSERTION Left 09/06/2017    Procedure: CYSTOSCOPY, LEFT URETEROSCOPY, RETROGRADE PYELOGRAM, HOLMIUM LASER, STENT INSERTION;  Surgeon: Uday Horne MD;  Location: [facility] OR;  Service:    • CYSTOSCOPY, URETEROSCOPY, RETROGRADE PYELOGRAM, STENT INSERTION Left 01/17/2018    Procedure: CYSTOSCOPY LEFT URETEROSCOPY RETROGRADE PYELOGRAM HOLMIUM LASER STENT INSERTION;  Surgeon: Uday Horne MD;  Location: [facility] OR;  Service:    • CYSTOSCOPY, URETEROSCOPY, RETROGRADE PYELOGRAM, STENT INSERTION Left  07/25/2018    Procedure: CYSTOSCOPY URETEROSCOPY RETROGRADE PYELOGRAM HOLMIUM LASER STENT INSERTION;  Surgeon: Uday Horne MD;  Location: [facility];  Service: Urology   • EYE SURGERY      eye lids lifted   • HIP SURGERY     • INJECTION OF MEDICATION  11/11/2013    Kenalog   • INJECTION OF MEDICATION  01/16/2014    Toradol   • JOINT REPLACEMENT Right 2012    total hip replacement   • SKIN GRAFT      on his foot; removed skin from hip   • TONSILLECTOMY     • TRANSESOPHAGEAL ECHOCARDIOGRAM (ОЛЬГА)  07/21/2014    with color flow-Normal LV systolic function with Ef of 60-65%/ Grad1 diastolic dysfunction of the left ventricular myocardium. No evidence of pericardial effusion     Social History:  reports that he quit smoking about 2 weeks ago. His smoking use included cigarettes. He has a 61.00 pack-year smoking history. He has never used smokeless tobacco. He reports current alcohol use. He reports that he does not use drugs.    Family History: family history includes Lung cancer in his mother; Rectal cancer in his sister.       Allergies:  No Known Allergies    Medications:  Prior to Admission medications    Medication Sig Start Date End Date Taking? Authorizing Provider   albuterol (PROVENTIL HFA;VENTOLIN HFA) 108 (90 Base) MCG/ACT inhaler Inhale 2 puffs Every 4 (Four) Hours As Needed for Wheezing. 12/7/17  Yes Chuy Camargo MD   albuterol (PROVENTIL) (2.5 MG/3ML) 0.083% nebulizer solution Take 2.5 mg by nebulization Every 4 (Four) Hours As Needed for Wheezing. 2/26/18  Yes Chuy Camargo MD   allopurinol (ZYLOPRIM) 300 MG tablet  8/16/22  Yes ProviderAnthony MD   aspirin 81 MG EC tablet Take 81 mg by mouth Daily. Last dose 1/8/18   Yes ProviderAnthony MD   cetirizine (zyrTEC) 10 MG tablet Take 10 mg by mouth Daily.   Yes ProviderAnthony MD   clopidogrel (PLAVIX) 75 MG tablet Take 1 tablet by mouth Daily. 1/29/18  Yes Chuy Camargo MD   finasteride (PROSCAR) 5 MG tablet Take 1 tablet  by mouth Every Night. 1/29/18  Yes Chuy Camargo MD   FLUoxetine (PROzac) 20 MG capsule Take 20 mg by mouth Daily. 11/5/20  Yes Anthony Jacobo MD   fluticasone (FLONASE) 50 MCG/ACT nasal spray 2 sprays into each nostril At Night As Needed for Rhinitis. 2/26/18  Yes Chuy Camargo MD   glimepiride (AMARYL) 2 MG tablet TAKE 1 TABLET BY MOUTH IN THE MORNING WITH FIRST MEAL OF THE DAY 12/7/22  Yes Anthony Jacobo MD   glucose blood test strip One touch reli-on glucometer 2/26/18  Yes Chuy Camargo MD   guaiFENesin (MUCINEX) 600 MG 12 hr tablet Take 600 mg by mouth 2 (Two) Times a Day.   Yes Anthony Jacobo MD   isosorbide mononitrate (IMDUR) 30 MG 24 hr tablet Take 1 tablet by mouth Daily. 1/29/18  Yes Chuy Camargo MD   levothyroxine (SYNTHROID) 50 MCG tablet Take 1 tablet by mouth Daily. 1/29/18  Yes Chuy Camargo MD   Mirabegron ER (MYRBETRIQ) 50 MG tablet sustained-release 24 hour 24 hr tablet Take 50 mg by mouth Daily.   Yes Anthony Jacobo MD   nicotine (NICODERM CQ) 21 MG/24HR patch Place 1 patch on the skin as directed by provider Daily. 1/10/23  Yes Gustabo Rincon MD   nitroglycerin (NITROSTAT) 0.4 MG SL tablet Place 1 tablet under the tongue Every 5 (Five) Minutes As Needed for chest pain. 1/3/17  Yes Mariposa Zamorano MD   ONE TOUCH LANCETS misc Use as directed test 6 times daily 2/26/18  Yes Chuy Camargo MD   Potassium Citrate ER 15 MEQ (1620 MG) tablet controlled-release Take 2 tablets by mouth Daily. 7/1/22  Yes Anthony Jacobo MD   tamsulosin (FLOMAX) 0.4 MG capsule 24 hr capsule Take 2 capsules by mouth Daily. 1/29/18  Yes Chuy Camargo MD   traMADol (ULTRAM) 50 MG tablet Take 50 mg by mouth Every 6 (Six) Hours As Needed. for pain 6/13/22  Yes Provider, Historical, MD   traZODone (DESYREL) 150 MG tablet Take 150 mg by mouth Every Night. 11/12/20  Yes Provider, MD Anthony   vitamin B-12 (CYANOCOBALAMIN) 1000 MCG tablet Take 1,000  "mcg by mouth Daily.   Yes Provider, MD Anthony     I have utilized all available immediate resources to obtain, update, and review the patient's current medications.    Objective     Vital Signs: /71   Pulse 94   Temp 96.6 °F (35.9 °C) (Axillary)   Resp 19   Ht 175.3 cm (69\")   Wt 93 kg (205 lb)   SpO2 100%   BMI 30.27 kg/m²   Physical Exam   General: Ill-appearing  HEENT: NC/AT, BiPAP in place  Neck: No swelling, trachea midline  Respiratory: Diminished with poor air movement and expiratory wheezes noted, tachypneic  Cardiac: Tachycardic with regular rhythm, S1-S2 present  Abdomen: Nontender, bowel sounds present  Extremities: BLE edema        Results Reviewed:  Lab Results (last 24 hours)     Procedure Component Value Units Date/Time    STAT Lactic Acid, Reflex [[id number]] Collected: 01/26/23 1836    Specimen: Blood Updated: 01/26/23 1838    Blood Gas, Arterial - [[id number]]  (Abnormal) Collected: 01/26/23 1808    Specimen: Arterial Blood Updated: 01/26/23 1809     Site Arterial Line     Tre's Test N/A     pH, Arterial 7.372 pH units      pCO2, Arterial 44.4 mm Hg      pO2, Arterial 284.0 mm Hg      Comment: 83 Value above reference range        HCO3, Arterial 25.8 mmol/L      Base Excess, Arterial 0.2 mmol/L      O2 Saturation, Arterial >100.0 %      Comment: 93 Value above reportable range > 100.0        Barometric Pressure for Blood Gas 751 mmHg      Modality Ventilator     FIO2 100 %      Flow Rate 18.0 lpm      Ventilator Mode AVAP     Set Tidal Volume 500     PEEP 5.0     Comment: Meter: [id number]     :  [id number]       Extra Tubes [[id number]] Collected: 01/26/23 1540    Specimen: Blood Updated: 01/26/23 1700    Narrative:      The following orders were created for panel order Extra Tubes.  Procedure                               Abnormality         Status                     ---------                               -----------         ------                     Gold Top - " SST[[id number]]                                   Final result               [place] Blood Culture Ashvin...[[id number]]                      Final result               Gray Top[[id number]]                                         In process                 Light Blue Top[[id number]]                                   Final result                 Please view results for these tests on the individual orders.    [place] Blood Culture Bottle Set [[id number]] Collected: 01/26/23 1552    Specimen: Blood Updated: 01/26/23 1700     Extra Tube Hold for add-ons.     Comment: Auto resulted.       Gold Top - SST [[id number]] Collected: 01/26/23 1540    Specimen: Blood Updated: 01/26/23 1645     Extra Tube Hold for add-ons.     Comment: Auto resulted.       Light Blue Top [[id number]] Collected: 01/26/23 1540    Specimen: Blood Updated: 01/26/23 1645     Extra Tube Hold for add-ons.     Comment: Auto resulted       Blood Gas, Arterial - [[id number]]  (Abnormal) Collected: 01/26/23 1641    Specimen: Arterial Blood Updated: 01/26/23 1641     Site Arterial Line     Tre's Test N/A     pH, Arterial 7.402 pH units      pCO2, Arterial 40.5 mm Hg      pO2, Arterial 82.8 mm Hg      Comment: 84 Value below reference range        HCO3, Arterial 25.2 mmol/L      Base Excess, Arterial 0.4 mmol/L      O2 Saturation, Arterial 97.2 %      Barometric Pressure for Blood Gas 750 mmHg      Modality NRB     FIO2 100 %      Flow Rate 15.0 lpm      Ventilator Mode NA     Comment: Meter: [id number]     :  [id number]       Lactic Acid, Plasma [[id number]]  (Abnormal) Collected: 01/26/23 1540    Specimen: Blood Updated: 01/26/23 1629     Lactate 4.7 mmol/L     Blood Culture - Blood, Arm, Left [[id number]] Collected: 01/26/23 1620    Specimen: Blood from Arm, Left Updated: 01/26/23 1620    Comprehensive Metabolic Panel [[id number]]  (Abnormal) Collected: 01/26/23 1518    Specimen: Blood Updated: 01/26/23 1610     Glucose 157 mg/dL      BUN 20 mg/dL       Creatinine 1.72 mg/dL      Sodium 136 mmol/L      Potassium 4.1 mmol/L      Chloride 98 mmol/L      CO2 25.0 mmol/L      Calcium 9.9 mg/dL      Total Protein 7.4 g/dL      Albumin 3.7 g/dL      ALT (SGPT) 51 U/L      AST (SGOT) 50 U/L      Alkaline Phosphatase 138 U/L      Total Bilirubin 0.2 mg/dL      Globulin 3.7 gm/dL      A/G Ratio 1.0 g/dL      BUN/Creatinine Ratio 11.6     Anion Gap 13.0 mmol/L      eGFR 42.2 mL/min/1.73     Narrative:      GFR Normal >60  Chronic Kidney Disease <60  Kidney Failure <15      Troponin [[id number]]  (Normal) Collected: 01/26/23 1518    Specimen: Blood Updated: 01/26/23 1610     Troponin T <0.010 ng/mL     Narrative:      Troponin T Reference Range:  <= 0.03 ng/mL-   Negative for AMI  >0.03 ng/mL-     Abnormal for myocardial necrosis.  Clinicians would have to utilize clinical acumen, EKG, Troponin and serial changes to determine if it is an Acute Myocardial Infarction or myocardial injury due to an underlying chronic condition.       Results may be falsely decreased if patient taking Biotin.      BNP [[id number]]  (Abnormal) Collected: 01/26/23 1518    Specimen: Blood Updated: 01/26/23 1608     proBNP 947.7 pg/mL     Narrative:      Among patients with dyspnea, NT-proBNP is highly sensitive for the detection of acute congestive heart failure. In addition NT-proBNP of <300 pg/ml effectively rules out acute congestive heart failure with 99% negative predictive value.    Results may be falsely decreased if patient taking Biotin.      COVID-19 and FLU A/B PCR - Swab, Nasopharynx [[id number]]  (Normal) Collected: 01/26/23 1524    Specimen: Swab from Nasopharynx Updated: 01/26/23 1607     COVID19 Not Detected     Influenza A PCR Not Detected     Influenza B PCR Not Detected    Narrative:      Fact sheet for providers: https://www.fda.gov/media/496349/download    Fact sheet for patients: https://www.fda.gov/media/876023/download    Test performed by PCR.    D-dimer, Quantitative  "[[id number]]  (Abnormal) Collected: 01/26/23 1552    Specimen: Blood Updated: 01/26/23 1604     D-Dimer, Quantitative 2,700 ng/mL (FEU)     Narrative:      According to the assay 's published package insert, a normal (<500 ng/mL (FEU)) D-dimer result in conjunction with a non-high clinical probability assessment, excludes deep vein thrombosis (DVT) and pulmonary embolism (PE) with high sensitivity.    D-dimer values increase with age and this can make VTE exclusion of an older population difficult. To address this, the American College of Physicians, based on best available evidence and recent guidelines, recommends that clinicians use age-adjusted D-dimer thresholds in patients greater than 50 years of age with: a) a low probability of PE who do not meet all Pulmonary Embolism Rule Out Criteria, or b) in those with intermediate probability of PE.   The formula for an age-adjusted D-dimer cut-off is \"age*10\".  For example, a 60 year old patient would have an age-adjusted cut-off of 600 ng/mL (FEU) and an 80 year old 800 ng/mL (FEU).      CBC & Differential [[id number]]  (Abnormal) Collected: 01/26/23 1518    Specimen: Blood Updated: 01/26/23 1541    Narrative:      The following orders were created for panel order CBC & Differential.  Procedure                               Abnormality         Status                     ---------                               -----------         ------                     CBC Auto Differential[[id number]]        Abnormal            Final result                 Please view results for these tests on the individual orders.    CBC Auto Differential [[id number]]  (Abnormal) Collected: 01/26/23 1518    Specimen: Blood Updated: 01/26/23 1541     WBC 17.03 10*3/mm3      RBC 4.97 10*6/mm3      Hemoglobin 12.7 g/dL      Hematocrit 41.5 %      MCV 83.5 fL      MCH 25.6 pg      MCHC 30.6 g/dL      RDW 16.6 %      RDW-SD 48.9 fl      MPV 9.9 fL      Platelets 236 10*3/mm3      " Neutrophil % 54.9 %      Lymphocyte % 33.1 %      Monocyte % 8.6 %      Eosinophil % 0.1 %      Basophil % 0.4 %      Immature Grans % 2.9 %      Neutrophils, Absolute 9.35 10*3/mm3      Lymphocytes, Absolute 5.64 10*3/mm3      Monocytes, Absolute 1.46 10*3/mm3      Eosinophils, Absolute 0.02 10*3/mm3      Basophils, Absolute 0.06 10*3/mm3      Immature Grans, Absolute 0.50 10*3/mm3      nRBC 0.2 /100 WBC     Blunt Top [[id number]] Collected: 01/26/23 1540    Specimen: Blood Updated: 01/26/23 1540        Imaging Results (Last 24 Hours)     Procedure Component Value Units Date/Time    CT Angiogram Chest [[id number]] Collected: 01/26/23 1716     Updated: 01/26/23 1825    Narrative:      EXAM:  CT CHEST ANGIOGRAPHY WITH IV CONTRAST    ORDERING PROVIDER:  ENRIQUE FELIX    CLINICAL HISTORY:  Chest pain, shortness of breath, hypoxia    COMPARISON:      TECHNIQUE:   Chest CT was performed using a high resolution pulmonary  angiogram protocol with 90ml of Isovue 370 contrast and  reformatted in the sagittal and coronal planes.     3-dimensional images also acquired with special processing of the  CT scan data with a specialized workstation for evaluation.    This examination was performed according to our departmental dose  optimization program which includes automated exposure control,  adjustment of the MA and kV according to patient size, and/or use  of iterative reconstruction technique.     FINDINGS:     LUNGS AND PLEURA: Moderate right pleural effusion, with loculated  component. Diffuse centrilobular emphysematous change of the lung  parenchyma. Multifocal air space processes more on the right  side, with coexisting moderate consolidation in the right lower  lobe, right middle lobe and to a lesser extent right upper lobe.  Mild left lung base consolidation.    HEART: Normal size and configuration. Trace amount of pericardial  effusion.     MEDIASTINUM AND RICKIE: Scattered significant adenopathy in the  mediastinum and  right hilum and to a lesser extent in the left  hilum.     AORTA AND GREAT VESSELS: No aneurysm or dissection.     PULMONARY ARTERIES: No filling defects.     UPPER ABDOMEN: Unremarkable.    MUSCULOSKELETAL:  There is no aggressive osseous lesion.  Unremarkable vertebral body height and alignment. No lytic or  sclerotic lesion.     EXTRATHORACIC SOFT TISSUES: Mildly prominent right axillary  adenopathy measuring 1.2 cm along the short axis. No mass.  Unremarkable supraclavicular soft tissues.       Impression:      There is no evidence of pulmonary embolism.  Moderate right pleural effusion, with loculated component.   Diffuse centrilobular emphysematous change of the lung  parenchyma.   Multifocal air space processes more on the right side, with  coexisting moderate consolidation in the right lower lobe, right  middle lobe and to a lesser extent right upper lobe. Mild left  lung base consolidation.  Trace amount of pericardial effusion.  Scattered significant adenopathy in the mediastinum and right  hilum and to a lesser extent in the left hilum.   Mildly prominent right axillary adenopathy measuring 1.2 cm along  the short axis.           Electronically signed by:  Severiano oSliz MD  1/26/2023 6:23 PM CST  Workstation: [id number]    XR Chest 1 View [[id number]] Collected: 01/26/23 1514     Updated: 01/26/23 1540    Narrative:      Chest x-ray single view.       CLINICAL INDICATION: Shortness of breath    COMPARISON: Chest January 17, 2023    FINDINGS: Cardiac silhouette is enlarged in size. Pulmonary  vascularity is increased.     Right-sided effusion and underlying infiltrative changes more  pronounced than on prior examination January 17, 2023.      Impression:      Cardiomegaly pulmonary vascular enlargement.  Increasing right-sided effusion and right lower lobe infiltrative  changes.    Electronically signed by:  Nico Vaca MD  1/26/2023 3:38 PM CST  Workstation: [id number]        I have personally reviewed and  interpreted the radiology studies and ECG obtained at time of admission.     Assessment / Plan     Assessment:   Active Hospital Problems    Diagnosis    • **Acute on chronic respiratory failure with hypoxia (HCC)    • Sepsis due to pneumonia (HCC)    • Loculated pleural effusion    • Mass of right lung      Plan:      Acute on chronic hypoxic respiratory failure with respiratory distress  P/F ratio less than 300  Reviewed ABGs  - Continue noninvasive positive pressure ventilation on BiPAP to assist work of breathing  - Follow clinically and repeat ABG as warranted, tenuous respiratory status, may require intubation if no significant improvement on BiPAP support  --Supplemental oxygen to maintain adequate gas exchange, goal SPO2 88-90% in setting of COPD    COPD exacerbation  - Nebulized bronchodilators  - Corticosteroids with IV Solu-Medrol every 6 hours    Sepsis with multifocal pneumonia  - Cautious with any fluid resuscitation in setting of pleural effusion  -- Lactic acidosis noted, trend lactate  - Follow blood cultures  - Empiric broad-spectrum IV antibiotics with vancomycin and cefepime given risk of MDRO    Right pleural effusion with loculation  Differential includes malignant effusion which is most likely, however may also have parapneumonic effusion  -Will ask for CT surgery evaluation    Metastatic lung cancer  -Followed by oncology and pulmonology    Acute kidney injury  Differential consideration includes prerenal versus ATN, hypoperfusion from sepsis  - Continue to monitor urine output and metabolic profile    Diabetes mellitus with hyperglycemia  - POC glucose monitoring with sliding scale insulin correction if needed      Extensive discussion at bedside with patient and family present.  Patient able to nod, occasionally speak in very short sentences, confirmed CODE STATUS    Full code      Total critical care time of 39 minutes.  Time spent exclusive of any procedures        Electronically signed  by Fitz Mcpherson MD, 01/26/23, 18:50 CST.                Electronically signed by iFtz Mcpherson MD at 01/26/23 1951       Physician Progress Notes (last 24 hours)  Notes from 02/08/23 1331 through 02/09/23 1331   No notes of this type exist for this encounter.            Physical Therapy Notes (last 24 hours)      Steven Esquivel PTA at 02/08/23 1603  Version 1 of 1            02/08/23 1431   OTHER   Discipline physical therapy assistant   Rehab Time/Intention   Session Not Performed other (see comments)  (Tx attempted this pm. Pt wanting to speak with MD re end of life care at this time.)         Electronically signed by Steven Esquivel PTA at 02/08/23 1603     Brittni Winchester PTA at 02/09/23 1149  Version 1 of 1            02/09/23 1149   OTHER   Discipline physical therapy assistant   Rehab Time/Intention   Session Not Performed other (see comments)  (pt going to comfort care)         Electronically signed by Brittni Winchester PTA at 02/09/23 1149          Occupational Therapy Notes (last 24 hours)      Lizette Flor COTA at 02/09/23 1238        Pt being put on comfort care.     Electronically signed by Lizette Flor COTA at 02/09/23 1238     Brittaney Gilmore OT at 02/09/23 1040             02/09/23 1040   OTHER   Discipline occupational therapist     D/w patient's spouse. Patient to be on comfort measures. No further OT warranted at this time per conversation with wife. OT to sign off. If new needs arise, wife notified to d/w RN or provider, and OT will gladly return. Wife appreciative.    Electronically signed by Brittaney Gilmore OT at 02/09/23 1042     Kiara Sandoval COTA at 02/08/23 1530             02/08/23 1425   OTHER   Discipline occupational therapy assistant   Rehab Time/Intention   Session Not Performed other (see comments);patient unavailable for treatment  ((9800) upon arrival; family was present at bedside w/ pt; stating family was ready to talk about end of life care.  No OT tx. Will consult w/ OTR. Pt w/ all needs met and no s/s of distress when BUSBY left.)         Electronically signed by Kiara Sandoval COTA at 02/08/23 3827

## 2023-02-09 NOTE — PROGRESS NOTES
"Nutrition Services    Patient Name:  Aman Sanchez  YOB: 1950  MRN: [id number]  Admit Date:  1/26/2023    Follow up. Noted pt went comfort care. Pt intake remains poor with report of only \"bites\" of trays being consumed. Spoke to wife about continuing supplements, requested to have supplements d/c due to pt not drinking them. RDN staff available if needed.    Electronically signed by:  Ruby Acosta  02/09/23 09:01 CST   "

## 2023-02-10 NOTE — H&P
[facility] Medicine  HISTORY AND PHYSICAL      Date of Admission: 2/9/2023  Primary Care Physician: Emile Wynn MD    Subjective     Chief Complaint: Hospice admission    History of Present Illness  Patient with metastatic lung cancer admitted to hospice after various discussions with oncologist, cardiothoracic surgery, pulmonology, and medical team.  Patient has become progressively weaker over the past week with life expectancy of days to weeks.  Patient and family have currently excepted comfort care measures.        Review of Systems   Otherwise complete ROS reviewed and negative except as mentioned in the HPI.    Past Medical History:   Past Medical History:   Diagnosis Date   • Acute bronchitis    • Anemia    • Breast lump    • Cervical radiculopathy    • COPD (chronic obstructive pulmonary disease) (HCC)    • Coronary atherosclerosis     2 stents, followed by Dr. Hargrove   • Depressive disorder    • Disease of thyroid gland    • Dysuria    • Essential hypertension    • Flank pain    • Hematochezia    • Hematuria syndrome    • Hyperlipidemia    • Hypoglycemia    • Kidney stone    • Kidney stones    • Lung cancer (HCC)    • Mass of neck    • MI (myocardial infarction) (HCC)    • Neck pain     sprain   • Osteoarthritis    • PONV (postoperative nausea and vomiting)    • Sleep apnea     using c-pap   • Sleep apnea    • Type 2 diabetes mellitus (HCC)      Past Surgical History:  Past Surgical History:   Procedure Laterality Date   • BACK SURGERY     • CARDIAC CATHETERIZATION N/A 08/23/2017    Procedure: Right Heart Cath;  Surgeon: Mariposa Zamorano MD;  Location: [facility] INVASIVE LOCATION;  Service:    • CATARACT EXTRACTION WITH INTRAOCULAR LENS IMPLANT Bilateral    • CHOLECYSTECTOMY     • CYSTOSCOPY  08/29/2014    Left ESWL, cysto and stent removal   • CYSTOSCOPY W/ LITHOLAPAXY / EHL  08/15/2014    Left ESWL   • CYSTOSCOPY, RETROGRADE PYELOGRAM AND STENT  INSERTION  12/29/2022   • CYSTOSCOPY, URETEROSCOPY, RETROGRADE PYELOGRAM, STENT INSERTION Left 06/30/2017    Procedure: CYSTOSCOPY, LEFT URETEROSCOPY, RETROGRADE PYELOGRAM, HOLMIUM LASER, STENT INSERTION;  Surgeon: Uday Horne MD;  Location: [facility];  Service:    • CYSTOSCOPY, URETEROSCOPY, RETROGRADE PYELOGRAM, STENT INSERTION Left 09/06/2017    Procedure: CYSTOSCOPY, LEFT URETEROSCOPY, RETROGRADE PYELOGRAM, HOLMIUM LASER, STENT INSERTION;  Surgeon: Uday Horne MD;  Location: [facility];  Service:    • CYSTOSCOPY, URETEROSCOPY, RETROGRADE PYELOGRAM, STENT INSERTION Left 01/17/2018    Procedure: CYSTOSCOPY LEFT URETEROSCOPY RETROGRADE PYELOGRAM HOLMIUM LASER STENT INSERTION;  Surgeon: Uday Horne MD;  Location: [facility];  Service:    • CYSTOSCOPY, URETEROSCOPY, RETROGRADE PYELOGRAM, STENT INSERTION Left 07/25/2018    Procedure: CYSTOSCOPY URETEROSCOPY RETROGRADE PYELOGRAM HOLMIUM LASER STENT INSERTION;  Surgeon: Uday Horne MD;  Location: [facility];  Service: Urology   • EYE SURGERY      eye lids lifted   • HIP SURGERY     • INJECTION OF MEDICATION  11/11/2013    Kenalog   • INJECTION OF MEDICATION  01/16/2014    Toradol   • JOINT REPLACEMENT Right 2012    total hip replacement   • SKIN GRAFT      on his foot; removed skin from hip   • TONSILLECTOMY     • TRANSESOPHAGEAL ECHOCARDIOGRAM (ОЛЬГА)  07/21/2014    with color flow-Normal LV systolic function with Ef of 60-65%/ Grad1 diastolic dysfunction of the left ventricular myocardium. No evidence of pericardial effusion     Social History:  reports that he quit smoking about 4 weeks ago. His smoking use included cigarettes. He has a 61.00 pack-year smoking history. He has never used smokeless tobacco. He reports current alcohol use. He reports that he does not use drugs.    Family History: family history includes Lung cancer in his mother; Rectal cancer in his sister.       Allergies:  No Known Allergies    Medications:  Prior to Admission  medications    Medication Sig Start Date End Date Taking? Authorizing Provider   albuterol (PROVENTIL HFA;VENTOLIN HFA) 108 (90 Base) MCG/ACT inhaler Inhale 2 puffs Every 4 (Four) Hours As Needed for Wheezing. 12/7/17   Chuy Camargo MD   albuterol (PROVENTIL) (2.5 MG/3ML) 0.083% nebulizer solution Take 2.5 mg by nebulization Every 4 (Four) Hours As Needed for Wheezing. 2/26/18   Chuy Camargo MD   allopurinol (ZYLOPRIM) 300 MG tablet  8/16/22   Anthony Jacobo MD   aspirin 81 MG EC tablet Take 81 mg by mouth Daily. Last dose 1/8/18    Anthony Jacobo MD   cetirizine (zyrTEC) 10 MG tablet Take 10 mg by mouth Daily.    Anthony Jacobo MD   clopidogrel (PLAVIX) 75 MG tablet Take 1 tablet by mouth Daily. 1/29/18   Chuy Camargo MD   finasteride (PROSCAR) 5 MG tablet Take 1 tablet by mouth Every Night. 1/29/18   Chuy Camargo MD   FLUoxetine (PROzac) 20 MG capsule Take 20 mg by mouth Daily. 11/5/20   Anthony Jacobo MD   fluticasone (FLONASE) 50 MCG/ACT nasal spray 2 sprays into each nostril At Night As Needed for Rhinitis. 2/26/18   Chuy Camargo MD   glimepiride (AMARYL) 2 MG tablet TAKE 1 TABLET BY MOUTH IN THE MORNING WITH FIRST MEAL OF THE DAY 12/7/22   Anthony Jacobo MD   glucose blood test strip One touch reli-on glucometer 2/26/18   Chuy Camargo MD   guaiFENesin (MUCINEX) 600 MG 12 hr tablet Take 600 mg by mouth 2 (Two) Times a Day.    Anthony Jacobo MD   isosorbide mononitrate (IMDUR) 30 MG 24 hr tablet Take 1 tablet by mouth Daily. 1/29/18   Chuy Camargo MD   levothyroxine (SYNTHROID) 50 MCG tablet Take 1 tablet by mouth Daily. 1/29/18   Chuy Camargo MD   Mirabegron ER (MYRBETRIQ) 50 MG tablet sustained-release 24 hour 24 hr tablet Take 50 mg by mouth Daily.    Anthony Jacobo, MD   nicotine (NICODERM CQ) 21 MG/24HR patch Place 1 patch on the skin as directed by provider Daily. 1/10/23   Gustabo Rincon MD    nitroglycerin (NITROSTAT) 0.4 MG SL tablet Place 1 tablet under the tongue Every 5 (Five) Minutes As Needed for chest pain. 1/3/17   Mariposa Zamorano MD   ONE TOUCH LANCETS misc Use as directed test 6 times daily 2/26/18   Chuy Camargo MD   Potassium Citrate ER 15 MEQ (1620 MG) tablet controlled-release Take 2 tablets by mouth Daily. 7/1/22   Anthony Jacobo MD   tamsulosin (FLOMAX) 0.4 MG capsule 24 hr capsule Take 2 capsules by mouth Daily. 1/29/18   Chuy Camargo MD   traMADol (ULTRAM) 50 MG tablet Take 50 mg by mouth Every 6 (Six) Hours As Needed. for pain 6/13/22   Anthony Jacobo MD   traZODone (DESYREL) 150 MG tablet Take 150 mg by mouth Every Night. 11/12/20   Anthony Jacobo MD   vitamin B-12 (CYANOCOBALAMIN) 1000 MCG tablet Take 1,000 mcg by mouth Daily.    Anthony Jacobo MD     I have utilized all available immediate resources to obtain, update, and review the patient's current medications.    Objective     Vital Signs: There were no vitals taken for this visit.  Physical Exam   Physical Exam  Vitals and nursing note reviewed.   Constitutional:       General: He is not in acute distress.     Appearance: He is well-developed. He is obese. He is ill-appearing. He is not diaphoretic.   HENT:      Head: Normocephalic and atraumatic.   Eyes:      General: No scleral icterus.        Right eye: No discharge.         Left eye: No discharge.   Neck:      Thyroid: No thyromegaly.      Vascular: No carotid bruit or JVD.   Cardiovascular:      Rate and Rhythm: Normal rate and regular rhythm.      Heart sounds: Normal heart sounds. No murmur heard.    No friction rub. No gallop.   Pulmonary:      Effort: Pulmonary effort is normal. No respiratory distress.      Breath sounds: No stridor. Examination of the right-lower field reveals decreased breath sounds. Decreased breath sounds and rhonchi present. No wheezing or rales.   Chest:      Chest wall: No tenderness.   Abdominal:       General: Bowel sounds are normal. There is no distension.      Palpations: Abdomen is soft. There is no mass.      Tenderness: There is no abdominal tenderness. There is no right CVA tenderness, left CVA tenderness, guarding or rebound.      Hernia: No hernia is present.   Musculoskeletal:         General: No swelling, tenderness or deformity.      Cervical back: Neck supple.      Right lower leg: No edema.      Left lower leg: No edema.   Skin:     General: Skin is warm and dry.      Coloration: Skin is not jaundiced or pale.      Findings: No bruising, erythema, lesion or rash.   Neurological:      General: No focal deficit present.      Mental Status: He is alert and oriented to person, place, and time.      Cranial Nerves: No cranial nerve deficit.      Sensory: No sensory deficit.      Motor: No weakness or abnormal muscle tone.      Coordination: Coordination normal.      Gait: Gait normal.      Deep Tendon Reflexes: Reflexes normal.      Comments: .   Psychiatric:         Mood and Affect: Mood normal.         Behavior: Behavior normal.         Thought Content: Thought content normal.         Judgment: Judgment normal.          Results Reviewed:  Lab Results (last 24 hours)     ** No results found for the last 24 hours. **        Imaging Results (Last 24 Hours)     ** No results found for the last 24 hours. **        I have personally reviewed and interpreted the radiology studies and ECG obtained at time of admission.     Assessment / Plan     Assessment:   Active Hospital Problems    Diagnosis    • **End of life care      Plan:   Acute on chronic respiratory failure with hypoxia/sepsis (secondary to a combination of multifocal consolidation, COPD exacerbation right-sided pleural effusion):   - Patient is status postplacement of right-sided chest tube secondary to malignant pleural effusion and chest tube was removed on 2/3/2023.  He has completed a course of antimicrobial therapy and steroids chest x-ray  done this morning showed worsening right upper lobe perihilar moderate size groundglass opacity and right lung base moderate size interstitial groundglass opacity.  Begin IV cefepime, continue noninvasive respiratory therapy and bronchodilators.  Blood and pleural fluid cultures showed no growth.  Pleural fluid cytology is positive for malignancy.  Patient has been determined not to require  placement of right-sided Pleurx catheter by CT surgery.  Input by CT surgery is appreciated.  -2/8/23 Case discussed with Dr. Lambert, pulmonary by Dr. Blunt.  Greatly appreciate Dr. Lambert help with this difficult case!  Patient needs correction/progression trend of effusion to be followed.  If effusion recurs, patient may require a pleural catheter by CT surgery.  If not, we will continue current management.     Metastatic lung cancer:   -Patient is to follow-up with his primary oncologist postdischarge.  -2/8/2023 spoke at length with oncology about patient, also spoke with patient and patient's wife about goals of care.  Is also present when Dr. Mcneal had goals of care discussion with patient and patient's family.  Family now accepting of comfort care status, and changing patient to hospice.  Family informed by oncology that patient not strong enough to survive chemo, and that hospice plan enrollment extremely appropriate.     Reactive leukocytosis secondary to resolving respiratory failure and metastatic lung cancer:  - Treat as above acute respiratory failure section and metastatic lung cancer resection.  Continue to follow trends daily.  Reactive leukocytosis is slowly improving and will be monitored.     Chronic kidney disease:   - Patient's baseline creatinine is between 1.2-1.7.  Creatinine is at baseline.  Continue to monitor and consult nephrologist if the need arises.     Nephrolithiasis with bilateral stents:  - 2/8/2023 patient and patient's wife states that patient has history of nephrolithiasis with stents  placed in [place] by urologist Dr. Avalos.  Also states that stents were scheduled to come out today.  Case discussed with Dr. Horne, who will evaluate patient today for possible stent removal.  -2/9/2023 urology consult canceled yesterday when patient decided on comfort care measures and hospice enrollment.        Diabetes mellitus with possible diabetic induced gastroparesis (complicated by refractory nausea): Glycemic control is adequate.  Begin Reglan and consult GI.  He may require endoscopy evaluation if the need arises.  Continue Accu-Cheks and sliding scale insulin.   -2/9/2023 endoscopic evaluation discontinued after patient decided to enroll in comfort care.     Intractable nausea/vomiting possibly due to diabetic gastroparesis:  - As above and diabetic/gastroparesis section.  Gastroenterology still awaiting stabilization of patient's respiratory status before considering EGD/colonoscopy evaluation.  - Currently comfort care measures, so receiving as needed Zofran for antiemetic needs.       Constipation: Resolved.       Obstructive sleep apnea: Continue nightly CPAP.       Deconditioning: Continue PT and OT.       Continue GI and DVT prophylaxis.     Medical Decision Making  Number and Complexity of problems: 5 with high complexities.        Conditions and Status: Hemodynamically stable.             MDM Data  External documents reviewed: Not applicable.     Decision rules/scores evaluated (example [id number], [place], etc): Not applicable.     Discussed with: Patient, his wife and nursing staff.     Treatment Plan: As detailed above.     Care Planning  Shared decision making: Discussed with patient and his wife.  Code status and discussions: Full code.     Disposition  Patient has life expectancy of days to weeks.  Patient admitted to inpatient hospice care 2/9/2023.     I confirmed that the patient's Advance Care Plan is present, code status is documented, or surrogate decision maker is listed in the  patient's medical record.      I have utilized all available immediate resources to obtain, update, or review the patient's current medications        Discharge Planning: In progress.    Electronically signed by Jorje Blunt MD, 02/09/23, 21:30 CST.

## 2023-02-10 NOTE — HOSPICE
Explained Hospice Benefits and Services  CPR discussion: pt had chosen no CPR, IV's/ IV antibiotics, tube feeding, dialysis, intubation. Wanted comfort measures only.  Hospitalization discussion: pt is GIP appropriate, requires skill nursing observation for frenquent medication changes.  Discharge Plan: with home Hospice when pain/symtoms controlled.  Spiritual/existential concerns discussed and wife reported that he has none.  Dyspnea: moderate  Admission book explained and instructions given on hospice availability and how to access nurse 24/7, completed  COVID-!9 precautions reviewed, written materials left with the pt's wife  Signee introduced the Booklet, Gone From My Sight, and reviewed with family.    Upon arrival, pt in HB, agitated, in pain.  Staff nurse came into the room to give pt pain and anxiety medication.  Pt unable to communicate.  All information was given by his wife, daughter, and sister.  Pt has battled COPD for years, has chronic hypoxic respiratory failure.  He started smoking when he was 11 years old, and recently quite.  He has also been having kidney stones which resulted in stents, yet continued to have issues.  He had been up and about until November 2022, when he started complaining that his stomach was upset.  Tests, then he had COVID which delayed further test.  Once over COVID, he had a X-ray, CT scan.  Diagnosed with lung cancer and not a candidate for chemo.  Cancer metasized to the liver and other organs.      1/26/23 presented to the ED in respiratory distress.  Developed severe worsening SOA, had been treated outpatient for COPD exacerbation several days prior. Found to have pneumonia.  Patient continued to decompress and hospitalized.  Pt terminal with life expectancy of days.

## 2023-02-10 NOTE — NURSING NOTE
Patient restless and complaining of pain. Trying to get out of bed. Family is at bedside, redirecting patient. No orders on chart at this time due to patient just becoming inpatient hospice and orders need to be re entered. MD made aware. Will medicate when orders received.

## 2023-02-10 NOTE — PROGRESS NOTES
[facility] Medicine Services  INPATIENT PROGRESS NOTE    Length of Stay: 1  Date of Admission: 2/9/2023  Primary Care Physician: Emile Wynn MD    Subjective   Chief Complaint: Shortness of air/persistent nausea.      HPI:     72-year-old male with COPD, metastatic lung cancer, chronic hypoxic respiratory failure (on 4 L nasal cannula at baseline), ALLEN on CPAP, CAD with previous stents, hypertension, diabetes, history of obstructive uropathy (with previous nephrostomy and ureteral stent) who was admitted for acute on chronic respiratory failure with hypoxia secondary to multifocal consolidation/moderate loculated right pleural effusion/COPD exacerbation.    He was extubated on 1/31/2023, less deconditioned, remains short of air and  his oxygen requirement is down to 4L nasal prongs with saturation in the low 90s. Right-sided chest tube was removed on 2/3/2023.  He has had multiple bowel movements, continues to have persistent nausea with decreased oral intake dating back to prior to admission.     Since admission patient's condition has deteriorated steadily, and by 2/9/2023 patient extremely hospice appropriate.  Patient subsequently discharged to inpatient hospice with medical team following patient after status change.  Patient currently on comfort care measures.    Daily assessment  2/10/2023 no new complaints.  Patient on comfort care measures.  Extremely lethargic, family present at bedside during time of exam by Dr. Blunt.    Review of Systems   Constitutional: Positive for activity change, appetite change and fatigue. Negative for chills, diaphoresis and fever.   HENT: Negative for trouble swallowing and voice change.    Eyes: Negative for photophobia and visual disturbance.   Respiratory: Positive for shortness of breath. Negative for cough, choking, chest tightness, wheezing and stridor.    Cardiovascular: Negative for chest pain, palpitations and leg  swelling.   Gastrointestinal: Positive for constipation and nausea. Negative for abdominal distention, abdominal pain, blood in stool, diarrhea and vomiting.   Endocrine: Negative for cold intolerance, heat intolerance, polydipsia, polyphagia and polyuria.   Genitourinary: Negative for decreased urine volume, difficulty urinating, dysuria, enuresis, flank pain, frequency, hematuria and urgency.   Musculoskeletal: Negative for arthralgias, gait problem, myalgias, neck pain and neck stiffness.   Skin: Negative for pallor, rash and wound.   Neurological: Negative for dizziness, tremors, seizures, syncope, facial asymmetry, speech difficulty, weakness, light-headedness, numbness and headaches.   Hematological: Does not bruise/bleed easily.   Psychiatric/Behavioral: Negative for agitation, behavioral problems and confusion.       Objective    Temp:  [96.6 °F (35.9 °C)-98.9 °F (37.2 °C)] 98.9 °F (37.2 °C)  Heart Rate:  [122-126] 122  Resp:  [20-22] 20  BP: (119-124)/(73-76) 119/76    AM-PAC 6 Clicks Score (PT): 6 (02/10/23 0811)         Physical Exam  Vitals and nursing note reviewed.   Constitutional:       General: He is not in acute distress.     Appearance: He is well-developed. He is obese. He is ill-appearing and toxic-appearing. He is not diaphoretic.      Comments: Lethargic, weak   HENT:      Head: Normocephalic and atraumatic.      Right Ear: Tympanic membrane normal.      Left Ear: Tympanic membrane normal.      Mouth/Throat:      Mouth: Mucous membranes are moist.      Pharynx: Oropharynx is clear.   Eyes:      General: No scleral icterus.        Right eye: No discharge.         Left eye: No discharge.      Extraocular Movements: Extraocular movements intact.      Pupils: Pupils are equal, round, and reactive to light.   Neck:      Thyroid: No thyromegaly.      Vascular: No carotid bruit or JVD.   Cardiovascular:      Rate and Rhythm: Normal rate and regular rhythm.      Heart sounds: Normal heart sounds. No  murmur heard.    No friction rub. No gallop.   Pulmonary:      Effort: Pulmonary effort is normal. No respiratory distress.      Breath sounds: No stridor. Examination of the right-lower field reveals decreased breath sounds. Decreased breath sounds and rhonchi present. No wheezing or rales.   Chest:      Chest wall: No tenderness.   Abdominal:      General: Bowel sounds are normal. There is no distension.      Palpations: Abdomen is soft. There is no mass.      Tenderness: There is no abdominal tenderness. There is no right CVA tenderness, left CVA tenderness, guarding or rebound.      Hernia: No hernia is present.   Musculoskeletal:         General: No swelling, tenderness or deformity.      Cervical back: Normal range of motion and neck supple.      Right lower leg: No edema.      Left lower leg: No edema.   Skin:     General: Skin is warm and dry.      Coloration: Skin is not jaundiced or pale.      Findings: No bruising, erythema, lesion or rash.   Neurological:      General: No focal deficit present.      Mental Status: He is alert and oriented to person, place, and time.      Cranial Nerves: No cranial nerve deficit.      Sensory: No sensory deficit.      Motor: No weakness or abnormal muscle tone.      Coordination: Coordination normal.      Gait: Gait normal.      Deep Tendon Reflexes: Reflexes normal.      Comments: .   Psychiatric:         Mood and Affect: Mood normal.         Behavior: Behavior normal.         Thought Content: Thought content normal.         Judgment: Judgment normal.           Medication Review:    Current Facility-Administered Medications:   •  [DISCONTINUED] acetaminophen (TYLENOL) tablet 650 mg, 650 mg, Oral, Q4H PRN **OR** acetaminophen (TYLENOL) 160 MG/5ML solution 650 mg, 650 mg, Oral, Q4H PRN **OR** acetaminophen (TYLENOL) suppository 650 mg, 650 mg, Rectal, Q4H PRN, Jorje Blunt MD  •  atropine 1 % ophthalmic solution 2 drop, 2 drop, Sublingual, BID PRN, Jorje Blunt MD  •   bisacodyl (DULCOLAX) suppository 10 mg, 10 mg, Rectal, Daily PRN, Jorje Blunt MD  •  carboxymethylcellulose (REFRESH PLUS) 0.5 % ophthalmic solution 1 drop, 1 drop, Both Eyes, Q30 Min PRN, Jorje Blunt MD  •  diphenoxylate-atropine (LOMOTIL) 2.5-0.025 MG per tablet 1 tablet, 1 tablet, Oral, Q2H PRN, Jorje Blunt MD  •  glycopyrrolate PF (ROBINUL) injection 0.2 mg, 0.2 mg, Intravenous, Q2H PRN, 0.2 mg at 02/10/23 0459 **OR** glycopyrrolate PF (ROBINUL) injection 0.2 mg, 0.2 mg, Subcutaneous, Q2H PRN **OR** glycopyrrolate PF (ROBINUL) injection 0.4 mg, 0.4 mg, Intravenous, Q2H PRN **OR** glycopyrrolate PF (ROBINUL) injection 0.4 mg, 0.4 mg, Subcutaneous, Q2H PRN, Jorje Blunt MD  •  haloperidol (HALDOL) tablet 1 mg, 1 mg, Oral, Q4H PRN **OR** haloperidol (HALDOL) 2 MG/ML solution 1 mg, 1 mg, Oral, Q4H PRN **OR** haloperidol lactate (HALDOL) injection 1 mg, 1 mg, Subcutaneous, Q4H PRN, Jorje Blunt MD  •  ipratropium-albuterol (DUO-NEB) nebulizer solution 3 mL, 3 mL, Nebulization, Q4H PRN, Jorje Blunt MD  •  Lidocaine Viscous HCl (XYLOCAINE) 2 % solution 5 mL, 5 mL, Mouth/Throat, Q4H PRN, Jorje Blunt MD  •  LORazepam (ATIVAN) tablet 0.5 mg, 0.5 mg, Oral, Q1H PRN **OR** LORazepam (ATIVAN) injection 0.5 mg, 0.5 mg, Intravenous, Q1H PRN **OR** LORazepam (ATIVAN) injection 0.5 mg, 0.5 mg, Intramuscular, Q1H PRN **OR** LORazepam (ATIVAN) injection 0.5 mg, 0.5 mg, Subcutaneous, Q1H PRN **OR** LORazepam (ATIVAN) 2 MG/ML concentrated solution 0.5 mg, 0.5 mg, Oral, Q1H PRN **OR** LORazepam (ATIVAN) 2 MG/ML concentrated solution 0.5 mg, 0.5 mg, Sublingual, Q1H PRN, Jorje Blunt MD  •  LORazepam (ATIVAN) tablet 1 mg, 1 mg, Oral, Q1H PRN **OR** LORazepam (ATIVAN) injection 1 mg, 1 mg, Intravenous, Q1H PRN **OR** LORazepam (ATIVAN) injection 1 mg, 1 mg, Intramuscular, Q1H PRN **OR** LORazepam (ATIVAN) injection 1 mg, 1 mg, Subcutaneous, Q1H PRN **OR** LORazepam (ATIVAN) 2 MG/ML concentrated solution 1 mg, 1 mg,  Oral, Q1H PRN **OR** LORazepam (ATIVAN) 2 MG/ML concentrated solution 1 mg, 1 mg, Sublingual, Q1H PRN, Jorje Blunt MD  •  LORazepam (ATIVAN) tablet 2 mg, 2 mg, Oral, Q1H PRN **OR** LORazepam (ATIVAN) injection 2 mg, 2 mg, Intravenous, Q1H PRN, 2 mg at 02/10/23 1552 **OR** LORazepam (ATIVAN) injection 2 mg, 2 mg, Intramuscular, Q1H PRN **OR** LORazepam (ATIVAN) injection 2 mg, 2 mg, Subcutaneous, Q1H PRN **OR** LORazepam (ATIVAN) 2 MG/ML concentrated solution 2 mg, 2 mg, Oral, Q1H PRN **OR** LORazepam (ATIVAN) 2 MG/ML concentrated solution 2 mg, 2 mg, Sublingual, Q1H PRN, Jorje Blunt MD  •  morphine injection 2 mg, 2 mg, Intravenous, Q1H PRN **OR** morphine concentrated solution 10 mg, 10 mg, Oral, Q1H PRN, Jorje Blunt MD  •  morphine injection 4 mg, 4 mg, Intravenous, Q1H PRN, 4 mg at 02/10/23 0811 **OR** morphine concentrated solution 10 mg, 10 mg, Oral, Q1H PRN, Jorje Blunt MD  •  morphine injection 6 mg, 6 mg, Intravenous, Q1H PRN, 6 mg at 02/10/23 1552 **OR** morphine concentrated solution 20 mg, 20 mg, Oral, Q1H PRN, Jorje Blunt MD  •  ondansetron (ZOFRAN) tablet 4 mg, 4 mg, Oral, Q6H PRN **OR** ondansetron (ZOFRAN) injection 4 mg, 4 mg, Intravenous, Q6H PRN, Jorje Blunt MD  •  sodium chloride nasal spray 1 spray, 1 spray, Each Nare, Q30 Min PRN, Jorje Blunt MD    Results Review:  I have reviewed the labs, radiology results, and diagnostic studies.    Laboratory Data:   Results from last 7 days   Lab Units 02/08/23  0745 02/07/23  0605 02/05/23  0636   SODIUM mmol/L 132* 134* 134*   POTASSIUM mmol/L 4.0 3.8 4.3   CHLORIDE mmol/L 94* 96* 96*   CO2 mmol/L 22.0 23.0 25.0   BUN mg/dL 53* 53* 51*   CREATININE mg/dL 1.82* 1.76* 1.72*   GLUCOSE mg/dL 195* 182* 140*   CALCIUM mg/dL 9.3 9.4 9.2   ANION GAP mmol/L 16.0* 15.0 13.0     Estimated Creatinine Clearance: 45.3 mL/min (A) (by C-G formula based on SCr of 1.82 mg/dL (H)).          Results from last 7 days   Lab Units 02/08/23  0731  02/07/23  0605 02/05/23  0636 02/04/23  0555   WBC 10*3/mm3 16.59* 13.81* 15.79* 18.81*   HEMOGLOBIN g/dL 14.1 14.0 13.7 14.9   HEMATOCRIT % 42.9 43.6 43.9 47.5   PLATELETS 10*3/mm3 113* 114* 144 154           Culture Data:   No results found for: BLOODCX  No results found for: URINECX  No results found for: RESPCX  No results found for: WOUNDCX  No results found for: STOOLCX  No components found for: BODYFLD    Radiology Data:   Imaging Results (Last 24 Hours)     ** No results found for the last 24 hours. **          ABG:        I have reviewed the patient's current medications.     Assessment/Plan     Hospital Problem List:  Principal Problem:    End of life care    Acute on chronic respiratory failure with hypoxia/sepsis (secondary to a combination of multifocal consolidation, COPD exacerbation right-sided pleural effusion):   - Patient is status postplacement of right-sided chest tube secondary to malignant pleural effusion and chest tube was removed on 2/3/2023.  He has completed a course of antimicrobial therapy and steroids chest x-ray done this morning showed worsening right upper lobe perihilar moderate size groundglass opacity and right lung base moderate size interstitial groundglass opacity.  Begin IV cefepime, continue noninvasive respiratory therapy and bronchodilators.  Blood and pleural fluid cultures showed no growth.  Pleural fluid cytology is positive for malignancy.  Patient has been determined not to require  placement of right-sided Pleurx catheter by CT surgery.  Input by CT surgery is appreciated.  -2/8/23 Case discussed with Dr. Lambert, pulmonary by Dr. Blunt.  Greatly appreciate Dr. Lambert help with this difficult case!  Patient needs correction/progression trend of effusion to be followed.  Patient currently on comfort care measures, and receiving as needed breathing treatments for dyspnea.    Metastatic lung cancer admitted 2/9/2023 to hospice inpatient:   -Patient is to follow-up with  his primary oncologist postdischarge.  -2/8/2023 spoke at length with oncology about patient, also spoke with patient and patient's wife about goals of care.  Is also present when Dr. Mcneal had goals of care discussion with patient and patient's family.  Family now accepting of comfort care status, and changing patient to hospice.  Family informed by oncology that patient not strong enough to survive chemo, and that hospice plan enrollment extremely appropriate.  - Patient currently on comfort care hospice measures.     Reactive leukocytosis secondary to resolving respiratory failure and metastatic lung cancer:  - No longer following patient's labs daily since patient now on hospice and comfort care.       Chronic kidney disease:   - No longer following patient's labs daily since patient now on hospice and comfort care.       Nephrolithiasis with bilateral stents:  - 2/8/2023 patient and patient's wife states that patient has history of nephrolithiasis with stents placed in [place] by urologist Dr. Avalos.  Also states that stents were scheduled to come out today.  Case discussed with Dr. Horne, who will evaluate patient today for possible stent removal.  -2/9/2023 urology consult canceled yesterday when patient decided on comfort care measures and hospice enrollment.        Diabetes mellitus with possible diabetic induced gastroparesis (complicated by refractory nausea): Glycemic control is adequate.  Begin Reglan and consult GI.  He may require endoscopy evaluation if the need arises.  Continue Accu-Cheks and sliding scale insulin.   -2/9/2023 endoscopic evaluation discontinued after patient decided to enroll in comfort care.     Intractable nausea/vomiting possibly due to diabetic gastroparesis:  - As above and diabetic/gastroparesis section.  Gastroenterology still awaiting stabilization of patient's respiratory status before considering EGD/colonoscopy evaluation.  - Currently comfort care measures, so  receiving as needed Zofran for antiemetic needs.        Constipation: Resolved.       Obstructive sleep apnea: Patient currently on comfort care measures receiving oxygen and breathing treatments as needed     Deconditioning: Continue PT and OT.       Continue GI and DVT prophylaxis.     Medical Decision Making  Number and Complexity of problems: 5 with high complexities.        Conditions and Status: Hemodynamically stable.             MDM Data  External documents reviewed: Not applicable.     Decision rules/scores evaluated (example [id number], [place], etc): Not applicable.     Discussed with: Patient, his wife and nursing staff.     Treatment Plan: As detailed above.     Care Planning  Shared decision making: Discussed with patient and his wife.  Code status and discussions: Full code.     Disposition  Patient has life expectancy of days to weeks.  Patient admitted to inpatient hospice care 2/9/2023.     I confirmed that the patient's Advance Care Plan is present, code status is documented, or surrogate decision maker is listed in the patient's medical record.      I have utilized all available immediate resources to obtain, update, or review the patient's current medications        Discharge Planning: In progress.  Jorje Blunt MD   02/10/23   17:01 CST

## 2023-02-11 NOTE — PLAN OF CARE
Goal Outcome Evaluation:              Outcome Evaluation: Pt on hospice care; family at bedside, monitoring for signs/symptoms of increasing pain and discomfort. medications administered overnight

## 2023-02-11 NOTE — DISCHARGE SUMMARY
[facility] Medicine Services  DISCHARGE SUMMARY       Date of Admission: 2023  Date of Discharge:  2023  Primary Care Physician: Emile Wynn MD    Presenting Problem/History of Present Illness:  End of life care [Z51.5]   Patient with metastatic lung cancer admitted to hospice after various discussions with oncologist, cardiothoracic surgery, pulmonology, and medical team.  Patient has become progressively weaker over the past week with life expectancy of days to weeks.  Patient and family have currently accepted comfort care measures.    Final Discharge Diagnoses:  Active Hospital Problems    Diagnosis    • **End of life care        Consults:   Consults     Date and Time Order Name Status Description    2023 12:02 PM Hematology & Oncology Inpatient Consult Completed     2023  9:47 AM Inpatient Gastroenterology Consult Completed     2023  9:17 AM Inpatient Cardiothoracic Surgery Consult Completed           Procedures Performed:                 Pertinent Test Results:   Lab Results (most recent)     None        Imaging Results (Most Recent)     None          Chief Complaint on Day of Discharge: Patient  on comfort care measures    Hospital Course:  The patient is a 72 y.o. male who presented to [facility] with metastatic lung cancer admitted to inpatient hospice for comfort care measures.  Patient Comfortable without discomfort.  Patient  2023 at 7:40 AM.  Patient's family present.      Condition on Discharge:  fair    Physical Exam on Discharge:  /76 (BP Location: Right arm, Patient Position: Sitting)   Pulse (!) 122   Temp 98.9 °F (37.2 °C) (Temporal)   Resp 16   SpO2 90%   Physical Exam  Physical Exam  Vitals and nursing note reviewed.   Constitutional:       General: He is not in acute distress.     Appearance: He is well-developed. He is obese. He is ill-appearing and toxic-appearing. He  is not diaphoretic.      Comments: Lethargic, weak   HENT:      Head: Normocephalic and atraumatic.      Right Ear: Tympanic membrane normal.      Left Ear: Tympanic membrane normal.      Mouth/Throat:      Mouth: Mucous membranes are moist.      Pharynx: Oropharynx is clear.   Eyes:      General: No scleral icterus.        Right eye: No discharge.         Left eye: No discharge.      Extraocular Movements: Extraocular movements intact.      Pupils: Pupils are equal, round, and reactive to light.   Neck:      Thyroid: No thyromegaly.      Vascular: No carotid bruit or JVD.   Cardiovascular:      Rate and Rhythm: Normal rate and regular rhythm.      Heart sounds: Normal heart sounds. No murmur heard.    No friction rub. No gallop.   Pulmonary:      Effort: Pulmonary effort is normal. No respiratory distress.      Breath sounds: No stridor. Examination of the right-lower field reveals decreased breath sounds. Decreased breath sounds and rhonchi present. No wheezing or rales.   Chest:      Chest wall: No tenderness.   Abdominal:      General: Bowel sounds are normal. There is no distension.      Palpations: Abdomen is soft. There is no mass.      Tenderness: There is no abdominal tenderness. There is no right CVA tenderness, left CVA tenderness, guarding or rebound.      Hernia: No hernia is present.   Musculoskeletal:         General: No swelling, tenderness or deformity.      Cervical back: Normal range of motion and neck supple.      Right lower leg: No edema.      Left lower leg: No edema.   Skin:     General: Skin is warm and dry.      Coloration: Skin is not jaundiced or pale.      Findings: No bruising, erythema, lesion or rash.   Neurological:      General: No focal deficit present.      Mental Status: He is alert and oriented to person, place, and time.      Cranial Nerves: No cranial nerve deficit.      Sensory: No sensory deficit.      Motor: No weakness or abnormal muscle tone.      Coordination:  Coordination normal.      Gait: Gait normal.      Deep Tendon Reflexes: Reflexes normal.      Comments: .   Psychiatric:         Mood and Affect: Mood normal.         Behavior: Behavior normal.         Thought Content: Thought content normal.         Judgment: Judgment normal.     Discharge Disposition:      Discharge Medications:     Discharge Medications      Continue These Medications      Instructions Start Date   ONE TOUCH LANCETS misc   Use as directed test 6 times daily         ASK your doctor about these medications      Instructions Start Date   albuterol sulfate  (90 Base) MCG/ACT inhaler  Commonly known as: PROVENTIL HFA;VENTOLIN HFA;PROAIR HFA   2 puffs, Inhalation, Every 4 Hours PRN      albuterol (2.5 MG/3ML) 0.083% nebulizer solution  Commonly known as: PROVENTIL   2.5 mg, Nebulization, Every 4 Hours PRN      allopurinol 300 MG tablet  Commonly known as: ZYLOPRIM   No dose, route, or frequency recorded.      aspirin 81 MG EC tablet   81 mg, Oral, Daily, Last dose 18      cetirizine 10 MG tablet  Commonly known as: zyrTEC   10 mg, Oral, Daily      clopidogrel 75 MG tablet  Commonly known as: PLAVIX   75 mg, Oral, Daily      finasteride 5 MG tablet  Commonly known as: PROSCAR   5 mg, Oral, Nightly      FLUoxetine 20 MG capsule  Commonly known as: PROzac   20 mg, Oral, Daily      fluticasone 50 MCG/ACT nasal spray  Commonly known as: FLONASE   2 sprays, Nasal, Nightly PRN      glimepiride 2 MG tablet  Commonly known as: AMARYL   TAKE 1 TABLET BY MOUTH IN THE MORNING WITH FIRST MEAL OF THE DAY      glucose blood test strip   One touch reli-on glucometer      guaiFENesin 600 MG 12 hr tablet  Commonly known as: MUCINEX   600 mg, Oral, 2 Times Daily      isosorbide mononitrate 30 MG 24 hr tablet  Commonly known as: Imdur   30 mg, Oral, Daily      levothyroxine 50 MCG tablet  Commonly known as: Synthroid   50 mcg, Oral, Daily      Mirabegron ER 50 MG tablet sustained-release 24 hour 24 hr  tablet  Commonly known as: MYRBETRIQ   50 mg, Oral, Daily      nicotine 21 MG/24HR patch  Commonly known as: NICODERM CQ   1 patch, Transdermal, Every 24 Hours      nitroglycerin 0.4 MG SL tablet  Commonly known as: NITROSTAT   0.4 mg, Sublingual, Every 5 Minutes PRN      Potassium Citrate ER 15 MEQ (1620 MG) tablet controlled-release   2 tablets, Oral, Daily      tamsulosin 0.4 MG capsule 24 hr capsule  Commonly known as: FLOMAX   0.8 mg, Oral, Daily      traMADol 50 MG tablet  Commonly known as: ULTRAM   50 mg, Oral, Every 6 Hours PRN, for pain      traZODone 150 MG tablet  Commonly known as: DESYREL   150 mg, Oral, Nightly      vitamin B-12 1000 MCG tablet  Commonly known as: CYANOCOBALAMIN   1,000 mcg, Oral, Daily             Discharge Diet: pt  on hospice     Activity at Discharge:  pt  on hospice     Discharge Care Plan/Instructions: pt  on hospice     Follow-up Appointments:   Future Appointments   Date Time Provider Department Center   2023 To Be Determined Mana Moe RN [facility]   2023  3:30 PM Asia Guaman APRN [facility]   2023 11:30 AM Homero Hargrove MD [facility] None       Test Results Pending at Discharge:                 Time: 740

## 2023-02-11 NOTE — HOSPICE
On arrival discussed patient and family with  leaving room.  Starting discussing patient with family to understand history better and current symptoms.  Reviewed symptoms observed in relation to blue book and patients imminent condition.  During discussion allowed and encouraged family to ask questions and explained typical way symptoms managed but stressed that each individual reacts differently to medication and disease process so to communicate with the staff present and then call hospice if unable to get information needed or understood from staff or not satisfied with plan of care.  Talked with family that goal is to have patient comfortable enough that if tired he can rest but also arousable from the medication as the disease process and not eating will eventually lead to patient being nonresponsive.  Family voiced understanding.  Talked with nurse caring for the patient and discussed symptoms and treatment and encouraged staff to call Hospice if changes, concerns, or family with questions.

## 2023-02-12 NOTE — DISCHARGE SUMMARY
[facility] Medicine Services  DISCHARGE SUMMARY       Date of Admission: 1/26/2023  Date of Discharge:  02/09/23  Primary Care Physician: Emile Wynn MD    Presenting Problem/History of Present Illness:  Lung mass [R91.8]  Acute on chronic respiratory failure with hypoxia (HCC) [J96.21]  Pneumonia of right lung due to infectious organism, unspecified part of lung [J18.9]   72-year-old male with COPD, metastatic lung cancer, chronic hypoxic respiratory failure on 2 L nasal cannula at baseline, ALLEN on CPAP, CAD with previous stents, hypertension, diabetes, history of obstructive uropathy with previous nephrostomy and ureteral stent, presented to the ED via EMS in respiratory distress.  Developed severe worsening shortness of breath with cough and wheezing despite outpatient treatment for COPD exacerbation several days ago when he saw a pulmonologist.  He has also been seen outpatient by hematology/oncology.  Previously scheduled for bronchoscopy and biopsy, but yet to have this done.       In the ED, he was also found to have sinus tachycardia with leukocytosis and evidence of pneumonia with multifocal consolidations, as well as moderate loculated right pleural effusion on CT chest, but no evidence of PE.  Noted mediastinotomy adenopathy and right axillary adenopathy.  Evidence of liver metastasis on previous imaging.     Due to respiratory distress with increased work of breathing, patient required initiation of BiPAP for respiratory support.       Final Discharge Diagnoses:  Active Hospital Problems    Diagnosis    • **Acute on chronic respiratory failure with hypoxia (HCC)    • Sepsis due to pneumonia (HCC)    • Loculated pleural effusion    • Mass of right lung        Consults:   Consults     Date and Time Order Name Status Description    2/8/2023 12:02 PM Hematology & Oncology Inpatient Consult Completed     2/7/2023  9:47 AM Inpatient Gastroenterology  Consult Completed     1/27/2023  9:17 AM Inpatient Cardiothoracic Surgery Consult Completed           Procedures Performed:                 Pertinent Test Results:   Lab Results (most recent)     Procedure Component Value Units Date/Time    POC Glucose Once [[id number]]  (Abnormal) Collected: 02/08/23 1557    Specimen: Blood Updated: 02/09/23 0544     Glucose 174 mg/dL      Comment: RN NotifiedOperator: [id number] PARDEEP Cisse ID: [id number]       POC Glucose Once [[id number]]  (Abnormal) Collected: 02/08/23 0632    Specimen: Blood Updated: 02/09/23 0544     Glucose 173 mg/dL      Comment: RN NotifiedOperator: [id number] CHARI Jeffers ID: [id number]       Manual Differential [[id number]]  (Abnormal) Collected: 02/08/23 0745    Specimen: Blood Updated: 02/08/23 0926     Neutrophil % 83.0 %      Lymphocyte % 8.0 %      Monocyte % 5.0 %      Bands %  4.0 %      Neutrophils Absolute 14.43 10*3/mm3      Lymphocytes Absolute 1.33 10*3/mm3      Monocytes Absolute 0.83 10*3/mm3      Anisocytosis Slight/1+     WBC Morphology Normal     Platelet Estimate Decreased    Basic Metabolic Panel [[id number]]  (Abnormal) Collected: 02/08/23 0745    Specimen: Blood Updated: 02/08/23 0857     Glucose 195 mg/dL      BUN 53 mg/dL      Creatinine 1.82 mg/dL      Sodium 132 mmol/L      Potassium 4.0 mmol/L      Chloride 94 mmol/L      CO2 22.0 mmol/L      Calcium 9.3 mg/dL      BUN/Creatinine Ratio 29.1     Anion Gap 16.0 mmol/L      eGFR 39.5 mL/min/1.73     Narrative:      GFR Normal >60  Chronic Kidney Disease <60  Kidney Failure <15      CBC & Differential [[id number]]  (Abnormal) Collected: 02/08/23 0745    Specimen: Blood Updated: 02/08/23 0839    Narrative:      The following orders were created for panel order CBC & Differential.  Procedure                               Abnormality         Status                     ---------                               -----------         ------                     CBC Auto  Differential[[id number]]        Abnormal            Final result               Scan Slide[[id number]]                                                                    Please view results for these tests on the individual orders.    CBC Auto Differential [[id number]]  (Abnormal) Collected: 02/08/23 0745    Specimen: Blood Updated: 02/08/23 0839     WBC 16.59 10*3/mm3      RBC 5.31 10*6/mm3      Hemoglobin 14.1 g/dL      Hematocrit 42.9 %      MCV 80.8 fL      MCH 26.6 pg      MCHC 32.9 g/dL      RDW 19.1 %      RDW-SD 51.3 fl      MPV 11.0 fL      Platelets 113 10*3/mm3     Basic Metabolic Panel [[id number]]  (Abnormal) Collected: 02/07/23 0605    Specimen: Blood Updated: 02/07/23 0634     Glucose 182 mg/dL      BUN 53 mg/dL      Creatinine 1.76 mg/dL      Sodium 134 mmol/L      Potassium 3.8 mmol/L      Chloride 96 mmol/L      CO2 23.0 mmol/L      Calcium 9.4 mg/dL      BUN/Creatinine Ratio 30.1     Anion Gap 15.0 mmol/L      eGFR 41.1 mL/min/1.73     Narrative:      GFR Normal >60  Chronic Kidney Disease <60  Kidney Failure <15      CBC & Differential [[id number]]  (Abnormal) Collected: 02/07/23 0605    Specimen: Blood Updated: 02/07/23 0615    Narrative:      The following orders were created for panel order CBC & Differential.  Procedure                               Abnormality         Status                     ---------                               -----------         ------                     CBC Auto Differential[[id number]]        Abnormal            Final result               Scan Slide[[id number]]                                                                    Please view results for these tests on the individual orders.    CBC Auto Differential [[id number]]  (Abnormal) Collected: 02/07/23 0605    Specimen: Blood Updated: 02/07/23 0615     WBC 13.81 10*3/mm3      RBC 5.33 10*6/mm3      Hemoglobin 14.0 g/dL      Hematocrit 43.6 %      MCV 81.8 fL      MCH 26.3 pg      MCHC 32.1 g/dL      RDW 18.7 %       RDW-SD 50.2 fl      MPV 10.8 fL      Platelets 114 10*3/mm3      Neutrophil % 74.4 %      Lymphocyte % 12.1 %      Monocyte % 8.0 %      Eosinophil % 0.1 %      Basophil % 0.5 %      Immature Grans % 4.9 %      Neutrophils, Absolute 10.29 10*3/mm3      Lymphocytes, Absolute 1.67 10*3/mm3      Monocytes, Absolute 1.10 10*3/mm3      Eosinophils, Absolute 0.01 10*3/mm3      Basophils, Absolute 0.07 10*3/mm3      Immature Grans, Absolute 0.67 10*3/mm3      nRBC 0.7 /100 WBC     Manual Differential [[id number]]  (Abnormal) Collected: 23    Specimen: Blood Updated: 23 0744     Neutrophil % 70.0 %      Lymphocyte % 18.0 %      Monocyte % 10.0 %      Eosinophil % 2.0 %      Neutrophils Absolute 9.72 10*3/mm3      Lymphocytes Absolute 2.50 10*3/mm3      Monocytes Absolute 1.39 10*3/mm3      Eosinophils Absolute 0.28 10*3/mm3      Anisocytosis Slight/1+     WBC Morphology Normal     Platelet Estimate Adequate    Body Fluid Culture - Body Fluid, Pleural Cavity [[id number]] Collected: 23 120    Specimen: Body Fluid from Pleural Cavity Updated: 23 1216     Body Fluid Culture No growth at 5 days     Gram Stain Rare (1+) WBCs seen      No organisms seen    [facility] CYTOLOGY, [facility] (GRETA,COR,MAD,NIC) [[id number]] Collected: 23 1205    Specimen: Body Fluid Updated: 23 0910     Reference Lab Report --     [facility]  [address] [place] [address]  Phone: [phone] or [phone]  Fax: [phone]  Darius Orosco M.D., Medical Director    PATIENT NAME                                 LABORATORY NO.  JLUIS FAM.                         [id number]  [id number]                                     AGE                SEX     N            CLIENT REF #  [facility]                       72       1950          [id number]    [id number]  Harmon                                   REQUESTING M.D.       ATTENDING M.D..         COPY TO..  [address] [place] [id number]                         JABARI  DATE COLLECTED        DATE RECEIVED          DATE REPORTED  01/28/2023 01/30/2023 02/02/2023    DIAGNOSIS:  PLEURAL FLUID, RIGHT:  Malignant    MICROSCOPIC DESCRIPTION:  Metastatic small cell carcinoma.    Professional interpretation rendered by Aj Valero M.D., RUDY at [facility], [address] [place] [address].    COMMENT:  To confirm the diagnosis, immunohistochemical stains with good controls are  performed.  The specimen is positive for TTF-1, Synaptophysin, CD56, and  EpCam, with background calretinin positive mesothelial cells.  P40 is negative.    CLINICAL HISTORY:  Lung mass    SPECIMENS SUBMITTED:  PLEURAL FLUID, RIGHT    GROSS SPECIMEN DESCRIPTION:  50cc of cloudy yellow fluid with visible sediment in fixative  Cell block has been examined.    REVIEWED, DIAGNOSED AND ELECTRONICALLY  SIGNED BY:    Aj Valero M.D., ESHA.  CPT CODES:  99978, 47444, 88341x5, 76810      Extra Tubes [[id number]] Collected: 02/01/23 0703    Specimen: Blood, Venous Line Updated: 02/01/23 0815    Narrative:      The following orders were created for panel order Extra Tubes.  Procedure                               Abnormality         Status                     ---------                               -----------         ------                     Lavender Top[[id number]]                                     Final result                 Please view results for these tests on the individual orders.    Lavender Top [[id number]] Collected: 02/01/23 0703    Specimen: Blood Updated: 02/01/23 0815     Extra Tube hold for add-on     Comment: Auto resulted       aPTT [[id number]]  (Normal) Collected: 02/01/23 0412    Specimen: Blood Updated: 02/01/23 0430     PTT 32.6 seconds     Narrative:      The recommended Heparin therapeutic range is 68-97 seconds.    Protime-INR  [[id number]]  (Abnormal) Collected: 02/01/23 0412    Specimen: Blood Updated: 02/01/23 0430     Protime 15.4 Seconds      INR 1.23    Narrative:      Therapeutic range for most indications is 2.0-3.0 INR,  or 2.5-3.5 for mechanical heart valves.    Blood Culture - Blood, Hand, Left [[id number]]  (Normal) Collected: 01/26/23 2019    Specimen: Blood from Hand, Left Updated: 01/31/23 2030     Blood Culture No growth at 5 days    Blood Culture - Blood, Arm, Left [[id number]]  (Normal) Collected: 01/26/23 1620    Specimen: Blood from Arm, Left Updated: 01/31/23 1631     Blood Culture No growth at 5 days    Blood Gas, Arterial - [[id number]]  (Abnormal) Collected: 01/31/23 0742    Specimen: Arterial Blood Updated: 01/31/23 0752     Site Right Radial     Tre's Test N/A     pH, Arterial 7.420 pH units      pCO2, Arterial 44.2 mm Hg      pO2, Arterial 74.2 mm Hg      Comment: 84 Value below reference range        HCO3, Arterial 28.6 mmol/L      Comment: 83 Value above reference range        Base Excess, Arterial 3.5 mmol/L      Comment: 83 Value above reference range        O2 Saturation, Arterial 95.2 %      Barometric Pressure for Blood Gas 756 mmHg      Modality Ventilator     FIO2 30 %      Ventilator Mode PSV     PEEP 5.0     PSV 8.0 cmH2O      Collected by      Comment: Meter: [id number]     :  [id number]       Comprehensive Metabolic Panel [[id number]]  (Abnormal) Collected: 01/31/23 0538    Specimen: Blood Updated: 01/31/23 0615     Glucose 173 mg/dL      BUN 54 mg/dL      Creatinine 1.71 mg/dL      Sodium 136 mmol/L      Potassium 5.3 mmol/L      Comment: Slight hemolysis detected by analyzer. Results may be affected.        Chloride 102 mmol/L      CO2 26.0 mmol/L      Calcium 8.6 mg/dL      Total Protein 5.8 g/dL      Albumin 3.0 g/dL      ALT (SGPT) 44 U/L      AST (SGOT) 40 U/L      Comment: Slight hemolysis detected by analyzer. Results may be affected.        Alkaline Phosphatase 96 U/L      Total  Bilirubin 0.4 mg/dL      Globulin 2.8 gm/dL      A/G Ratio 1.1 g/dL      BUN/Creatinine Ratio 31.6     Anion Gap 8.0 mmol/L      eGFR 42.5 mL/min/1.73     Narrative:      GFR Normal >60  Chronic Kidney Disease <60  Kidney Failure <15      Manual Differential (Lab) [[id number]]  (Abnormal) Collected: 01/30/23 0442    Specimen: Blood Updated: 01/30/23 0727     Neutrophil % 78.0 %      Lymphocyte % 16.0 %      Monocyte % 6.0 %      Neutrophils Absolute 8.89 10*3/mm3      Lymphocytes Absolute 1.82 10*3/mm3      Monocytes Absolute 0.68 10*3/mm3      Anisocytosis Slight/1+     Dacrocytes Slight/1+     Hypochromia Slight/1+     Ovalocytes Slight/1+     WBC Morphology Normal     Platelet Estimate Adequate    Comprehensive Metabolic Panel [[id number]]  (Abnormal) Collected: 01/30/23 0442    Specimen: Blood Updated: 01/30/23 0548     Glucose 106 mg/dL      BUN 40 mg/dL      Creatinine 1.73 mg/dL      Sodium 137 mmol/L      Potassium 5.0 mmol/L      Comment: Slight hemolysis detected by analyzer. Results may be affected.        Chloride 99 mmol/L      CO2 28.0 mmol/L      Calcium 9.1 mg/dL      Total Protein 6.0 g/dL      Albumin 3.1 g/dL      ALT (SGPT) 47 U/L      AST (SGOT) 40 U/L      Alkaline Phosphatase 98 U/L      Total Bilirubin 0.3 mg/dL      Globulin 2.9 gm/dL      A/G Ratio 1.1 g/dL      BUN/Creatinine Ratio 23.1     Anion Gap 10.0 mmol/L      eGFR 41.9 mL/min/1.73     Narrative:      GFR Normal >60  Chronic Kidney Disease <60  Kidney Failure <15      Blood Gas, Arterial - [[id number]]  (Abnormal) Collected: 01/29/23 1551    Specimen: Arterial Blood Updated: 01/29/23 1551     Site Arterial Line     Tre's Test N/A     pH, Arterial 7.405 pH units      pCO2, Arterial 43.7 mm Hg      pO2, Arterial 85.2 mm Hg      HCO3, Arterial 27.4 mmol/L      Comment: 83 Value above reference range        Base Excess, Arterial 2.2 mmol/L      Comment: 83 Value above reference range        O2 Saturation, Arterial 97.6 %      Barometric  Pressure for Blood Gas 750 mmHg      Modality Room Air     Ventilator Mode NA     Comment: Meter: [id number]     :  [id number]       Potassium [[id number]]  (Normal) Collected: 01/27/23 1750    Specimen: Blood Updated: 01/27/23 1826     Potassium 4.9 mmol/L      Comment: Slight hemolysis detected by analyzer. Results may be affected.       Protime-INR [[id number]]  (Normal) Collected: 01/27/23 0400    Specimen: Blood Updated: 01/27/23 0644     Protime 14.9 Seconds      INR 1.17    Narrative:      Therapeutic range for most indications is 2.0-3.0 INR,  or 2.5-3.5 for mechanical heart valves.    MRSA Screen, PCR (Inpatient) - Swab, Nares [[id number]]  (Normal) Collected: 01/26/23 2007    Specimen: Swab from Nares Updated: 01/27/23 0554     MRSA, PCR Negative    Narrative:      Performed by real-time polymerase chain reaction (qPCR).  The negative predictive value of this diagnostic test is high and should only be used to consider de-escalating anti-MRSA therapy. A positive result may indicate colonization with MRSA and must be correlated clinically.    STAT Lactic Acid, Reflex [[id number]]  (Abnormal) Collected: 01/27/23 0400    Specimen: Blood Updated: 01/27/23 0503     Lactate 3.8 mmol/L     Extra Tubes [[id number]] Collected: 01/27/23 0400    Specimen: Blood, Venous Line Updated: 01/27/23 0501    Narrative:      The following orders were created for panel order Extra Tubes.  Procedure                               Abnormality         Status                     ---------                               -----------         ------                     Lavender Top[[id number]]                                     Final result               Green Top (Gel)[[id number]]                                  Final result               Light Blue Top[[id number]]                                   Final result                 Please view results for these tests on the individual orders.    Lavender Top [[id number]] Collected:  01/27/23 0400    Specimen: Blood Updated: 01/27/23 0501     Extra Tube hold for add-on     Comment: Auto resulted       Green Top (Gel) [[id number]] Collected: 01/27/23 0400    Specimen: Blood Updated: 01/27/23 0501     Extra Tube Hold for add-ons.     Comment: Auto resulted.       Light Blue Top [[id number]] Collected: 01/27/23 0400    Specimen: Blood Updated: 01/27/23 0501     Extra Tube Hold for add-ons.     Comment: Auto resulted       STAT Lactic Acid, Reflex [[id number]]  (Abnormal) Collected: 01/27/23 0116    Specimen: Blood Updated: 01/27/23 0359     Lactate 4.1 mmol/L     Blunt Top [[id number]] Collected: 01/26/23 1540    Specimen: Blood Updated: 01/26/23 1945     Extra Tube Hold for add-ons.     Comment: Auto resulted.       [place] Blood Culture Bottle Set [[id number]] Collected: 01/26/23 1552    Specimen: Blood Updated: 01/26/23 1700     Extra Tube Hold for add-ons.     Comment: Auto resulted.       Gold Top - SST [[id number]] Collected: 01/26/23 1540    Specimen: Blood Updated: 01/26/23 1645     Extra Tube Hold for add-ons.     Comment: Auto resulted.       Light Blue Top [[id number]] Collected: 01/26/23 1540    Specimen: Blood Updated: 01/26/23 1645     Extra Tube Hold for add-ons.     Comment: Auto resulted       Lactic Acid, Plasma [[id number]]  (Abnormal) Collected: 01/26/23 1540    Specimen: Blood Updated: 01/26/23 1629     Lactate 4.7 mmol/L     Troponin [[id number]]  (Normal) Collected: 01/26/23 1518    Specimen: Blood Updated: 01/26/23 1610     Troponin T <0.010 ng/mL     Narrative:      Troponin T Reference Range:  <= 0.03 ng/mL-   Negative for AMI  >0.03 ng/mL-     Abnormal for myocardial necrosis.  Clinicians would have to utilize clinical acumen, EKG, Troponin and serial changes to determine if it is an Acute Myocardial Infarction or myocardial injury due to an underlying chronic condition.       Results may be falsely decreased if patient taking Biotin.      BNP [[id number]]  (Abnormal) Collected:  "01/26/23 1518    Specimen: Blood Updated: 01/26/23 1608     proBNP 947.7 pg/mL     Narrative:      Among patients with dyspnea, NT-proBNP is highly sensitive for the detection of acute congestive heart failure. In addition NT-proBNP of <300 pg/ml effectively rules out acute congestive heart failure with 99% negative predictive value.    Results may be falsely decreased if patient taking Biotin.      COVID-19 and FLU A/B PCR - Swab, Nasopharynx [[id number]]  (Normal) Collected: 01/26/23 1524    Specimen: Swab from Nasopharynx Updated: 01/26/23 1607     COVID19 Not Detected     Influenza A PCR Not Detected     Influenza B PCR Not Detected    Narrative:      Fact sheet for providers: https://www.fda.gov/media/672826/download    Fact sheet for patients: https://www.fda.gov/media/985709/download    Test performed by PCR.    D-dimer, Quantitative [[id number]]  (Abnormal) Collected: 01/26/23 1552    Specimen: Blood Updated: 01/26/23 1604     D-Dimer, Quantitative 2,700 ng/mL (FEU)     Narrative:      According to the assay 's published package insert, a normal (<500 ng/mL (FEU)) D-dimer result in conjunction with a non-high clinical probability assessment, excludes deep vein thrombosis (DVT) and pulmonary embolism (PE) with high sensitivity.    D-dimer values increase with age and this can make VTE exclusion of an older population difficult. To address this, the [employer], based on best available evidence and recent guidelines, recommends that clinicians use age-adjusted D-dimer thresholds in patients greater than 50 years of age with: a) a low probability of PE who do not meet all Pulmonary Embolism Rule Out Criteria, or b) in those with intermediate probability of PE.   The formula for an age-adjusted D-dimer cut-off is \"age*10\".  For example, a 60 year old patient would have an age-adjusted cut-off of 600 ng/mL (FEU) and an 80 year old 800 ng/mL (FEU).          Imaging Results (Most " Recent)     Procedure Component Value Units Date/Time    XR Chest 1 View [[id number]] Collected: 02/07/23 0943     Updated: 02/07/23 1017    Narrative:        PROCEDURE: Single chest view portable    REASON FOR EXAM:SOA, J96.21 Acute and chronic respiratory failure  with hypoxia R91.8 Other nonspecific abnormal finding of lung  field J18.9 Pneumonia, unspecified organism Z74.09 Other reduced  mobility Z74.09 Other reduced mobility Z78.9 Other specified  health status R13.11 Dysphagia, oral phase    FINDINGS: Comparison exam dated February 6, 2023. Left PICC line  with tip seen within the region of the left axillary vein.  Cardiac size appears within normal limits. Worsening right upper  lobe perihilar moderate size groundglass opacity. Right lung base  moderate size interstitial groundglass opacity. Lungs are  otherwise clear.. Pleural spaces are normal. No acute osseous  abnormality.      Impression:      1.  Worsening right upper lobe perihilar moderate size  groundglass opacity. Right lung base moderate size interstitial  groundglass opacity. These opacities would be suspicious for  worsening pneumonia and/or atypical pulmonary edema.  2.  Left PICC line with tip seen within the region of the left  axillary vein.    Electronically signed by:  Brant Bacon MD  2/7/2023 10:15 AM CST  Workstation: [id number]    XR Abdomen KUB [[id number]] Collected: 02/06/23 0935     Updated: 02/06/23 1655    Narrative:      PROCEDURE: Single view abdomen/KUB x-ray    COMPARISON: CT abdomen and pelvis dated 3/3/2022    HISTORY: rule out fecal impaction, J96.21 Acute and chronic  respiratory failure with hypoxia R91.8 Other nonspecific abnormal  finding of lung field J18.9 Pneumonia, unspecified organism  Z74.09 Other reduced mobility Z74.09 Other reduced mobility Z78.9  Other specified health status R13.11 Dysphagia, oral phase    TECHNIQUE: Single frontal view of the abdomen and pelvis.     FINDINGS:  There are calcifications in the  region of the left kidney.  Calcifications in the low pelvis over the right side of the pubic  symphysis are of uncertain significance, likely in the prostate.  There are bilateral ureteral stents. The left ureteral stent is  slightly uncoiled superiorly.  Stabilization rods and pedicle screws are noted bilaterally in  the lower lumbar spine.  There are linear opacities in the region of the prostate.  There are surgical clips in the right upper abdomen.  There is fecal debris and bowel gas in nondilated colon.  Fecal impaction cannot be excluded on this exam. CT would be more  sensitive.  Nonobstructive small bowel gas pattern.      Impression:      There does appear to be stool throughout the colon and in the  rectum.  Fecal impaction cannot be excluded on this exam. CT would be more  sensitive.  There are bilateral ureteral stents. The left ureteral stent is  slightly uncoiled superiorly.  There are calcifications in the region of the left kidney.  Calcifications in the low pelvis over the right side of the pubic  symphysis are of uncertain significance, likely in the prostate.    Electronically signed by:  Tyler Dewitt MD  2/6/2023 4:53 PM CST  Workstation: [id number]    XR Chest 2 View [[id number]] Collected: 02/06/23 0630     Updated: 02/06/23 1003    Narrative:      EXAM: XR CHEST 2 VIEWS     HISTORY: Pleural Effusion follow up, J96.21 Acute and chronic  respiratory failure with hypoxia R91.8 Other nonspecific abnormal  finding of lung field J18.9 Pneumonia, unspecified organism  Z74.09 Other reduced mobility Z74.09 Other reduced mobility Z78.9  Other specified health status R13.11 Dysphagia, oral phase.    COMPARISON: February 3, 2023     FINDINGS:    Heart: Normal size.     Mediastinum: Prominent right hilar contour . Mediastinal  adenopathy    Lungs: Right chest tube has been removed. No visualized  pneumothorax. Small-to-moderate right effusion. Extensive right  perihilar opacities are unchanged.  Increased interstitial  markings.    Bones: Osseous demineralization.    Abdomen: Visualized upper abdomen is unremarkable.      Impression:        Interval removal of the right chest tube. No visualized  pneumothorax.    Small-to-moderate right effusion    Mediastinal adenopathy    Right hilar opacity may represent pneumonia or malignancy.     Diffuse right perihilar opacities may represent postobstructive  pneumonitis. Continued follow-up recommended.    Electronically signed by:  Macho Simmons DO  2/6/2023 10:01 AM  CST Workstation: [id number]    XR Chest 1 View [[id number]] Collected: 02/03/23 0958     Updated: 02/03/23 1126    Narrative:        PORTABLE CHEST    HISTORY: Follow-up right effusion. Acute and chronic respiratory  failure with hypoxia. Pneumonia.    Portable AP semierect upright film of the chest was obtained at  10:04 AM.  COMPARISON: January 31, 2023    FINDINGS:   EKG leads.  Right thoracotomy catheter remains in place.  No pleural effusion.  No pneumothorax.  Chronic obstructive pulmonary disease.  Slight improvement in the right perihilar infiltrate.  Stable minimal cardiomegaly.  Old granulomatous disease.  The pulmonary vasculature is not increased.  No acute osseous abnormality.  Degenerative changes are present in the thoracic spine.      Impression:      CONCLUSION:  Right thoracotomy catheter remains in place.  No pleural effusion.  No pneumothorax.  Chronic obstructive pulmonary disease.  Slight improvement in the right perihilar infiltrate.  Stable minimal cardiomegaly.    [id number]    Electronically signed by:  Ermias Paz MD  2/3/2023 11:24 AM CST  Workstation: [id number]    XR Chest 1 View [[id number]] Collected: 01/31/23 1114     Updated: 01/31/23 1220    Narrative:      EXAM: Chest, 1 AP view  CLINICAL INDICATION: Respiratory failure    COMPARISON: Chest radiographs 1/29/2023    FINDINGS:    The supporting tubes and lines are unchanged.    There are stable right greater than left  interstitial opacities  redemonstrated. There is stable right hilar prominence.    There is no pleural effusion or large pneumothorax.  The cardiomediastinal silhouette is unchanged.    There is no acute or aggressive osseous lesion.      Impression:      CONCLUSION: Stable appearance of chest as above.    Electronically signed by:  Tyler Arevalo DO  1/31/2023 12:17 PM  CST Workstation: [id number]    XR Chest 1 View [[id number]] Collected: 01/29/23 0956     Updated: 01/29/23 1020    Narrative:        PROCEDURE: Single chest view portable    REASON FOR EXAM:Fluid, J96.21 Acute and chronic respiratory  failure with hypoxia R91.8 Other nonspecific abnormal finding of  lung field J18.9 Pneumonia, unspecified organism    FINDINGS: Comparison exam dated January 28, 2023. ET tube with  tip 6.2 cm above miko. NG tube with tip below level diaphragm.  Small caliber apparent right chest tube in place. No  pneumothorax. Mild cardiomegaly. Right upper lobe and right lung  base perihilar interstitial groundglass opacities. Lungs are  otherwise clear. Pleural spaces are normal. No acute osseous  abnormality.      Impression:      1.  Tubes as described above.  2.  Mild cardiomegaly.Right upper lobe and right lung base  perihilar interstitial groundglass opacities. The differential  for this finding would include atypical pulmonary edema versus  pneumonia.    Electronically signed by:  Brant Bacon MD  1/29/2023 10:18 AM CST  Workstation: [id number]    US Guided Vascular Access [[id number]] Collected: 01/28/23 1035     Updated: 01/28/23 1220    Narrative:      PROCEDURE: Ultrasound Guidance Vascular Access      Ordering physician(s): JABARI LANE    Clinical Indication: Venous access      Findings:    The vessel was sonographically evaluated and determined to be  patent. Concurrent realtime ultrasound was used to visualize  needle entry into the left cephalic vein and a permanent image  for permanent recording and reporting.          Impression:      Impression:     Please see above findings    Electronically signed by:  Brant Bacon MD  1/28/2023 12:18 PM CST  Workstation: [id number]    IR Insert Midline Without Port Pump 5 Plus [[id number]] Resulted: 01/28/23 1129     Updated: 01/28/23 1129    Narrative:      This procedure was auto-finalized with no dictation required.    XR Chest 1 View [[id number]] Collected: 01/28/23 0921     Updated: 01/28/23 0934    Narrative:          COMPARISON:     1/28/2023    INDICATION:     Intubation    FINDINGS:     Portable AP chest radiograph is obtained.   Endotracheal tube is seen with its tip 5.5 cm above the miko.   The cardiomediastinal silhouette appears stable size and  configuration.  The pulmonary vasculature is prominent.Large  right pleural effusion. This appears increased on the differences  are presumably positional. Probable smaller left effusion. Right  airspace consolidation. Bilateral interstitial opacities.      Impression:      1. Interval intubation.      2. Cardiomegaly. Central vascular congestion. Interstitial  opacities suggesting edema.      3. Large right pleural effusion. This appears increased although  the differences are likely positional. Right lung airspace  consolidation.      4. Probable smaller left effusion.    Electronically signed by:  Corbin Frankel MD  1/28/2023 9:32 AM CST  Workstation: [id number]    XR Chest 1 View [[id number]] Collected: 01/28/23 0837     Updated: 01/28/23 0859    Narrative:        PROCEDURE: Single chest view portable    REASON FOR EXAM:short of air, J96.21 Acute and chronic  respiratory failure with hypoxia R91.8 Other nonspecific abnormal  finding of lung field J18.9 Pneumonia, unspecified organism    FINDINGS: Cardiomegaly. Right upper lobe perihilar and right lung  base infrahilar moderate size interstitial groundglass opacity.  Lungs are otherwise clear. Small right pleural effusion.. No  acute osseous abnormality.      Impression:      1.   Small right pleural effusion.  2.  Right upper lobe perihilar and right lung base infrahilar  moderate size interstitial groundglass opacity. This would be  suspicious for atypical pulmonary edema versus pneumonia.  Recommend clinical correlation.    Electronically signed by:  Brant Bacon MD  1/28/2023 8:57 AM CST  Workstation: [id number]    CT Angiogram Chest [[id number]] Collected: 01/26/23 1716     Updated: 01/26/23 1825    Narrative:      EXAM:  CT CHEST ANGIOGRAPHY WITH IV CONTRAST    ORDERING PROVIDER:  ENRIQUE FELIX    CLINICAL HISTORY:  Chest pain, shortness of breath, hypoxia    COMPARISON:      TECHNIQUE:   Chest CT was performed using a high resolution pulmonary  angiogram protocol with 90ml of Isovue 370 contrast and  reformatted in the sagittal and coronal planes.     3-dimensional images also acquired with special processing of the  CT scan data with a specialized workstation for evaluation.    This examination was performed according to our departmental dose  optimization program which includes automated exposure control,  adjustment of the MA and kV according to patient size, and/or use  of iterative reconstruction technique.     FINDINGS:     LUNGS AND PLEURA: Moderate right pleural effusion, with loculated  component. Diffuse centrilobular emphysematous change of the lung  parenchyma. Multifocal air space processes more on the right  side, with coexisting moderate consolidation in the right lower  lobe, right middle lobe and to a lesser extent right upper lobe.  Mild left lung base consolidation.    HEART: Normal size and configuration. Trace amount of pericardial  effusion.     MEDIASTINUM AND RICKIE: Scattered significant adenopathy in the  mediastinum and right hilum and to a lesser extent in the left  hilum.     AORTA AND GREAT VESSELS: No aneurysm or dissection.     PULMONARY ARTERIES: No filling defects.     UPPER ABDOMEN: Unremarkable.    MUSCULOSKELETAL:  There is no aggressive osseous  lesion.  Unremarkable vertebral body height and alignment. No lytic or  sclerotic lesion.     EXTRATHORACIC SOFT TISSUES: Mildly prominent right axillary  adenopathy measuring 1.2 cm along the short axis. No mass.  Unremarkable supraclavicular soft tissues.       Impression:      There is no evidence of pulmonary embolism.  Moderate right pleural effusion, with loculated component.   Diffuse centrilobular emphysematous change of the lung  parenchyma.   Multifocal air space processes more on the right side, with  coexisting moderate consolidation in the right lower lobe, right  middle lobe and to a lesser extent right upper lobe. Mild left  lung base consolidation.  Trace amount of pericardial effusion.  Scattered significant adenopathy in the mediastinum and right  hilum and to a lesser extent in the left hilum.   Mildly prominent right axillary adenopathy measuring 1.2 cm along  the short axis.           Electronically signed by:  Severiano Soliz MD  1/26/2023 6:23 PM CST  Workstation: [id number]    XR Chest 1 View [[id number]] Collected: 01/26/23 1514     Updated: 01/26/23 1540    Narrative:      Chest x-ray single view.       CLINICAL INDICATION: Shortness of breath    COMPARISON: Chest January 17, 2023    FINDINGS: Cardiac silhouette is enlarged in size. Pulmonary  vascularity is increased.     Right-sided effusion and underlying infiltrative changes more  pronounced than on prior examination January 17, 2023.      Impression:      Cardiomegaly pulmonary vascular enlargement.  Increasing right-sided effusion and right lower lobe infiltrative  changes.    Electronically signed by:  Nico Vaca MD  1/26/2023 3:38 PM CST  Workstation: [id number]          Chief Complaint on Day of Discharge: weakess    Hospital Course:  The patient is a 72 y.o. male who presented to [facility] with Patient with metastatic lung cancer admitted to hospice after various discussions with oncologist, cardiothoracic surgery,  "pulmonology, and medical team.  Patient has become progressively weaker over the past week with life expectancy of days to weeks.  Patient and family have currently accepted comfort care measures patient subsequently admitted to inpatient hospice service..      Condition on Discharge:  fair    Physical Exam on Discharge:  /70 (BP Location: Right arm, Patient Position: Lying)   Pulse 104   Temp 97.3 °F (36.3 °C) (Temporal)   Resp 22   Ht 175.3 cm (69\")   Wt 85.1 kg (187 lb 9.6 oz)   SpO2 90%   BMI 27.70 kg/m²   Physical Exam  Physical Exam  Vitals and nursing note reviewed.   Constitutional:       General: He is not in acute distress.     Appearance: He is well-developed. He is obese. He is ill-appearing. He is not diaphoretic.   HENT:      Head: Normocephalic and atraumatic.   Eyes:      General: No scleral icterus.        Right eye: No discharge.         Left eye: No discharge.   Neck:      Thyroid: No thyromegaly.      Vascular: No carotid bruit or JVD.   Cardiovascular:      Rate and Rhythm: Normal rate and regular rhythm.      Heart sounds: Normal heart sounds. No murmur heard.    No friction rub. No gallop.   Pulmonary:      Effort: Pulmonary effort is normal. No respiratory distress.      Breath sounds: No stridor. Examination of the right-lower field reveals decreased breath sounds. Decreased breath sounds and rhonchi present. No wheezing or rales.   Chest:      Chest wall: No tenderness.   Abdominal:      General: Bowel sounds are normal. There is no distension.      Palpations: Abdomen is soft. There is no mass.      Tenderness: There is no abdominal tenderness. There is no right CVA tenderness, left CVA tenderness, guarding or rebound.      Hernia: No hernia is present.   Musculoskeletal:         General: No swelling, tenderness or deformity.      Cervical back: Neck supple.      Right lower leg: No edema.      Left lower leg: No edema.   Skin:     General: Skin is warm and dry.      " Coloration: Skin is not jaundiced or pale.      Findings: No bruising, erythema, lesion or rash.   Neurological:      General: No focal deficit present.      Mental Status: He is alert and oriented to person, place, and time.      Cranial Nerves: No cranial nerve deficit.      Sensory: No sensory deficit.      Motor: No weakness or abnormal muscle tone.      Coordination: Coordination normal.      Gait: Gait normal.      Deep Tendon Reflexes: Reflexes normal.      Comments: .   Psychiatric:         Mood and Affect: Mood normal.         Behavior: Behavior normal.         Thought Content: Thought content normal.         Judgment: Judgment normal.        Discharge Disposition:  Hospice/Medical Facility ([facility])    Discharge Medications:     Discharge Medications      Continue These Medications      Instructions Start Date   ONE TOUCH LANCETS misc   Use as directed test 6 times daily         ASK your doctor about these medications      Instructions Start Date   albuterol sulfate  (90 Base) MCG/ACT inhaler  Commonly known as: PROVENTIL HFA;VENTOLIN HFA;PROAIR HFA   2 puffs, Inhalation, Every 4 Hours PRN      albuterol (2.5 MG/3ML) 0.083% nebulizer solution  Commonly known as: PROVENTIL   2.5 mg, Nebulization, Every 4 Hours PRN      allopurinol 300 MG tablet  Commonly known as: ZYLOPRIM   No dose, route, or frequency recorded.      aspirin 81 MG EC tablet   81 mg, Oral, Daily, Last dose 1/8/18      cetirizine 10 MG tablet  Commonly known as: zyrTEC   10 mg, Oral, Daily      clopidogrel 75 MG tablet  Commonly known as: PLAVIX   75 mg, Oral, Daily      finasteride 5 MG tablet  Commonly known as: PROSCAR   5 mg, Oral, Nightly      FLUoxetine 20 MG capsule  Commonly known as: PROzac   20 mg, Oral, Daily      fluticasone 50 MCG/ACT nasal spray  Commonly known as: FLONASE   2 sprays, Nasal, Nightly PRN      glimepiride 2 MG tablet  Commonly known as: AMARYL   TAKE 1 TABLET BY MOUTH IN THE MORNING WITH  FIRST MEAL OF THE DAY      glucose blood test strip   One touch reli-on glucometer      guaiFENesin 600 MG 12 hr tablet  Commonly known as: MUCINEX   600 mg, Oral, 2 Times Daily      isosorbide mononitrate 30 MG 24 hr tablet  Commonly known as: Imdur   30 mg, Oral, Daily      levothyroxine 50 MCG tablet  Commonly known as: Synthroid   50 mcg, Oral, Daily      Mirabegron ER 50 MG tablet sustained-release 24 hour 24 hr tablet  Commonly known as: MYRBETRIQ   50 mg, Oral, Daily      nicotine 21 MG/24HR patch  Commonly known as: NICODERM CQ   1 patch, Transdermal, Every 24 Hours      nitroglycerin 0.4 MG SL tablet  Commonly known as: NITROSTAT   0.4 mg, Sublingual, Every 5 Minutes PRN      Potassium Citrate ER 15 MEQ (1620 MG) tablet controlled-release   2 tablets, Oral, Daily      tamsulosin 0.4 MG capsule 24 hr capsule  Commonly known as: FLOMAX   0.8 mg, Oral, Daily      traMADol 50 MG tablet  Commonly known as: ULTRAM   50 mg, Oral, Every 6 Hours PRN, for pain      traZODone 150 MG tablet  Commonly known as: DESYREL   150 mg, Oral, Nightly      vitamin B-12 1000 MCG tablet  Commonly known as: CYANOCOBALAMIN   1,000 mcg, Oral, Daily             Discharge Diet: Regular diet    Activity at Discharge: Per hospice comfort care measures    Discharge Care Plan/Instructions: Patient admitted to hospice for comfort care measures    Follow-up Appointments:   No future appointments.    Test Results Pending at Discharge:             Time: 2000

## 2023-02-13 NOTE — HOSPICE
The patient was not alert and oriented. There were no visible fall risks.    The patient's spouse, the patient's two daughters, and I met in the patient's hospital room where we discussed the role of hospice, family history, and the patient's disease process.    I offered , information, compassion, and a listening and spiritual presence.

## 2025-05-20 NOTE — NURSING NOTE
"This RN went to give pt his soap suds enema. Pt states \"can I eat first?\" Will continue to monitor.   "
Pain not controlled due to frequent med changes.   
Patient expressed pain by squeezing signee hand when asked if in pain. Not time for pain medication at this time. MD notified. Orders to follow.   
Patient received morphine at 0806. Effective until 1020. Morphine order is for q 4 hr prn. MD notified due to patient increase in pain and increased soa and not time for pain medication.  Orders received to increase pain med.   
Pt refused soap suds enema.  
Pt sitting up in chair. Spouse at bedside.   
Spoke to Dr. Jorje Blunt, hospitalist, regarding consult placed for palliative care.  Informed Dr. Blunt that I spoke to Dr. Velez regarding patient and patient is now on comfort measures with a life expectancy of days to a couple weeks.  Dr. Blunt was under the impression that palliative care and hospice were the same thing at this facility.  Dr. Blunt stated he would remove palliative care consult and order a hospice consult.  Palliative care consult not completed.  
negative...

## (undated) DEVICE — CONTAINER,SPECIMEN,OR STERILE,4OZ: Brand: MEDLINE

## (undated) DEVICE — SWAN-GANZ THERMODILUTION CATHETER: Brand: SWAN-GANZ

## (undated) DEVICE — GLV SURG NEOLON 2G PF LF 6.5 STRL

## (undated) DEVICE — PK CYSTO LF 60

## (undated) DEVICE — ARTERIAL NEEDLE: Brand: UNBRANDED

## (undated) DEVICE — SOL IRR H2O BTL 1000ML STRL

## (undated) DEVICE — GLV SURG NEOLON 2G PF LF 7.5 STRL

## (undated) DEVICE — PK CATH LAB 60

## (undated) DEVICE — CATH URETRL OPN/END 5F70CM

## (undated) DEVICE — SOL PVPI SPRY BETADINE 3OZ

## (undated) DEVICE — NITINOL WIRE WITH HYDROPHILIC TIP: Brand: SENSOR

## (undated) DEVICE — ENDOSCOPIC SEAL URO 1 SIZE FITS ALL: Brand: ENDOSCOPIC SEAL

## (undated) DEVICE — ELECTRODE,RT,STRESS,FOAM,50PK: Brand: MEDLINE

## (undated) DEVICE — DRAINBAG,ANTI-REFLUX TOWER,L/F,2000ML,LL: Brand: MEDLINE

## (undated) DEVICE — EVAC BLDR UROVAC W ADAPT

## (undated) DEVICE — GLV SURG TRIUMPH ORTHO W/ALOE PF LTX 7.5 STRL

## (undated) DEVICE — CATH URETRL OPEN END W/CONNECT 5F 70CM

## (undated) DEVICE — FIBR LASR FLEXIVA 365 HIPOWR 1P/U

## (undated) DEVICE — SOL IRR NACL 0.9PCT 3000ML

## (undated) DEVICE — GW PTFE FIX/CORE FLXTIP .038 3X150CM

## (undated) DEVICE — GLV SURG SENSICARE GREEN W/ALOE PF LF 6.5 STRL

## (undated) DEVICE — GLIDESHEATH BASIC HYDROPHILIC COATED INTRODUCER SHEATH: Brand: GLIDESHEATH

## (undated) DEVICE — CATH THERMODIL SWANGANZ 4LUM 6F 110CM

## (undated) DEVICE — URETERAL ACCESS SHEATH SET: Brand: NAVIGATOR HD

## (undated) DEVICE — Device

## (undated) DEVICE — SYS IRR PUMP SGL ACTN VAC SYR 10CC

## (undated) DEVICE — SAFESECURE,SECUREMENT,FOLEY CATH,STERILE: Brand: MEDLINE

## (undated) DEVICE — SOL IRRG H2O PL/BG 1000ML STRL

## (undated) DEVICE — MODEL AT P65, P/N 701554-001KIT CONTENTS: HAND CONTROLLER, 3-WAY HIGH-PRESSURE STOPCOCK WITH ROTATING END AND PREMIUM HIGH-PRESSURE TUBING: Brand: ANGIOTOUCH® KIT

## (undated) DEVICE — CATHETER,FOLEY,100%SILICONE,16FR,10ML,LF: Brand: MEDLINE

## (undated) DEVICE — MODEL BT2000 P/N 700287-012KIT CONTENTS: MANIFOLD WITH SALINE AND CONTRAST PORTS, SALINE TUBING WITH SPIKE AND HAND SYRINGE, TRANSDUCER: Brand: BT2000 AUTOMATED MANIFOLD KIT

## (undated) DEVICE — INTRO SHEATH ENGAGE W/50 GW .038 8F12

## (undated) DEVICE — GLV SURG TRIUMPH LT PF LTX 7 STRL